# Patient Record
Sex: MALE | Race: WHITE | NOT HISPANIC OR LATINO | Employment: OTHER | URBAN - METROPOLITAN AREA
[De-identification: names, ages, dates, MRNs, and addresses within clinical notes are randomized per-mention and may not be internally consistent; named-entity substitution may affect disease eponyms.]

---

## 2017-01-24 ENCOUNTER — ALLSCRIPTS OFFICE VISIT (OUTPATIENT)
Dept: OTHER | Facility: OTHER | Age: 57
End: 2017-01-24

## 2017-01-24 DIAGNOSIS — Z13.6 ENCOUNTER FOR SCREENING FOR CARDIOVASCULAR DISORDERS: ICD-10-CM

## 2017-01-24 DIAGNOSIS — G40.909 EPILEPSY WITHOUT STATUS EPILEPTICUS, NOT INTRACTABLE (HCC): ICD-10-CM

## 2017-01-24 DIAGNOSIS — M15.9 POLYOSTEOARTHRITIS: ICD-10-CM

## 2017-01-24 DIAGNOSIS — Z12.5 ENCOUNTER FOR SCREENING FOR MALIGNANT NEOPLASM OF PROSTATE: ICD-10-CM

## 2017-01-26 ENCOUNTER — APPOINTMENT (OUTPATIENT)
Dept: LAB | Facility: HOSPITAL | Age: 57
End: 2017-01-26
Attending: PSYCHIATRY & NEUROLOGY
Payer: COMMERCIAL

## 2017-01-26 ENCOUNTER — TRANSCRIBE ORDERS (OUTPATIENT)
Dept: LAB | Facility: HOSPITAL | Age: 57
End: 2017-01-26

## 2017-01-26 DIAGNOSIS — G40.909 EPILEPSY WITHOUT STATUS EPILEPTICUS, NOT INTRACTABLE (HCC): ICD-10-CM

## 2017-01-26 LAB
ALBUMIN SERPL BCP-MCNC: 4 G/DL (ref 3.5–5)
ALP SERPL-CCNC: 67 U/L (ref 46–116)
ALT SERPL W P-5'-P-CCNC: 32 U/L (ref 12–78)
ANION GAP SERPL CALCULATED.3IONS-SCNC: 9 MMOL/L (ref 4–13)
AST SERPL W P-5'-P-CCNC: 18 U/L (ref 5–45)
BASOPHILS # BLD AUTO: 0.04 THOUSANDS/ΜL (ref 0–0.1)
BASOPHILS NFR BLD AUTO: 1 % (ref 0–1)
BILIRUB SERPL-MCNC: 0.27 MG/DL (ref 0.2–1)
BUN SERPL-MCNC: 8 MG/DL (ref 5–25)
CALCIUM SERPL-MCNC: 8.4 MG/DL (ref 8.3–10.1)
CARBAMAZEPINE SERPL-MCNC: 12.6 UG/ML (ref 4–12)
CHLORIDE SERPL-SCNC: 97 MMOL/L (ref 100–108)
CO2 SERPL-SCNC: 28 MMOL/L (ref 21–32)
CREAT SERPL-MCNC: 0.69 MG/DL (ref 0.6–1.3)
EOSINOPHIL # BLD AUTO: 0.12 THOUSAND/ΜL (ref 0–0.61)
EOSINOPHIL NFR BLD AUTO: 2 % (ref 0–6)
ERYTHROCYTE [DISTWIDTH] IN BLOOD BY AUTOMATED COUNT: 11.4 % (ref 11.6–15.1)
GFR SERPL CREATININE-BSD FRML MDRD: >60 ML/MIN/1.73SQ M
GLUCOSE SERPL-MCNC: 70 MG/DL (ref 65–140)
HCT VFR BLD AUTO: 40.8 % (ref 36.5–49.3)
HGB BLD-MCNC: 14.3 G/DL (ref 12–17)
LYMPHOCYTES # BLD AUTO: 2.53 THOUSANDS/ΜL (ref 0.6–4.47)
LYMPHOCYTES NFR BLD AUTO: 39 % (ref 14–44)
MCH RBC QN AUTO: 33.5 PG (ref 26.8–34.3)
MCHC RBC AUTO-ENTMCNC: 35 G/DL (ref 31.4–37.4)
MCV RBC AUTO: 96 FL (ref 82–98)
MONOCYTES # BLD AUTO: 0.54 THOUSAND/ΜL (ref 0.17–1.22)
MONOCYTES NFR BLD AUTO: 8 % (ref 4–12)
NEUTROPHILS # BLD AUTO: 3.23 THOUSANDS/ΜL (ref 1.85–7.62)
NEUTS SEG NFR BLD AUTO: 50 % (ref 43–75)
NRBC BLD AUTO-RTO: 0 /100 WBCS
PLATELET # BLD AUTO: 236 THOUSANDS/UL (ref 149–390)
PMV BLD AUTO: 9.1 FL (ref 8.9–12.7)
POTASSIUM SERPL-SCNC: 4 MMOL/L (ref 3.5–5.3)
PROT SERPL-MCNC: 7.1 G/DL (ref 6.4–8.2)
RBC # BLD AUTO: 4.27 MILLION/UL (ref 3.88–5.62)
SODIUM SERPL-SCNC: 134 MMOL/L (ref 136–145)
WBC # BLD AUTO: 6.47 THOUSAND/UL (ref 4.31–10.16)

## 2017-01-26 PROCEDURE — 80156 ASSAY CARBAMAZEPINE TOTAL: CPT

## 2017-01-26 PROCEDURE — 36415 COLL VENOUS BLD VENIPUNCTURE: CPT

## 2017-01-26 PROCEDURE — 80053 COMPREHEN METABOLIC PANEL: CPT

## 2017-01-26 PROCEDURE — 85025 COMPLETE CBC W/AUTO DIFF WBC: CPT

## 2017-02-20 ENCOUNTER — TRANSCRIBE ORDERS (OUTPATIENT)
Dept: LAB | Facility: HOSPITAL | Age: 57
End: 2017-02-20

## 2017-02-20 ENCOUNTER — APPOINTMENT (OUTPATIENT)
Dept: LAB | Facility: HOSPITAL | Age: 57
End: 2017-02-20
Payer: COMMERCIAL

## 2017-02-20 ENCOUNTER — GENERIC CONVERSION - ENCOUNTER (OUTPATIENT)
Dept: OTHER | Facility: OTHER | Age: 57
End: 2017-02-20

## 2017-02-20 DIAGNOSIS — Z13.6 ENCOUNTER FOR SCREENING FOR CARDIOVASCULAR DISORDERS: ICD-10-CM

## 2017-02-20 DIAGNOSIS — G40.909 EPILEPSY WITHOUT STATUS EPILEPTICUS, NOT INTRACTABLE (HCC): ICD-10-CM

## 2017-02-20 DIAGNOSIS — M15.9 POLYOSTEOARTHRITIS: ICD-10-CM

## 2017-02-20 DIAGNOSIS — Z12.5 ENCOUNTER FOR SCREENING FOR MALIGNANT NEOPLASM OF PROSTATE: ICD-10-CM

## 2017-02-20 LAB
ALBUMIN SERPL BCP-MCNC: 3.9 G/DL (ref 3.5–5)
ALP SERPL-CCNC: 68 U/L (ref 46–116)
ALT SERPL W P-5'-P-CCNC: 32 U/L (ref 12–78)
ANION GAP SERPL CALCULATED.3IONS-SCNC: 8 MMOL/L (ref 4–13)
AST SERPL W P-5'-P-CCNC: 17 U/L (ref 5–45)
BASOPHILS # BLD AUTO: 0.03 THOUSANDS/ΜL (ref 0–0.1)
BASOPHILS NFR BLD AUTO: 1 % (ref 0–1)
BILIRUB SERPL-MCNC: 0.52 MG/DL (ref 0.2–1)
BUN SERPL-MCNC: 9 MG/DL (ref 5–25)
CALCIUM SERPL-MCNC: 8.4 MG/DL (ref 8.3–10.1)
CHLORIDE SERPL-SCNC: 101 MMOL/L (ref 100–108)
CHOLEST SERPL-MCNC: 210 MG/DL (ref 50–200)
CO2 SERPL-SCNC: 28 MMOL/L (ref 21–32)
CREAT SERPL-MCNC: 0.74 MG/DL (ref 0.6–1.3)
EOSINOPHIL # BLD AUTO: 0.09 THOUSAND/ΜL (ref 0–0.61)
EOSINOPHIL NFR BLD AUTO: 2 % (ref 0–6)
ERYTHROCYTE [DISTWIDTH] IN BLOOD BY AUTOMATED COUNT: 11.5 % (ref 11.6–15.1)
GFR SERPL CREATININE-BSD FRML MDRD: >60 ML/MIN/1.73SQ M
GLUCOSE SERPL-MCNC: 88 MG/DL (ref 65–140)
HCT VFR BLD AUTO: 42.9 % (ref 36.5–49.3)
HDLC SERPL-MCNC: 93 MG/DL (ref 40–60)
HGB BLD-MCNC: 15 G/DL (ref 12–17)
LDLC SERPL CALC-MCNC: 87 MG/DL (ref 0–100)
LYMPHOCYTES # BLD AUTO: 1.93 THOUSANDS/ΜL (ref 0.6–4.47)
LYMPHOCYTES NFR BLD AUTO: 32 % (ref 14–44)
MCH RBC QN AUTO: 34 PG (ref 26.8–34.3)
MCHC RBC AUTO-ENTMCNC: 35 G/DL (ref 31.4–37.4)
MCV RBC AUTO: 97 FL (ref 82–98)
MONOCYTES # BLD AUTO: 0.45 THOUSAND/ΜL (ref 0.17–1.22)
MONOCYTES NFR BLD AUTO: 8 % (ref 4–12)
NEUTROPHILS # BLD AUTO: 3.49 THOUSANDS/ΜL (ref 1.85–7.62)
NEUTS SEG NFR BLD AUTO: 57 % (ref 43–75)
NRBC BLD AUTO-RTO: 0 /100 WBCS
PLATELET # BLD AUTO: 236 THOUSANDS/UL (ref 149–390)
PMV BLD AUTO: 9.1 FL (ref 8.9–12.7)
POTASSIUM SERPL-SCNC: 4.1 MMOL/L (ref 3.5–5.3)
PROT SERPL-MCNC: 7.2 G/DL (ref 6.4–8.2)
PSA SERPL-MCNC: 0.6 NG/ML (ref 0–4)
RBC # BLD AUTO: 4.41 MILLION/UL (ref 3.88–5.62)
SODIUM SERPL-SCNC: 137 MMOL/L (ref 136–145)
TRIGL SERPL-MCNC: 149 MG/DL
TSH SERPL DL<=0.05 MIU/L-ACNC: 1.01 UIU/ML (ref 0.36–3.74)
WBC # BLD AUTO: 6 THOUSAND/UL (ref 4.31–10.16)

## 2017-02-20 PROCEDURE — 36415 COLL VENOUS BLD VENIPUNCTURE: CPT

## 2017-02-20 PROCEDURE — 80061 LIPID PANEL: CPT

## 2017-02-20 PROCEDURE — 84443 ASSAY THYROID STIM HORMONE: CPT

## 2017-02-20 PROCEDURE — G0103 PSA SCREENING: HCPCS

## 2017-02-20 PROCEDURE — 80053 COMPREHEN METABOLIC PANEL: CPT

## 2017-02-20 PROCEDURE — 85025 COMPLETE CBC W/AUTO DIFF WBC: CPT

## 2017-07-25 ENCOUNTER — ALLSCRIPTS OFFICE VISIT (OUTPATIENT)
Dept: OTHER | Facility: OTHER | Age: 57
End: 2017-07-25

## 2017-10-05 ENCOUNTER — ALLSCRIPTS OFFICE VISIT (OUTPATIENT)
Dept: OTHER | Facility: OTHER | Age: 57
End: 2017-10-05
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
## 2017-10-25 ENCOUNTER — ALLSCRIPTS OFFICE VISIT (OUTPATIENT)
Dept: OTHER | Facility: OTHER | Age: 57
End: 2017-10-25

## 2017-10-25 DIAGNOSIS — G40.909 EPILEPSY WITHOUT STATUS EPILEPTICUS, NOT INTRACTABLE (HCC): ICD-10-CM
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
## 2017-12-18 ENCOUNTER — ALLSCRIPTS OFFICE VISIT (OUTPATIENT)
Dept: OTHER | Facility: OTHER | Age: 57
End: 2017-12-18
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
## 2017-12-30 ENCOUNTER — APPOINTMENT (EMERGENCY)
Dept: RADIOLOGY | Facility: HOSPITAL | Age: 57
End: 2017-12-30
Payer: COMMERCIAL

## 2017-12-30 ENCOUNTER — HOSPITAL ENCOUNTER (EMERGENCY)
Facility: HOSPITAL | Age: 57
Discharge: HOME | End: 2017-12-30
Admitting: EMERGENCY MEDICINE
Payer: COMMERCIAL

## 2017-12-30 VITALS
SYSTOLIC BLOOD PRESSURE: 142 MMHG | HEART RATE: 92 BPM | RESPIRATION RATE: 18 BRPM | TEMPERATURE: 98.2 F | WEIGHT: 165 LBS | DIASTOLIC BLOOD PRESSURE: 85 MMHG | OXYGEN SATURATION: 99 %

## 2017-12-30 DIAGNOSIS — S52.021A CLOSED FRACTURE OF RIGHT OLECRANON PROCESS, INITIAL ENCOUNTER: Primary | ICD-10-CM

## 2017-12-30 PROCEDURE — 73080 X-RAY EXAM OF ELBOW: CPT

## 2017-12-30 PROCEDURE — 99283 EMERGENCY DEPT VISIT LOW MDM: CPT

## 2017-12-30 RX ORDER — LORAZEPAM 0.5 MG/1
0.5 TABLET ORAL EVERY 8 HOURS PRN
COMMUNITY
End: 2018-05-07 | Stop reason: SDUPTHER

## 2017-12-30 RX ORDER — MELATONIN
1000 DAILY
COMMUNITY
End: 2017-12-31 | Stop reason: ALTCHOICE

## 2017-12-30 RX ORDER — CARBAMAZEPINE 200 MG/1
TABLET ORAL
COMMUNITY
Start: 2015-06-11 | End: 2018-03-28 | Stop reason: SDUPTHER

## 2017-12-30 RX ORDER — DIPHENOXYLATE HYDROCHLORIDE AND ATROPINE SULFATE 2.5; .025 MG/1; MG/1
1 TABLET ORAL DAILY
COMMUNITY
End: 2017-12-31 | Stop reason: ALTCHOICE

## 2017-12-30 RX ORDER — SILDENAFIL 50 MG/1
50 TABLET, FILM COATED ORAL DAILY PRN
COMMUNITY
End: 2017-12-31 | Stop reason: ALTCHOICE

## 2017-12-30 RX ORDER — ASPIRIN 81 MG/1
81 TABLET ORAL DAILY
COMMUNITY
End: 2018-06-25 | Stop reason: ALTCHOICE

## 2017-12-30 NOTE — DISCHARGE INSTRUCTIONS
Elbow Fracture   WHAT YOU NEED TO KNOW:   An elbow fracture is a break in one or more of the 3 bones that form your elbow joint  An elbow fracture is often caused by an injury  An example is a fall onto an outstretched hand with a bent elbow  Osteoporosis (brittle bones) can increase your risk for an elbow fracture  DISCHARGE INSTRUCTIONS:   Return to the emergency department if:   · Your skin becomes swollen, cold, or pale  · Your elbow, hand, or fingers are numb  Contact your healthcare provider if:   · You have a fever  · The pain gets worse, even after you rest and take your medicine  · You have new or more trouble moving your arm  · You have new sores around the area of your brace, splint, or cast     · Your brace, splint, or cast becomes damaged  · You have questions or concerns about your condition or care  Medicines: You may need any of the following:  · Prescription pain medicine  may be given  Ask your healthcare provider how to take this medicine safely  Some prescription pain medicines contain acetaminophen  Do not take other medicines that contain acetaminophen without talking to your healthcare provider  Too much acetaminophen may cause liver damage  Prescription pain medicine may cause constipation  Ask your healthcare provider how to prevent or treat constipation  · NSAIDs , such as ibuprofen, help decrease swelling, pain, and fever  This medicine is available with or without a doctor's order  NSAIDs can cause stomach bleeding or kidney problems in certain people  If you take blood thinner medicine, always ask your healthcare provider if NSAIDs are safe for you  Always read the medicine label and follow directions  · Take your medicine as directed  Contact your healthcare provider if you think your medicine is not helping or if you have side effects  Tell him or her if you are allergic to any medicine  Keep a list of the medicines, vitamins, and herbs you take   Include the amounts, and when and why you take them  Bring the list or the pill bottles to follow-up visits  Carry your medicine list with you in case of an emergency  Self-care:   · Elevate your elbow  above the level of your heart as often as you can  This will help decrease swelling and pain  Prop your elbow on pillows or blankets to keep it elevated comfortably  While your elbow is elevated, wiggle your fingers and open and close them to prevent hand stiffness  · Apply ice  on your elbow on your elbow for 15 to 20 minutes every hour or as directed  Use an ice pack, or put crushed ice in a plastic bag  Cover it with a towel  Ice helps prevent tissue damage and decreases swelling and pain  · Go to physical therapy as directed  A physical therapist can teach you exercises to help improve movement and strength and to decrease pain  Care for your brace, cast, or splint:  Follow instructions about when you may take a bath or shower  It is important not to get your brace, cast, or splint wet  Cover your device with a plastic bag before you bathe  Tape the bag to your skin above the device to help keep out water  Hold your elbow away from the water in case the bag breaks  · Check the skin around your cast, brace, or splint daily for any redness or open skin  · Do not use a sharp or pointed object to scratch your skin under the cast, brace, or splint  · Do not remove your brace or splint unless directed  Follow up with your healthcare provider as directed: You may need to see a specialist  Prakashstew Janae may need more x-rays and treatment  Write down your questions so you remember to ask them during your visits  © 2017 [employer] [employer] Information is for End User's use only and may not be sold, redistributed or otherwise used for commercial purposes  All illustrations and images included in CareNotes® are the copyrighted property of A D A M [employer]  or Abdirizak Navas    The above information is an  only  It is not intended as medical advice for individual conditions or treatments  Talk to your doctor, nurse or pharmacist before following any medical regimen to see if it is safe and effective for you

## 2017-12-30 NOTE — ED NOTES
Pt comes out to nurses station and states "who do I talk to about medication  I need 200mg of Tegratol   I need 2 pills "      Berta Dailey, RN  12/30/17 6504

## 2017-12-30 NOTE — ED PROVIDER NOTES
History  Chief Complaint   Patient presents with    Elbow Injury     Patient reports falling in driveway while shoveling, injuring R elbow  This is a 77-year-old male, history of seizures, presents for evaluation of right elbow injury that happened earlier today  Patient reports that he was walking down his driveway, slipped and injured his elbow  Patient states her crackling sounds when he got up  States he has minimal pain however noticed a lot of swelling to the area  Patient has not taken anything for pain nor does he want anything for pain  Patient states that he is able to fully range his hand however has minimal pain with extension  Denies any fever, chills, numbness, tingling, radiation of pain  States that he was able to drive here  Patient is right-hand dominant  Prior to Admission Medications   Prescriptions Last Dose Informant Patient Reported? Taking? LORazepam (ATIVAN) 0 5 mg tablet   Yes Yes   Sig: Take 0 5 mg by mouth every 8 (eight) hours as needed for anxiety   aspirin (ECOTRIN LOW STRENGTH) 81 mg EC tablet   Yes Yes   Sig: Take 81 mg by mouth daily   carBAMazepine (TEGRETOL) 200 mg tablet   Yes Yes   Sig: Take by mouth   cholecalciferol (VITAMIN D3) 1,000 units tablet   Yes Yes   Sig: Take 1,000 Units by mouth daily   co-enzyme Q-10 30 MG capsule   Yes Yes   Sig: Take 100 mg by mouth 3 (three) times a day   multivitamin (THERAGRAN) TABS   Yes Yes   Sig: Take 1 tablet by mouth daily   sildenafil (VIAGRA) 50 MG tablet   Yes Yes   Sig: Take 50 mg by mouth daily as needed for erectile dysfunction      Facility-Administered Medications: None       Past Medical History:   Diagnosis Date    Seizures ([facility] [address] )        History reviewed  No pertinent surgical history  History reviewed  No pertinent family history  I have reviewed and agree with the history as documented      Social History   Substance Use Topics    Smoking status: Never Smoker    Smokeless tobacco: Never Used  Alcohol use Yes      Comment: occasional        Review of Systems   Constitutional: Negative for chills and fever  Gastrointestinal: Negative for nausea and vomiting  Musculoskeletal: Positive for arthralgias and joint swelling  Skin: Negative  Neurological: Negative for weakness and numbness  Physical Exam  ED Triage Vitals [12/30/17 1208]   Temperature Pulse Respirations Blood Pressure SpO2   98 2 °F (36 8 °C) 92 18 142/85 99 %      Temp Source Heart Rate Source Patient Position - Orthostatic VS BP Location FiO2 (%)   Oral Monitor Sitting Right arm --      Pain Score       3           Orthostatic Vital Signs  Vitals:    12/30/17 1208   BP: 142/85   Pulse: 92   Patient Position - Orthostatic VS: Sitting       Physical Exam   Constitutional: He is oriented to person, place, and time  He appears well-developed and well-nourished  He is cooperative  No distress  HENT:   Head: Normocephalic and atraumatic  Neck: Normal range of motion  Neck supple  Cardiovascular: Normal rate and normal heart sounds  No murmur heard  Pulmonary/Chest: Effort normal and breath sounds normal    Musculoskeletal: Normal range of motion  He exhibits tenderness and deformity  He exhibits no edema  Right elbow: He exhibits swelling and effusion  He exhibits normal range of motion, no deformity and no laceration  Tenderness found  Lateral epicondyle and olecranon process tenderness noted  No radial head and no medial epicondyle tenderness noted  Swelling noted over the olecranon, no erythema or ecchymosis  Full active range of motion, 5/5 strength  Radial pulse 2 +, capillary refill less than 2 seconds, sensation intact  Soft musculature  No signs of compartment syndrome  Neurological: He is alert and oriented to person, place, and time  Skin: Skin is warm  Capillary refill takes less than 2 seconds  No rash noted  He is not diaphoretic  No erythema  Psychiatric: He has a normal mood and affect  ED Medications  Medications - No data to display    Diagnostic Studies  Results Reviewed     None                 XR elbow 3+ views RIGHT    (Results Pending)              Procedures  Static Splint Application  Date/Time: 12/30/2017 2:00 PM  Performed by: Phil Adames  Authorized by: Phil Adames     Patient location:  ED  Procedure performed by emergency physician: No    Other Assisting Provider: Yes (comment) (ED nurse)    Consent:     Consent obtained:  Verbal    Consent given by:  Patient    Risks discussed:  Discoloration, numbness, pain and swelling  Universal protocol:     Patient identity confirmed:  Verbally with patient  Indication:     Indications: fracture    Pre-procedure details:     Sensation:  Normal  Procedure details:     Laterality:  Right    Location:  Elbow    Elbow:  R elbow    Splint type:  Long arm (Posterior long-arm splint)  Post-procedure details:     Pain:  Unchanged    Sensation:  Normal    Neurovascular Exam: skin pink      Patient tolerance of procedure: Tolerated well, no immediate complications           Phone Contacts  ED Phone Contact    ED Course  ED Course as of Dec 30 1423   Sat Dec 30, 2017   1357 Spoke with Ju Jewell, orthopedic resident, states to apply posterior splint keep hand at a 45-90 degree angle and apply sling  Follow up with Orthopedics in 1 week with Dr Yue Akins                                [facility]  Number of Diagnoses or Management Options  Closed fracture of right olecranon process, initial encounter:   Diagnosis management comments: This is a 66-year-old male presents for evaluation of right elbow injury after a fall earlier today  Patient is well-appearing, vital signs not concerning  X-ray results in fractures  Talked to Orthopedics on the phone who advised to place a posterior splint and have him follow up in 1 week  Return precautions given  Patient understands and agrees to plan      CritCare Time    Disposition  Final diagnoses:   Closed fracture of right olecranon process, initial encounter     Time reflects when diagnosis was documented in both MDM as applicable and the Disposition within this note     Time User Action Codes Description Comment    12/30/2017  1:58 PM Caden Johnson Add [K81 021A] Closed fracture of right olecranon process, initial encounter       ED Disposition     ED Disposition Condition Comment    Discharge  [address] discharge to home/self care  Condition at discharge: Good        Follow-up Information     Follow up With Specialties Details Why Contact Info    Ruperto Tovar MD Orthopedic Surgery Go in 1 week Make an appointment to see Dr Geovanni Haley in 1 week  [address]  [phone]          Patient's Medications   Discharge Prescriptions    No medications on file     No discharge procedures on file      ED Provider  Electronically Signed by           Chely Nair PA-C  12/30/17 4652

## 2017-12-31 ENCOUNTER — HOSPITAL ENCOUNTER (EMERGENCY)
Facility: HOSPITAL | Age: 57
Discharge: HOME | End: 2017-12-31
Attending: EMERGENCY MEDICINE | Admitting: EMERGENCY MEDICINE
Payer: COMMERCIAL

## 2017-12-31 VITALS
BODY MASS INDEX: 23.62 KG/M2 | OXYGEN SATURATION: 99 % | HEART RATE: 100 BPM | HEIGHT: 70 IN | RESPIRATION RATE: 18 BRPM | WEIGHT: 165 LBS | TEMPERATURE: 98 F | SYSTOLIC BLOOD PRESSURE: 151 MMHG | DIASTOLIC BLOOD PRESSURE: 72 MMHG

## 2017-12-31 DIAGNOSIS — S42.401A CLOSED FRACTURE OF RIGHT ELBOW: Primary | ICD-10-CM

## 2017-12-31 PROCEDURE — 99283 EMERGENCY DEPT VISIT LOW MDM: CPT

## 2017-12-31 NOTE — ED ATTENDING ATTESTATION
Carina Verdugo MD, saw and evaluated the patient  I have discussed the patient with the resident/non-physician practitioner and agree with the resident's/non-physician practitioner's findings, Plan of Care, and MDM as documented in the resident's/non-physician practitioner's note, except where noted  All available labs and Radiology studies were reviewed  At this point I agree with the current assessment done in the Emergency Department  I have conducted an independent evaluation of this patient a history and physical is as follows: The patient suffered an olecranon fracture yesterday he was seen in the ER    The patient was placed in a posterior  long-arm splint his complaint today is swelling of his hand he has no pain or numbness on exam the splint appears to be somewhat too tight at the wrist area there is some mild edema of the dorsal aspect of the hand pulses are normal sensation is normal motor strength intact the splint was taken down and a new splint was placed good capillary refill neurovascular status status post new splint application intact     Critical Care Time  CritCare Time    Procedures

## 2018-01-02 ENCOUNTER — GENERIC CONVERSION - ENCOUNTER (OUTPATIENT)
Dept: FAMILY MEDICINE CLINIC | Facility: CLINIC | Age: 58
End: 2018-01-02

## 2018-01-03 ENCOUNTER — APPOINTMENT (OUTPATIENT)
Dept: LAB | Facility: HOSPITAL | Age: 58
End: 2018-01-03
Attending: PSYCHIATRY & NEUROLOGY
Payer: COMMERCIAL

## 2018-01-03 ENCOUNTER — TRANSCRIBE ORDERS (OUTPATIENT)
Dept: LAB | Facility: HOSPITAL | Age: 58
End: 2018-01-03

## 2018-01-03 DIAGNOSIS — G40.909 EPILEPSY WITHOUT STATUS EPILEPTICUS, NOT INTRACTABLE (HCC): ICD-10-CM

## 2018-01-03 LAB
ALBUMIN SERPL BCP-MCNC: 3.9 G/DL (ref 3.5–5)
ALP SERPL-CCNC: 72 U/L (ref 46–116)
ALT SERPL W P-5'-P-CCNC: 30 U/L (ref 12–78)
ANION GAP SERPL CALCULATED.3IONS-SCNC: 5 MMOL/L (ref 4–13)
AST SERPL W P-5'-P-CCNC: 19 U/L (ref 5–45)
BASOPHILS # BLD AUTO: 0.04 THOUSANDS/ΜL (ref 0–0.1)
BASOPHILS NFR BLD AUTO: 1 % (ref 0–1)
BILIRUB SERPL-MCNC: 0.43 MG/DL (ref 0.2–1)
BUN SERPL-MCNC: 11 MG/DL (ref 5–25)
CALCIUM SERPL-MCNC: 9.1 MG/DL (ref 8.3–10.1)
CARBAMAZEPINE SERPL-MCNC: 13.9 UG/ML (ref 4–12)
CHLORIDE SERPL-SCNC: 104 MMOL/L (ref 100–108)
CO2 SERPL-SCNC: 29 MMOL/L (ref 21–32)
CREAT SERPL-MCNC: 0.69 MG/DL (ref 0.6–1.3)
EOSINOPHIL # BLD AUTO: 0.14 THOUSAND/ΜL (ref 0–0.61)
EOSINOPHIL NFR BLD AUTO: 2 % (ref 0–6)
ERYTHROCYTE [DISTWIDTH] IN BLOOD BY AUTOMATED COUNT: 11.5 % (ref 11.6–15.1)
GFR SERPL CREATININE-BSD FRML MDRD: 105 ML/MIN/1.73SQ M
GLUCOSE SERPL-MCNC: 62 MG/DL (ref 65–140)
HCT VFR BLD AUTO: 42.7 % (ref 36.5–49.3)
HGB BLD-MCNC: 14.7 G/DL (ref 12–17)
LYMPHOCYTES # BLD AUTO: 1.9 THOUSANDS/ΜL (ref 0.6–4.47)
LYMPHOCYTES NFR BLD AUTO: 23 % (ref 14–44)
MCH RBC QN AUTO: 33.9 PG (ref 26.8–34.3)
MCHC RBC AUTO-ENTMCNC: 34.4 G/DL (ref 31.4–37.4)
MCV RBC AUTO: 99 FL (ref 82–98)
MONOCYTES # BLD AUTO: 0.73 THOUSAND/ΜL (ref 0.17–1.22)
MONOCYTES NFR BLD AUTO: 9 % (ref 4–12)
NEUTROPHILS # BLD AUTO: 5.28 THOUSANDS/ΜL (ref 1.85–7.62)
NEUTS SEG NFR BLD AUTO: 65 % (ref 43–75)
NRBC BLD AUTO-RTO: 0 /100 WBCS
PLATELET # BLD AUTO: 276 THOUSANDS/UL (ref 149–390)
PMV BLD AUTO: 9.4 FL (ref 8.9–12.7)
POTASSIUM SERPL-SCNC: 3.9 MMOL/L (ref 3.5–5.3)
PROT SERPL-MCNC: 7.5 G/DL (ref 6.4–8.2)
RBC # BLD AUTO: 4.33 MILLION/UL (ref 3.88–5.62)
SODIUM SERPL-SCNC: 138 MMOL/L (ref 136–145)
WBC # BLD AUTO: 8.11 THOUSAND/UL (ref 4.31–10.16)

## 2018-01-03 PROCEDURE — 80156 ASSAY CARBAMAZEPINE TOTAL: CPT

## 2018-01-03 PROCEDURE — 36415 COLL VENOUS BLD VENIPUNCTURE: CPT

## 2018-01-03 PROCEDURE — 85025 COMPLETE CBC W/AUTO DIFF WBC: CPT

## 2018-01-03 PROCEDURE — 80053 COMPREHEN METABOLIC PANEL: CPT

## 2018-01-04 DIAGNOSIS — R25.1 TREMOR: ICD-10-CM

## 2018-01-08 ENCOUNTER — GENERIC CONVERSION - ENCOUNTER (OUTPATIENT)
Dept: FAMILY MEDICINE CLINIC | Facility: CLINIC | Age: 58
End: 2018-01-08

## 2018-01-13 VITALS
WEIGHT: 164 LBS | TEMPERATURE: 97.3 F | BODY MASS INDEX: 22.96 KG/M2 | HEIGHT: 71 IN | HEART RATE: 80 BPM | SYSTOLIC BLOOD PRESSURE: 120 MMHG | DIASTOLIC BLOOD PRESSURE: 90 MMHG | RESPIRATION RATE: 16 BRPM

## 2018-01-13 VITALS
HEIGHT: 71 IN | HEART RATE: 88 BPM | BODY MASS INDEX: 22.54 KG/M2 | WEIGHT: 161 LBS | DIASTOLIC BLOOD PRESSURE: 74 MMHG | RESPIRATION RATE: 16 BRPM | SYSTOLIC BLOOD PRESSURE: 126 MMHG

## 2018-01-14 VITALS
BODY MASS INDEX: 23.71 KG/M2 | HEART RATE: 76 BPM | HEIGHT: 71 IN | SYSTOLIC BLOOD PRESSURE: 120 MMHG | DIASTOLIC BLOOD PRESSURE: 82 MMHG | WEIGHT: 169.38 LBS | RESPIRATION RATE: 16 BRPM | TEMPERATURE: 98.5 F

## 2018-01-14 VITALS
HEART RATE: 92 BPM | WEIGHT: 161 LBS | DIASTOLIC BLOOD PRESSURE: 90 MMHG | SYSTOLIC BLOOD PRESSURE: 134 MMHG | TEMPERATURE: 97.9 F | HEIGHT: 71 IN | RESPIRATION RATE: 16 BRPM | BODY MASS INDEX: 22.54 KG/M2

## 2018-01-15 DIAGNOSIS — M15.9 POLYOSTEOARTHRITIS: ICD-10-CM

## 2018-01-15 DIAGNOSIS — Z13.6 ENCOUNTER FOR SCREENING FOR CARDIOVASCULAR DISORDERS: ICD-10-CM

## 2018-01-15 DIAGNOSIS — Z12.5 ENCOUNTER FOR SCREENING FOR MALIGNANT NEOPLASM OF PROSTATE: ICD-10-CM

## 2018-01-15 DIAGNOSIS — G40.909 EPILEPSY WITHOUT STATUS EPILEPTICUS, NOT INTRACTABLE (HCC): ICD-10-CM

## 2018-01-18 NOTE — RESULT NOTES
Message   Call patient  Blood work - WNL  Verified Results  (1) CBC/PLT/DIFF [id number] 09:18AM Cammy Pugh   TW Order Number: [id number]     Test Name Result Flag Reference   WBC COUNT 6 00 Thousand/uL  4 31-10 16   RBC COUNT 4 41 Million/uL  3 88-5 62   HEMOGLOBIN 15 0 g/dL  12 0-17 0   HEMATOCRIT 42 9 %  36 5-49 3   MCV 97 fL  82-98   MCH 34 0 pg  26 8-34 3   MCHC 35 0 g/dL  31 4-37 4   RDW 11 5 % L 11 6-15 1   MPV 9 1 fL  8 9-12 7   PLATELET COUNT 081 Thousands/uL  149-390   nRBC AUTOMATED 0 /100 WBCs     NEUTROPHILS RELATIVE PERCENT 57 %  43-75   LYMPHOCYTES RELATIVE PERCENT 32 %  14-44   MONOCYTES RELATIVE PERCENT 8 %  4-12   EOSINOPHILS RELATIVE PERCENT 2 %  0-6   BASOPHILS RELATIVE PERCENT 1 %  0-1   NEUTROPHILS ABSOLUTE COUNT 3 49 Thousands/? ??L  1 85-7 62   LYMPHOCYTES ABSOLUTE COUNT 1 93 Thousands/? ??L  0 60-4 47   MONOCYTES ABSOLUTE COUNT 0 45 Thousand/? ??L  0 17-1 22   EOSINOPHILS ABSOLUTE COUNT 0 09 Thousand/? ??L  0 00-0 61   BASOPHILS ABSOLUTE COUNT 0 03 Thousands/? ??L  0 00-0 10   - Patient Instructions: This bloodwork is non-fasting  Please drink two glasses of water morning of bloodwork  - Patient Instructions: This bloodwork is non-fasting  Please drink two glasses of water morning of bloodwork  (1) COMPREHENSIVE METABOLIC PANEL [id number] 52:93MR Cammy Pugh   TW Order Number: [id number]     Test Name Result Flag Reference   GLUCOSE,RANDM 88 mg/dL     If the patient is fasting, the ADA then defines impaired fasting glucose as > 100 mg/dL and diabetes as > or equal to 123 mg/dL     SODIUM 137 mmol/L  136-145   POTASSIUM 4 1 mmol/L  3 5-5 3   CHLORIDE 101 mmol/L  100-108   CARBON DIOXIDE 28 mmol/L  21-32   ANION GAP (CALC) 8 mmol/L  4-13   BLOOD UREA NITROGEN 9 mg/dL  5-25   CREATININE 0 74 mg/dL  0 60-1 30   Standardized to IDMS reference method   CALCIUM 8 4 mg/dL  8 3-10 1   BILI, TOTAL 0 52 mg/dL  0 20-1 00   ALK PHOSPHATAS 68 U/L   ALT (SGPT) 32 U/L  12-78   AST(SGOT) 17 U/L  5-45   ALBUMIN 3 9 g/dL  3 5-5 0   TOTAL PROTEIN 7 2 g/dL  6 4-8 2   eGFR Non-African American      >60 0 ml/min/1 73sq m   - Patient Instructions: This is a fasting blood test  Water,black tea or black  coffee only after 9:00pm the night before test Drink 2 glasses of water the morning of test - Patient Instructions: This bloodwork is non-fasting  Please drink two glasses of   water morning of bloodwork  National Kidney Disease Education Program recommendations are as follows:  GFR calculation is accurate only with a steady state creatinine  Chronic Kidney disease less than 60 ml/min/1 73 sq  meters  Kidney failure less than 15 ml/min/1 73 sq  meters  (1) TSH 20Feb2017 09:18AM Arash Goff   TW Order Number: [id number]     Test Name Result Flag Reference   TSH 1 010 uIU/mL  0 358-3 740   - Patient Instructions: This bloodwork is non-fasting  Please drink two glasses of water morning of bloodwork  - Patient Instructions: This is a fasting blood test  Water,black tea or black  coffee only after 9:00pm the night before test Drink 2 glasses of water the morning of test - Patient Instructions: This bloodwork is non-fasting  Please drink two glasses of   water morning of bloodwork  Patients undergoing fluorescein dye angiography may retain small amounts of fluorescein in the body for 48-72 hours post procedure  Samples containing fluorescein can produce falsely depressed TSH values  If the patient had this procedure,a specimen should be resubmitted post fluorescein clearance  (1) LIPID PANEL, FASTING 20Feb2017 09:18AM Arash EASLEY Order Number: [id number]     Test Name Result Flag Reference   CHOLESTEROL 210 mg/dL H    HDL,DIRECT 93 mg/dL H 40-60   Specimen collection should occur prior to Metamizole administration due to the potential for falsely depressed results     LDL CHOLESTEROL CALCULATED 87 mg/dL  0-100   - Patient Instructions: This is a fasting blood test  Water,black tea or black  coffee only after 9:00pm the night before test   Drink 2 glasses of water the morning of test     - Patient Instructions: This is a fasting blood test  Water,black tea or black  coffee only after 9:00pm the night before test Drink 2 glasses of water the morning of test - Patient Instructions: This bloodwork is non-fasting  Please drink two glasses of   water morning of bloodwork  Triglyceride:         Normal              <150 mg/dl       Borderline High    150-199 mg/dl       High               200-499 mg/dl       Very High          >499 mg/dl  Cholesterol:         Desirable        <200 mg/dl      Borderline High  200-239 mg/dl      High             >239 mg/dl  HDL Cholesterol:        High    >59 mg/dL      Low     <41 mg/dL  LDL CALCULATED:    This screening LDL is a calculated result  It does not have the accuracy of the Direct Measured LDL in the monitoring of patients with hyperlipidemia and/or statin therapy  Direct Measure LDL (ELC444) must be ordered separately in these patients  TRIGLYCERIDES 149 mg/dL  <=150   Specimen collection should occur prior to N-Acetylcysteine or Metamizole administration due to the potential for falsely depressed results  (1) PSA (SCREEN) (Dx V76 44 Screen for Prostate Cancer) [id number] 09:18AM Jesusitabeth Rohan    Order Number: [id number]     Test Name Result Flag Reference   PROSTATE SPECIFIC ANTIGEN 0 6 ng/mL  0 0-4 0   American Urological Association Guidelines define biochemical recurrence of prostate cancer as a detectable or rising PSA value post-radical prostatectomy that is greater than or equal to 0 2 ng/mL with a second confirmatory level of greater than or equal to 0 2 ng/mL  - Patient Instructions: This test is non-fasting  Please drink two glasses of water morning of bloodwork  - Patient Instructions: This test is non-fasting   Please drink two glasses of water morning of bloodwork

## 2018-01-22 ENCOUNTER — GENERIC CONVERSION - ENCOUNTER (OUTPATIENT)
Dept: OTHER | Facility: OTHER | Age: 58
End: 2018-01-22

## 2018-01-23 VITALS
HEART RATE: 80 BPM | DIASTOLIC BLOOD PRESSURE: 88 MMHG | SYSTOLIC BLOOD PRESSURE: 130 MMHG | BODY MASS INDEX: 23.1 KG/M2 | RESPIRATION RATE: 16 BRPM | WEIGHT: 165 LBS | TEMPERATURE: 97 F | HEIGHT: 71 IN

## 2018-01-23 NOTE — MISCELLANEOUS
Message  Return to work or school:   Hugo Garay is under my professional care  He was seen in my office on 12/18/2017   He is able to return to work on  12/21/2017       ERNA Solsi /sybil        Signatures   Electronically signed by : ERNA Solis ; Dec 20 2017 12:04PM EST                       (Author)

## 2018-02-28 ENCOUNTER — CONSULT (OUTPATIENT)
Dept: UROLOGY | Facility: AMBULATORY SURGERY CENTER | Age: 58
End: 2018-02-28
Payer: COMMERCIAL

## 2018-02-28 VITALS
DIASTOLIC BLOOD PRESSURE: 80 MMHG | HEIGHT: 71 IN | SYSTOLIC BLOOD PRESSURE: 140 MMHG | WEIGHT: 168.2 LBS | BODY MASS INDEX: 23.55 KG/M2 | HEART RATE: 80 BPM

## 2018-02-28 DIAGNOSIS — N52.9 ORGANIC IMPOTENCE: Primary | ICD-10-CM

## 2018-02-28 PROCEDURE — 99204 OFFICE O/P NEW MOD 45 MIN: CPT | Performed by: UROLOGY

## 2018-02-28 NOTE — PROGRESS NOTES
Assessment/Plan:    Organic impotence  Tri Mix was ordered to a compound pharmacy for the patient  He will get the medication and come back for Tri Mix teaching  We briefly discussed other treatment options including penile prosthesis  Diagnoses and all orders for this visit:    Organic impotence          Total visit time was 60 minutes of which over 50% was spent on counseling  Subjective:     Patient ID: Fidel Coffman is a 62 y o  male    22-year-old male presents for evaluation of erectile dysfunction  The patient has epilepsy and is on large doses of Tegretol  He states ever being on that medication he has had problems with erections  He states his erections are best in the morning when the medicine is almost out of his system  He has problems both obtaining and maintaining erections  He has tried Cialis and Viagra  He has also tried Tri Mix gel which is squirted into the urethra  None of these options have worked well for him  He gets occasional morning erections  He denies any abnormal curvature or pain with erections  He has no other complaints  The following portions of the patient's history were reviewed and updated as appropriate: allergies, current medications, past family history, past medical history, past social history, past surgical history and problem list     Review of Systems   Constitutional: Negative  HENT: Negative  Eyes: Negative  Respiratory: Negative  Cardiovascular: Negative  Gastrointestinal: Negative  Endocrine: Negative  Genitourinary:        As noted per HPI   Musculoskeletal: Negative  Skin: Negative  Allergic/Immunologic: Negative  Neurological: Negative  Hematological: Negative  Psychiatric/Behavioral: Negative  AUA SYMPTOM SCORE    Flowsheet Row Most Recent Value   AUA SYMPTOM SCORE   How often have you had a sensation of not emptying your bladder completely after you finished urinating?   0   How often have you had to urinate again less than two hours after you finished urinating? 0   How often have you found you stopped and started again several times when you urinate?  0   How often have you found it difficult to postpone urination? 0   How often have you had a weak urinary stream?  0   How often have you had to push or strain to begin urination? 0   How many times did you most typically get up to urinate from the time you went to bed at night until the time you got up in the morning?  0   Quality of Life: If you were to spend the rest of your life with your urinary condition just the way it is now, how would you feel about that?  1   AUA SYMPTOM SCORE  0          Urinary Incontinence Screening    Flowsheet Row Most Recent Value   Urinary Incontinence   Urinary Incontinence? No   Incomplete emptying? No   Urinary frequency? No   Urinary urgency? No   Urinary hesitancy? No   Dysuria (painful difficult urination)? No   Nocturia (waking up to use the bathroom)? No   Straining (having to push to go)? No   Weak stream?  No   Intermittent stream?  No   Post void dribbling? Yes          Objective:    Physical Exam   Constitutional: He is oriented to person, place, and time  He appears well-developed and well-nourished  Neck: Normal range of motion  Cardiovascular: Intact distal pulses  Pulmonary/Chest: Effort normal    Abdominal: Soft  Bowel sounds are normal  He exhibits no distension and no mass  There is no tenderness  There is no rebound and no guarding  Musculoskeletal: Normal range of motion  Neurological: He is alert and oriented to person, place, and time  Skin: Skin is warm and dry  Psychiatric: He has a normal mood and affect  Vitals reviewed          Results  Lab Results   Component Value Date    PSA 0 6 02/20/2017    PSA 0 7 06/03/2016    PSA 0 8 12/04/2014     Lab Results   Component Value Date    GLUCOSE 62 (L) 01/03/2018    CALCIUM 9 1 01/03/2018     01/03/2018    K 3 9 01/03/2018    CO2 29 01/03/2018     01/03/2018    BUN 11 01/03/2018    CREATININE 0 69 01/03/2018     Lab Results   Component Value Date    WBC 8 11 01/03/2018    HGB 14 7 01/03/2018    HCT 42 7 01/03/2018    MCV 99 (H) 01/03/2018     01/03/2018       No results found for this or any previous visit (from the past 1 hour(s)) ]

## 2018-02-28 NOTE — LETTER
February 28, 2018     Ivan Drummond, [address]    Patient: Alyssa Carpenter   YOB: 1960   Date of Visit: 2/28/2018       Dear Dr Ruth Castellano:    Thank you for referring Alyssa Carpenter to me for evaluation  Below are my notes for this consultation  If you have questions, please do not hesitate to call me  I look forward to following your patient along with you  Sincerely,        Jason Desai MD        CC: No Recipients  Jason Desai MD  2/28/2018  3:15 PM  Sign at close encounter  Assessment/Plan:    Organic impotence  Tri Mix was ordered to a compound pharmacy for the patient  He will get the medication and come back for Tri Mix teaching  We briefly discussed other treatment options including penile prosthesis  Diagnoses and all orders for this visit:    Organic impotence          Total visit time was 60 minutes of which over 50% was spent on counseling  Subjective:     Patient ID: Alyssa Carpenter is a 62 y o  male    54-year-old male presents for evaluation of erectile dysfunction  The patient has epilepsy and is on large doses of Tegretol  He states ever being on that medication he has had problems with erections  He states his erections are best in the morning when the medicine is almost out of his system  He has problems both obtaining and maintaining erections  He has tried Cialis and Viagra  He has also tried Tri Mix gel which is squirted into the urethra  None of these options have worked well for him  He gets occasional morning erections  He denies any abnormal curvature or pain with erections  He has no other complaints  The following portions of the patient's history were reviewed and updated as appropriate: allergies, current medications, past family history, past medical history, past social history, past surgical history and problem list     Review of Systems   Constitutional: Negative      HENT: Negative  Eyes: Negative  Respiratory: Negative  Cardiovascular: Negative  Gastrointestinal: Negative  Endocrine: Negative  Genitourinary:        As noted per HPI   Musculoskeletal: Negative  Skin: Negative  Allergic/Immunologic: Negative  Neurological: Negative  Hematological: Negative  Psychiatric/Behavioral: Negative  AUA SYMPTOM SCORE    Flowsheet Row Most Recent Value   AUA SYMPTOM SCORE   How often have you had a sensation of not emptying your bladder completely after you finished urinating? 0   How often have you had to urinate again less than two hours after you finished urinating? 0   How often have you found you stopped and started again several times when you urinate?  0   How often have you found it difficult to postpone urination? 0   How often have you had a weak urinary stream?  0   How often have you had to push or strain to begin urination? 0   How many times did you most typically get up to urinate from the time you went to bed at night until the time you got up in the morning?  0   Quality of Life: If you were to spend the rest of your life with your urinary condition just the way it is now, how would you feel about that?  1   AUA SYMPTOM SCORE  0          Urinary Incontinence Screening    Flowsheet Row Most Recent Value   Urinary Incontinence   Urinary Incontinence? No   Incomplete emptying? No   Urinary frequency? No   Urinary urgency? No   Urinary hesitancy? No   Dysuria (painful difficult urination)? No   Nocturia (waking up to use the bathroom)? No   Straining (having to push to go)? No   Weak stream?  No   Intermittent stream?  No   Post void dribbling? Yes          Objective:    Physical Exam   Constitutional: He is oriented to person, place, and time  He appears well-developed and well-nourished  Neck: Normal range of motion  Cardiovascular: Intact distal pulses  Pulmonary/Chest: Effort normal    Abdominal: Soft   Bowel sounds are normal  He exhibits no distension and no mass  There is no tenderness  There is no rebound and no guarding  Musculoskeletal: Normal range of motion  Neurological: He is alert and oriented to person, place, and time  Skin: Skin is warm and dry  Psychiatric: He has a normal mood and affect  Vitals reviewed          Results  Lab Results   Component Value Date    PSA 0 6 02/20/2017    PSA 0 7 06/03/2016    PSA 0 8 12/04/2014     Lab Results   Component Value Date    GLUCOSE 62 (L) 01/03/2018    CALCIUM 9 1 01/03/2018     01/03/2018    K 3 9 01/03/2018    CO2 29 01/03/2018     01/03/2018    BUN 11 01/03/2018    CREATININE 0 69 01/03/2018     Lab Results   Component Value Date    WBC 8 11 01/03/2018    HGB 14 7 01/03/2018    HCT 42 7 01/03/2018    MCV 99 (H) 01/03/2018     01/03/2018       No results found for this or any previous visit (from the past 1 hour(s)) ]

## 2018-02-28 NOTE — ASSESSMENT & PLAN NOTE
Tri Mix was ordered to a compound pharmacy for the patient  He will get the medication and come back for Tri Mix teaching  We briefly discussed other treatment options including penile prosthesis

## 2018-03-16 ENCOUNTER — TELEPHONE (OUTPATIENT)
Dept: NEUROLOGY | Facility: CLINIC | Age: 58
End: 2018-03-16

## 2018-03-16 DIAGNOSIS — G40.209 PARTIAL SYMPTOMATIC EPILEPSY WITH COMPLEX PARTIAL SEIZURES, NOT INTRACTABLE, WITHOUT STATUS EPILEPTICUS (HCC): ICD-10-CM

## 2018-03-16 DIAGNOSIS — G40.119 POORLY CONTROLLED PARTIAL SEIZURES (HCC): Primary | ICD-10-CM

## 2018-03-16 NOTE — TELEPHONE ENCOUNTER
Patient stopped by the office to report he is on Tegretol 200mg 1 tab 8 times daily, which has been working well, but it has been costing him $500  He reports he had discussed potentially going back on the generic  He would like to go back to the generic but also wants to take lorazepam with this do avoid any issue with the transition  He would like your opinion regarding this

## 2018-03-16 NOTE — TELEPHONE ENCOUNTER
Ativan is not used this way , only for auras    If he wants to go to generic , can increase the dose of Tegretol to 9 daily but no way to predict his tolerance and potential seizure control or side effects     If he agrees , can erx the script

## 2018-03-28 RX ORDER — CARBAMAZEPINE 200 MG/1
TABLET ORAL
Qty: 270 TABLET | Refills: 5 | Status: SHIPPED | OUTPATIENT
Start: 2018-03-28 | End: 2018-09-21 | Stop reason: SDUPTHER

## 2018-03-28 NOTE — TELEPHONE ENCOUNTER
Called and spoke to pt and advised him of the below  He states he would like generic and to take 9 daily  He will let us know if he cannot tolerate or has any side effects  He states he paid for a 1 month supply of Tegretol (brand) already because he was running out  He would like new rx sent to [facility] for generic and he will  next month

## 2018-04-04 ENCOUNTER — TELEPHONE (OUTPATIENT)
Dept: FAMILY MEDICINE CLINIC | Facility: CLINIC | Age: 58
End: 2018-04-04

## 2018-04-04 NOTE — TELEPHONE ENCOUNTER
Patient was seen by urologist for ED  Recommend to call [facility] to discuss semen analysis

## 2018-04-20 ENCOUNTER — APPOINTMENT (EMERGENCY)
Dept: RADIOLOGY | Facility: HOSPITAL | Age: 58
End: 2018-04-20
Payer: COMMERCIAL

## 2018-04-20 ENCOUNTER — HOSPITAL ENCOUNTER (EMERGENCY)
Facility: HOSPITAL | Age: 58
Discharge: HOME | End: 2018-04-20
Attending: EMERGENCY MEDICINE | Admitting: EMERGENCY MEDICINE
Payer: COMMERCIAL

## 2018-04-20 VITALS
DIASTOLIC BLOOD PRESSURE: 73 MMHG | TEMPERATURE: 98.4 F | HEART RATE: 85 BPM | RESPIRATION RATE: 18 BRPM | SYSTOLIC BLOOD PRESSURE: 120 MMHG | OXYGEN SATURATION: 97 %

## 2018-04-20 DIAGNOSIS — G40.919 BREAKTHROUGH SEIZURE (HCC): Primary | ICD-10-CM

## 2018-04-20 LAB
ALBUMIN SERPL BCP-MCNC: 3.6 G/DL (ref 3.5–5)
ALP SERPL-CCNC: 74 U/L (ref 46–116)
ALT SERPL W P-5'-P-CCNC: 30 U/L (ref 12–78)
AMPHETAMINES SERPL QL SCN: NEGATIVE
ANION GAP SERPL CALCULATED.3IONS-SCNC: 12 MMOL/L (ref 4–13)
AST SERPL W P-5'-P-CCNC: 43 U/L (ref 5–45)
BACTERIA UR QL AUTO: ABNORMAL /HPF
BACTERIA UR QL AUTO: NORMAL /HPF
BARBITURATES UR QL: NEGATIVE
BASOPHILS # BLD AUTO: 0.02 THOUSANDS/ΜL (ref 0–0.1)
BASOPHILS NFR BLD AUTO: 0 % (ref 0–1)
BENZODIAZ UR QL: NEGATIVE
BILIRUB SERPL-MCNC: 0.34 MG/DL (ref 0.2–1)
BILIRUB UR QL STRIP: NEGATIVE
BILIRUB UR QL STRIP: NEGATIVE
BUN SERPL-MCNC: 13 MG/DL (ref 5–25)
CALCIUM SERPL-MCNC: 8.1 MG/DL (ref 8.3–10.1)
CARBAMAZEPINE SERPL-MCNC: 7.4 UG/ML (ref 4–12)
CHLORIDE SERPL-SCNC: 107 MMOL/L (ref 100–108)
CLARITY UR: CLEAR
CLARITY UR: CLEAR
CO2 SERPL-SCNC: 18 MMOL/L (ref 21–32)
COCAINE UR QL: NEGATIVE
COLOR UR: YELLOW
COLOR UR: YELLOW
CREAT SERPL-MCNC: 0.95 MG/DL (ref 0.6–1.3)
EOSINOPHIL # BLD AUTO: 0.02 THOUSAND/ΜL (ref 0–0.61)
EOSINOPHIL NFR BLD AUTO: 0 % (ref 0–6)
ERYTHROCYTE [DISTWIDTH] IN BLOOD BY AUTOMATED COUNT: 11.8 % (ref 11.6–15.1)
GFR SERPL CREATININE-BSD FRML MDRD: 88 ML/MIN/1.73SQ M
GLUCOSE SERPL-MCNC: 143 MG/DL (ref 65–140)
GLUCOSE UR STRIP-MCNC: NEGATIVE MG/DL
GLUCOSE UR STRIP-MCNC: NEGATIVE MG/DL
HCT VFR BLD AUTO: 42.9 % (ref 36.5–49.3)
HGB BLD-MCNC: 14.6 G/DL (ref 12–17)
HGB UR QL STRIP.AUTO: NEGATIVE
HGB UR QL STRIP.AUTO: NEGATIVE
HYALINE CASTS #/AREA URNS LPF: ABNORMAL /LPF
HYALINE CASTS #/AREA URNS LPF: NORMAL /LPF
KETONES UR STRIP-MCNC: NEGATIVE MG/DL
KETONES UR STRIP-MCNC: NEGATIVE MG/DL
LEUKOCYTE ESTERASE UR QL STRIP: NEGATIVE
LEUKOCYTE ESTERASE UR QL STRIP: NEGATIVE
LYMPHOCYTES # BLD AUTO: 1.12 THOUSANDS/ΜL (ref 0.6–4.47)
LYMPHOCYTES NFR BLD AUTO: 10 % (ref 14–44)
MCH RBC QN AUTO: 33 PG (ref 26.8–34.3)
MCHC RBC AUTO-ENTMCNC: 34 G/DL (ref 31.4–37.4)
MCV RBC AUTO: 97 FL (ref 82–98)
METHADONE UR QL: NEGATIVE
MONOCYTES # BLD AUTO: 0.46 THOUSAND/ΜL (ref 0.17–1.22)
MONOCYTES NFR BLD AUTO: 4 % (ref 4–12)
NEUTROPHILS # BLD AUTO: 9.83 THOUSANDS/ΜL (ref 1.85–7.62)
NEUTS SEG NFR BLD AUTO: 86 % (ref 43–75)
NITRITE UR QL STRIP: NEGATIVE
NITRITE UR QL STRIP: NEGATIVE
NON-SQ EPI CELLS URNS QL MICRO: ABNORMAL /HPF
NON-SQ EPI CELLS URNS QL MICRO: NORMAL /HPF
NRBC BLD AUTO-RTO: 0 /100 WBCS
OPIATES UR QL SCN: NEGATIVE
PCP UR QL: NEGATIVE
PH UR STRIP.AUTO: 5 [PH] (ref 4.5–8)
PH UR STRIP.AUTO: 5 [PH] (ref 4.5–8)
PLATELET # BLD AUTO: 219 THOUSANDS/UL (ref 149–390)
PMV BLD AUTO: 8.9 FL (ref 8.9–12.7)
POTASSIUM SERPL-SCNC: 4.3 MMOL/L (ref 3.5–5.3)
PROT SERPL-MCNC: 6.9 G/DL (ref 6.4–8.2)
PROT UR STRIP-MCNC: ABNORMAL MG/DL
PROT UR STRIP-MCNC: ABNORMAL MG/DL
RBC # BLD AUTO: 4.42 MILLION/UL (ref 3.88–5.62)
RBC #/AREA URNS AUTO: ABNORMAL /HPF
RBC #/AREA URNS AUTO: NORMAL /HPF
SODIUM SERPL-SCNC: 137 MMOL/L (ref 136–145)
SP GR UR STRIP.AUTO: 1.01 (ref 1–1.03)
SP GR UR STRIP.AUTO: 1.02 (ref 1–1.03)
THC UR QL: NEGATIVE
UROBILINOGEN UR QL STRIP.AUTO: 0.2 E.U./DL
UROBILINOGEN UR QL STRIP.AUTO: 0.2 E.U./DL
WBC # BLD AUTO: 11.47 THOUSAND/UL (ref 4.31–10.16)
WBC #/AREA URNS AUTO: ABNORMAL /HPF
WBC #/AREA URNS AUTO: NORMAL /HPF

## 2018-04-20 PROCEDURE — 80307 DRUG TEST PRSMV CHEM ANLYZR: CPT | Performed by: EMERGENCY MEDICINE

## 2018-04-20 PROCEDURE — 99284 EMERGENCY DEPT VISIT MOD MDM: CPT

## 2018-04-20 PROCEDURE — 80156 ASSAY CARBAMAZEPINE TOTAL: CPT | Performed by: EMERGENCY MEDICINE

## 2018-04-20 PROCEDURE — 71046 X-RAY EXAM CHEST 2 VIEWS: CPT

## 2018-04-20 PROCEDURE — 36415 COLL VENOUS BLD VENIPUNCTURE: CPT | Performed by: EMERGENCY MEDICINE

## 2018-04-20 PROCEDURE — 81001 URINALYSIS AUTO W/SCOPE: CPT

## 2018-04-20 PROCEDURE — 81001 URINALYSIS AUTO W/SCOPE: CPT | Performed by: EMERGENCY MEDICINE

## 2018-04-20 PROCEDURE — 85025 COMPLETE CBC W/AUTO DIFF WBC: CPT | Performed by: EMERGENCY MEDICINE

## 2018-04-20 PROCEDURE — 80053 COMPREHEN METABOLIC PANEL: CPT | Performed by: EMERGENCY MEDICINE

## 2018-04-20 PROCEDURE — 96361 HYDRATE IV INFUSION ADD-ON: CPT

## 2018-04-20 PROCEDURE — 96374 THER/PROPH/DIAG INJ IV PUSH: CPT

## 2018-04-20 RX ORDER — CARBAMAZEPINE 200 MG/1
200 TABLET ORAL 3 TIMES DAILY
Status: DISCONTINUED | OUTPATIENT
Start: 2018-04-20 | End: 2018-04-21 | Stop reason: HOSPADM

## 2018-04-20 RX ORDER — CARBAMAZEPINE 200 MG/1
200 TABLET ORAL ONCE
Status: COMPLETED | OUTPATIENT
Start: 2018-04-20 | End: 2018-04-20

## 2018-04-20 RX ORDER — LORAZEPAM 2 MG/ML
0.5 INJECTION INTRAMUSCULAR ONCE
Status: COMPLETED | OUTPATIENT
Start: 2018-04-20 | End: 2018-04-20

## 2018-04-20 RX ORDER — ACETAMINOPHEN 325 MG/1
650 TABLET ORAL ONCE
Status: COMPLETED | OUTPATIENT
Start: 2018-04-20 | End: 2018-04-20

## 2018-04-20 RX ADMIN — SODIUM CHLORIDE 1000 ML: 0.9 INJECTION, SOLUTION INTRAVENOUS at 19:34

## 2018-04-20 RX ADMIN — ACETAMINOPHEN 650 MG: 325 TABLET, FILM COATED ORAL at 21:37

## 2018-04-20 RX ADMIN — CARBAMAZEPINE 200 MG: 200 TABLET ORAL at 21:08

## 2018-04-20 RX ADMIN — LORAZEPAM 0.5 MG: 2 INJECTION INTRAMUSCULAR; INTRAVENOUS at 19:39

## 2018-04-20 NOTE — ED ATTENDING ATTESTATION
Rashaun Proctor MD, saw and evaluated the patient  I have discussed the patient with the resident/non-physician practitioner and agree with the resident's/non-physician practitioner's findings, Plan of Care, and MDM as documented in the resident's/non-physician practitioner's note, except where noted  All available labs and Radiology studies were reviewed  At this point I agree with the current assessment done in the Emergency Department  I have conducted an independent evaluation of this patient a history and physical is as follows:  OA: 63 y/o m with h/o seizure disorder on tegretol who presents s/p a witnessed tonic-clonic seizure today  ~ 15 minutes post-ictal period  No abortive medications given  Pt was sleeping in bed after work, was then talking on the phone with his girlfriend when seizure occurred  Pt admits to increased stressors and ocassional noncompliance with medications  Last two seizures two years ago  He had typical aura preceding seizure  No tongue biting or incontinence  Denies recent illness/cough/fevers/fall/trauma  PE, well developed m in NAD, mildly tachycardic on my exam with , PERRL, no nystagmus, neck supple/FROM, RR-mild tachycardia, regular, no murmurs, lungs CTAB, -w/r, abd soft, NT/ND, +BS, -r/g, - LE edema/calf ttp, + 2 distal pulses and capillary refill < 2 sec, TOPETE, symmetric face, clear speech, AAOx3  A/p seizure, check tegretol level, u/a, pt requesting meds, discuss with neuro  Pt's girlfriend pulled me aside to state the pt has not been sleeping well and stressed in regard to her moving       Critical Care Time  CritCare Time    Procedures

## 2018-04-20 NOTE — ED RE-EVALUATION NOTE
Pt requesting his dinner-time tegretol that he normally takes at 6pm  States that he did not have a chance to take it since he had his seizure and was brought here to the ED  States he takes 200mg of tegretol 4x/day  This dose was confirmed in previous records       Amelia Stephenson MD  04/23/18 [id number]

## 2018-04-21 NOTE — DISCHARGE INSTRUCTIONS
Epilepsy   WHAT YOU NEED TO KNOW:   Epilepsy is a brain disorder that causes seizures  It is also called a seizure disorder  A seizure means an abnormal area in your brain sometimes sends bursts of electrical activity  A seizure may start in one part of your brain, or both sides may be affected  Depending on the type of seizure, you may have movements you cannot control, lose consciousness, or stare straight ahead  You may be confused or tired after the seizure  A seizure may last a few seconds or longer than 5 minutes  A birth defect, tumor, stroke, dementia, injury, or infection may cause epilepsy  The cause of your epilepsy may not be known  If your seizures are not controlled, epilepsy may become life-threatening  DISCHARGE INSTRUCTIONS:   Call 911 for any of the following:   · Your seizure lasts longer than 5 minutes  · You have trouble breathing after a seizure  · You have diabetes or are pregnant and have a seizure  · You have a seizure in water, such as a swimming pool or bathtub  Seek care immediately if:   · You have a second seizure within 24 hours of the first      · You are injured during a seizure  Contact your healthcare provider if:   · You feel you are not able to cope with your condition  · Your seizures start to happen more often  · You are confused longer than usual after a seizure  · You are planning to get pregnant or are currently pregnant  · You have questions or concerns about your condition or care  Medicines:   · Antiseizure medicine  may control or prevent another seizure  Do not stop taking this medicine  Another person may need to give you rescue medicine to stop a seizure at home  Ask your healthcare provider for more information about rescue medicine  · Take your medicine as directed  Contact your healthcare provider if you think your medicine is not helping or if you have side effects  Tell him of her if you are allergic to any medicine   Keep a list of the medicines, vitamins, and herbs you take  Include the amounts, and when and why you take them  Bring the list or the pill bottles to follow-up visits  Carry your medicine list with you in case of an emergency  Follow up with your neurologist as directed: You may need tests to check the level of antiseizure medicine in your blood  Your neurologist may need to change or adjust your medicine  Write down your questions so you remember to ask them during your visits  What you can do to prevent a seizure: You may not be able to prevent every seizure  The following can help you manage triggers that may make a seizure start:  · Take your medicine every day at the same time  This will also help prevent medicine side effects  Set an alarm to help remind you to take your medicine every day  · Manage stress  Stress can be a trigger for epilepsy  Exercise can help you reduce stress  Talk to your healthcare provider about exercise that is safe for you  Illness can be a form of stress  Eat a variety of healthy foods and drink plenty of liquids during an illness  Talk to your healthcare provider about other ways to manage stress  · Set a regular sleep schedule  A lack of sleep can trigger a seizure  Try to go to sleep and wake up at the same time every day  Keep your bedroom quiet and dark  Talk to your healthcare provider if you are having trouble sleeping  · Limit or do not drink alcohol as directed  Alcohol can trigger a seizure, especially if you drink a large amount at one time  A drink of alcohol is 12 ounces of beer, 1½ ounces of liquor, or 5 ounces of wine  Talk to your healthcare provider about a safe amount of alcohol for you  Your provider may recommend that you do not drink any alcohol  Tell him or her if you need help to quit drinking  What you can do to manage epilepsy:   · Keep a seizure diary  This can help you find your triggers and avoid them   Write down the dates of your seizures, where you were, and what you were doing  Include how you felt before and after  Possible triggers include illness, lack of sleep, hormonal changes, alcohol, drugs, lights, or stress  · Record any auras you have before a seizure  An aura is a sign that you are about to have a seizure  Auras happen before certain types of seizures that are in only 1 part of the brain  The aura may happen seconds before a seizure, or up to an hour before  You may feel, see, hear, or smell something  Examples include part of your body becoming hot  You may see a flash of light or hear something  You may have anxiety or déjà vu  If you have an aura, include it in your seizure diary  · Create a care plan  Tell family, friends, and coworkers about your epilepsy  Give them instructions that tell them how they can keep you safe if you have a seizure  · Find support  You may be referred to a psychologist or   Ask your healthcare provider about support groups for people with epilepsy  · Ask what safety precautions you should take  Talk with your healthcare provider about driving  You may not be able to drive until you are seizure-free for a period of time  You will need to check the law where you live  Also talk to your healthcare provider about swimming and bathing  You may drown or develop life-threatening heart or lung damage if you have a seizure in water  · Carry medical alert identification  Wear medical alert jewelry or carry a card that says you have epilepsy  Ask your healthcare provider where to get these items  How others can keep you safe during a seizure:  Give the following instructions to family, friends, and coworkers:  · Do not panic  · Do not hold me down or put anything in my mouth  · Gently guide me to the floor or a soft surface  · Place me on my side to help prevent me from swallowing saliva or vomit  · Protect me from injury   Remove sharp or hard objects from the area surrounding me, or cushion my head  · Loosen the clothing around my head and neck  · Time how long my seizure lasts  Call 911 if my seizure lasts longer than 5 minutes or if I have a second seizure  · Stay with me until my seizure ends  Let me rest until I am fully awake  · Perform CPR if I stop breathing or you cannot feel my pulse  · Do not give me anything to eat or drink until I am fully awake  © 2017 [facility] Information is for End User's use only and may not be sold, redistributed or otherwise used for commercial purposes  All illustrations and images included in CareNotes® are the copyrighted property of [employer] A [employer]  or [employer]  The above information is an  only  It is not intended as medical advice for individual conditions or treatments  Talk to your doctor, nurse or pharmacist before following any medical regimen to see if it is safe and effective for you

## 2018-04-21 NOTE — ED PROVIDER NOTES
History  Chief Complaint   Patient presents with    Seizure - Prior Hx Of     pt with hx seizures, only other known seizure 2 ys ago  pt missed his daily dose ativan and had witnessed tonic clonic seizure by mother  Pt post-ictal 15 min per EMS  pt has no complaints at this time and is appropriate  59-year-old male with a history of a seizure disorder, for which he takes Ativan and Tegretol, presents status post witnessed tonic-clonic seizure  The patient has been seizure-free for last 2 years on his medication  He states that he thinks he forgot his lunchtime dose of Tegretol, then after returning from work, went to sleep for a nap which she normally takes afterward  He was found by his mother in the middle of the tonic-clonic seizure while he was in bed, and then was noted to have about a 15 minutes episode of confusion, which was also witnessed by EMS  Patient is currently back to baseline, has no acute complaints  On further questioning, it appears that the patient frequently for gets his lunchtime dose of Tegretol  He has also been having increased stress over the last several weeks, associated with helping his girlfriend move  He denies any fevers, chills, recent upper respiratory infection, cough, sputum production, chest pain, nausea, vomiting, diarrhea, shortness of breath, abdominal pain, urinary symptoms  He has no recent travel, recent sick contacts, recent hospitalizations, recent surgery  He denies any recent alcohol use, and states the occasionally does drink approximately 1-2 alcoholic drinks with his girlfriend on special occasions  He denies any drug use  He denies any increased thirst, or increased water intake recently  Prior to Admission Medications   Prescriptions Last Dose Informant Patient Reported? Taking?    Calcium Carb-Cholecalciferol (CALCIUM 500 +D PO) 4/20/2018 at Unknown time Self Yes Yes   Sig: Take by mouth 2 tablets daily   LORazepam (ATIVAN) 0 5 mg tablet Past Week at Unknown time Self Yes Yes   Sig: Take 0 5 mg by mouth every 8 (eight) hours as needed for anxiety   Vitamin Mixture (VITAMIN E COMPLETE PO) 2018 at Unknown time Self Yes Yes   Sig: Take by mouth   aspirin (ECOTRIN LOW STRENGTH) 81 mg EC tablet Past Month at Unknown time Self Yes Yes   Sig: Take 81 mg by mouth daily   carBAMazepine (TEGRETOL) 200 mg tablet 2018 at Unknown time  No Yes   Si pills po daily   Patient taking differently: 400 mg 4 (four) times a day     co-enzyme Q-10 30 MG capsule 2018 at Unknown time Self Yes Yes   Sig: Take 100 mg by mouth 3 (three) times a day      Facility-Administered Medications: None       Past Medical History:   Diagnosis Date    Seizures ([facility] [address] )        No past surgical history on file  Family History   Problem Relation Age of Onset    Family history unknown: Yes     I have reviewed and agree with the history as documented  Social History   Substance Use Topics    Smoking status: Never Smoker    Smokeless tobacco: Never Used    Alcohol use Yes      Comment: occasional        Review of Systems   Constitutional: Negative for activity change, chills, fever and unexpected weight change  HENT: Negative for congestion and sinus pain  Eyes: Negative for photophobia and visual disturbance  Respiratory: Negative for cough and shortness of breath  Cardiovascular: Negative for chest pain and palpitations  Gastrointestinal: Negative for abdominal pain, diarrhea, nausea and vomiting  Endocrine: Negative for polyphagia and polyuria  Genitourinary: Negative for dysuria  Musculoskeletal: Negative for neck pain and neck stiffness  Skin: Negative for pallor and rash  Neurological: Positive for seizures  Negative for dizziness, syncope, speech difficulty, weakness, light-headedness and headaches  Psychiatric/Behavioral: The patient is nervous/anxious          Physical Exam  ED Triage Vitals   Temperature Pulse Respirations Blood Pressure SpO2   04/20/18 1904 04/20/18 1900 04/20/18 1900 04/20/18 1900 04/20/18 1900   98 4 °F (36 9 °C) (!) 120 18 111/66 97 %      Temp Source Heart Rate Source Patient Position - Orthostatic VS BP Location FiO2 (%)   04/20/18 1904 04/20/18 1900 04/20/18 1900 04/20/18 1900 --   Oral Monitor Lying Right arm       Pain Score       04/20/18 1900       No Pain           Orthostatic Vital Signs  Vitals:    04/20/18 2043 04/20/18 2139 04/20/18 2226 04/20/18 2348   BP: 111/80 114/76 120/76 120/73   Pulse: 94 93 94 85   Patient Position - Orthostatic VS: Lying Lying Lying Lying       Physical Exam   Constitutional: He is oriented to person, place, and time  Awake alert, well-appearing, no acute distress   HENT:   Head: Normocephalic  Mouth/Throat: Oropharynx is clear and moist  No oropharyngeal exudate  No tongue lacerations, oral lacerations  Eyes: Pupils are equal, round, and reactive to light  No scleral icterus  Neck: Normal range of motion  No JVD present  Cardiovascular: Regular rhythm and normal heart sounds  Tachycardic   Pulmonary/Chest: Effort normal  No respiratory distress  He has no wheezes  He has no rales  Abdominal: Soft  He exhibits no distension  There is no tenderness  Musculoskeletal: Normal range of motion  He exhibits no edema  Neurological: He is alert and oriented to person, place, and time  He exhibits normal muscle tone  Strength is 5/5 in all extremities bilaterally  Sensation grossly intact in all extremities  No dysmetria with finger-to-nose or heel-to-shin testing  Gait is normal    Skin: Skin is warm and dry  No rash noted         ED Medications  Medications   carBAMazepine (TEGretol) tablet 200 mg (200 mg Oral Not Given 4/20/18 2139)   sodium chloride 0 9 % bolus 1,000 mL (0 mL Intravenous Stopped 4/20/18 2052)   LORazepam (ATIVAN) 2 mg/mL injection 0 5 mg (0 5 mg Intravenous Given 4/20/18 1939)   carBAMazepine (TEGretol) tablet 200 mg (200 mg Oral Given 4/20/18 2108)   acetaminophen (TYLENOL) tablet 650 mg (650 mg Oral Given 4/20/18 2137)       Diagnostic Studies  Results Reviewed     Procedure Component Value Units Date/Time    Rapid drug screen, urine [[id number]]  (Normal) Collected:  04/20/18 2221    Lab Status:  Final result Specimen:  Urine from Urine, Clean Catch Updated:  04/20/18 2355     Amph/Meth UR Negative     Barbiturate Ur Negative     Benzodiazepine Urine Negative     Cocaine Urine Negative     Methadone Urine Negative     Opiate Urine Negative     PCP Ur Negative     THC Urine Negative    Narrative:         FOR MEDICAL PURPOSES ONLY  IF CONFIRMATION NEEDED PLEASE CONTACT THE LAB WITHIN 5 DAYS      Drug Screen Cutoff Levels:  AMPHETAMINE/METHAMPHETAMINES  1000 ng/mL  BARBITURATES     200 ng/mL  BENZODIAZEPINES     200 ng/mL  COCAINE      300 ng/mL  METHADONE      300 ng/mL  OPIATES      300 ng/mL  PHENCYCLIDINE     25 ng/mL  THC       50 ng/mL    Urine Microscopic [[id number]]  (Abnormal) Collected:  04/20/18 2222    Lab Status:  Final result Specimen:  Urine from Urine, Clean Catch Updated:  04/20/18 2242     RBC, UA None Seen /hpf      WBC, UA 2-4 (A) /hpf      Epithelial Cells None Seen /hpf      Bacteria, UA None Seen /hpf      Hyaline Casts, UA None Seen /lpf     Urine Microscopic [[id number]]  (Normal) Collected:  04/20/18 2221    Lab Status:  Final result Specimen:  Urine from Urine, Clean Catch Updated:  04/20/18 2238     RBC, UA None Seen /hpf      WBC, UA None Seen /hpf      Epithelial Cells None Seen /hpf      Bacteria, UA None Seen /hpf      Hyaline Casts, UA None Seen /lpf     UA w Reflex to Microscopic w Reflex to Culture [[id number]]  (Abnormal) Collected:  04/20/18 2221    Lab Status:  Final result Specimen:  Urine from Urine, Clean Catch Updated:  04/20/18 2236     Color, UA Yellow     Clarity, UA Clear     Specific Gravity, UA 1 014     pH, UA 5 0     Leukocytes, UA Negative     Nitrite, UA Negative     Protein, UA 30 (1+) (A) mg/dl      Glucose, UA Negative mg/dl      Ketones, UA Negative mg/dl      Urobilinogen, UA 0 2 E U /dl      Bilirubin, UA Negative     Blood, UA Negative    ED Urine Macroscopic [[id number]]  (Abnormal) Collected:  04/20/18 2222    Lab Status:  Final result Specimen:  Urine Updated:  04/20/18 2219     Color, UA Yellow     Clarity, UA Clear     pH, UA 5 0     Leukocytes, UA Negative     Nitrite, UA Negative     Protein, UA 30 (1+) (A) mg/dl      Glucose, UA Negative mg/dl      Ketones, UA Negative mg/dl      Urobilinogen, UA 0 2 E U /dl      Bilirubin, UA Negative     Blood, UA Negative     Specific Gravity, UA 1 025    Narrative:       CLINITEK RESULT    Comprehensive metabolic panel [[id number]]  (Abnormal) Collected:  04/20/18 1931    Lab Status:  Final result Specimen:  Blood from Arm, Left Updated:  04/20/18 2037     Sodium 137 mmol/L      Potassium 4 3 mmol/L      Chloride 107 mmol/L      CO2 18 (L) mmol/L      Anion Gap 12 mmol/L      BUN 13 mg/dL      Creatinine 0 95 mg/dL      Glucose 143 (H) mg/dL      Calcium 8 1 (L) mg/dL      AST 43 U/L      ALT 30 U/L      Alkaline Phosphatase 74 U/L      Total Protein 6 9 g/dL      Albumin 3 6 g/dL      Total Bilirubin 0 34 mg/dL      eGFR 88 ml/min/1 73sq m     Narrative:         National Kidney Disease Education Program recommendations are as follows:  GFR calculation is accurate only with a steady state creatinine  Chronic Kidney disease less than 60 ml/min/1 73 sq  meters  Kidney failure less than 15 ml/min/1 73 sq  meters      Carbamazepine level, total [[id number]]  (Normal) Collected:  04/20/18 1931    Lab Status:  Final result Specimen:  Blood from Arm, Left Updated:  04/20/18 2037     Carbamazepine Lvl 7 4 ug/mL     CBC and differential [[id number]]  (Abnormal) Collected:  04/20/18 1931    Lab Status:  Final result Specimen:  Blood from Arm, Left Updated:  04/20/18 1942     WBC 11 47 (H) Thousand/uL      RBC 4 42 Million/uL      Hemoglobin 14 6 g/dL      Hematocrit 42 9 %      MCV 97 fL MCH 33 0 pg      MCHC 34 0 g/dL      RDW 11 8 %      MPV 8 9 fL      Platelets 378 Thousands/uL      nRBC 0 /100 WBCs      Neutrophils Relative 86 (H) %      Lymphocytes Relative 10 (L) %      Monocytes Relative 4 %      Eosinophils Relative 0 %      Basophils Relative 0 %      Neutrophils Absolute 9 83 (H) Thousands/µL      Lymphocytes Absolute 1 12 Thousands/µL      Monocytes Absolute 0 46 Thousand/µL      Eosinophils Absolute 0 02 Thousand/µL      Basophils Absolute 0 02 Thousands/µL                  XR chest 2 views   Final Result by Aurea Montano MD (04/20 2029)      No acute cardiopulmonary disease  Workstation performed: [id number]               Procedures  Procedures      Phone Consults  ED Phone Contact    ED Course  ED Course                                MDM  Number of Diagnoses or Management Options  Breakthrough seizure [facility]):   Diagnosis management comments:     ASSESSMENT AND PLAN    Tanner Suarez is a 62 y o  male with a history of a known seizure disorder, for which she takes daily Ativan, and q i d  Tegretol, presents status post a breakthrough seizure in the setting of recent stressors and anxiety related to hoping his girlfriend move, as well as possible missed doses of his Tegretol  he is currently tachycardic, but otherwise hemodynamically stable, and appears nontoxic  His physical exam is unremarkable  -Tegretol level was drawn, and found to be therapeutic, however lower than his previous readings   -lab work unremarkable, except for mild leukocytosis, which could be explained by his recent seizure   -chest x-ray unremarkable   -urinalysis significant for mild proteinuria, mild leukouria  Otherwise unremarkable  Rapid drug screen was negative, however of note the patient Ativan every day, and that was also negative for benzodiazepines, so possibly this isn't an accurate result    However, I do not believe a positive drug screen would changed acute management at this time   -patient was given a dose of Ativan for anxiety  He was also given his dinner time dose of Tegretol, which he states he missed due to coming to the hospital   -breakthrough seizure likely secondary to recent stressors, decreased sleep which may have lowered his seizure threshold  He denies alcohol or drug use, so I do not believe this is playing a factor  Also, his Tegretol level was normal  I discussed this case with the epileptologist on call, who recommended no acute interventions at this time, but to schedule an appointment within the next 2 weeks with his neurologist for possible medication adjustments to be determined at that time   -due to his recent seizure, and incomplete workup at this time, I did revoked his 's license due to his breakthrough seizure  I discussed this with the patient, he endorses that he cannot drive  I submitted the appropriate paperwork to the [address] [place] [address] [employer]   -the patient will be discharged home  Strict return precautions were provided  He will follow up with his neurologist, and I instructed him to call their office on Monday to schedule an appointment  The patient, as well as his girlfriend was present in the room, endorse understanding and agreement with this plan  CritCare Time    Disposition  Final diagnoses:   Breakthrough seizure ([address] )     Time reflects when diagnosis was documented in both MDM as applicable and the Disposition within this note     Time User Action Codes Description Comment    4/20/2018 11:47 PM Yonny Weber Add [G40 919] Breakthrough seizure [facility])       ED Disposition     ED Disposition Condition Comment    Discharge  [address] discharge to home/self care      Condition at discharge: Good        Follow-up Information     Follow up With Specialties Details Why [address] BOYLE MD [address]      Mayito Martinez DO Neurology Schedule an appointment as soon as possible for a visit  [address] [facility]  [phone]          Discharge Medication List as of 4/20/2018 11:48 PM      CONTINUE these medications which have NOT CHANGED    Details   aspirin (ECOTRIN LOW STRENGTH) 81 mg EC tablet Take 81 mg by mouth daily, Historical Med      Calcium Carb-Cholecalciferol (CALCIUM 500 +D PO) Take by mouth 2 tablets daily, Historical Med      carBAMazepine (TEGRETOL) 200 mg tablet 9 pills po daily, Normal      co-enzyme Q-10 30 MG capsule Take 100 mg by mouth 3 (three) times a day, Historical Med      LORazepam (ATIVAN) 0 5 mg tablet Take 0 5 mg by mouth every 8 (eight) hours as needed for anxiety, Historical Med      Vitamin Mixture (VITAMIN E COMPLETE PO) Take by mouth, Historical Med           No discharge procedures on file  ED Provider  Attending physically available and evaluated [facility] managed the patient along with the ED Attending      Electronically Signed by         Freya Desai MD  04/21/18 7192

## 2018-04-23 ENCOUNTER — TELEPHONE (OUTPATIENT)
Dept: NEUROLOGY | Facility: CLINIC | Age: 58
End: 2018-04-23

## 2018-04-23 NOTE — TELEPHONE ENCOUNTER
As per the er records , he forgot to take a dose of tegretol at lunchtime , with the 200mg dose , he needs to be compliant     No need to be seen sooner or change the dose since seizures was due to noncompliance   It occurred when he was sleeping      he will be starting on generic tegretol next week     He can f/u as previously  scheduled

## 2018-04-23 NOTE — TELEPHONE ENCOUNTER
Pt left message w/ answering service over the weekend to report sz while in bed  I called and spoke to pt who states he had sz after work on Friday while in bed  Pt was sleeping  No recollection of event  Pt taken to B by EMS  Please see Epic notes  UA/culture abnormal  Tegretol total level 13 9     Pt denies illness/stressors/sleep deprivation/ETOH/ missed doses or med changes  Pt "feeling great" and back to work today  Next appt 10/29  Pt questioning need to be seen sooner  Please advise  Tegretol 200mg tabs - 400mg QID  Pt to start 9 tabs daily (600mg am and 400mg TID) on May 1st  States he is finishing up this mo rx       Cell [phone]

## 2018-05-03 ENCOUNTER — TELEPHONE (OUTPATIENT)
Dept: NEUROLOGY | Facility: CLINIC | Age: 58
End: 2018-05-03

## 2018-05-03 ENCOUNTER — OFFICE VISIT (OUTPATIENT)
Dept: NEUROLOGY | Facility: CLINIC | Age: 58
End: 2018-05-03
Payer: COMMERCIAL

## 2018-05-03 VITALS — WEIGHT: 163 LBS | BODY MASS INDEX: 22.73 KG/M2

## 2018-05-03 DIAGNOSIS — G40.909 NONINTRACTABLE EPILEPSY WITHOUT STATUS EPILEPTICUS, UNSPECIFIED EPILEPSY TYPE (HCC): Primary | ICD-10-CM

## 2018-05-03 PROBLEM — R25.1 TREMOR: Status: ACTIVE | Noted: 2017-10-25

## 2018-05-03 PROCEDURE — 99215 OFFICE O/P EST HI 40 MIN: CPT | Performed by: PHYSICIAN ASSISTANT

## 2018-05-03 RX ORDER — SILDENAFIL CITRATE 50 MG
TABLET ORAL
COMMUNITY
End: 2019-12-02 | Stop reason: ALTCHOICE

## 2018-05-03 NOTE — TELEPHONE ENCOUNTER
Transportation options will likely be limited as it appears patient lives in [place] and works in [facility]  Perhaps the only possibility will be carpooling/UBER, as public transportation typically does not cross county lines  MSW attempted to reach patient to discuss transportation options and confirm location of employment  Patient was not available at home, and call to patient's cell phone was unanswered  MSW left message requesting callback  Awaiting same

## 2018-05-03 NOTE — ASSESSMENT & PLAN NOTE
Pt had a breakthrough seizure 4/20/18 which is likely due to forgetting to take his medication at lunchtime  He reports that he does forget at work at times  He does understand the importance of taking his medications as prescribed  He did change to generic medication 2 days ago, after having the seizure, due to the high cost of branded Tegretol  He will have a carbamazepine level checked in 2 weeks  He did receive a letter from [facility] which will be completed and sent back  He does understand that he is not to drive at this time  [facility]  will be contacted to assist with any transportation information she may have  He will follow-up as scheduled with Dr Yunier Bains

## 2018-05-03 NOTE — PROGRESS NOTES
Patient ID: Livia Lin is a 62 y o  male  Assessment/Plan:    Epilepsy ([facility] )  Pt had a breakthrough seizure 4/20/18 which is likely due to forgetting to take his medication at lunchtime  He reports that he does forget at work at times  He does understand the importance of taking his medications as prescribed  He did change to generic medication 2 days ago, after having the seizure, due to the high cost of branded Tegretol  He will have a carbamazepine level checked in 2 weeks  He did receive a letter from [facility] which will be completed and sent back  He does understand that he is not to drive at this time  Merissa Cueva will be contacted to assist with any transportation information she may have  He will follow-up as scheduled with Dr Corinne Michel  Diagnoses and all orders for this visit:    Nonintractable epilepsy without status epilepticus, unspecified epilepsy type ([facility] )    Other orders  -     sildenafil (VIAGRA) 50 MG tablet; take 1 tablet by mouth if needed 1 HOUR BEFORE NEEDED  -     Calcium-Magnesium-Vitamin D (CALCIUM MAGNESIUM PO); Take by mouth           Subjective:    HPI    Livia Lin is a 57y/o right hand male with a long history of seizures  They began when he was 9 y/o it started with twitching of the chin, and was followed by a tonic clinic event  He was placed on phenobarbital and Dilantin but it did not control the seizures  In 1981 he was using a blow torch when had a typical seizure with a loss consciousness  At that time Phenobarbital and Dilantin was discontinued and Tegretol was started  His seizures start with abnormal sense in the chest; he usually screams followed by hiccups, a LOC, generalized tonic/ clonic activity and is followed by a severe, intense headache  The headaches last for about 1/2 hr  during the tonic/ clonic activity  In January of 2015     He was diagnosed with bronchitis and placed on antibiotic (Pack) he had a seizures after the three day    (Normal description followed by headache)  He switched to Tegretol brand name 1400mg daily and started taking mag supplements  On November 18, 2015  He forgot to take the last dose of tegretol  The following day  He began a having hiccups or auras  It was followed by 2 to 3hrs of confusion, and alteration on consiousness  He was treated with extra magnesium, extra hot herbal tea  He then developed chest pain and was brought to [facility]  He was admitted, for monitoring  Tegretol level was 12 2, sodium was 142  And an EEG showed generalized epileptiform features, and CT scan was normal  The dose of brand name tegretol was increased to 1600mg daily  On April 20, 2018 he was seen in the ED  He was taking a nap when his mother found him having a seizure  He may have missed one dose of Tegretol that day  His Tegretol level was 7 4  His levels usually run 12-13  He was taking brand name at the time  He is now taking generic Tegretol 200mg tabs 9 tabs total per day (3, 2, 2, 2)  The ED physician did report him to the state and he did receive paperwork about no longer driving  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  He is drinking Gatorade and working full time   He is taking care of his mother  No double vision or difficulty walking but does admit to chest pain when eating quickly     The following portions of the patient's history were reviewed and updated as appropriate: allergies, current medications, past family history, past medical history, past social history, past surgical history and problem list          Objective:    Weight 73 9 kg (163 lb)  Physical Exam   Constitutional: He appears well-developed and well-nourished  Eyes: EOM are normal  Pupils are equal, round, and reactive to light  Neurological: He has normal strength and normal reflexes  Gait normal    Psychiatric: His speech is normal    Vitals reviewed        Neurological Exam    Mental Status  The patient is alert and oriented to person, place, time, and situation  His recent and remote memory are normal  He has no visuospatial neglect  His speech is normal  His language is fluent with no aphasia  He has normal attention span and concentration  He follows multi-step commands  He has a normal fund of knowledge  Cranial Nerves    CN II: The patient's visual acuity and visual fields are normal   CN III, IV, VI: The patient's pupils are equally round and reactive to light and ocular movements are normal   CN V: The patient has normal facial sensation  CN VII:  The patient has symmetric facial movement  CN VIII:  The patient's hearing is normal   CN IX, X: The patient has symmetric palate movement and normal gag reflex  CN XI: The patient's shoulder shrug strength is normal   CN XII: The patient's tongue is midline without atrophy or fasciculations  Motor   His strength is 5/5 throughout all four extremities  Sensory  The patient's sensation is normal in all four extremities  Reflexes  Deep tendon reflexes are 2+ and symmetric in all four extremities with downgoing toes bilaterally  Gait and Coordination  The patient has normal gait and station  ROS:    Review of Systems   Constitutional: Negative  HENT: Negative  Eyes: Negative  Respiratory: Negative  Cardiovascular: Negative  Gastrointestinal: Negative  Endocrine: Negative  Genitourinary: Negative  Musculoskeletal: Negative  Skin: Negative  Allergic/Immunologic: Negative  Neurological: Positive for seizures  Hematological: Negative  Psychiatric/Behavioral: Negative

## 2018-05-05 ENCOUNTER — LAB (OUTPATIENT)
Dept: LAB | Facility: HOSPITAL | Age: 58
End: 2018-05-05
Payer: COMMERCIAL

## 2018-05-05 DIAGNOSIS — G40.909 NONINTRACTABLE EPILEPSY WITHOUT STATUS EPILEPTICUS, UNSPECIFIED EPILEPSY TYPE (HCC): ICD-10-CM

## 2018-05-05 LAB — CARBAMAZEPINE SERPL-MCNC: 11.5 UG/ML (ref 4–12)

## 2018-05-05 PROCEDURE — 36415 COLL VENOUS BLD VENIPUNCTURE: CPT

## 2018-05-05 PROCEDURE — 80156 ASSAY CARBAMAZEPINE TOTAL: CPT

## 2018-05-07 DIAGNOSIS — G40.209 PARTIAL SYMPTOMATIC EPILEPSY WITH COMPLEX PARTIAL SEIZURES, NOT INTRACTABLE, WITHOUT STATUS EPILEPTICUS (HCC): Primary | ICD-10-CM

## 2018-05-07 DIAGNOSIS — G40.909 SEIZURE DISORDER (HCC): Primary | ICD-10-CM

## 2018-05-07 DIAGNOSIS — G40.909 NONINTRACTABLE EPILEPSY WITHOUT STATUS EPILEPTICUS, UNSPECIFIED EPILEPSY TYPE (HCC): Primary | ICD-10-CM

## 2018-05-07 RX ORDER — LORAZEPAM 0.5 MG/1
TABLET ORAL
Qty: 15 TABLET | Refills: 0 | Status: SHIPPED | OUTPATIENT
Start: 2018-05-07 | End: 2019-03-11 | Stop reason: SDUPTHER

## 2018-05-07 NOTE — TELEPHONE ENCOUNTER
Writer received voicemail from patient's brother Seema Jarvis Monday 5/7 at 8:06am     Padilla Watkins returned call to Seema Lukass- [phone]  Informed him of the information above  Discussed that patient [place] outside [place]  Discussed how this limits transportation options  Discussed car pooling, uber, taxi's  He reported his brother wants a steady ride and doesn't like the idea of having to call for 1 each time he needs it  Informed him this would likely be the case as most transportation options require this  Informed him that other - William was originally working on this task and has placed a call to the epilepsy foundation but writer was unclear of the outcome of that call at this time  Writer informed Mihaelamary lou Lukass that William FOY  Was out of the office today but that 1 of the social workers would return his call tomorrow potentially with an update from the epilepsy foundation  Seema Jarvis- [phone]  He would like the return call because his brother is asking him to help him deal with this  His brother reports he needs a ride already as the 8th but at this time there is no direct option/answer besides uber/taxi until further notice

## 2018-05-07 NOTE — PROGRESS NOTES
Please call pt and let him know that his level is better - more in line with previous labs  He will need a repeat carbamazepine level in 2 weeks as he is now on generic medication  I will enter the order if you can send it to him  Thanks

## 2018-05-07 NOTE — TELEPHONE ENCOUNTER
----- Message from Ramiro Doty PA-C sent at 5/7/2018 11:44 AM EDT -----  Please call pt and let him know that his level is better - more in line with previous labs  He will need a repeat carbamazepine level in 2 weeks as he is now on generic medication  I will enter the order if you can send it to him  Thanks

## 2018-05-08 NOTE — TELEPHONE ENCOUNTER
MSW contacted [place] and [employer] but both agencies would not be able to provide transportation since patient's needs cross [employer]  MSW did phone the [address] and Adult Services that suggested [facility] [facility] [employer] at [phone]  MSW placed call to [facility], but there was no answer  MSW left message requesting callback  Awaiting same  MSW also phoned [address] [facility] [address] who advised that patient would need to hire a private service  MSW phoned patient's brother, Sharron Champion, to make him aware that this writer is still awaiting a callback from the [employer]  Sharron Champion stated that patient has been set up with a transport company in [place] ([address]) for 5/8/18 through 5/15/18  Sharron Champion stated that patient is paying $50 a day for round trip service to and from work ( at 6AM at home, and then 3PM pick-up at work)  Sharron Champion stated that patient will have the same /car each day and that they are able to book in advance (not schedule rides each day)  Sharron Champion stated that they did look into [employer], but that would have been slightly more at $26 40 per one day ($52 80 a day)  MSW did advise that [employer] does allow users to save work and home locations to help facilitate ride requests  MSW also did advise that there are  services, such as [employer], that would be able to help schedule trips but charge a fee of $0 19 per minute (during the call and duration of the ride) for the service  MSW did advise that per [employer] website, they do offer scheduled services (automatic rides for fixed appointments)  Sharron Champion stated that he and the patient would be open to any other transportation options that may be cheaper, as patient's current mode of transportation will cost him $1000 per month  MSW encouraged patient/family to think about family/friends/neighbors/coworkers who may be able to help   MSW inquired if it would be possible to for patient to UBER to a coworkers house and then carpool from there, as this may offer some savings  MSW advised that patient/family would need to explore these informal supports  MSW advised that this writer will update Starlette Look if any additional services are suggested

## 2018-05-08 NOTE — TELEPHONE ENCOUNTER
MSW had reached out to the [employer] on 5/3/18, but still had yet to receive callback as of this AM  MSW placed a call to Molli Barthel of the [address] [place] [address] again this date  MSW left a message requesting a callback ASAP  Awaiting same

## 2018-05-08 NOTE — TELEPHONE ENCOUNTER
MSW received callback from Susana Reid from the [employer]  Susana Reid advised that there was no simple solution, that patient may qualify for transportation via [facility] [employer] (MSW had already placed call to this agency (awaiting callback), but is under the impression that they offer only infrequent transportation  Susana Reid also suggested that patient might be able to strike a deal with an Uber , put an ad on [employer], or look to members of a [employer] that he may belong to  MSW also placed call to [address]  to see if their private duty aide services would be able to offer transportation at a cheaper rate  Message left for Livan Souza at [phone]  Awaiting callback

## 2018-05-09 NOTE — TELEPHONE ENCOUNTER
MSW spoke with [facility] and the [facility] at [address]  She was able to quote a price of $21 per hour for aide services to provide transportation, but then that patient would also be required to pay $0 40 per mile for gas reimbursement  This would total $54 80 per day which is more than the taxi service that patient already has lined up  MSW also received a callback from [employer] at [facility] who advised that they would not be able to help as their volunteers do not travel that far, and also that [employer]'s focus for transportation is more on getting patient to medical appointments and grocery shopping than getting them to and from work  [employer] also stated that there is a waiting list for any services for patients in [place]

## 2018-05-09 NOTE — TELEPHONE ENCOUNTER
MSW phoned patient's brother to make him aware of suggestions from the [facility] [facility]  MSW also advised that [facility] would not be able to assist  MSW also quoted the price from [address]  Patient's brother stated that it sounds like he already has the most cost efficient transportation lined up  MSW advised that if any additional resources are located that this writer will reach out, but that this is unlikely as an exhaustive search has already been completed  Patient's brother thanked this writer for the assistance  MSW will be available to patient/brother as needed

## 2018-05-17 ENCOUNTER — TELEPHONE (OUTPATIENT)
Dept: NEUROLOGY | Facility: CLINIC | Age: 58
End: 2018-05-17

## 2018-05-17 NOTE — TELEPHONE ENCOUNTER
Torrey Abreu, Patient stopped in to let you know that he his catching a car service back and forth to work and he is getting 9 to 10 hours of sleep, you were right about not getting enough of sleep,He is feeling better    Thanks  [employer]

## 2018-06-04 NOTE — TELEPHONE ENCOUNTER
Pt came into office to drop off Seizure reporting form, please call [phone]  Pt is aware it can take up to 2 weeks to complete

## 2018-06-18 NOTE — TELEPHONE ENCOUNTER
I'm not familiar with that form but I can take a look at it when I am in the office on Wednesday  Thanks

## 2018-06-18 NOTE — TELEPHONE ENCOUNTER
Called and advised pt of all of the below  He verbalized clear understanding of all instructions  He states that [place] received the seizure reporting form last month  He does not know why they send a substance abuse form  He will call [place] after work and call us back  Substance abuse form placed in Lois's folder

## 2018-06-18 NOTE — TELEPHONE ENCOUNTER
Patient came in regarding the substance abuse form  I did let him know that ivette will review it on Wednesday 6/20/2018  Please call patient when completed at [phone]       Thanks  [facility] [employer]

## 2018-06-18 NOTE — TELEPHONE ENCOUNTER
Received seizure reporting form and substance abuse form  I see that seizure reporting from was sent on 5/3  I lmom for pt to see if he knows why he received another seizure reporting form  Lois-are you willing to complete substance abuse form?

## 2018-06-25 ENCOUNTER — OFFICE VISIT (OUTPATIENT)
Dept: FAMILY MEDICINE CLINIC | Facility: CLINIC | Age: 58
End: 2018-06-25
Payer: COMMERCIAL

## 2018-06-25 VITALS
TEMPERATURE: 97.7 F | DIASTOLIC BLOOD PRESSURE: 80 MMHG | HEIGHT: 71 IN | BODY MASS INDEX: 22.68 KG/M2 | SYSTOLIC BLOOD PRESSURE: 130 MMHG | HEART RATE: 84 BPM | WEIGHT: 162 LBS | RESPIRATION RATE: 16 BRPM

## 2018-06-25 DIAGNOSIS — G40.909 NONINTRACTABLE EPILEPSY WITHOUT STATUS EPILEPTICUS, UNSPECIFIED EPILEPSY TYPE (HCC): Primary | ICD-10-CM

## 2018-06-25 DIAGNOSIS — R03.0 ELEVATED BP WITHOUT DIAGNOSIS OF HYPERTENSION: ICD-10-CM

## 2018-06-25 PROBLEM — K59.00 CONSTIPATION: Status: ACTIVE | Noted: 2017-07-25

## 2018-06-25 PROBLEM — M15.9 GOA (GENERALIZED OSTEOARTHRITIS): Status: ACTIVE | Noted: 2017-01-24

## 2018-06-25 PROBLEM — K63.5 COLON POLYPS: Status: ACTIVE | Noted: 2017-07-25

## 2018-06-25 PROCEDURE — 99213 OFFICE O/P EST LOW 20 MIN: CPT | Performed by: FAMILY MEDICINE

## 2018-06-25 PROCEDURE — 3008F BODY MASS INDEX DOCD: CPT | Performed by: FAMILY MEDICINE

## 2018-06-25 NOTE — PROGRESS NOTES
Assessment/Plan:    Elevated BP without diagnosis of hypertension  Patient presents to the office c/o elevated blood pressure readings at home  His BP in the office is normal at 130/80  Recommended to start on a low-sodium diet  Encouraged regular exercise  Patient was advised to bring blood pressure monitor to check for accuracy  Epilepsy [facility])  Continue current medical regimen as per neurology Dr Rickey Gil  Keep follow-up appointment as scheduled on 9/24/18  Diagnoses and all orders for this visit:    Nonintractable epilepsy without status epilepticus, unspecified epilepsy type ([facility] [address] )    Elevated BP without diagnosis of hypertension          Subjective:      Patient ID: David Doyle is a 62 y o  male  HPI     Patient presents to the office to discussed elevated blood pressure readings  Patient states that he started checking BP at home with wrist monitor  BP ranges 145 -150/80-86  He is not sure if his BP monitor is accurate  Denies chest pain, shortness of breath, dizziness, headache  Patient admits eating salty foods  Family history is positive for HTN  Patient has epilepsy  He had breakthrough seizure on 4/20/18 due to forgetting to take his medication at lunch  He was seen in [facility]  He followed by neurology on 5/3/18  Dose of Carbamazepine 200 mg was  increased to 9 tablets daily  No more episodes of seizure  Denies tobacco use  The following portions of the patient's history were reviewed and updated as appropriate: allergies, current medications, past family history, past medical history, past social history, past surgical history and problem list     Review of Systems   Constitutional: Negative for activity change, appetite change, chills, fatigue and fever  HENT: Negative  Eyes: Negative for pain, discharge, redness, itching and visual disturbance     Respiratory: Negative for cough, chest tightness, shortness of breath and wheezing  Cardiovascular: Negative for chest pain, palpitations and leg swelling  Gastrointestinal: Positive for diarrhea (occasional)  Negative for abdominal pain, blood in stool, constipation, nausea and vomiting  Genitourinary: Negative for difficulty urinating, dysuria and hematuria  Musculoskeletal: Negative for arthralgias, back pain, gait problem, joint swelling and myalgias  Skin: Negative for rash and wound  Neurological: Negative for dizziness, syncope and headaches  Hematological: Negative for adenopathy  Does not bruise/bleed easily  Psychiatric/Behavioral: Negative for sleep disturbance  Anxiety          Objective:      /80   Pulse 84   Temp 97 7 °F (36 5 °C) (Tympanic)   Resp 16   Ht 5' 11" (1 803 m)   Wt 73 5 kg (162 lb)   BMI 22 59 kg/m²          Physical Exam   Constitutional: He is oriented to person, place, and time  He appears well-developed and well-nourished  HENT:   Head: Normocephalic and atraumatic  Right Ear: External ear normal    Left Ear: External ear normal    Mouth/Throat: Oropharynx is clear and moist    Eyes: Conjunctivae are normal  Pupils are equal, round, and reactive to light  Neck: Normal range of motion  Neck supple  No JVD present  Cardiovascular: Normal rate, regular rhythm and normal heart sounds  No murmur heard  No carotid bruits BL  No BL LE edema   Pulmonary/Chest: Effort normal and breath sounds normal  He has no wheezes  He has no rales  Abdominal: Soft  Bowel sounds are normal  There is no tenderness  Musculoskeletal: Normal range of motion  He exhibits no edema, tenderness or deformity  Neurological: He is alert and oriented to person, place, and time  Coordination normal    Skin: Skin is warm and dry  No rash noted  Psychiatric: He has a normal mood and affect  His behavior is normal    Nursing note and vitals reviewed

## 2018-06-25 NOTE — ASSESSMENT & PLAN NOTE
Patient presents to the office c/o elevated blood pressure readings at home  His BP in the office is normal at 130/80  Recommended to start on a low-sodium diet  Encouraged regular exercise  Patient was advised to bring blood pressure monitor to check for accuracy

## 2018-09-21 DIAGNOSIS — G40.209 PARTIAL SYMPTOMATIC EPILEPSY WITH COMPLEX PARTIAL SEIZURES, NOT INTRACTABLE, WITHOUT STATUS EPILEPTICUS (HCC): ICD-10-CM

## 2018-09-24 RX ORDER — CARBAMAZEPINE 200 MG/1
TABLET ORAL
Qty: 270 TABLET | Refills: 5 | Status: SHIPPED | OUTPATIENT
Start: 2018-09-24 | End: 2019-04-10 | Stop reason: SDUPTHER

## 2018-10-01 ENCOUNTER — OFFICE VISIT (OUTPATIENT)
Dept: FAMILY MEDICINE CLINIC | Facility: CLINIC | Age: 58
End: 2018-10-01
Payer: COMMERCIAL

## 2018-10-01 ENCOUNTER — TELEPHONE (OUTPATIENT)
Dept: FAMILY MEDICINE CLINIC | Facility: CLINIC | Age: 58
End: 2018-10-01

## 2018-10-01 VITALS
HEART RATE: 80 BPM | TEMPERATURE: 98.1 F | RESPIRATION RATE: 16 BRPM | BODY MASS INDEX: 22.71 KG/M2 | DIASTOLIC BLOOD PRESSURE: 100 MMHG | WEIGHT: 162.8 LBS | SYSTOLIC BLOOD PRESSURE: 142 MMHG

## 2018-10-01 DIAGNOSIS — I10 ESSENTIAL HYPERTENSION: Primary | ICD-10-CM

## 2018-10-01 DIAGNOSIS — K63.5 POLYP OF COLON, UNSPECIFIED PART OF COLON, UNSPECIFIED TYPE: ICD-10-CM

## 2018-10-01 DIAGNOSIS — Z12.5 SCREENING FOR PROSTATE CANCER: ICD-10-CM

## 2018-10-01 DIAGNOSIS — H61.23 BILATERAL IMPACTED CERUMEN: ICD-10-CM

## 2018-10-01 DIAGNOSIS — G40.909 NONINTRACTABLE EPILEPSY WITHOUT STATUS EPILEPTICUS, UNSPECIFIED EPILEPSY TYPE (HCC): ICD-10-CM

## 2018-10-01 PROBLEM — R03.0 ELEVATED BP WITHOUT DIAGNOSIS OF HYPERTENSION: Status: RESOLVED | Noted: 2018-06-25 | Resolved: 2018-10-01

## 2018-10-01 PROCEDURE — 69210 REMOVE IMPACTED EAR WAX UNI: CPT | Performed by: FAMILY MEDICINE

## 2018-10-01 PROCEDURE — 99214 OFFICE O/P EST MOD 30 MIN: CPT | Performed by: FAMILY MEDICINE

## 2018-10-01 NOTE — ASSESSMENT & PLAN NOTE
Patient had colonoscopy done 4 years ago by colorectal surgeon   Dr Nafisa Pathak  Patient will be due for colonoscopy next year

## 2018-10-01 NOTE — PROGRESS NOTES
Chief Complaint   Patient presents with    Follow-up     8 month follow up  Health Maintenance   Topic Date Due    DTaP,Tdap,and Td Vaccines (1 - Tdap) 02/08/1981    INFLUENZA VACCINE  09/01/2018    CRC Screening: Colonoscopy  03/29/2019    Depression Screening PHQ  06/25/2019     Assessment/Plan:    Essential hypertension  BP is elevated today  Patient states that he has blood pressure medication at home prescribed to him in the past, but he does not remember the name and the dose  Recommended to follow a low-sodium diet, regular exercise  Start checking blood pressure at home and call with BP log in 2 weeks  Check labs  Epilepsy ([facility] )  Continue cCbamazepine 200 mg 9 tablets daily  Schedule follow-up office visit with neurology Dr Ashia Hernandez  Colon polyps  Patient had colonoscopy done 4 years ago by colorectal surgeon   Dr David Mercedes  Patient will be due for colonoscopy next year  Bilateral impacted cerumen  Cerumen impaction removed from both ears by irrigation with warm water  TM intact BL  Patient tolerated procedure well  HM: patient declined Flu vaccination  I have spent 25 minutes with Patient  today in which greater than 50% of this time was spent in counseling/coordination of care regarding Risks and benefits of tx options, Intructions for management, Importance of tx compliance and Risk factor reductions  Schedule follow-up visit for HTN in 4 weeks, will review test results  Diagnoses and all orders for this visit:    Nonintractable epilepsy without status epilepticus, unspecified epilepsy type ([facility] )  -     CBC and differential; Future    Essential hypertension  -     Comprehensive metabolic panel; Future  -     Lipid panel; Future  -     TSH, 3rd generation with Free T4 reflex;  Future  -     CBC and differential; Future    Polyp of colon, unspecified part of colon, unspecified type    Bilateral impacted cerumen    Screening for prostate cancer  -     PSA, Total Screen; Future          Subjective:      Patient ID: Shubham Williamson is a 62 y o  male  HPI     The patient is 70-year-old male with epilepsy, elevated pressure, OA, ED  He presents for 8 months follow-up office visit  Epilepsy - patient been followed by neurology   Dr Peter Tan, last seen in May 2018  Currently taking Carbamazepine 200 mg 9 tab  daily  Patient reports no episodes of seizures since 8/18  Last blood test done in 5/18  Patient denies chest pain, shortness of breath, dizziness  He has not been monitoring blood pressure at home, admits eating salty foods, drinks 2 cups of coffee daily  Denies tobacco use  Patient c/o clogged right ear, started using  Debrox ear drops for the last 2-3 days  Denies nasal congestion, sore throat  Denies dizziness  Refusing Flu shot  Patient had colonoscopy done 4 years ago by colorectal surgeon Dr Neeta Shelby,  2 polyps were removed  The following portions of the patient's history were reviewed and updated as appropriate: allergies, past family history, past medical history, past social history, past surgical history and problem list     Review of Systems   Constitutional: Negative for activity change, appetite change, chills, fatigue and fever  HENT: Positive for hearing loss (decresed hearing in R ear)  Negative for congestion, ear pain, nosebleeds, postnasal drip, sinus pressure, sore throat, tinnitus and trouble swallowing  Clogged R ear   Eyes: Negative for pain, discharge, redness, itching and visual disturbance  Respiratory: Negative for cough, chest tightness, shortness of breath and wheezing  Cardiovascular: Negative for chest pain, palpitations and leg swelling  Gastrointestinal: Positive for abdominal distention and constipation (occasionally)  Negative for abdominal pain, blood in stool, diarrhea, nausea and vomiting     Genitourinary: Negative for difficulty urinating, dysuria, flank pain, frequency and hematuria  Musculoskeletal: Positive for arthralgias (in R hand joint)  Negative for back pain, gait problem, joint swelling, myalgias and neck pain  Skin: Negative for rash and wound  Neurological: Negative for dizziness, seizures, syncope and headaches  Hematological: Negative  Psychiatric/Behavioral: Negative for behavioral problems, dysphoric mood, sleep disturbance and suicidal ideas  Objective:      /100 (BP Location: Left arm, Patient Position: Sitting, Cuff Size: Standard)   Pulse 80   Temp 98 1 °F (36 7 °C) (Tympanic)   Resp 16   Wt 73 8 kg (162 lb 12 8 oz)   BMI 22 71 kg/m²          Physical Exam   Constitutional: He appears well-developed and well-nourished  HENT:   Head: Normocephalic and atraumatic  BL external ear canals impacted with wax  TM can not be visualized well   Eyes: Pupils are equal, round, and reactive to light  Conjunctivae are normal    Cardiovascular: Normal rate, regular rhythm and normal heart sounds  No murmur heard  Pulmonary/Chest: Effort normal and breath sounds normal  He has no wheezes  He has no rales  Abdominal: Bowel sounds are normal  There is no tenderness  Musculoskeletal: Normal range of motion  He exhibits no edema, tenderness or deformity  Skin: Skin is warm and dry  No rash noted  Nursing note and vitals reviewed

## 2018-10-01 NOTE — ASSESSMENT & PLAN NOTE
BP is elevated today  Patient states that he has blood pressure medication at home prescribed to him in the past, but he does not remember the name and the dose  Recommended to follow a low-sodium diet, regular exercise  Start checking blood pressure at home and call with BP log in 2 weeks  Check labs

## 2018-10-01 NOTE — ASSESSMENT & PLAN NOTE
Cerumen impaction removed from both ears by irrigation with warm water  TM intact BL  Patient tolerated procedure well

## 2018-10-01 NOTE — ASSESSMENT & PLAN NOTE
Continue cCbamazepine 200 mg 9 tablets daily  Schedule follow-up office visit with neurology Dr Linda Howell

## 2018-10-08 ENCOUNTER — OFFICE VISIT (OUTPATIENT)
Dept: NEUROLOGY | Facility: CLINIC | Age: 58
End: 2018-10-08
Payer: COMMERCIAL

## 2018-10-08 VITALS
SYSTOLIC BLOOD PRESSURE: 138 MMHG | DIASTOLIC BLOOD PRESSURE: 98 MMHG | HEIGHT: 71 IN | BODY MASS INDEX: 23.17 KG/M2 | HEART RATE: 81 BPM | WEIGHT: 165.5 LBS

## 2018-10-08 DIAGNOSIS — G40.009 PARTIAL IDIOPATHIC EPILEPSY WITH SEIZURES OF LOCALIZED ONSET, NOT INTRACTABLE, WITHOUT STATUS EPILEPTICUS (HCC): Primary | ICD-10-CM

## 2018-10-08 PROCEDURE — 99213 OFFICE O/P EST LOW 20 MIN: CPT | Performed by: PSYCHIATRY & NEUROLOGY

## 2018-10-08 NOTE — PROGRESS NOTES
Patient ID: Geeta Anderson is a 62 y o  male  Assessment/Plan:    Epilepsy ([facility] )  Pt had a breakthrough seizure 4/20/18 which is likely due to forgetting to take his medication at lunchtime and insomnia He does understand the importance of taking his medications as prescribed  He did change to generic medication  due to the high cost of branded Tegretol  He did receive a letter from [address] he is no longer driving    He will be requesting a a form   But he has not received form [employer]   I asked him to call and sent our office the forms   He  needs to be seizure free for 6 months but ultimately it is [place] decision    He asked  For a letter for 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  I can not make that decision with out  additional information but it is ultimately  his company decision  to allow him to drive their  cars  He needs a tegretol level        Diagnoses and all orders for this visit:    Partial idiopathic epilepsy with seizures of localized onset, not intractable, without status epilepticus ([facility] )  -     Carbamazepine level, total; Future         Subjective:    Jovita So is a 59y/o right hand male with a long history of seizures  He had  a seizure  On 4/20/18 And is now seizure free   They began when he was 7 y/o it started with twitching of the chin, and was followed by a tonic clinic event  He was placed on phenobarbital and Dilantin but it did not control the seizures  In 1981 he was using a blow torch when had a typical seizure with a loss consciousness  At that time Phenobarbital and Dilantin was discontinued and Tegretol was started  His seizures start with abnormal sense in the chest; he usually screams followed by hiccups, a LOC, generalized tonic/ clonic activity and is followed by a severe, intense headache  The headaches last for about 1/2 hr  during the tonic/ clonic activity       In January of 2015     He was diagnosed with bronchitis and placed on antibiotic (Pack) he had a seizures after the three day    (Normal description followed by headache)  He switched to Tegretol brand name 1400mg daily and started taking mag supplements       On November 18, 2015  He forgot to take the last dose of tegretol  The following day  He began a having "hiccups" or auras  It was followed by 2 to 3hrs of confusion, and alteration on consiousness  He was treated with extra magnesium, extra hot herbal tea  He then developed chest pain and was brought to [address] [place] [address]  He was admitted, for monitoring  Tegretol level was 12 2, sodium was 142  And an EEG showed generalized epileptiform features, and CT scan was normal  The dose of brand name tegretol was increased to 1600mg daily      On April 20, 2018 he was seen in the ED  He was taking a nap when his mother found him having a seizure  He may have missed one dose of Tegretol that day  His Tegretol level was 7 4  His levels usually run 12-13  He was taking brand name at the time  He is now taking generic Tegretol 200mg tabs 9 tabs total per day (3, 2, 2, 2)  The ED physician did report him to the state and he did receive paperwork about no longer driving  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      He is drinking Gatorade and working full time   He is sleeping better   He is sleeping  At 8;30 pm   He is taking 3-2-2-2                     The following portions of the patient's history were reviewed and updated as appropriate:   He  has a past medical history of Arthritis; Hypertension; and Seizures ([address] )  He  has no past surgical history on file  His family history includes Alzheimer's disease in his mother; Dementia in his mother; Stroke in his father  He  reports that he has never smoked  He has never used smokeless tobacco  He reports that he drinks alcohol  He reports that he does not use drugs    Current Outpatient Prescriptions   Medication Sig Dispense Refill    Calcium Carb-Cholecalciferol (CALCIUM 500 +D PO) Take by mouth 2 tablets daily      Calcium-Magnesium-Vitamin D (CALCIUM MAGNESIUM PO) Take by mouth      carBAMazepine (TEGretol) 200 mg tablet take 9 tablets by mouth daily 270 tablet 5    co-enzyme Q-10 30 MG capsule Take 100 mg by mouth daily        LORazepam (ATIVAN) 0 5 mg tablet Take 1 tablet as needed for seizures  15 tablet 0    Vitamin Mixture (VITAMIN E COMPLETE PO) Take by mouth      sildenafil (VIAGRA) 50 MG tablet take 1 tablet by mouth if needed 1 HOUR BEFORE NEEDED       No current facility-administered medications for this visit  He is allergic to meperidine and pollen extract            Objective:    Blood pressure 138/98, pulse 81, height 5' 11" (1 803 m), weight 75 1 kg (165 lb 8 oz)  Physical Exam   Eyes: Lids are normal    Neurological: He has normal strength  Coordination normal    Psychiatric: His speech is normal        Neurological Exam  Mental Status  Awake, alert and oriented to person, place and time  Speech is normal  Language is fluent with no aphasia  Attention and concentration are normal     Cranial Nerves  CN II: Visual fields full to confrontation  CN III, IV, VI: Normal lids and orbits bilaterally  CN V: Facial sensation is normal   CN VII: Full and symmetric facial movement  CN VIII: Hearing is normal   CN IX, X: Palate elevates symmetrically    Motor  Normal muscle bulk throughout  Strength is 5/5 throughout all four extremities  Sensory  Light touch is normal in upper and lower extremities  Coordination  Finger-to-nose, rapid alternating movements and heel-to-shin normal bilaterally without dysmetria  Gait  Normal casual, toe, heel and tandem gait  Able to rise from chair without using arms  ROS:    Review of Systems   Constitutional: Negative  Negative for appetite change and fever  HENT: Negative  Negative for hearing loss, tinnitus, trouble swallowing and voice change      Eyes: Positive for itching  Negative for photophobia and pain  Respiratory: Negative  Negative for shortness of breath  Cardiovascular: Negative  Negative for palpitations  Gastrointestinal: Positive for diarrhea  Negative for nausea and vomiting  Endocrine: Negative  Negative for cold intolerance and heat intolerance  Genitourinary: Negative  Negative for dysuria, frequency and urgency  Musculoskeletal: Positive for back pain and joint swelling (Hands)  Negative for myalgias and neck pain  Skin: Negative  Negative for rash  Neurological: Negative  Negative for dizziness, tremors, seizures, syncope, facial asymmetry, speech difficulty, weakness, light-headedness, numbness and headaches  Hematological: Negative  Does not bruise/bleed easily  Psychiatric/Behavioral: Negative  Negative for confusion, hallucinations and sleep disturbance

## 2018-10-08 NOTE — ASSESSMENT & PLAN NOTE
Pt had a breakthrough seizure 4/20/18 which is likely due to forgetting to take his medication at lunchtime and insomnia He does understand the importance of taking his medications as prescribed  He did change to generic medication  due to the high cost of branded Tegretol  He did receive a letter from [facility] he is no longer driving    He will be requesting a a form   But he has not received form [place] [employer]   I asked him to call and sent our office the forms   He  needs to be seizure free for 6 months but ultimately it is [place] decision    He asked  For a letter for 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

## 2018-10-12 ENCOUNTER — TELEPHONE (OUTPATIENT)
Dept: NEUROLOGY | Facility: CLINIC | Age: 58
End: 2018-10-12

## 2018-10-12 NOTE — TELEPHONE ENCOUNTER
Patient came in requesting a letter from Dr Michael Pruett that patients states employer will keep it on file  Letter employer is requesting to say that Patient is okay to drive small  trucks  We Also let patient know that he needs to bring in a letter from employer stating this  Patient is in doubt about requesting this from employer  Any questions you may have please contact patient   I also gave patient a note what to ask his employer with our number if he has any questions

## 2018-10-13 ENCOUNTER — APPOINTMENT (OUTPATIENT)
Dept: LAB | Facility: HOSPITAL | Age: 58
End: 2018-10-13
Payer: COMMERCIAL

## 2018-10-13 DIAGNOSIS — M15.9 POLYOSTEOARTHRITIS: ICD-10-CM

## 2018-10-13 DIAGNOSIS — G40.909 EPILEPSY WITHOUT STATUS EPILEPTICUS, NOT INTRACTABLE (HCC): ICD-10-CM

## 2018-10-13 DIAGNOSIS — G40.009 PARTIAL IDIOPATHIC EPILEPSY WITH SEIZURES OF LOCALIZED ONSET, NOT INTRACTABLE, WITHOUT STATUS EPILEPTICUS (HCC): ICD-10-CM

## 2018-10-13 DIAGNOSIS — G40.909 NONINTRACTABLE EPILEPSY WITHOUT STATUS EPILEPTICUS, UNSPECIFIED EPILEPSY TYPE (HCC): ICD-10-CM

## 2018-10-13 DIAGNOSIS — Z12.5 SCREENING FOR PROSTATE CANCER: ICD-10-CM

## 2018-10-13 DIAGNOSIS — I10 ESSENTIAL HYPERTENSION: ICD-10-CM

## 2018-10-13 DIAGNOSIS — Z12.5 ENCOUNTER FOR SCREENING FOR MALIGNANT NEOPLASM OF PROSTATE: ICD-10-CM

## 2018-10-13 DIAGNOSIS — R25.1 TREMOR: ICD-10-CM

## 2018-10-13 DIAGNOSIS — G40.909 SEIZURE DISORDER (HCC): ICD-10-CM

## 2018-10-13 DIAGNOSIS — Z13.6 ENCOUNTER FOR SCREENING FOR CARDIOVASCULAR DISORDERS: ICD-10-CM

## 2018-10-13 LAB
ALBUMIN SERPL BCP-MCNC: 3.8 G/DL (ref 3.5–5)
ALP SERPL-CCNC: 66 U/L (ref 46–116)
ALT SERPL W P-5'-P-CCNC: 40 U/L (ref 12–78)
ANION GAP SERPL CALCULATED.3IONS-SCNC: 4 MMOL/L (ref 4–13)
AST SERPL W P-5'-P-CCNC: 22 U/L (ref 5–45)
BASOPHILS # BLD AUTO: 0.08 THOUSANDS/ΜL (ref 0–0.1)
BASOPHILS NFR BLD AUTO: 1 % (ref 0–1)
BILIRUB SERPL-MCNC: 0.36 MG/DL (ref 0.2–1)
BUN SERPL-MCNC: 11 MG/DL (ref 5–25)
CALCIUM SERPL-MCNC: 8.2 MG/DL (ref 8.3–10.1)
CARBAMAZEPINE SERPL-MCNC: 10.2 UG/ML (ref 4–12)
CHLORIDE SERPL-SCNC: 104 MMOL/L (ref 100–108)
CHOLEST SERPL-MCNC: 210 MG/DL (ref 50–200)
CO2 SERPL-SCNC: 27 MMOL/L (ref 21–32)
CREAT SERPL-MCNC: 0.67 MG/DL (ref 0.6–1.3)
EOSINOPHIL # BLD AUTO: 0.27 THOUSAND/ΜL (ref 0–0.61)
EOSINOPHIL NFR BLD AUTO: 4 % (ref 0–6)
ERYTHROCYTE [DISTWIDTH] IN BLOOD BY AUTOMATED COUNT: 11.3 % (ref 11.6–15.1)
GFR SERPL CREATININE-BSD FRML MDRD: 106 ML/MIN/1.73SQ M
GLUCOSE P FAST SERPL-MCNC: 85 MG/DL (ref 65–99)
HCT VFR BLD AUTO: 45.6 % (ref 36.5–49.3)
HDLC SERPL-MCNC: 95 MG/DL (ref 40–60)
HGB BLD-MCNC: 15.4 G/DL (ref 12–17)
IMM GRANULOCYTES # BLD AUTO: 0.01 THOUSAND/UL (ref 0–0.2)
IMM GRANULOCYTES NFR BLD AUTO: 0 % (ref 0–2)
LDLC SERPL CALC-MCNC: 96 MG/DL (ref 0–100)
LYMPHOCYTES # BLD AUTO: 2.01 THOUSANDS/ΜL (ref 0.6–4.47)
LYMPHOCYTES NFR BLD AUTO: 32 % (ref 14–44)
MCH RBC QN AUTO: 33.7 PG (ref 26.8–34.3)
MCHC RBC AUTO-ENTMCNC: 33.8 G/DL (ref 31.4–37.4)
MCV RBC AUTO: 100 FL (ref 82–98)
MONOCYTES # BLD AUTO: 0.42 THOUSAND/ΜL (ref 0.17–1.22)
MONOCYTES NFR BLD AUTO: 7 % (ref 4–12)
NEUTROPHILS # BLD AUTO: 3.55 THOUSANDS/ΜL (ref 1.85–7.62)
NEUTS SEG NFR BLD AUTO: 56 % (ref 43–75)
NONHDLC SERPL-MCNC: 115 MG/DL
NRBC BLD AUTO-RTO: 0 /100 WBCS
PLATELET # BLD AUTO: 230 THOUSANDS/UL (ref 149–390)
PMV BLD AUTO: 9 FL (ref 8.9–12.7)
POTASSIUM SERPL-SCNC: 4.2 MMOL/L (ref 3.5–5.3)
PROT SERPL-MCNC: 7.3 G/DL (ref 6.4–8.2)
PSA SERPL-MCNC: 0.8 NG/ML (ref 0–4)
RBC # BLD AUTO: 4.57 MILLION/UL (ref 3.88–5.62)
SODIUM SERPL-SCNC: 135 MMOL/L (ref 136–145)
TRIGL SERPL-MCNC: 93 MG/DL
TSH SERPL DL<=0.05 MIU/L-ACNC: 0.82 UIU/ML (ref 0.36–3.74)
WBC # BLD AUTO: 6.34 THOUSAND/UL (ref 4.31–10.16)

## 2018-10-13 PROCEDURE — 36415 COLL VENOUS BLD VENIPUNCTURE: CPT

## 2018-10-13 PROCEDURE — 80053 COMPREHEN METABOLIC PANEL: CPT

## 2018-10-13 PROCEDURE — 80061 LIPID PANEL: CPT

## 2018-10-13 PROCEDURE — G0103 PSA SCREENING: HCPCS

## 2018-10-13 PROCEDURE — 80156 ASSAY CARBAMAZEPINE TOTAL: CPT

## 2018-10-13 PROCEDURE — 84443 ASSAY THYROID STIM HORMONE: CPT

## 2018-10-13 PROCEDURE — 85025 COMPLETE CBC W/AUTO DIFF WBC: CPT

## 2018-10-13 NOTE — TELEPHONE ENCOUNTER
Please call pt to get more information     We had a long discussion about this at the last  Visit           He does not have his drivers  licence reinstated so nothing  About driving  can be addressed       Since he continues to have seizures I can not put in writing that there is no risk with driving any type of vehicle    There is always a risk of seizures   Even when he is taking his meds and is sleeping well     Please let him know

## 2018-10-15 NOTE — TELEPHONE ENCOUNTER
Pt states last seizure was 4/20, please see last office note    Pt needs [employer] form filled out, will drop off form on Friday @[address]   pt states employer wants letter stating he is safe to drive  I did review below note w/pt & last office note as well  Pt hoping letter will say he is safe to drive    Pt states he is taking meds as prescribed  Cannot afford to lost his job, this is his life    Please advise

## 2018-10-15 NOTE — TELEPHONE ENCOUNTER
We can note document he is safe to drive     We can only document that Jean[employer] has reinstated his license    Please ask him to call us when his license is reinstated

## 2018-10-17 NOTE — TELEPHONE ENCOUNTER
He needs to call on Monday to let us know if he was seizure free over the weekend    After he call we can fill out the forms

## 2018-10-17 NOTE — TELEPHONE ENCOUNTER
Pt aware  He will be dropping off [employer] form on Friday to [place] office,   Will be 6months seizure free on saturday

## 2018-10-19 NOTE — TELEPHONE ENCOUNTER
Pt dropped off [employer] forms they were scanned into media tab as well as placed in clinical bin  Pt was told about two week turn around period and will be calling in on Monday as told to report seizure free

## 2018-10-22 NOTE — TELEPHONE ENCOUNTER
Pt called to report he remained sz free over this weekend  Pt made aware of 2 wk turn around again  He verbalized understanding

## 2018-10-23 NOTE — TELEPHONE ENCOUNTER
I need to look at the form first   You can send it to [facility] tomorrow  Or else I will look at it on Monday  10/29 when I am back in the [address] [place] [address]

## 2018-10-24 NOTE — TELEPHONE ENCOUNTER
Dr Philippe Jaquez called me this am and states that she reviewed form and asked that I complete form  She will review and sign on Monday      From completed and placed in your folder

## 2018-10-29 ENCOUNTER — OFFICE VISIT (OUTPATIENT)
Dept: FAMILY MEDICINE CLINIC | Facility: CLINIC | Age: 58
End: 2018-10-29
Payer: COMMERCIAL

## 2018-10-29 ENCOUNTER — TELEPHONE (OUTPATIENT)
Dept: NEUROLOGY | Facility: CLINIC | Age: 58
End: 2018-10-29

## 2018-10-29 VITALS
HEIGHT: 71 IN | DIASTOLIC BLOOD PRESSURE: 84 MMHG | SYSTOLIC BLOOD PRESSURE: 130 MMHG | HEART RATE: 64 BPM | WEIGHT: 162.2 LBS | TEMPERATURE: 97.4 F | BODY MASS INDEX: 22.71 KG/M2 | RESPIRATION RATE: 16 BRPM

## 2018-10-29 DIAGNOSIS — G40.009 PARTIAL IDIOPATHIC EPILEPSY WITH SEIZURES OF LOCALIZED ONSET, NOT INTRACTABLE, WITHOUT STATUS EPILEPTICUS (HCC): ICD-10-CM

## 2018-10-29 DIAGNOSIS — I10 ESSENTIAL HYPERTENSION: Primary | ICD-10-CM

## 2018-10-29 PROCEDURE — 3075F SYST BP GE 130 - 139MM HG: CPT | Performed by: FAMILY MEDICINE

## 2018-10-29 PROCEDURE — 3079F DIAST BP 80-89 MM HG: CPT | Performed by: FAMILY MEDICINE

## 2018-10-29 PROCEDURE — 99213 OFFICE O/P EST LOW 20 MIN: CPT | Performed by: FAMILY MEDICINE

## 2018-10-29 PROCEDURE — 3008F BODY MASS INDEX DOCD: CPT | Performed by: FAMILY MEDICINE

## 2018-10-29 NOTE — PROGRESS NOTES
Chief Complaint   Patient presents with    Follow-up     4 week follow up for Hypertension  Health Maintenance   Topic Date Due    DTaP,Tdap,and Td Vaccines (1 - Tdap) 02/08/1981    INFLUENZA VACCINE  07/01/2018    CRC Screening: Colonoscopy  03/29/2019    Depression Screening PHQ  06/25/2019     Assessment/Plan:    Essential hypertension  BP improved since last visit  Recommended to follow a low-sodium diet, stay well hydrated  Continue regular exercise  Check blood pressure at home periodically  Call if blood pressure >140/90  Epilepsy [facility])  Management per neurology Dr Demi Hauser  Schedule follow-up visit in 6 months  Diagnoses and all orders for this visit:    Essential hypertension    Partial idiopathic epilepsy with seizures of localized onset, not intractable, without status epilepticus ([facility] )          Subjective:      Patient ID: Usha Munoz is a 62 y o  male  HPI     Patient presents to the office for 4 week follow-up visit for HTN  Patient does not take any blood pressure medications currently  He had blood work done on October 13, 2018  TSH 0 817, Hemoglobin 15 4, creatinine 0 67, LFTs - within normal limits  Total cholesterol 210, triglycerides 93, HDL 95, LDL 96  Patient states that he tries to exercise 3 times per week on stationary bike  Denies chest pain, shortness of breath, dizziness  He checks blood pressure periodically  BP ranges in 130/ 80's  Patient has epilepsy  Currently taking Carbamazepine 200 mg 9 tablets daily  He has been followed by neurologist Thomas Gomez, last seen on 10/8/18  Declined Flu shot  Colonoscopy done 4 years ago by colorectal surgeon   Dr Valda Dancer,  2 polyps were removed      The following portions of the patient's history were reviewed and updated as appropriate: allergies, current medications, past medical history, past social history, past surgical history and problem list     Review of Systems Constitutional: Negative for activity change, appetite change, chills, fatigue and fever  HENT: Positive for congestion (mild)  Negative for ear pain, nosebleeds, sore throat, tinnitus and trouble swallowing  Eyes: Negative for pain, discharge, redness, itching and visual disturbance  Respiratory: Negative for cough, chest tightness, shortness of breath and wheezing  Cardiovascular: Negative for chest pain, palpitations and leg swelling  Gastrointestinal: Negative  Genitourinary: Negative for difficulty urinating, dysuria, flank pain, frequency and hematuria  Musculoskeletal: Negative for arthralgias, back pain, joint swelling and myalgias  Skin: Negative for rash and wound  Neurological: Negative for dizziness, syncope and headaches  Hematological: Negative  Psychiatric/Behavioral: Negative  Objective:      /84 (BP Location: Left arm, Patient Position: Sitting, Cuff Size: Standard)   Pulse 64   Temp (!) 97 4 °F (36 3 °C) (Tympanic)   Resp 16   Ht 5' 11" (1 803 m)   Wt 73 6 kg (162 lb 3 2 oz)   BMI 22 62 kg/m²          Physical Exam   Constitutional: He appears well-developed and well-nourished  HENT:   Head: Normocephalic and atraumatic  Right Ear: External ear normal    Left Ear: External ear normal    Mouth/Throat: Oropharynx is clear and moist    Eyes: Pupils are equal, round, and reactive to light  Conjunctivae are normal    Cardiovascular: Normal rate, regular rhythm and normal heart sounds  No murmur heard  No BL LE edema  No carotid bruits BL   Pulmonary/Chest: Effort normal and breath sounds normal  He has no wheezes  He has no rales  Abdominal: Soft  Bowel sounds are normal  There is no tenderness  Musculoskeletal: Normal range of motion  He exhibits no edema, tenderness or deformity  Skin: Skin is warm and dry  No rash noted  Psychiatric: He has a normal mood and affect  Nursing note and vitals reviewed

## 2018-10-29 NOTE — TELEPHONE ENCOUNTER
Pt called asking status of the form  Advised him of the below  Clinical team: Pt would like a call back today once signed by Dr Lorna Mackenzie              [phone]

## 2018-10-29 NOTE — ASSESSMENT & PLAN NOTE
BP improved since last visit  Recommended to follow a low-sodium diet, stay well hydrated  Continue regular exercise  Check blood pressure at home periodically  Call if blood pressure >140/90

## 2019-03-11 DIAGNOSIS — G40.209 PARTIAL SYMPTOMATIC EPILEPSY WITH COMPLEX PARTIAL SEIZURES, NOT INTRACTABLE, WITHOUT STATUS EPILEPTICUS (HCC): ICD-10-CM

## 2019-03-11 RX ORDER — LORAZEPAM 0.5 MG/1
TABLET ORAL
Qty: 15 TABLET | Refills: 0 | Status: SHIPPED | OUTPATIENT
Start: 2019-03-11 | End: 2022-03-24 | Stop reason: SDUPTHER

## 2019-04-10 ENCOUNTER — OFFICE VISIT (OUTPATIENT)
Dept: NEUROLOGY | Facility: CLINIC | Age: 59
End: 2019-04-10
Payer: COMMERCIAL

## 2019-04-10 VITALS
DIASTOLIC BLOOD PRESSURE: 98 MMHG | WEIGHT: 168.4 LBS | SYSTOLIC BLOOD PRESSURE: 140 MMHG | HEART RATE: 88 BPM | HEIGHT: 71 IN | BODY MASS INDEX: 23.57 KG/M2

## 2019-04-10 DIAGNOSIS — G40.009 PARTIAL IDIOPATHIC EPILEPSY WITH SEIZURES OF LOCALIZED ONSET, NOT INTRACTABLE, WITHOUT STATUS EPILEPTICUS (HCC): Primary | ICD-10-CM

## 2019-04-10 DIAGNOSIS — G40.209 PARTIAL SYMPTOMATIC EPILEPSY WITH COMPLEX PARTIAL SEIZURES, NOT INTRACTABLE, WITHOUT STATUS EPILEPTICUS (HCC): ICD-10-CM

## 2019-04-10 PROCEDURE — 99214 OFFICE O/P EST MOD 30 MIN: CPT | Performed by: PHYSICIAN ASSISTANT

## 2019-04-10 RX ORDER — CARBAMAZEPINE 200 MG/1
TABLET ORAL
Qty: 270 TABLET | Refills: 5 | Status: SHIPPED | OUTPATIENT
Start: 2019-04-10 | End: 2019-10-09 | Stop reason: SDUPTHER

## 2019-05-09 ENCOUNTER — APPOINTMENT (OUTPATIENT)
Dept: LAB | Facility: HOSPITAL | Age: 59
End: 2019-05-09
Payer: COMMERCIAL

## 2019-05-09 DIAGNOSIS — G40.009 PARTIAL IDIOPATHIC EPILEPSY WITH SEIZURES OF LOCALIZED ONSET, NOT INTRACTABLE, WITHOUT STATUS EPILEPTICUS (HCC): ICD-10-CM

## 2019-05-09 LAB
ALBUMIN SERPL BCP-MCNC: 3.7 G/DL (ref 3.5–5)
ALP SERPL-CCNC: 76 U/L (ref 46–116)
ALT SERPL W P-5'-P-CCNC: 39 U/L (ref 12–78)
ANION GAP SERPL CALCULATED.3IONS-SCNC: 6 MMOL/L (ref 4–13)
AST SERPL W P-5'-P-CCNC: 22 U/L (ref 5–45)
BASOPHILS # BLD AUTO: 0.06 THOUSANDS/ΜL (ref 0–0.1)
BASOPHILS NFR BLD AUTO: 1 % (ref 0–1)
BILIRUB SERPL-MCNC: 0.32 MG/DL (ref 0.2–1)
BUN SERPL-MCNC: 12 MG/DL (ref 5–25)
CALCIUM SERPL-MCNC: 8.2 MG/DL (ref 8.3–10.1)
CARBAMAZEPINE SERPL-MCNC: 13.7 UG/ML (ref 4–12)
CHLORIDE SERPL-SCNC: 103 MMOL/L (ref 100–108)
CO2 SERPL-SCNC: 26 MMOL/L (ref 21–32)
CREAT SERPL-MCNC: 0.82 MG/DL (ref 0.6–1.3)
EOSINOPHIL # BLD AUTO: 0.15 THOUSAND/ΜL (ref 0–0.61)
EOSINOPHIL NFR BLD AUTO: 2 % (ref 0–6)
ERYTHROCYTE [DISTWIDTH] IN BLOOD BY AUTOMATED COUNT: 11 % (ref 11.6–15.1)
GFR SERPL CREATININE-BSD FRML MDRD: 97 ML/MIN/1.73SQ M
GLUCOSE SERPL-MCNC: 101 MG/DL (ref 65–140)
HCT VFR BLD AUTO: 41.2 % (ref 36.5–49.3)
HGB BLD-MCNC: 14.1 G/DL (ref 12–17)
IMM GRANULOCYTES # BLD AUTO: 0.02 THOUSAND/UL (ref 0–0.2)
IMM GRANULOCYTES NFR BLD AUTO: 0 % (ref 0–2)
LYMPHOCYTES # BLD AUTO: 2.34 THOUSANDS/ΜL (ref 0.6–4.47)
LYMPHOCYTES NFR BLD AUTO: 35 % (ref 14–44)
MCH RBC QN AUTO: 33.6 PG (ref 26.8–34.3)
MCHC RBC AUTO-ENTMCNC: 34.2 G/DL (ref 31.4–37.4)
MCV RBC AUTO: 98 FL (ref 82–98)
MONOCYTES # BLD AUTO: 0.54 THOUSAND/ΜL (ref 0.17–1.22)
MONOCYTES NFR BLD AUTO: 8 % (ref 4–12)
NEUTROPHILS # BLD AUTO: 3.53 THOUSANDS/ΜL (ref 1.85–7.62)
NEUTS SEG NFR BLD AUTO: 54 % (ref 43–75)
NRBC BLD AUTO-RTO: 0 /100 WBCS
PLATELET # BLD AUTO: 220 THOUSANDS/UL (ref 149–390)
PMV BLD AUTO: 8.8 FL (ref 8.9–12.7)
POTASSIUM SERPL-SCNC: 3.6 MMOL/L (ref 3.5–5.3)
PROT SERPL-MCNC: 7 G/DL (ref 6.4–8.2)
RBC # BLD AUTO: 4.2 MILLION/UL (ref 3.88–5.62)
SODIUM SERPL-SCNC: 135 MMOL/L (ref 136–145)
WBC # BLD AUTO: 6.64 THOUSAND/UL (ref 4.31–10.16)

## 2019-05-09 PROCEDURE — 80053 COMPREHEN METABOLIC PANEL: CPT

## 2019-05-09 PROCEDURE — 80156 ASSAY CARBAMAZEPINE TOTAL: CPT

## 2019-05-09 PROCEDURE — 36415 COLL VENOUS BLD VENIPUNCTURE: CPT

## 2019-05-09 PROCEDURE — 85025 COMPLETE CBC W/AUTO DIFF WBC: CPT

## 2019-05-14 ENCOUNTER — OFFICE VISIT (OUTPATIENT)
Dept: FAMILY MEDICINE CLINIC | Facility: CLINIC | Age: 59
End: 2019-05-14
Payer: COMMERCIAL

## 2019-05-14 VITALS
RESPIRATION RATE: 16 BRPM | OXYGEN SATURATION: 98 % | SYSTOLIC BLOOD PRESSURE: 130 MMHG | WEIGHT: 169 LBS | HEIGHT: 71 IN | HEART RATE: 76 BPM | BODY MASS INDEX: 23.66 KG/M2 | TEMPERATURE: 98.7 F | DIASTOLIC BLOOD PRESSURE: 82 MMHG

## 2019-05-14 DIAGNOSIS — I10 ESSENTIAL HYPERTENSION: Primary | ICD-10-CM

## 2019-05-14 DIAGNOSIS — Z12.5 SCREENING FOR PROSTATE CANCER: ICD-10-CM

## 2019-05-14 DIAGNOSIS — G40.009 PARTIAL IDIOPATHIC EPILEPSY WITH SEIZURES OF LOCALIZED ONSET, NOT INTRACTABLE, WITHOUT STATUS EPILEPTICUS (HCC): ICD-10-CM

## 2019-05-14 DIAGNOSIS — Z11.59 ENCOUNTER FOR HEPATITIS C VIRUS SCREENING TEST FOR HIGH RISK PATIENT: ICD-10-CM

## 2019-05-14 DIAGNOSIS — Z91.89 ENCOUNTER FOR HEPATITIS C VIRUS SCREENING TEST FOR HIGH RISK PATIENT: ICD-10-CM

## 2019-05-14 PROCEDURE — 1036F TOBACCO NON-USER: CPT | Performed by: FAMILY MEDICINE

## 2019-05-14 PROCEDURE — 3075F SYST BP GE 130 - 139MM HG: CPT | Performed by: FAMILY MEDICINE

## 2019-05-14 PROCEDURE — 99213 OFFICE O/P EST LOW 20 MIN: CPT | Performed by: FAMILY MEDICINE

## 2019-05-14 PROCEDURE — 3079F DIAST BP 80-89 MM HG: CPT | Performed by: FAMILY MEDICINE

## 2019-05-14 PROCEDURE — 3008F BODY MASS INDEX DOCD: CPT | Performed by: FAMILY MEDICINE

## 2019-10-09 ENCOUNTER — OFFICE VISIT (OUTPATIENT)
Dept: NEUROLOGY | Facility: CLINIC | Age: 59
End: 2019-10-09
Payer: COMMERCIAL

## 2019-10-09 VITALS
HEART RATE: 80 BPM | HEIGHT: 71 IN | WEIGHT: 161 LBS | SYSTOLIC BLOOD PRESSURE: 120 MMHG | BODY MASS INDEX: 22.54 KG/M2 | DIASTOLIC BLOOD PRESSURE: 78 MMHG

## 2019-10-09 DIAGNOSIS — G40.209 PARTIAL SYMPTOMATIC EPILEPSY WITH COMPLEX PARTIAL SEIZURES, NOT INTRACTABLE, WITHOUT STATUS EPILEPTICUS (HCC): ICD-10-CM

## 2019-10-09 DIAGNOSIS — G40.909 NONINTRACTABLE EPILEPSY WITHOUT STATUS EPILEPTICUS, UNSPECIFIED EPILEPSY TYPE (HCC): Primary | ICD-10-CM

## 2019-10-09 PROCEDURE — 99213 OFFICE O/P EST LOW 20 MIN: CPT | Performed by: PHYSICIAN ASSISTANT

## 2019-10-09 RX ORDER — CARBAMAZEPINE 200 MG/1
TABLET ORAL
Qty: 270 TABLET | Refills: 5 | Status: SHIPPED | OUTPATIENT
Start: 2019-10-09 | End: 2020-03-24 | Stop reason: SDUPTHER

## 2019-10-09 NOTE — ASSESSMENT & PLAN NOTE
Pt will continue carbamazepine 200mg 3 tabs in the AM, 2 tabs at lunch, 2 tabs at dinner and 2 tabs at bedtime  Bloodwork will be ordered  He will continue the present dose of medication  He was educated on the importance of not skipping doses of medication  Fifteen minutes were spent with the patient gathering history, examining patient and formulating a treatment plan  Pt/family understood and were in agreement with the treatment plan  He will follow-up in 6 months

## 2019-10-09 NOTE — PATIENT INSTRUCTIONS
Epilepsy ([facility] )  Pt will continue carbamazepine 200mg 3 tabs in the AM, 2 tabs at lunch, 2 tabs at dinner and 2 tabs at bedtime  Bloodwork will be ordered  He will continue the present dose of medication  He was educated on the importance of not skipping doses of medication  Fifteen minutes were spent with the patient gathering history, examining patient and formulating a treatment plan  Pt/family understood and were in agreement with the treatment plan  He will follow-up in 6 months

## 2019-10-09 NOTE — PROGRESS NOTES
Patient ID: Tony Jacob is a 61 y o  male  Assessment/Plan:    Epilepsy ([facility] )  Pt will continue carbamazepine 200mg 3 tabs in the AM, 2 tabs at lunch, 2 tabs at dinner and 2 tabs at bedtime  Bloodwork will be ordered  He will continue the present dose of medication  He was educated on the importance of not skipping doses of medication  Fifteen minutes were spent with the patient gathering history, examining patient and formulating a treatment plan  Pt/family understood and were in agreement with the treatment plan  He will follow-up in 6 months  Problem List Items Addressed This Visit        Nervous and Auditory    Epilepsy ([facility] ) - Primary     Pt will continue carbamazepine 200mg 3 tabs in the AM, 2 tabs at lunch, 2 tabs at dinner and 2 tabs at bedtime  Bloodwork will be ordered  He will continue the present dose of medication  He was educated on the importance of not skipping doses of medication  Fifteen minutes were spent with the patient gathering history, examining patient and formulating a treatment plan  Pt/family understood and were in agreement with the treatment plan  He will follow-up in 6 months  Relevant Medications    carBAMazepine (TEGretol) 200 mg tablet    Other Relevant Orders    Carbamazepine level, total    CBC and differential    Comprehensive metabolic panel             Subjective:    LAMBERTO Morris is a 62 y/o right hand male with a long history of seizures  He had  a seizure on 4/20/18  He has been seizure free since  To review, seizures began when he was 9 y/o with twitching of the chin followed by a tonic clonic event  He was placed on phenobarbital and Dilantin but it did not control the seizures  In 1981 he was using a blow torch when had a typical seizure with a loss consciousness  At that time Phenobarbital and Dilantin was discontinued and Tegretol was started   His seizures start with abnormal sense in the chest  He usually screams followed by hiccups, LOC, generalized tonic/ clonic activity and is followed by a severe, intense headache  The headaches last for about 1/2 hr      In January of 2015    He was diagnosed with bronchitis and placed on an antibiotic  He had a seizures after the three day  He switched to Tegretol brand name 1400mg daily and started taking mag supplements  On November 18, 2015  He forgot to take the last dose of tegretol  The following day  He began a having "hiccups" or auras  It was followed by 2 to 3hrs of confusion, and alteration on consiousness  He was treated with extra magnesium, extra hot herbal tea  He then developed chest pain and was brought to [address] [place] [address]  He was admitted, for monitoring  Tegretol level was 12 2, sodium was 142  And an EEG showed generalized epileptiform features, and CT scan was normal  The dose of brand name tegretol was increased to 1600mg daily  On April 20, 2018 he was seen in the ED  He was taking a nap when his mother found him having a seizure  He may have missed one dose of Tegretol that day  His Tegretol level was 7 4  His levels usually run 12-13  He was taking brand name at the time  He is now taking generic Tegretol 200mg tabs 9 tabs total per day (3, 2, 2, 2)  He does have lorazepam for breakthrough  The ED physician did report him to the state and he did receive paperwork about no longer driving  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  He has not had a seizure since then  He was seizure free for 6 months and his license was reinstated  He is using magnesium and phospholipid supplements  He states that he does have difficulty remembering things in the morning at work but his memory does improve during the day  He denies skipping any doses of medication since his last appt  He reports that he has had a tremor in the Rt arm that occurs when he is driving  He states that it started after he broke his arm and has a phani and plate in the arm   He spoke to the surgeon who told him to do exercises to strengthen the arm  Bloodwork was done in May  Carbamazepine level was mildly elevated but he had taken the mediation a few hours prior  The following portions of the patient's history were reviewed and updated as appropriate: allergies, current medications, past family history, past medical history, past social history, past surgical history and problem list          Objective:    Blood pressure 120/78, pulse 80, height 5' 11" (1 803 m), weight 73 kg (161 lb)  Physical Exam   Eyes: Pupils are equal, round, and reactive to light  Lids are normal    Neurological: He has normal strength and normal reflexes  Coordination normal    Psychiatric: His speech is normal    Vitals reviewed  Neurological Exam  Mental Status   Oriented to person, place, time and situation  Speech is normal  Language is fluent with no aphasia  Attention and concentration are normal  Fund of knowledge is appropriate for level of education  Apraxia absent  Cranial Nerves  CN II: Visual acuity is normal  Visual fields full to confrontation  CN III, IV, VI: Extraocular movements intact bilaterally  Normal lids and orbits bilaterally  Pupils equal round and reactive to light bilaterally  CN V: Facial sensation is normal   CN VII: Full and symmetric facial movement  CN VIII: Hearing is normal   CN IX, X: Palate elevates symmetrically  Normal gag reflex  CN XI: Shoulder shrug strength is normal   CN XII: Tongue midline without atrophy or fasciculations  Motor   Strength is 5/5 throughout all four extremities  Sensory  Sensation is intact to light touch, pinprick, vibration and proprioception in all four extremities  Reflexes  Deep tendon reflexes are 2+ and symmetric in all four extremities with downgoing toes bilaterally  Coordination  Finger-to-nose, rapid alternating movements and heel-to-shin normal bilaterally without dysmetria      Gait  Normal casual, toe, heel and tandem gait  ROS:    Review of Systems   Constitutional: Negative  Negative for appetite change and fever  HENT: Negative  Negative for hearing loss, tinnitus, trouble swallowing and voice change  Eyes: Negative  Negative for photophobia and pain  Respiratory: Negative  Negative for shortness of breath  Cardiovascular: Negative  Negative for palpitations  Gastrointestinal: Negative  Negative for nausea and vomiting  Endocrine: Negative  Negative for cold intolerance and heat intolerance  Genitourinary: Negative  Negative for dysuria, frequency and urgency  Musculoskeletal: Negative  Negative for myalgias and neck pain  Skin: Negative  Negative for rash  Neurological: Negative  Negative for dizziness, tremors, seizures, syncope, facial asymmetry, speech difficulty, weakness, light-headedness, numbness and headaches  Hematological: Negative  Does not bruise/bleed easily  Psychiatric/Behavioral: Negative  Negative for confusion, hallucinations and sleep disturbance  Review of systems personally reviewed

## 2019-11-16 ENCOUNTER — APPOINTMENT (OUTPATIENT)
Dept: LAB | Facility: HOSPITAL | Age: 59
End: 2019-11-16
Payer: COMMERCIAL

## 2019-11-16 DIAGNOSIS — Z91.89 ENCOUNTER FOR HEPATITIS C VIRUS SCREENING TEST FOR HIGH RISK PATIENT: ICD-10-CM

## 2019-11-16 DIAGNOSIS — Z11.59 ENCOUNTER FOR HEPATITIS C VIRUS SCREENING TEST FOR HIGH RISK PATIENT: ICD-10-CM

## 2019-11-16 DIAGNOSIS — G40.909 NONINTRACTABLE EPILEPSY WITHOUT STATUS EPILEPTICUS, UNSPECIFIED EPILEPSY TYPE (HCC): ICD-10-CM

## 2019-11-16 DIAGNOSIS — I10 ESSENTIAL HYPERTENSION: ICD-10-CM

## 2019-11-16 DIAGNOSIS — G40.009 PARTIAL IDIOPATHIC EPILEPSY WITH SEIZURES OF LOCALIZED ONSET, NOT INTRACTABLE, WITHOUT STATUS EPILEPTICUS (HCC): ICD-10-CM

## 2019-11-16 DIAGNOSIS — Z12.5 SCREENING FOR PROSTATE CANCER: ICD-10-CM

## 2019-11-16 LAB
ALBUMIN SERPL BCP-MCNC: 3.9 G/DL (ref 3.5–5)
ALP SERPL-CCNC: 67 U/L (ref 46–116)
ALT SERPL W P-5'-P-CCNC: 39 U/L (ref 12–78)
ANION GAP SERPL CALCULATED.3IONS-SCNC: 5 MMOL/L (ref 4–13)
AST SERPL W P-5'-P-CCNC: 21 U/L (ref 5–45)
BASOPHILS # BLD AUTO: 0.08 THOUSANDS/ΜL (ref 0–0.1)
BASOPHILS NFR BLD AUTO: 1 % (ref 0–1)
BILIRUB SERPL-MCNC: 0.46 MG/DL (ref 0.2–1)
BUN SERPL-MCNC: 9 MG/DL (ref 5–25)
CALCIUM SERPL-MCNC: 9.1 MG/DL (ref 8.3–10.1)
CARBAMAZEPINE SERPL-MCNC: 13.3 UG/ML (ref 4–12)
CHLORIDE SERPL-SCNC: 105 MMOL/L (ref 100–108)
CO2 SERPL-SCNC: 26 MMOL/L (ref 21–32)
CREAT SERPL-MCNC: 0.67 MG/DL (ref 0.6–1.3)
EOSINOPHIL # BLD AUTO: 0.24 THOUSAND/ΜL (ref 0–0.61)
EOSINOPHIL NFR BLD AUTO: 4 % (ref 0–6)
ERYTHROCYTE [DISTWIDTH] IN BLOOD BY AUTOMATED COUNT: 11.2 % (ref 11.6–15.1)
GFR SERPL CREATININE-BSD FRML MDRD: 105 ML/MIN/1.73SQ M
GLUCOSE SERPL-MCNC: 84 MG/DL (ref 65–140)
HCT VFR BLD AUTO: 43.7 % (ref 36.5–49.3)
HCV AB SER QL: NORMAL
HGB BLD-MCNC: 14.6 G/DL (ref 12–17)
IMM GRANULOCYTES # BLD AUTO: 0.02 THOUSAND/UL (ref 0–0.2)
IMM GRANULOCYTES NFR BLD AUTO: 0 % (ref 0–2)
LYMPHOCYTES # BLD AUTO: 1.77 THOUSANDS/ΜL (ref 0.6–4.47)
LYMPHOCYTES NFR BLD AUTO: 26 % (ref 14–44)
MCH RBC QN AUTO: 33.3 PG (ref 26.8–34.3)
MCHC RBC AUTO-ENTMCNC: 33.4 G/DL (ref 31.4–37.4)
MCV RBC AUTO: 100 FL (ref 82–98)
MONOCYTES # BLD AUTO: 0.54 THOUSAND/ΜL (ref 0.17–1.22)
MONOCYTES NFR BLD AUTO: 8 % (ref 4–12)
NEUTROPHILS # BLD AUTO: 4.27 THOUSANDS/ΜL (ref 1.85–7.62)
NEUTS SEG NFR BLD AUTO: 61 % (ref 43–75)
NRBC BLD AUTO-RTO: 0 /100 WBCS
PLATELET # BLD AUTO: 220 THOUSANDS/UL (ref 149–390)
PMV BLD AUTO: 8.9 FL (ref 8.9–12.7)
POTASSIUM SERPL-SCNC: 4.3 MMOL/L (ref 3.5–5.3)
PROT SERPL-MCNC: 7.4 G/DL (ref 6.4–8.2)
PSA SERPL-MCNC: 1 NG/ML (ref 0–4)
RBC # BLD AUTO: 4.38 MILLION/UL (ref 3.88–5.62)
SODIUM SERPL-SCNC: 136 MMOL/L (ref 136–145)
WBC # BLD AUTO: 6.92 THOUSAND/UL (ref 4.31–10.16)

## 2019-11-16 PROCEDURE — 80053 COMPREHEN METABOLIC PANEL: CPT

## 2019-11-16 PROCEDURE — 86803 HEPATITIS C AB TEST: CPT

## 2019-11-16 PROCEDURE — 85025 COMPLETE CBC W/AUTO DIFF WBC: CPT

## 2019-11-16 PROCEDURE — 80156 ASSAY CARBAMAZEPINE TOTAL: CPT

## 2019-11-16 PROCEDURE — 36415 COLL VENOUS BLD VENIPUNCTURE: CPT

## 2019-11-16 PROCEDURE — G0103 PSA SCREENING: HCPCS

## 2019-11-19 ENCOUNTER — TELEPHONE (OUTPATIENT)
Dept: NEUROLOGY | Facility: CLINIC | Age: 59
End: 2019-11-19

## 2019-11-19 NOTE — TELEPHONE ENCOUNTER
----- Message from Donnie Walsh PA-C sent at 11/18/2019  2:39 PM EST -----  Can you call pt and find out is he has any symptoms consistent with carbamazepine toxicity? Can you also find out when he took his medication in relation to when he had the blood drawn? Thank you

## 2019-11-19 NOTE — TELEPHONE ENCOUNTER
Patient came to office and requested to speak to nursing staff  Patient states that his numbers may have been off because he "drinks a lot of coffee"  Advised patient that this test is related to his medication levels  Patient advised that his information was sent to [facility] and we will reach out to him if medication adjustments are recommended  Patient verbalizes understanding

## 2019-11-19 NOTE — TELEPHONE ENCOUNTER
Call to patient, states that he feels great and wouldn't want to change his dose  Denies any adverse affects  Taking 3 tabs in the AM, 2 tabs with lunch, 2 tabs with dinner, and 2 tabs at bedtime as he has been directed to take

## 2019-11-30 PROBLEM — H61.23 BILATERAL IMPACTED CERUMEN: Status: RESOLVED | Noted: 2018-10-01 | Resolved: 2019-11-30

## 2019-12-02 ENCOUNTER — OFFICE VISIT (OUTPATIENT)
Dept: FAMILY MEDICINE CLINIC | Facility: CLINIC | Age: 59
End: 2019-12-02
Payer: COMMERCIAL

## 2019-12-02 VITALS
HEIGHT: 71 IN | TEMPERATURE: 98.7 F | DIASTOLIC BLOOD PRESSURE: 80 MMHG | HEART RATE: 90 BPM | OXYGEN SATURATION: 97 % | SYSTOLIC BLOOD PRESSURE: 120 MMHG | WEIGHT: 169 LBS | BODY MASS INDEX: 23.66 KG/M2 | RESPIRATION RATE: 16 BRPM

## 2019-12-02 DIAGNOSIS — I10 ESSENTIAL HYPERTENSION: Primary | ICD-10-CM

## 2019-12-02 DIAGNOSIS — K63.5 POLYP OF COLON, UNSPECIFIED PART OF COLON, UNSPECIFIED TYPE: ICD-10-CM

## 2019-12-02 DIAGNOSIS — L98.9 SKIN LESION OF HAND: ICD-10-CM

## 2019-12-02 DIAGNOSIS — G40.009 PARTIAL IDIOPATHIC EPILEPSY WITH SEIZURES OF LOCALIZED ONSET, NOT INTRACTABLE, WITHOUT STATUS EPILEPTICUS (HCC): ICD-10-CM

## 2019-12-02 PROCEDURE — 3079F DIAST BP 80-89 MM HG: CPT | Performed by: FAMILY MEDICINE

## 2019-12-02 PROCEDURE — 3074F SYST BP LT 130 MM HG: CPT | Performed by: FAMILY MEDICINE

## 2019-12-02 PROCEDURE — 1036F TOBACCO NON-USER: CPT | Performed by: FAMILY MEDICINE

## 2019-12-02 PROCEDURE — 99213 OFFICE O/P EST LOW 20 MIN: CPT | Performed by: FAMILY MEDICINE

## 2019-12-02 PROCEDURE — 3008F BODY MASS INDEX DOCD: CPT | Performed by: FAMILY MEDICINE

## 2019-12-02 NOTE — ASSESSMENT & PLAN NOTE
Colonoscopy was performed by colorectal surgeon Dr Parveen Mace 5 years ago, 2 polyps were removed  Schedule colonoscopy next year

## 2019-12-02 NOTE — ASSESSMENT & PLAN NOTE
Blood pressure remains stable  Continue to follow a well-balanced diet, avoid salty foods  Encouraged regular exercise

## 2019-12-02 NOTE — PROGRESS NOTES
Chief Complaint   Patient presents with    Follow-up     6 month follow up     Health Maintenance   Topic Date Due    HIV Screening  02/08/1975    DTaP,Tdap,and Td Vaccines (2 - Tdap) 07/27/2008    Depression Screening PHQ  05/14/2020    BMI: Adult  12/02/2020    CRC Screening: Colonoscopy  08/05/2022    Pneumococcal Vaccine: 65+ Years (1 of 2 - PCV13) 02/08/2025    Hepatitis C Screening  Completed    Pneumococcal Vaccine: Pediatrics (0 to 5 Years) and At-Risk Patients (6 to 59 Years)  Aged Out    HIB Vaccine  Aged Out    Hepatitis B Vaccine  Aged Out    IPV Vaccine  Aged Out    Hepatitis A Vaccine  Aged Out    Meningococcal ACWY Vaccine  Aged Out    HPV Vaccine  Aged Out    Influenza Vaccine  Discontinued     Assessment/Plan:    Essential hypertension  Blood pressure remains stable  Continue to follow a well-balanced diet, avoid salty foods  Encouraged regular exercise  Epilepsy ([facility] [address] )  Stable  No seizure activity for over 1 year  Continue current dose of Carbamazepine  Follow- up with neurologist Dr Soco Graff every 6 months  Colon polyps  Colonoscopy was performed by colorectal surgeon Dr Mohamud Cody 5 years ago, 2 polyps were removed  Schedule colonoscopy next year  Skin lesion of hand  Recommended dermatology evaluation  HM: patient declined Flu vaccination  Schedule follow-up office visit in 6 months  Check labs prior to next visit  Diagnoses and all orders for this visit:    Essential hypertension  -     Lipid panel; Future  -     Comprehensive metabolic panel; Future    Partial idiopathic epilepsy with seizures of localized onset, not intractable, without status epilepticus ([facility] [address] )    Polyp of colon, unspecified part of colon, unspecified type    Skin lesion of hand  -     Ambulatory referral to Dermatology; Future          Subjective:      Patient ID: Jaimie Crenshaw is a 61 y o  male  HPI     Patient presents for 6 month follow-up visit        PMHx: HTN, OA,  Epilepsy, colon polyps  Patient states that he feels well overall  Reviewed current medications, blood work results from November 16, 2019  Hemoglobin 14 6, WBC 6 92 thousands, fasting blood sugar 84, creatinine 0 67, LFTs -within normal range  PSA 1 0  HTN - blood pressure remains stable  Patient does not take any blood pressure medication currently  He remains active  Weight has been stable  Denies chest pain, shortness of breath, dizziness    Epilepsy - patient has been neurologist Dr Monique Kessler, last seen in 10/19  Currently taking Carbamazepine 200 mg 9 tablets daily  Reports last seizure over 1 year ago  Carbamazepine level was mildly elevated at 13 3 with recent blood work but patient declined changing medication because jailene is doing well on current dose  Denies tobacco use  Patient wants to check skin lesions on right hand which he has been having for the last few years  Denies skin itching  Patient had colonoscopy 5 years ago performed by colorectal surgeon Dr Nick Chow, 2 polyps were removed  Patient declined Flu vaccination  The following portions of the patient's history were reviewed and updated as appropriate: allergies, current medications, past medical history, past social history, past surgical history and problem list     Review of Systems   Constitutional: Negative for activity change, appetite change, chills, fatigue and fever  HENT: Negative for congestion, ear pain, hearing loss, mouth sores, nosebleeds, sinus pressure, sore throat, tinnitus and trouble swallowing  Eyes: Negative for pain, discharge, redness, itching and visual disturbance  Respiratory: Negative for cough, chest tightness, shortness of breath and wheezing  Cardiovascular: Negative for chest pain, palpitations and leg swelling  Gastrointestinal: Positive for constipation (occasionally)  Negative for abdominal pain, blood in stool, diarrhea, nausea and vomiting  Genitourinary: Negative for difficulty urinating, dysuria, flank pain, frequency and hematuria  Musculoskeletal: Positive for back pain (occasional back pain )  Negative for gait problem, joint swelling and neck pain  Skin: Negative for rash  Skin lesions on right hand   Neurological: Positive for seizures (no seizures for over 1 year)  Negative for dizziness, syncope and headaches  Hematological: Negative  Psychiatric/Behavioral: Negative for sleep disturbance  The patient is not nervous/anxious  Objective:      /80 (BP Location: Left arm, Patient Position: Sitting, Cuff Size: Adult)   Pulse 90   Temp 98 7 °F (37 1 °C) (Tympanic)   Resp 16   Ht 5' 11" (1 803 m)   Wt 76 7 kg (169 lb)   SpO2 97%   BMI 23 57 kg/m²          Physical Exam   Constitutional: He appears well-developed and well-nourished  HENT:   Head: Normocephalic and atraumatic  Right Ear: External ear normal    Left Ear: External ear normal    Mouth/Throat: Oropharynx is clear and moist    Eyes: Pupils are equal, round, and reactive to light  Conjunctivae are normal    Cardiovascular: Normal rate, regular rhythm and normal heart sounds  No murmur heard  No BL LE edema  No carotid bruits BL   Pulmonary/Chest: Effort normal and breath sounds normal    Abdominal: Soft  Bowel sounds are normal  There is no tenderness  Musculoskeletal:   Right 5 th finger contructure   Skin: Skin is warm and dry  No rash noted  Raised fleshed -colored skin lesions on the dorsum of right hand   Psychiatric: He has a normal mood and affect  Nursing note and vitals reviewed

## 2019-12-02 NOTE — ASSESSMENT & PLAN NOTE
Stable  No seizure activity for over 1 year  Continue current dose of Carbamazepine  Follow- up with neurologist Dr Chandu Bryson every 6 months

## 2020-03-24 DIAGNOSIS — G40.209 PARTIAL SYMPTOMATIC EPILEPSY WITH COMPLEX PARTIAL SEIZURES, NOT INTRACTABLE, WITHOUT STATUS EPILEPTICUS (HCC): ICD-10-CM

## 2020-03-24 RX ORDER — CARBAMAZEPINE 200 MG/1
TABLET ORAL
Qty: 270 TABLET | Refills: 5 | Status: SHIPPED | OUTPATIENT
Start: 2020-03-24 | End: 2020-04-08 | Stop reason: SDUPTHER

## 2020-04-03 ENCOUNTER — TELEPHONE (OUTPATIENT)
Dept: NEUROLOGY | Facility: CLINIC | Age: 60
End: 2020-04-03

## 2020-04-08 ENCOUNTER — OFFICE VISIT (OUTPATIENT)
Dept: NEUROLOGY | Facility: CLINIC | Age: 60
End: 2020-04-08
Payer: COMMERCIAL

## 2020-04-08 VITALS
HEART RATE: 85 BPM | BODY MASS INDEX: 24.2 KG/M2 | HEIGHT: 70 IN | DIASTOLIC BLOOD PRESSURE: 82 MMHG | WEIGHT: 169 LBS | SYSTOLIC BLOOD PRESSURE: 132 MMHG

## 2020-04-08 DIAGNOSIS — M54.2 NECK PAIN: ICD-10-CM

## 2020-04-08 DIAGNOSIS — R25.1 TREMOR: ICD-10-CM

## 2020-04-08 DIAGNOSIS — G40.909 NONINTRACTABLE EPILEPSY WITHOUT STATUS EPILEPTICUS, UNSPECIFIED EPILEPSY TYPE (HCC): Primary | ICD-10-CM

## 2020-04-08 DIAGNOSIS — G40.209 PARTIAL SYMPTOMATIC EPILEPSY WITH COMPLEX PARTIAL SEIZURES, NOT INTRACTABLE, WITHOUT STATUS EPILEPTICUS (HCC): ICD-10-CM

## 2020-04-08 PROCEDURE — 3008F BODY MASS INDEX DOCD: CPT | Performed by: PHYSICIAN ASSISTANT

## 2020-04-08 PROCEDURE — 3079F DIAST BP 80-89 MM HG: CPT | Performed by: PHYSICIAN ASSISTANT

## 2020-04-08 PROCEDURE — 3075F SYST BP GE 130 - 139MM HG: CPT | Performed by: PHYSICIAN ASSISTANT

## 2020-04-08 PROCEDURE — 1036F TOBACCO NON-USER: CPT | Performed by: PHYSICIAN ASSISTANT

## 2020-04-08 PROCEDURE — 99214 OFFICE O/P EST MOD 30 MIN: CPT | Performed by: PHYSICIAN ASSISTANT

## 2020-04-08 RX ORDER — CARBAMAZEPINE 200 MG/1
TABLET ORAL
Qty: 270 TABLET | Refills: 5 | Status: SHIPPED | OUTPATIENT
Start: 2020-04-08 | End: 2020-10-15 | Stop reason: SDUPTHER

## 2020-06-15 ENCOUNTER — OFFICE VISIT (OUTPATIENT)
Dept: FAMILY MEDICINE CLINIC | Facility: CLINIC | Age: 60
End: 2020-06-15
Payer: COMMERCIAL

## 2020-06-15 VITALS
DIASTOLIC BLOOD PRESSURE: 82 MMHG | WEIGHT: 163 LBS | HEIGHT: 70 IN | TEMPERATURE: 98.3 F | OXYGEN SATURATION: 98 % | SYSTOLIC BLOOD PRESSURE: 120 MMHG | HEART RATE: 84 BPM | BODY MASS INDEX: 23.34 KG/M2 | RESPIRATION RATE: 14 BRPM

## 2020-06-15 DIAGNOSIS — K21.9 GASTROESOPHAGEAL REFLUX DISEASE WITHOUT ESOPHAGITIS: ICD-10-CM

## 2020-06-15 DIAGNOSIS — I10 ESSENTIAL HYPERTENSION: Primary | ICD-10-CM

## 2020-06-15 DIAGNOSIS — K63.5 POLYP OF COLON, UNSPECIFIED PART OF COLON, UNSPECIFIED TYPE: ICD-10-CM

## 2020-06-15 DIAGNOSIS — G40.009 PARTIAL IDIOPATHIC EPILEPSY WITH SEIZURES OF LOCALIZED ONSET, NOT INTRACTABLE, WITHOUT STATUS EPILEPTICUS (HCC): ICD-10-CM

## 2020-06-15 PROCEDURE — 99213 OFFICE O/P EST LOW 20 MIN: CPT | Performed by: FAMILY MEDICINE

## 2020-06-15 PROCEDURE — 1036F TOBACCO NON-USER: CPT | Performed by: FAMILY MEDICINE

## 2020-06-15 PROCEDURE — 3079F DIAST BP 80-89 MM HG: CPT | Performed by: FAMILY MEDICINE

## 2020-06-15 PROCEDURE — 3008F BODY MASS INDEX DOCD: CPT | Performed by: FAMILY MEDICINE

## 2020-06-15 PROCEDURE — 3074F SYST BP LT 130 MM HG: CPT | Performed by: FAMILY MEDICINE

## 2020-06-29 ENCOUNTER — TRANSCRIBE ORDERS (OUTPATIENT)
Dept: LAB | Facility: HOSPITAL | Age: 60
End: 2020-06-29

## 2020-06-29 ENCOUNTER — TELEPHONE (OUTPATIENT)
Dept: OTHER | Facility: OTHER | Age: 60
End: 2020-06-29

## 2020-06-29 ENCOUNTER — LAB (OUTPATIENT)
Dept: LAB | Facility: HOSPITAL | Age: 60
End: 2020-06-29
Payer: COMMERCIAL

## 2020-06-29 DIAGNOSIS — G40.209 PARTIAL SYMPTOMATIC EPILEPSY WITH COMPLEX PARTIAL SEIZURES, NOT INTRACTABLE, WITHOUT STATUS EPILEPTICUS (HCC): ICD-10-CM

## 2020-06-29 DIAGNOSIS — G40.909 NONINTRACTABLE EPILEPSY WITHOUT STATUS EPILEPTICUS, UNSPECIFIED EPILEPSY TYPE (HCC): ICD-10-CM

## 2020-06-29 DIAGNOSIS — I10 ESSENTIAL HYPERTENSION: ICD-10-CM

## 2020-06-29 DIAGNOSIS — R73.9 HYPERGLYCEMIA: Primary | ICD-10-CM

## 2020-06-29 LAB
ALBUMIN SERPL BCP-MCNC: 3.8 G/DL (ref 3.5–5)
ALP SERPL-CCNC: 62 U/L (ref 46–116)
ALT SERPL W P-5'-P-CCNC: 28 U/L (ref 12–78)
ANION GAP SERPL CALCULATED.3IONS-SCNC: 7 MMOL/L (ref 4–13)
AST SERPL W P-5'-P-CCNC: 19 U/L (ref 5–45)
BILIRUB SERPL-MCNC: 0.32 MG/DL (ref 0.2–1)
BUN SERPL-MCNC: 12 MG/DL (ref 5–25)
CALCIUM SERPL-MCNC: 8.3 MG/DL (ref 8.3–10.1)
CARBAMAZEPINE SERPL-MCNC: 16.9 UG/ML (ref 4–12)
CHLORIDE SERPL-SCNC: 107 MMOL/L (ref 100–108)
CO2 SERPL-SCNC: 25 MMOL/L (ref 21–32)
CREAT SERPL-MCNC: 0.85 MG/DL (ref 0.6–1.3)
GFR SERPL CREATININE-BSD FRML MDRD: 95 ML/MIN/1.73SQ M
GLUCOSE SERPL-MCNC: 160 MG/DL (ref 65–140)
POTASSIUM SERPL-SCNC: 4.1 MMOL/L (ref 3.5–5.3)
PROT SERPL-MCNC: 6.8 G/DL (ref 6.4–8.2)
SODIUM SERPL-SCNC: 139 MMOL/L (ref 136–145)

## 2020-06-29 PROCEDURE — 80053 COMPREHEN METABOLIC PANEL: CPT

## 2020-06-29 PROCEDURE — 36415 COLL VENOUS BLD VENIPUNCTURE: CPT

## 2020-06-29 PROCEDURE — 80156 ASSAY CARBAMAZEPINE TOTAL: CPT

## 2020-06-30 ENCOUNTER — TELEPHONE (OUTPATIENT)
Dept: NEUROLOGY | Facility: CLINIC | Age: 60
End: 2020-06-30

## 2020-09-28 ENCOUNTER — TELEPHONE (OUTPATIENT)
Dept: NEUROLOGY | Facility: CLINIC | Age: 60
End: 2020-09-28

## 2020-10-15 ENCOUNTER — TELEPHONE (OUTPATIENT)
Dept: NEUROLOGY | Facility: CLINIC | Age: 60
End: 2020-10-15

## 2020-10-15 ENCOUNTER — OFFICE VISIT (OUTPATIENT)
Dept: NEUROLOGY | Facility: CLINIC | Age: 60
End: 2020-10-15
Payer: COMMERCIAL

## 2020-10-15 VITALS
SYSTOLIC BLOOD PRESSURE: 122 MMHG | BODY MASS INDEX: 23.48 KG/M2 | HEIGHT: 70 IN | DIASTOLIC BLOOD PRESSURE: 72 MMHG | TEMPERATURE: 97.2 F | WEIGHT: 164 LBS | HEART RATE: 92 BPM

## 2020-10-15 DIAGNOSIS — R25.1 TREMOR: ICD-10-CM

## 2020-10-15 DIAGNOSIS — M54.2 NECK PAIN: ICD-10-CM

## 2020-10-15 DIAGNOSIS — G40.209 PARTIAL SYMPTOMATIC EPILEPSY WITH COMPLEX PARTIAL SEIZURES, NOT INTRACTABLE, WITHOUT STATUS EPILEPTICUS (HCC): ICD-10-CM

## 2020-10-15 DIAGNOSIS — G40.909 NONINTRACTABLE EPILEPSY WITHOUT STATUS EPILEPTICUS, UNSPECIFIED EPILEPSY TYPE (HCC): Primary | ICD-10-CM

## 2020-10-15 PROCEDURE — 3078F DIAST BP <80 MM HG: CPT | Performed by: PHYSICIAN ASSISTANT

## 2020-10-15 PROCEDURE — 99214 OFFICE O/P EST MOD 30 MIN: CPT | Performed by: PHYSICIAN ASSISTANT

## 2020-10-15 PROCEDURE — 1036F TOBACCO NON-USER: CPT | Performed by: PHYSICIAN ASSISTANT

## 2020-10-15 RX ORDER — CARBAMAZEPINE 200 MG/1
TABLET ORAL
Qty: 270 TABLET | Refills: 5 | Status: SHIPPED | OUTPATIENT
Start: 2020-10-15 | End: 2021-06-22 | Stop reason: SDUPTHER

## 2020-10-16 ENCOUNTER — APPOINTMENT (OUTPATIENT)
Dept: LAB | Facility: HOSPITAL | Age: 60
End: 2020-10-16
Payer: COMMERCIAL

## 2020-10-16 DIAGNOSIS — G40.909 NONINTRACTABLE EPILEPSY WITHOUT STATUS EPILEPTICUS, UNSPECIFIED EPILEPSY TYPE (HCC): ICD-10-CM

## 2020-10-16 DIAGNOSIS — R73.9 HYPERGLYCEMIA: ICD-10-CM

## 2020-10-16 LAB
ALBUMIN SERPL BCP-MCNC: 4.1 G/DL (ref 3.5–5)
ALP SERPL-CCNC: 69 U/L (ref 46–116)
ALT SERPL W P-5'-P-CCNC: 32 U/L (ref 12–78)
ANION GAP SERPL CALCULATED.3IONS-SCNC: 4 MMOL/L (ref 4–13)
AST SERPL W P-5'-P-CCNC: 14 U/L (ref 5–45)
BASOPHILS # BLD AUTO: 0.08 THOUSANDS/ΜL (ref 0–0.1)
BASOPHILS NFR BLD AUTO: 1 % (ref 0–1)
BILIRUB SERPL-MCNC: 0.45 MG/DL (ref 0.2–1)
BUN SERPL-MCNC: 10 MG/DL (ref 5–25)
CALCIUM SERPL-MCNC: 8.6 MG/DL (ref 8.3–10.1)
CARBAMAZEPINE SERPL-MCNC: 12.1 UG/ML (ref 4–12)
CHLORIDE SERPL-SCNC: 103 MMOL/L (ref 100–108)
CHOLEST SERPL-MCNC: 223 MG/DL (ref 50–200)
CO2 SERPL-SCNC: 29 MMOL/L (ref 21–32)
CREAT SERPL-MCNC: 0.8 MG/DL (ref 0.6–1.3)
EOSINOPHIL # BLD AUTO: 0.28 THOUSAND/ΜL (ref 0–0.61)
EOSINOPHIL NFR BLD AUTO: 4 % (ref 0–6)
ERYTHROCYTE [DISTWIDTH] IN BLOOD BY AUTOMATED COUNT: 11.3 % (ref 11.6–15.1)
EST. AVERAGE GLUCOSE BLD GHB EST-MCNC: 97 MG/DL
GFR SERPL CREATININE-BSD FRML MDRD: 97 ML/MIN/1.73SQ M
GLUCOSE P FAST SERPL-MCNC: 99 MG/DL (ref 65–99)
HBA1C MFR BLD: 5 %
HCT VFR BLD AUTO: 45.7 % (ref 36.5–49.3)
HDLC SERPL-MCNC: 100 MG/DL
HGB BLD-MCNC: 15.6 G/DL (ref 12–17)
IMM GRANULOCYTES # BLD AUTO: 0.01 THOUSAND/UL (ref 0–0.2)
IMM GRANULOCYTES NFR BLD AUTO: 0 % (ref 0–2)
LDLC SERPL CALC-MCNC: 86 MG/DL (ref 0–100)
LYMPHOCYTES # BLD AUTO: 2.25 THOUSANDS/ΜL (ref 0.6–4.47)
LYMPHOCYTES NFR BLD AUTO: 31 % (ref 14–44)
MCH RBC QN AUTO: 34.1 PG (ref 26.8–34.3)
MCHC RBC AUTO-ENTMCNC: 34.1 G/DL (ref 31.4–37.4)
MCV RBC AUTO: 100 FL (ref 82–98)
MONOCYTES # BLD AUTO: 0.46 THOUSAND/ΜL (ref 0.17–1.22)
MONOCYTES NFR BLD AUTO: 6 % (ref 4–12)
NEUTROPHILS # BLD AUTO: 4.23 THOUSANDS/ΜL (ref 1.85–7.62)
NEUTS SEG NFR BLD AUTO: 58 % (ref 43–75)
NONHDLC SERPL-MCNC: 123 MG/DL
NRBC BLD AUTO-RTO: 0 /100 WBCS
PLATELET # BLD AUTO: 231 THOUSANDS/UL (ref 149–390)
PMV BLD AUTO: 8.9 FL (ref 8.9–12.7)
POTASSIUM SERPL-SCNC: 3.8 MMOL/L (ref 3.5–5.3)
PROT SERPL-MCNC: 7.4 G/DL (ref 6.4–8.2)
RBC # BLD AUTO: 4.58 MILLION/UL (ref 3.88–5.62)
SODIUM SERPL-SCNC: 136 MMOL/L (ref 136–145)
TRIGL SERPL-MCNC: 183 MG/DL
WBC # BLD AUTO: 7.31 THOUSAND/UL (ref 4.31–10.16)

## 2020-10-16 PROCEDURE — 83036 HEMOGLOBIN GLYCOSYLATED A1C: CPT

## 2020-10-16 PROCEDURE — 36415 COLL VENOUS BLD VENIPUNCTURE: CPT

## 2020-10-16 PROCEDURE — 80156 ASSAY CARBAMAZEPINE TOTAL: CPT

## 2020-10-16 PROCEDURE — 80061 LIPID PANEL: CPT

## 2020-10-16 PROCEDURE — 80053 COMPREHEN METABOLIC PANEL: CPT

## 2020-10-16 PROCEDURE — 85025 COMPLETE CBC W/AUTO DIFF WBC: CPT

## 2020-10-28 ENCOUNTER — TELEPHONE (OUTPATIENT)
Dept: FAMILY MEDICINE CLINIC | Facility: CLINIC | Age: 60
End: 2020-10-28

## 2020-12-13 PROBLEM — F41.9 ANXIETY: Status: ACTIVE | Noted: 2020-12-13

## 2020-12-13 PROBLEM — E78.2 MIXED HYPERLIPIDEMIA: Status: ACTIVE | Noted: 2020-12-13

## 2020-12-15 ENCOUNTER — OFFICE VISIT (OUTPATIENT)
Dept: FAMILY MEDICINE CLINIC | Facility: CLINIC | Age: 60
End: 2020-12-15
Payer: COMMERCIAL

## 2020-12-15 VITALS
BODY MASS INDEX: 24.2 KG/M2 | HEIGHT: 70 IN | TEMPERATURE: 97.5 F | HEART RATE: 78 BPM | RESPIRATION RATE: 14 BRPM | DIASTOLIC BLOOD PRESSURE: 84 MMHG | OXYGEN SATURATION: 98 % | WEIGHT: 169 LBS | SYSTOLIC BLOOD PRESSURE: 130 MMHG

## 2020-12-15 DIAGNOSIS — Z12.5 SCREENING FOR PROSTATE CANCER: ICD-10-CM

## 2020-12-15 DIAGNOSIS — G40.009 PARTIAL IDIOPATHIC EPILEPSY WITH SEIZURES OF LOCALIZED ONSET, NOT INTRACTABLE, WITHOUT STATUS EPILEPTICUS (HCC): ICD-10-CM

## 2020-12-15 DIAGNOSIS — F41.9 ANXIETY: ICD-10-CM

## 2020-12-15 DIAGNOSIS — K21.9 GASTROESOPHAGEAL REFLUX DISEASE WITHOUT ESOPHAGITIS: ICD-10-CM

## 2020-12-15 DIAGNOSIS — E78.2 MIXED HYPERLIPIDEMIA: ICD-10-CM

## 2020-12-15 DIAGNOSIS — I10 ESSENTIAL HYPERTENSION: Primary | ICD-10-CM

## 2020-12-15 PROCEDURE — 3079F DIAST BP 80-89 MM HG: CPT | Performed by: FAMILY MEDICINE

## 2020-12-15 PROCEDURE — 3075F SYST BP GE 130 - 139MM HG: CPT | Performed by: FAMILY MEDICINE

## 2020-12-15 PROCEDURE — 99213 OFFICE O/P EST LOW 20 MIN: CPT | Performed by: FAMILY MEDICINE

## 2020-12-15 PROCEDURE — 1036F TOBACCO NON-USER: CPT | Performed by: FAMILY MEDICINE

## 2020-12-15 PROCEDURE — 3008F BODY MASS INDEX DOCD: CPT | Performed by: FAMILY MEDICINE

## 2021-02-11 ENCOUNTER — TELEPHONE (OUTPATIENT)
Dept: NEUROLOGY | Facility: CLINIC | Age: 61
End: 2021-02-11

## 2021-02-11 ENCOUNTER — HOSPITAL ENCOUNTER (OUTPATIENT)
Dept: RADIOLOGY | Age: 61
Discharge: HOME | End: 2021-02-11
Payer: COMMERCIAL

## 2021-02-11 DIAGNOSIS — G40.909 NONINTRACTABLE EPILEPSY WITHOUT STATUS EPILEPTICUS, UNSPECIFIED EPILEPSY TYPE (HCC): ICD-10-CM

## 2021-02-11 PROCEDURE — 77080 DXA BONE DENSITY AXIAL: CPT

## 2021-02-11 NOTE — TELEPHONE ENCOUNTER
Per result notes-Testing is consistent with osteoporosis  He should follow-up with his PCP for treatment recommendations    Called and advised pt of all of the below and above  He verbalized clear understanding of all instructions   He will f/u w/ his pcp

## 2021-02-11 NOTE — TELEPHONE ENCOUNTER
----- Message from Bernarda Morales PA-C sent at 2/11/2021 11:43 AM EST -----  Pt to follow-up with PCP

## 2021-02-13 PROBLEM — M81.0 OSTEOPOROSIS WITHOUT CURRENT PATHOLOGICAL FRACTURE: Status: ACTIVE | Noted: 2021-02-13

## 2021-02-15 ENCOUNTER — OFFICE VISIT (OUTPATIENT)
Dept: FAMILY MEDICINE CLINIC | Facility: CLINIC | Age: 61
End: 2021-02-15
Payer: COMMERCIAL

## 2021-02-15 VITALS
HEART RATE: 90 BPM | WEIGHT: 159 LBS | HEIGHT: 70 IN | OXYGEN SATURATION: 97 % | TEMPERATURE: 98.4 F | SYSTOLIC BLOOD PRESSURE: 140 MMHG | DIASTOLIC BLOOD PRESSURE: 82 MMHG | RESPIRATION RATE: 14 BRPM | BODY MASS INDEX: 22.76 KG/M2

## 2021-02-15 DIAGNOSIS — M81.0 OSTEOPOROSIS WITHOUT CURRENT PATHOLOGICAL FRACTURE, UNSPECIFIED OSTEOPOROSIS TYPE: Primary | ICD-10-CM

## 2021-02-15 DIAGNOSIS — G40.009 PARTIAL IDIOPATHIC EPILEPSY WITH SEIZURES OF LOCALIZED ONSET, NOT INTRACTABLE, WITHOUT STATUS EPILEPTICUS (HCC): ICD-10-CM

## 2021-02-15 PROCEDURE — 3008F BODY MASS INDEX DOCD: CPT | Performed by: FAMILY MEDICINE

## 2021-02-15 PROCEDURE — 3725F SCREEN DEPRESSION PERFORMED: CPT | Performed by: FAMILY MEDICINE

## 2021-02-15 PROCEDURE — 3079F DIAST BP 80-89 MM HG: CPT | Performed by: FAMILY MEDICINE

## 2021-02-15 PROCEDURE — 3077F SYST BP >= 140 MM HG: CPT | Performed by: FAMILY MEDICINE

## 2021-02-15 PROCEDURE — 99213 OFFICE O/P EST LOW 20 MIN: CPT | Performed by: FAMILY MEDICINE

## 2021-02-15 RX ORDER — ALENDRONATE SODIUM 70 MG/1
70 TABLET ORAL
Qty: 4 TABLET | Refills: 5 | Status: SHIPPED | OUTPATIENT
Start: 2021-02-15 | End: 2021-08-30

## 2021-02-15 NOTE — PROGRESS NOTES
Chief Complaint   Patient presents with    Results     Review DEXA scan results     Health Maintenance   Topic Date Due    HIV Screening  02/08/1975    Annual Physical  02/08/1978    DTaP,Tdap,and Td Vaccines (2 - Tdap) 07/27/2008    Influenza Vaccine (1) 09/01/2020    Depression Screening PHQ  02/15/2022    BMI: Adult  02/15/2022    Colonoscopy Surveillance  08/05/2022    Colorectal Cancer Screening  08/05/2029    Hepatitis C Screening  Completed    Pneumococcal Vaccine: Pediatrics (0 to 5 Years) and At-Risk Patients (6 to 59 Years)  Aged Out    HIB Vaccine  Aged Out    Hepatitis B Vaccine  Aged Out    IPV Vaccine  Aged Out    Hepatitis A Vaccine  Aged Out    Meningococcal ACWY Vaccine  Aged Out    HPV Vaccine  Aged Out              Assessment/Plan:    Osteoporosis without current pathological fracture  DEXA scan done on February 11, 2021 showed osteoporosis  Patient has been taking Carbamazepine for epilepsy since  1970's  Discussed treatment options with patient  Patient declined Prolia injections  Will start on Fosamax 70 mg 1 tablet once a week  Discuss how to take medication in detail  Continue calcium with Vit D supplementation, weight-bearing exercise  Recommended to follow-up with a dentist regularly  Stop taking medicine if develops abdominal pain, heartburn  Will recheck DEXA scan in 2 years  Epilepsy ([facility] )  Stable  Continue current dose of Carbamazepine  Follow- up with Neurology Dr Marybel Sanford every 6 months  Keep follow-up office visit as scheduled in April Diagnoses and all orders for this visit:    Osteoporosis without current pathological fracture, unspecified osteoporosis type  -     alendronate (Fosamax) 70 mg tablet;  Take 1 tablet (70 mg total) by mouth every 7 days    Partial idiopathic epilepsy with seizures of localized onset, not intractable, without status epilepticus ([facility] )          Subjective:      Patient ID: Lilibeth Holley james a 64 y o  male  HPI     Patient presents  to the office to discuss treatment for osteoporosis  Patient has epilepsy, followed by neurologist Dr Brian Villarreal  Takes Carbamazepine 200 mg 9 tablets daily  Last seizure was in April 2018  DEXA scan ordered by neurologist done on February 11, 2021 showed osteoporosis  Reviewed blood test results from December 2020  Creatinine 0 80, calcium 8 6  Patient takes Calcium with Vit D 1 tablet twice daily  C/o occasional pain in left wrist, right knee due to osteoarthritis  Patient states that he has right elbow Fx in the past     Denies tobacco use  Denies dental problems  Patient has history GERD  C/o occasional heartburn  Takes Tums PRN  The following portions of the patient's history were reviewed and updated as appropriate: allergies, current medications, past medical history, past social history, past surgical history and problem list     Review of Systems   Constitutional: Negative for activity change, appetite change, chills, fatigue and fever  HENT: Negative for congestion, nosebleeds and sore throat  Eyes: Negative for pain, discharge, redness, itching and visual disturbance  Respiratory: Negative for cough, chest tightness, shortness of breath and wheezing  Cardiovascular: Negative for chest pain, palpitations and leg swelling  Gastrointestinal: Positive for diarrhea (occasional)  Negative for abdominal pain, blood in stool, constipation, nausea and vomiting  Occasional heartburn   Genitourinary: Negative for difficulty urinating, flank pain, frequency and hematuria  Musculoskeletal: Positive for arthralgias  Negative for back pain, gait problem and neck pain  Skin: Negative for rash  Neurological: Negative for dizziness, syncope and headaches  Hematological: Negative  Psychiatric/Behavioral: Negative for sleep disturbance          Anxiety - symptoms are stable         Objective:      /82 (BP Location: Left arm, Patient Position: Sitting, Cuff Size: Adult)   Pulse 90   Temp 98 4 °F (36 9 °C) (Tympanic)   Resp 14   Ht 5' 10" (1 778 m)   Wt 72 1 kg (159 lb)   SpO2 97%   BMI 22 81 kg/m²          Physical Exam  Vitals signs and nursing note reviewed  Constitutional:       Appearance: Normal appearance  HENT:      Head: Normocephalic and atraumatic  Eyes:      Conjunctiva/sclera: Conjunctivae normal       Pupils: Pupils are equal, round, and reactive to light  Cardiovascular:      Rate and Rhythm: Normal rate and regular rhythm  Heart sounds: No murmur  Pulmonary:      Effort: Pulmonary effort is normal       Breath sounds: Normal breath sounds  Abdominal:      General: There is no distension  Palpations: Abdomen is soft  Tenderness: There is no abdominal tenderness  Musculoskeletal:      Comments: Arthritic changes in hand joints  Right 5 th finger contracture   Neurological:      Mental Status: He is alert     Psychiatric:         Mood and Affect: Mood normal

## 2021-02-15 NOTE — ASSESSMENT & PLAN NOTE
Stable  Continue current dose of Carbamazepine  Follow- up with Neurology Dr Radha Dueñas every 6 months

## 2021-02-15 NOTE — ASSESSMENT & PLAN NOTE
DEXA scan done on February 11, 2021 showed osteoporosis  Patient has been taking Carbamazepine for epilepsy since  1970's  Discussed treatment options with patient  Patient declined Prolia injections  Will start on Fosamax 70 mg 1 tablet once a week  Discuss how to take medication in detail  Continue calcium with Vit D supplementation, weight-bearing exercise  Recommended to follow-up with a dentist regularly  Stop taking medicine if develops abdominal pain, heartburn  Will recheck DEXA scan in 2 years

## 2021-03-10 DIAGNOSIS — Z23 ENCOUNTER FOR IMMUNIZATION: ICD-10-CM

## 2021-03-19 ENCOUNTER — IMMUNIZATIONS (OUTPATIENT)
Dept: FAMILY MEDICINE CLINIC | Facility: HOSPITAL | Age: 61
End: 2021-03-19

## 2021-03-19 DIAGNOSIS — Z23 ENCOUNTER FOR IMMUNIZATION: Primary | ICD-10-CM

## 2021-03-19 PROCEDURE — 91300 SARS-COV-2 / COVID-19 MRNA VACCINE (PFIZER-BIONTECH) 30 MCG: CPT

## 2021-03-19 PROCEDURE — 0001A SARS-COV-2 / COVID-19 MRNA VACCINE (PFIZER-BIONTECH) 30 MCG: CPT

## 2021-04-06 ENCOUNTER — TELEPHONE (OUTPATIENT)
Dept: NEUROLOGY | Facility: CLINIC | Age: 61
End: 2021-04-06

## 2021-04-10 ENCOUNTER — IMMUNIZATIONS (OUTPATIENT)
Dept: FAMILY MEDICINE CLINIC | Facility: HOSPITAL | Age: 61
End: 2021-04-10

## 2021-04-10 DIAGNOSIS — Z23 ENCOUNTER FOR IMMUNIZATION: Primary | ICD-10-CM

## 2021-04-10 PROCEDURE — 0002A SARS-COV-2 / COVID-19 MRNA VACCINE (PFIZER-BIONTECH) 30 MCG: CPT

## 2021-04-10 PROCEDURE — 91300 SARS-COV-2 / COVID-19 MRNA VACCINE (PFIZER-BIONTECH) 30 MCG: CPT

## 2021-06-11 ENCOUNTER — OFFICE VISIT (OUTPATIENT)
Dept: FAMILY MEDICINE CLINIC | Facility: CLINIC | Age: 61
End: 2021-06-11
Payer: COMMERCIAL

## 2021-06-11 VITALS
HEART RATE: 84 BPM | DIASTOLIC BLOOD PRESSURE: 80 MMHG | SYSTOLIC BLOOD PRESSURE: 126 MMHG | WEIGHT: 162 LBS | RESPIRATION RATE: 14 BRPM | OXYGEN SATURATION: 98 % | TEMPERATURE: 97.4 F | HEIGHT: 70 IN | BODY MASS INDEX: 23.19 KG/M2

## 2021-06-11 DIAGNOSIS — K21.9 GASTROESOPHAGEAL REFLUX DISEASE WITHOUT ESOPHAGITIS: ICD-10-CM

## 2021-06-11 DIAGNOSIS — R25.1 TREMOR: ICD-10-CM

## 2021-06-11 DIAGNOSIS — G40.009 PARTIAL IDIOPATHIC EPILEPSY WITH SEIZURES OF LOCALIZED ONSET, NOT INTRACTABLE, WITHOUT STATUS EPILEPTICUS (HCC): ICD-10-CM

## 2021-06-11 DIAGNOSIS — M81.0 OSTEOPOROSIS WITHOUT CURRENT PATHOLOGICAL FRACTURE, UNSPECIFIED OSTEOPOROSIS TYPE: ICD-10-CM

## 2021-06-11 DIAGNOSIS — F41.9 ANXIETY: ICD-10-CM

## 2021-06-11 DIAGNOSIS — I10 ESSENTIAL HYPERTENSION: Primary | ICD-10-CM

## 2021-06-11 PROCEDURE — 3079F DIAST BP 80-89 MM HG: CPT | Performed by: FAMILY MEDICINE

## 2021-06-11 PROCEDURE — 3074F SYST BP LT 130 MM HG: CPT | Performed by: FAMILY MEDICINE

## 2021-06-11 PROCEDURE — 99214 OFFICE O/P EST MOD 30 MIN: CPT | Performed by: FAMILY MEDICINE

## 2021-06-11 PROCEDURE — 1036F TOBACCO NON-USER: CPT | Performed by: FAMILY MEDICINE

## 2021-06-11 PROCEDURE — 3008F BODY MASS INDEX DOCD: CPT | Performed by: FAMILY MEDICINE

## 2021-06-11 NOTE — ASSESSMENT & PLAN NOTE
DEXA scan done in February 2021 showed osteoporosis  Patient has been taking Carbamazepine for epilepsy since 1970's  Continue Fosamax 70 mg once a week  Continue calcium with vitamin-D supplementation, weight-bearing exercise  Recheck DEXA scan in 2 years

## 2021-06-11 NOTE — ASSESSMENT & PLAN NOTE
BP remains stable  Follow a low-sodium diet, regular exercise  Check labs as ordered in 12/2020    Reprinted script for blood test

## 2021-06-11 NOTE — PROGRESS NOTES
Chief Complaint   Patient presents with    Follow-up     4 month follow up     Health Maintenance   Topic Date Due    HIV Screening  Never done    Annual Physical  Never done    DTaP,Tdap,and Td Vaccines (2 - Tdap) 07/27/2008    Influenza Vaccine (Season Ended) 09/01/2021    Depression Screening PHQ  02/15/2022    BMI: Adult  06/11/2022    Colonoscopy Surveillance  08/05/2022    Colorectal Cancer Screening  08/05/2029    Hepatitis C Screening  Completed    COVID-19 Vaccine  Completed    Pneumococcal Vaccine: Pediatrics (0 to 5 Years) and At-Risk Patients (6 to 59 Years)  Aged Out    HIB Vaccine  Aged Out    Hepatitis B Vaccine  Aged Out    IPV Vaccine  Aged Out    Hepatitis A Vaccine  Aged Out    Meningococcal ACWY Vaccine  Aged Out    HPV Vaccine  Aged Out                Assessment/Plan:    Essential hypertension  BP remains stable  Follow a low-sodium diet, regular exercise  Check labs as ordered in 12/2020  Reprinted script for blood test       Osteoporosis without current pathological fracture  DEXA scan done in February 2021 showed osteoporosis  Patient has been taking Carbamazepine for epilepsy since 1970's  Continue Fosamax 70 mg once a week  Continue calcium with vitamin-D supplementation, weight-bearing exercise  Recheck DEXA scan in 2 years  Epilepsy ([address] )  Stable on current dose of Carbamazepine 200 mg9 tablets  daily  Follow up with neurology Dr Unique Hartley  GERD (gastroesophageal reflux disease)  Symptoms are stable  TakeTums PRN  Avoid caffeine, fried, fatty foods  Anxiety  Mood has been stable  Tremor  Patient is concerned about developing Parkinson's disease  Recommended to discuss right hand tremor with neurology Dr Unique Hartley  Schedule follow-up office visit in 6 months         Diagnoses and all orders for this visit:    Essential hypertension    Osteoporosis without current pathological fracture, unspecified osteoporosis type    Partial idiopathic epilepsy with seizures of localized onset, not intractable, without status epilepticus ([facility] [address] )    Gastroesophageal reflux disease without esophagitis    Anxiety    Tremor          Subjective:      Patient ID: Francesca Moon is a 64 y o  male  HPI    Patient presents for 4 month follow-up office visit  PMHx: HTN, Epilepsy, Anxiety, GERD, OA, Hyperlipidemia, Osteoporosis  Reviewed current medications  Patient did not go for blood work prior to today's visit as ordered in 12/2020  HTN - blood pressure remains stable  Patient does not take any blood pressure medication currently  Reports no chest pain, shortness of breath, dizziness  Epilepsy- management per neurology Dr Betty Srivastava, was seen by neurology PA -C  in October 2020  Currently on Carbamazepine 200 mg 9 tablets daily  Reports no seizures  Last seizure was in April 2018  Anxiety - mood has been stable  Patient retired in April this year  He remains active around his home, walks  DEXA done in February 2021 showed osteoporosis  Patient was started on Fosamax 70 mg 1 tablet once a week  Reports no side effects  He takes calcium with Vit D supplements  C/o right hand tremor for the past year, wants to check for   Parkinson's disease  Reports no family history of Parkinson's  Patient had right wrist fracture S/P fall in 2017  Surgery was performed by orthopedic surgeon Dr Esteves Come  Patient wants to know if removal of metal plate will help with R hand tremor  Completed COVID vaccination in April  Patient has history of colon polyps  Colonoscopy done in August 2019 by colorectal surgeon Dr Rebeka Perry who recommended to repeat colonoscopy in 3 years  Denies tobacco use    The following portions of the patient's history were reviewed and updated as appropriate: allergies, current medications, past medical history, past social history, past surgical history and problem list     Review of Systems   Constitutional: Negative for activity change, appetite change, chills, fatigue and fever  HENT: Negative for congestion, ear pain, hearing loss, mouth sores, nosebleeds, sore throat, tinnitus and trouble swallowing  Eyes: Negative  Respiratory: Negative for cough, chest tightness, shortness of breath and wheezing  Cardiovascular: Negative for chest pain, palpitations and leg swelling  Gastrointestinal: Negative for abdominal pain, blood in stool, constipation, diarrhea, nausea and vomiting  Genitourinary: Negative for difficulty urinating, dysuria, flank pain, frequency and hematuria  Nocturia x 3   Musculoskeletal: Positive for arthralgias  Negative for back pain, gait problem, joint swelling and neck pain  Skin: Negative for rash  Neurological: Positive for tremors (right hand)  Negative for dizziness and headaches  Hematological: Negative  Psychiatric/Behavioral: Negative for dysphoric mood and sleep disturbance  Anxiety - mood has been stable         Objective:      /80 (BP Location: Left arm, Patient Position: Sitting, Cuff Size: Standard)   Pulse 84   Temp (!) 97 4 °F (36 3 °C) (Tympanic)   Resp 14   Ht 5' 10" (1 778 m)   Wt 73 5 kg (162 lb)   SpO2 98%   BMI 23 24 kg/m²          Physical Exam  Vitals signs and nursing note reviewed  Constitutional:       Appearance: Normal appearance  HENT:      Head: Normocephalic and atraumatic  Right Ear: Tympanic membrane and external ear normal       Left Ear: Tympanic membrane and external ear normal    Eyes:      Conjunctiva/sclera: Conjunctivae normal    Neck:      Musculoskeletal: Normal range of motion and neck supple  Vascular: No carotid bruit  Cardiovascular:      Rate and Rhythm: Normal rate and regular rhythm  Heart sounds: No murmur  Pulmonary:      Effort: Pulmonary effort is normal       Breath sounds: Normal breath sounds     Abdominal:      General: Bowel sounds are normal  There is no distension  Palpations: Abdomen is soft  Tenderness: There is no abdominal tenderness  Musculoskeletal:      Comments: No joints swelling or tenderness  Right hand : 5 th finger contracture  Skin:     General: Skin is warm and dry  Findings: No rash  Neurological:      General: No focal deficit present  Mental Status: He is alert  Mental status is at baseline  Motor: No weakness        Gait: Gait normal       Comments: Mild right hand tremor at rest   Psychiatric:         Mood and Affect: Mood normal

## 2021-06-11 NOTE — ASSESSMENT & PLAN NOTE
Patient is concerned about developing Parkinson's disease  Recommended to discuss right hand tremor with neurology Dr Demi Hauser

## 2021-06-11 NOTE — ASSESSMENT & PLAN NOTE
Stable on current dose of Carbamazepine 200 mg9 tablets  daily  Follow up with neurology Dr Bell Appl

## 2021-06-14 ENCOUNTER — APPOINTMENT (OUTPATIENT)
Dept: LAB | Facility: HOSPITAL | Age: 61
End: 2021-06-14
Payer: COMMERCIAL

## 2021-06-14 DIAGNOSIS — E78.2 MIXED HYPERLIPIDEMIA: ICD-10-CM

## 2021-06-14 DIAGNOSIS — Z12.5 SCREENING FOR PROSTATE CANCER: ICD-10-CM

## 2021-06-14 DIAGNOSIS — I10 ESSENTIAL HYPERTENSION: ICD-10-CM

## 2021-06-14 LAB
ALBUMIN SERPL BCP-MCNC: 3.6 G/DL (ref 3.5–5)
ALP SERPL-CCNC: 84 U/L (ref 46–116)
ALT SERPL W P-5'-P-CCNC: 29 U/L (ref 12–78)
ANION GAP SERPL CALCULATED.3IONS-SCNC: 3 MMOL/L (ref 4–13)
AST SERPL W P-5'-P-CCNC: 19 U/L (ref 5–45)
BILIRUB SERPL-MCNC: 0.47 MG/DL (ref 0.2–1)
BUN SERPL-MCNC: 11 MG/DL (ref 5–25)
CALCIUM SERPL-MCNC: 9.3 MG/DL (ref 8.3–10.1)
CHLORIDE SERPL-SCNC: 104 MMOL/L (ref 100–108)
CHOLEST SERPL-MCNC: 186 MG/DL (ref 50–200)
CO2 SERPL-SCNC: 29 MMOL/L (ref 21–32)
CREAT SERPL-MCNC: 0.67 MG/DL (ref 0.6–1.3)
GFR SERPL CREATININE-BSD FRML MDRD: 104 ML/MIN/1.73SQ M
GLUCOSE P FAST SERPL-MCNC: 94 MG/DL (ref 65–99)
HDLC SERPL-MCNC: 81 MG/DL
LDLC SERPL CALC-MCNC: 74 MG/DL (ref 0–100)
NONHDLC SERPL-MCNC: 105 MG/DL
POTASSIUM SERPL-SCNC: 3.9 MMOL/L (ref 3.5–5.3)
PROT SERPL-MCNC: 7.4 G/DL (ref 6.4–8.2)
PSA SERPL-MCNC: 0.7 NG/ML (ref 0–4)
SODIUM SERPL-SCNC: 136 MMOL/L (ref 136–145)
TRIGL SERPL-MCNC: 156 MG/DL

## 2021-06-14 PROCEDURE — 36415 COLL VENOUS BLD VENIPUNCTURE: CPT

## 2021-06-14 PROCEDURE — G0103 PSA SCREENING: HCPCS

## 2021-06-14 PROCEDURE — 80061 LIPID PANEL: CPT

## 2021-06-14 PROCEDURE — 80053 COMPREHEN METABOLIC PANEL: CPT

## 2021-06-22 ENCOUNTER — OFFICE VISIT (OUTPATIENT)
Dept: NEUROLOGY | Facility: CLINIC | Age: 61
End: 2021-06-22
Payer: COMMERCIAL

## 2021-06-22 VITALS
DIASTOLIC BLOOD PRESSURE: 85 MMHG | HEART RATE: 82 BPM | BODY MASS INDEX: 23.19 KG/M2 | SYSTOLIC BLOOD PRESSURE: 131 MMHG | WEIGHT: 162 LBS | HEIGHT: 70 IN

## 2021-06-22 DIAGNOSIS — G40.209 PARTIAL SYMPTOMATIC EPILEPSY WITH COMPLEX PARTIAL SEIZURES, NOT INTRACTABLE, WITHOUT STATUS EPILEPTICUS (HCC): ICD-10-CM

## 2021-06-22 DIAGNOSIS — R25.1 TREMOR: Primary | ICD-10-CM

## 2021-06-22 DIAGNOSIS — G40.909 EPILEPSY (HCC): ICD-10-CM

## 2021-06-22 PROCEDURE — 3079F DIAST BP 80-89 MM HG: CPT | Performed by: PHYSICIAN ASSISTANT

## 2021-06-22 PROCEDURE — 99215 OFFICE O/P EST HI 40 MIN: CPT | Performed by: PHYSICIAN ASSISTANT

## 2021-06-22 PROCEDURE — 1036F TOBACCO NON-USER: CPT | Performed by: PHYSICIAN ASSISTANT

## 2021-06-22 PROCEDURE — 3075F SYST BP GE 130 - 139MM HG: CPT | Performed by: PHYSICIAN ASSISTANT

## 2021-06-22 PROCEDURE — 3008F BODY MASS INDEX DOCD: CPT | Performed by: PHYSICIAN ASSISTANT

## 2021-06-22 RX ORDER — LORAZEPAM 0.5 MG/1
TABLET ORAL
Qty: 15 TABLET | Refills: 0 | Status: CANCELLED | OUTPATIENT
Start: 2021-06-22

## 2021-06-22 RX ORDER — CARBAMAZEPINE 200 MG/1
TABLET ORAL
Qty: 270 TABLET | Refills: 5 | Status: SHIPPED | OUTPATIENT
Start: 2021-06-22 | End: 2021-07-20 | Stop reason: SDUPTHER

## 2021-06-22 NOTE — PROGRESS NOTES
Patient ID: Cristy Muniz is a 64 y o  male  Assessment/Plan:    Tremor  · Pt and his brother report worsening tremor over the past few months  · Bloodwork will be ordered  · A brain MRI will be ordered to rule out a structural cause of tremor  · He reports possibly having some metal at the back of his head  Will discuss with MRI to determine if he needs an x-ray prior  · Discussed possible causes of tremor  · Pt is hesitant to start any new medications  Will be revisited after work-up  I have spent 45 minutes with Patient and family today in which greater than 50% of this time was spent in counseling/coordination of care regarding Diagnostic results, Prognosis, Risks and benefits of tx options, Intructions for management, Patient and family education, Importance of tx compliance, Risk factor reductions and Impressions  He will follow-up as scheduled  Epilepsy ([facility] )  · No seizures since his last appt  · He will continue carbamazepine  Problem List Items Addressed This Visit        Nervous and Auditory    Epilepsy ([facility] )     · No seizures since his last appt  · He will continue carbamazepine  Relevant Medications    carBAMazepine (TEGretol) 200 mg tablet       Other    Tremor - Primary     · Pt and his brother report worsening tremor over the past few months  · Bloodwork will be ordered  · A brain MRI will be ordered to rule out a structural cause of tremor  · He reports possibly having some metal at the back of his head  Will discuss with MRI to determine if he needs an x-ray prior  I have spent 45 minutes with Patient and family today in which greater than 50% of this time was spent in counseling/coordination of care regarding Diagnostic results, Prognosis, Risks and benefits of tx options, Intructions for management, Patient and family education, Importance of tx compliance, Risk factor reductions and Impressions  He will follow-up as scheduled           Relevant Orders TSH, 3rd generation    Copper Level    Ceruloplasmin    Vitamin B12    Vitamin D 25 hydroxy    MRI brain without contrast             Subjective:    HPI    Neal Herr is a 65 y/o right hand male with a long history of seizures  He had  a seizure on 4/20/18  He has been seizure free since  To review, seizures began when he was 7 y/o with twitching of the chin followed by a tonic clonic event  He was placed on phenobarbital and Dilantin but it did not control the seizures  In 1981 he was using a blow torch when had a typical seizure with a loss consciousness  At that time Phenobarbital and Dilantin was discontinued and Tegretol was started  His seizures start with abnormal sense in the chest  He usually screams followed by hiccups, LOC, generalized tonic/ clonic activity and is followed by a severe, intense headache  The headaches last for about 1/2 hr      In January of 2015, he was diagnosed with bronchitis and placed on an antibiotic  He had a seizures after the three day  He switched to Tegretol brand name 1400mg daily and started taking mag supplements  On November 18, 2015  He forgot to take the last dose of tegretol  The following day  He began a having "hiccups" or auras  It was followed by 2 to 3hrs of confusion, and alteration on consiousness  He was treated with extra magnesium, extra hot herbal tea  He then developed chest pain and was brought to [address] [place] [address]  He was admitted, for monitoring  Tegretol level was 12 2, sodium was 142  And an EEG showed generalized epileptiform features, and CT scan was normal  The dose of brand name tegretol was increased to 1600mg daily  On April 20, 2018 he was seen in the ED  He was taking a nap when his mother found him having a seizure  He may have missed one dose of Tegretol that day  His Tegretol level was 7 4  His levels usually run 12-13  He was taking brand name at the time   He is now taking generic Tegretol 200mg tabs 9 tabs total per day (3, 2, 2, 2)  He does have lorazepam for breakthrough  The ED physician did report him to the state and he did receive paperwork about no longer driving  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  He has not had a seizure since then  He was seizure free for 6 months and his license was reinstated  He denies any seizures since his last appt  He is taking carbamazepine as prescribed and denies any side effects of the medication  He retired last month which makes it easier to take his medication  He reports having an intermittent tremor in the Rt arm  His brother was on the phone during his appt to discuss the tremor  Tremor has been present for many years but it worsened in 2017 after he broke his arm  The tremor is present both at rest and with activity  It does not inhibit his ability to eat, drink or perform ADLs  He denies any family history of tremor  He denies any aggravating or alleviating factors  He is concerned for the development of PD  He denies any change in handwriting, sense of smell, dysphagia, change in speech or slowness of movement  The following portions of the patient's history were reviewed and updated as appropriate: allergies, current medications, past family history, past medical history, past social history, past surgical history and problem list          Objective:    Blood pressure 131/85, pulse 82, height 5' 10" (1 778 m), weight 73 5 kg (162 lb)  Physical Exam  Vitals reviewed  Eyes:      General: Lids are normal       Extraocular Movements: Extraocular movements intact  Pupils: Pupils are equal, round, and reactive to light  Neurological:      Coordination: Coordination is intact  Deep Tendon Reflexes: Strength normal and reflexes are normal and symmetric  Psychiatric:         Speech: Speech normal          Neurological Exam  Mental Status   Oriented to person, place, time and situation  Recent and remote memory are intact   Speech is normal  Language is fluent with no aphasia  Fund of knowledge is appropriate for level of education  Apraxia absent  Cranial Nerves  CN II: Visual acuity is normal  Visual fields full to confrontation  CN III, IV, VI: Extraocular movements intact bilaterally  Normal lids and orbits bilaterally  Pupils equal round and reactive to light bilaterally  CN V: Facial sensation is normal   CN VII: Full and symmetric facial movement  CN VIII: Hearing is normal   CN IX, X: Palate elevates symmetrically  Normal gag reflex  CN XI: Shoulder shrug strength is normal   CN XII: Tongue midline without atrophy or fasciculations  Motor   Strength is 5/5 throughout all four extremities  High frequency tremor seen in the Rt UE at rest when pt is distracted  Tremor improves with Rt UE outstretched  High frequency tremor seen on finger to nose testing bilaterally Rt > Lt  Sensory  Sensation is intact to light touch, pinprick, vibration and proprioception in all four extremities  Reflexes  Deep tendon reflexes are 2+ and symmetric in all four extremities with downgoing toes bilaterally  Coordination  Finger-to-nose, rapid alternating movements and heel-to-shin normal bilaterally without dysmetria  Gait  Normal casual, toe, heel and tandem gait  ROS:    Review of Systems   Constitutional: Negative  Negative for appetite change and fever  HENT: Negative  Negative for hearing loss, tinnitus, trouble swallowing and voice change  Eyes: Negative  Negative for photophobia and pain  Respiratory: Negative  Negative for shortness of breath  Cardiovascular: Negative  Negative for palpitations  Gastrointestinal: Negative  Negative for nausea and vomiting  Endocrine: Negative  Negative for cold intolerance  Genitourinary: Negative  Negative for dysuria, frequency and urgency  Musculoskeletal: Negative  Negative for myalgias and neck pain  Skin: Negative  Negative for rash     Neurological: Positive for tremors (right arm)  Negative for dizziness, seizures, syncope, facial asymmetry, speech difficulty, weakness, light-headedness, numbness and headaches  Hematological: Negative  Does not bruise/bleed easily  Psychiatric/Behavioral: Negative  Negative for confusion, hallucinations and sleep disturbance  All other systems reviewed and are negative  Review of systems personally reviewed

## 2021-06-22 NOTE — PATIENT INSTRUCTIONS
Tremor  · Pt and his brother report worsening tremor over the past few months  · Bloodwork will be ordered  · A brain MRI will be ordered to rule out a structural cause of tremor  · He reports possibly having some metal at the back of his head  Will discuss with MRI to determine if he needs an x-ray prior  · Discussed possible causes of tremor  · Pt is hesitant to start any new medications  Will be revisited after work-up  I have spent 45 minutes with Patient and family today in which greater than 50% of this time was spent in counseling/coordination of care regarding Diagnostic results, Prognosis, Risks and benefits of tx options, Intructions for management, Patient and family education, Importance of tx compliance, Risk factor reductions and Impressions  He will follow-up as scheduled  Epilepsy ([facility] )  · No seizures since his last appt  · He will continue carbamazepine

## 2021-06-22 NOTE — ASSESSMENT & PLAN NOTE
· Pt and his brother report worsening tremor over the past few months  · Bloodwork will be ordered  · A brain MRI will be ordered to rule out a structural cause of tremor  · He reports possibly having some metal at the back of his head  Will discuss with MRI to determine if he needs an x-ray prior  · Discussed possible causes of tremor  · Pt is hesitant to start any new medications  Will be revisited after work-up  I have spent 45 minutes with Patient and family today in which greater than 50% of this time was spent in counseling/coordination of care regarding Diagnostic results, Prognosis, Risks and benefits of tx options, Intructions for management, Patient and family education, Importance of tx compliance, Risk factor reductions and Impressions  He will follow-up as scheduled

## 2021-07-07 ENCOUNTER — APPOINTMENT (OUTPATIENT)
Dept: LAB | Facility: HOSPITAL | Age: 61
End: 2021-07-07
Payer: COMMERCIAL

## 2021-07-07 DIAGNOSIS — R25.1 TREMOR: ICD-10-CM

## 2021-07-07 LAB
25(OH)D3 SERPL-MCNC: 36.2 NG/ML (ref 30–100)
TSH SERPL DL<=0.05 MIU/L-ACNC: 1.26 UIU/ML (ref 0.36–3.74)
VIT B12 SERPL-MCNC: 570 PG/ML (ref 100–900)

## 2021-07-07 PROCEDURE — 82306 VITAMIN D 25 HYDROXY: CPT

## 2021-07-07 PROCEDURE — 82390 ASSAY OF CERULOPLASMIN: CPT

## 2021-07-07 PROCEDURE — 82607 VITAMIN B-12: CPT

## 2021-07-07 PROCEDURE — 36415 COLL VENOUS BLD VENIPUNCTURE: CPT

## 2021-07-07 PROCEDURE — 84443 ASSAY THYROID STIM HORMONE: CPT

## 2021-07-07 PROCEDURE — 82525 ASSAY OF COPPER: CPT

## 2021-07-08 LAB — CERULOPLASMIN SERPL-MCNC: 32.1 MG/DL (ref 16–31)

## 2021-07-09 LAB — COPPER SERPL-MCNC: 125 UG/DL (ref 69–132)

## 2021-07-12 ENCOUNTER — HOSPITAL ENCOUNTER (OUTPATIENT)
Dept: RADIOLOGY | Facility: HOSPITAL | Age: 61
Discharge: HOME | End: 2021-07-12
Payer: COMMERCIAL

## 2021-07-12 DIAGNOSIS — R25.1 TREMOR: ICD-10-CM

## 2021-07-12 PROCEDURE — G1004 CDSM NDSC: HCPCS

## 2021-07-12 PROCEDURE — 70551 MRI BRAIN STEM W/O DYE: CPT

## 2021-07-14 ENCOUNTER — TELEPHONE (OUTPATIENT)
Dept: NEUROLOGY | Facility: CLINIC | Age: 61
End: 2021-07-14

## 2021-07-14 NOTE — TELEPHONE ENCOUNTER
----- Message from Savage Dover PA-C sent at 7/14/2021  8:52 AM EDT -----  Please call pt and let him know that he had one abnormal value in bloodwork  I would like to have him repeat the ceruloplasmin in 4 weeks  Thanks

## 2021-07-14 NOTE — TELEPHONE ENCOUNTER
Called patient, read Lois's message  Explained to him what this lab looks at  Patient verbalizes understanding of information  Confirmed he uses a [facility] and does not need script printed  Reports they do not have any further questions or concerns at this time

## 2021-07-16 ENCOUNTER — TELEPHONE (OUTPATIENT)
Dept: OTHER | Facility: OTHER | Age: 61
End: 2021-07-16

## 2021-07-16 ENCOUNTER — TELEPHONE (OUTPATIENT)
Dept: NEUROLOGY | Facility: CLINIC | Age: 61
End: 2021-07-16

## 2021-07-16 NOTE — TELEPHONE ENCOUNTER
Called patient LVM to patient that his appointment has been reschedule due to provider out of office  I also stated that I will mail appointment card with date time location and provider

## 2021-07-16 NOTE — TELEPHONE ENCOUNTER
Pt called to see why appointment was rescheduled  Advised him previous provider will not be in the office  He satted he didn't want to see a new provider advised to call when office is open on Monday to see if he can see old provider

## 2021-07-19 ENCOUNTER — TELEPHONE (OUTPATIENT)
Dept: NEUROLOGY | Facility: CLINIC | Age: 61
End: 2021-07-19

## 2021-07-19 NOTE — TELEPHONE ENCOUNTER
Patient called in to ask about his last carbamazepine level  I reviewed labs and office note from 10/15 with him  States he wants to know because he continues to have shaking of his right arm - thinks this is because his carbamazepine level is too high  (shaking reported to Tommy Jones in the past  Notes it is not worsening, it just continues to be there and he is concerned)  Patient requested another carbamazepine level be drawn  Best call back [phone] okay with detailed messages

## 2021-07-19 NOTE — TELEPHONE ENCOUNTER
Called patient, let him know labs were ordered  Patient verbalizes understanding of information  Reports they do not have any further questions or concerns at this time

## 2021-07-19 NOTE — TELEPHONE ENCOUNTER
ADD ON - patient is scheduled with Taj King in [place] on 7/20 @ 1p - insurance is still [employer] ppo

## 2021-07-20 ENCOUNTER — TELEPHONE (OUTPATIENT)
Dept: NEUROLOGY | Facility: CLINIC | Age: 61
End: 2021-07-20

## 2021-07-20 ENCOUNTER — APPOINTMENT (OUTPATIENT)
Dept: LAB | Facility: HOSPITAL | Age: 61
End: 2021-07-20
Payer: COMMERCIAL

## 2021-07-20 ENCOUNTER — OFFICE VISIT (OUTPATIENT)
Dept: NEUROLOGY | Facility: CLINIC | Age: 61
End: 2021-07-20
Payer: COMMERCIAL

## 2021-07-20 ENCOUNTER — OFFICE VISIT (OUTPATIENT)
Dept: SURGERY | Facility: CLINIC | Age: 61
End: 2021-07-20
Payer: COMMERCIAL

## 2021-07-20 VITALS
HEIGHT: 70 IN | HEART RATE: 84 BPM | BODY MASS INDEX: 23.34 KG/M2 | SYSTOLIC BLOOD PRESSURE: 130 MMHG | DIASTOLIC BLOOD PRESSURE: 86 MMHG | WEIGHT: 163 LBS

## 2021-07-20 VITALS — WEIGHT: 162 LBS | HEIGHT: 70 IN | BODY MASS INDEX: 23.19 KG/M2

## 2021-07-20 DIAGNOSIS — G40.209 PARTIAL SYMPTOMATIC EPILEPSY WITH COMPLEX PARTIAL SEIZURES, NOT INTRACTABLE, WITHOUT STATUS EPILEPTICUS (HCC): ICD-10-CM

## 2021-07-20 DIAGNOSIS — G40.909 SEIZURE DISORDER (HCC): Primary | ICD-10-CM

## 2021-07-20 DIAGNOSIS — G40.909 NONINTRACTABLE EPILEPSY WITHOUT STATUS EPILEPTICUS, UNSPECIFIED EPILEPSY TYPE (HCC): ICD-10-CM

## 2021-07-20 DIAGNOSIS — R25.1 TREMOR: ICD-10-CM

## 2021-07-20 DIAGNOSIS — M79.5 FOREIGN BODY (FB) IN SOFT TISSUE: Primary | ICD-10-CM

## 2021-07-20 LAB — CARBAMAZEPINE SERPL-MCNC: 12.9 UG/ML (ref 4–12)

## 2021-07-20 PROCEDURE — 3008F BODY MASS INDEX DOCD: CPT | Performed by: PHYSICIAN ASSISTANT

## 2021-07-20 PROCEDURE — 80156 ASSAY CARBAMAZEPINE TOTAL: CPT

## 2021-07-20 PROCEDURE — 3075F SYST BP GE 130 - 139MM HG: CPT | Performed by: PHYSICIAN ASSISTANT

## 2021-07-20 PROCEDURE — 36415 COLL VENOUS BLD VENIPUNCTURE: CPT

## 2021-07-20 PROCEDURE — 99202 OFFICE O/P NEW SF 15 MIN: CPT | Performed by: SURGERY

## 2021-07-20 PROCEDURE — 3079F DIAST BP 80-89 MM HG: CPT | Performed by: PHYSICIAN ASSISTANT

## 2021-07-20 PROCEDURE — 99215 OFFICE O/P EST HI 40 MIN: CPT | Performed by: PHYSICIAN ASSISTANT

## 2021-07-20 PROCEDURE — 1036F TOBACCO NON-USER: CPT | Performed by: PHYSICIAN ASSISTANT

## 2021-07-20 RX ORDER — CARBAMAZEPINE 200 MG/1
400 TABLET ORAL 4 TIMES DAILY
Qty: 240 TABLET | Refills: 5 | Status: SHIPPED | OUTPATIENT
Start: 2021-07-20 | End: 2022-03-24 | Stop reason: SDUPTHER

## 2021-07-20 NOTE — ASSESSMENT & PLAN NOTE
· Tremor remains unchanged  · Labs unremarkable with the exception of ceruloplasmin  It was mildly elevated and will be repeated in 4 weeks  · Brain MRI was reviewed and was unremarkable with the exception of a foreign body  The foreign body is unlikely to be causing the tremor or seizures

## 2021-07-20 NOTE — PATIENT INSTRUCTIONS
Seizure disorder ([facility])  · Carbamazepine level has been mildly elevated the past few times it has been checked  · Pt recently retired  He admits that when he was working he did not take the lunchtime dose of carbamazepine  Prior therapeutic levels were likely due to him not taking the lunchtime dose of medication  · He questioned eliminating the lunchtime dose of carbamazepine because he had not been taking it prior to halfway  He will reduce carbamazepine to 2 pills 4x daily  He does understand that there is a risk of seizure when medications are changed  · A carbamazepine level will be checked in 2 weeks to see what effect the medication change has on the level  I have spent 40 minutes with Patient  today in which greater than 50% of this time was spent in counseling/coordination of care regarding Diagnostic results, Prognosis, Risks and benefits of tx options, Intructions for management, Patient and family education, Importance of tx compliance, Risk factor reductions and Impressions  He will follow-up in 4 months  Tremor  · Tremor remains unchanged  · Labs unremarkable with the exception of ceruloplasmin  It was mildly elevated and will be repeated in 4 weeks  · Brain MRI was reviewed and was unremarkable with the exception of a foreign body  The foreign body is unlikely to be causing the tremor or seizures

## 2021-07-20 NOTE — ASSESSMENT & PLAN NOTE
· Carbamazepine level has been mildly elevated the past few times it has been checked  · Pt recently retired  He admits that when he was working he did not take the lunchtime dose of carbamazepine  Prior therapeutic levels were likely due to him not taking the lunchtime dose of medication  · He questioned eliminating the lunchtime dose of carbamazepine because he had not been taking it prior to [facility]  He will reduce carbamazepine to 2 pills 4x daily  He does understand that there is a risk of seizure when medications are changed  · A carbamazepine level will be checked in 2 weeks to see what effect the medication change has on the level  I have spent 40 minutes with Patient  today in which greater than 50% of this time was spent in counseling/coordination of care regarding Diagnostic results, Prognosis, Risks and benefits of tx options, Intructions for management, Patient and family education, Importance of tx compliance, Risk factor reductions and Impressions  He will follow-up in 4 months

## 2021-07-20 NOTE — TELEPHONE ENCOUNTER
Pt's son Chintan Moulton called and states that Dr Lita Membreno is not comfortable removing the piece of metal in back of pt's head, not able to get precise location  He would like to know if you can refer this pt to another surgeon?      Confirmed pt's appt today at 1 pm                  [phone] ok to leave detailed message
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
## 2021-08-08 ENCOUNTER — TELEPHONE (OUTPATIENT)
Dept: OTHER | Facility: OTHER | Age: 61
End: 2021-08-08

## 2021-08-08 ENCOUNTER — APPOINTMENT (OUTPATIENT)
Dept: LAB | Facility: HOSPITAL | Age: 61
End: 2021-08-08
Payer: COMMERCIAL

## 2021-08-08 DIAGNOSIS — G40.909 SEIZURE DISORDER (HCC): ICD-10-CM

## 2021-08-08 LAB — CARBAMAZEPINE SERPL-MCNC: 15.3 UG/ML (ref 4–12)

## 2021-08-08 PROCEDURE — 36415 COLL VENOUS BLD VENIPUNCTURE: CPT

## 2021-08-08 PROCEDURE — 80156 ASSAY CARBAMAZEPINE TOTAL: CPT

## 2021-08-08 NOTE — TELEPHONE ENCOUNTER
Dr Susanna Montanez call Darlene @ [facility] [phone] regarding a CRITICAL Carbamazepine Result  For PT: Shalonda Espinoza  1960

## 2021-08-08 NOTE — TELEPHONE ENCOUNTER
Called madonna and tegretol level 15 3  Called pt to report level  Called home and cell numbers  Pt available at home number  Pt reports no tegretol toxicity sxs at this time  Actually dosing of tegretol was decreased at July appt from 9 to 8 tabs daily  Pt did take his tegretol med this am right before his blood draw about 30 min prior  Pt notes he forgot to do labs first   Likely elevated level reflective of peak value rather than trough  Currently stable  No sxs at this time  Main provider team to decide when to recheck labs again    Pt not interested in getting any labs today and already took meds this am  Please direct pt to when timing of next lab

## 2021-08-09 ENCOUNTER — TELEPHONE (OUTPATIENT)
Dept: NEUROLOGY | Facility: CLINIC | Age: 61
End: 2021-08-09

## 2021-08-09 ENCOUNTER — APPOINTMENT (OUTPATIENT)
Dept: LAB | Facility: HOSPITAL | Age: 61
End: 2021-08-09
Payer: COMMERCIAL

## 2021-08-09 DIAGNOSIS — R25.1 TREMOR: ICD-10-CM

## 2021-08-09 DIAGNOSIS — G40.909 SEIZURE DISORDER (HCC): ICD-10-CM

## 2021-08-09 LAB — CARBAMAZEPINE SERPL-MCNC: 12.3 UG/ML (ref 4–12)

## 2021-08-09 PROCEDURE — 80156 ASSAY CARBAMAZEPINE TOTAL: CPT

## 2021-08-09 PROCEDURE — 36415 COLL VENOUS BLD VENIPUNCTURE: CPT

## 2021-08-09 PROCEDURE — 82390 ASSAY OF CERULOPLASMIN: CPT

## 2021-08-09 NOTE — TELEPHONE ENCOUNTER
----- Message from Diana Durant PA-C sent at 8/9/2021  7:31 AM EDT -----  Please call the patient regarding his abnormal result    Have him repeat the lab this week and get blood draw before he takes his medication

## 2021-08-09 NOTE — TELEPHONE ENCOUNTER
Please call pt and let him know the labs need to be done prior to med not  after baumann     will order a repeat level 2 weeks after the lowering of the dose    Please send to pt

## 2021-08-09 NOTE — TELEPHONE ENCOUNTER
Called [phone] and advised pt of the below  He verbalized clear understanding of all instructions

## 2021-08-09 NOTE — TELEPHONE ENCOUNTER
Level better , keep on current dose of 8 a day        Repeat trough level in 3 months , will place order

## 2021-08-10 LAB — CERULOPLASMIN SERPL-MCNC: 27.8 MG/DL (ref 16–31)

## 2021-08-10 NOTE — TELEPHONE ENCOUNTER
Called patient, reviewed lab results and recommendations   Patient verbalized understanding and will repeat labs in 3 months

## 2021-08-14 ENCOUNTER — OFFICE VISIT (OUTPATIENT)
Dept: FAMILY MEDICINE CLINIC | Facility: CLINIC | Age: 61
End: 2021-08-14
Payer: COMMERCIAL

## 2021-08-14 VITALS
WEIGHT: 164 LBS | TEMPERATURE: 97.8 F | RESPIRATION RATE: 14 BRPM | HEART RATE: 88 BPM | HEIGHT: 70 IN | SYSTOLIC BLOOD PRESSURE: 118 MMHG | DIASTOLIC BLOOD PRESSURE: 64 MMHG | OXYGEN SATURATION: 98 % | BODY MASS INDEX: 23.48 KG/M2

## 2021-08-14 DIAGNOSIS — R25.1 TREMOR OF RIGHT HAND: Primary | ICD-10-CM

## 2021-08-14 DIAGNOSIS — Z87.81 HISTORY OF ARM FRACTURE: ICD-10-CM

## 2021-08-14 PROCEDURE — 99213 OFFICE O/P EST LOW 20 MIN: CPT | Performed by: FAMILY MEDICINE

## 2021-08-14 PROCEDURE — 1036F TOBACCO NON-USER: CPT | Performed by: FAMILY MEDICINE

## 2021-08-14 PROCEDURE — 3078F DIAST BP <80 MM HG: CPT | Performed by: FAMILY MEDICINE

## 2021-08-14 PROCEDURE — 3074F SYST BP LT 130 MM HG: CPT | Performed by: FAMILY MEDICINE

## 2021-08-14 PROCEDURE — 3008F BODY MASS INDEX DOCD: CPT | Performed by: FAMILY MEDICINE

## 2021-08-14 NOTE — PROGRESS NOTES
Chief Complaint   Patient presents with    Tremors     Right Arm     Health Maintenance   Topic Date Due    HIV Screening  Never done    Annual Physical  Never done    DTaP,Tdap,and Td Vaccines (2 - Tdap) 07/27/2008    Influenza Vaccine (1) 09/01/2021    Depression Screening PHQ  02/15/2022    Colorectal Cancer Screening  08/05/2022    BMI: Adult  08/14/2022    Hepatitis C Screening  Completed    COVID-19 Vaccine  Completed    Pneumococcal Vaccine: Pediatrics (0 to 5 Years) and At-Risk Patients (6 to 59 Years)  Aged Out    HIB Vaccine  Aged Out    Hepatitis B Vaccine  Aged Out    IPV Vaccine  Aged Out    Hepatitis A Vaccine  Aged Out    Meningococcal ACWY Vaccine  Aged Out    HPV Vaccine  Aged Out              Assessment/Plan:    Tremor of right hand  Patient has mild right hand tremor  Explained to patient that hand tremor is unlikely related to the metal plate in his right forearm  According to neurology visit note from July patient does not have Parkinson's disease  Continue to follow-up with neurology  History of arm fracture  Patient had right forearm fracture S/P fall in 2017  He will schedule appointment with orthopedic surgeon Dr Gibson Gonzalez who performed surgery to discuss possible removal of the metal plate from his right forearm  Diagnoses and all orders for this visit:    Tremor of right hand    History of arm fracture          Subjective:      Patient ID: Magaly Foster is a 64 y o  male  HPI     Patient is 59-year-old male with history of epilepsy  managed by neurology Dr Krys Arauz presents today c/o right hand tremor  Patient states that tremor worsens with using his right hand while eating  He believes that right hand tremor relates to his old right forearm fracture and metal plate he has  He had right forearm Fx S/P fall in 2017  Surgery was performed by orthopedic surgeon Dr Gibson Gonzalez      Patient  thinks about scheduling appointment with Dr Gibson Gonzalez to remove the metal plate which he believes causing right hand tremor  Patient was seen by neurology last month and was told that tremor is likely not related to the metal plate and he does not have Parkinson's disease  Patient denies family H/o tremors, Parkinson's disease  Review of Systems   Constitutional: Negative for activity change, appetite change, chills, fatigue and fever  Respiratory: Negative for cough, chest tightness and shortness of breath  Cardiovascular: Negative for chest pain, palpitations and leg swelling  Musculoskeletal: Negative for arthralgias, back pain, gait problem, joint swelling and neck pain  Skin: Negative for rash  Neurological: Negative for dizziness and headaches  Right hand tremor   Hematological: Negative  Psychiatric/Behavioral:        Anxiety         Objective:      /64 (BP Location: Left arm, Patient Position: Sitting, Cuff Size: Adult)   Pulse 88   Temp 97 8 °F (36 6 °C) (Tympanic)   Resp 14   Ht 5' 10" (1 778 m)   Wt 74 4 kg (164 lb)   SpO2 98%   BMI 23 53 kg/m²          Physical Exam  Vitals and nursing note reviewed  Constitutional:       Appearance: Normal appearance  HENT:      Head: Normocephalic and atraumatic  Eyes:      Conjunctiva/sclera: Conjunctivae normal       Pupils: Pupils are equal, round, and reactive to light  Cardiovascular:      Rate and Rhythm: Normal rate and regular rhythm  Heart sounds: No murmur heard  Pulmonary:      Effort: Pulmonary effort is normal       Breath sounds: Normal breath sounds  Musculoskeletal:         General: No swelling  Comments: Right hand: 5 th finger cotracture   Skin:     General: Skin is warm and dry  Neurological:      Mental Status: He is alert        Comments: Fine right hand tremor   Psychiatric:         Mood and Affect: Mood normal

## 2021-08-14 NOTE — ASSESSMENT & PLAN NOTE
Patient had right forearm fracture S/P fall in 2017  He will schedule appointment with orthopedic surgeon Dr Anette Ruth who performed surgery to discuss possible removal of the metal plate from his right forearm

## 2021-08-14 NOTE — ASSESSMENT & PLAN NOTE
Patient has mild right hand tremor  Explained to patient that hand tremor is unlikely related to the metal plate in his right forearm  According to neurology visit note from July patient does not have Parkinson's disease  Continue to follow-up with neurology

## 2021-08-27 ENCOUNTER — HOSPITAL ENCOUNTER (EMERGENCY)
Facility: HOSPITAL | Age: 61
Discharge: HOME | End: 2021-08-27
Attending: EMERGENCY MEDICINE | Admitting: EMERGENCY MEDICINE
Payer: COMMERCIAL

## 2021-08-27 VITALS
RESPIRATION RATE: 18 BRPM | SYSTOLIC BLOOD PRESSURE: 152 MMHG | DIASTOLIC BLOOD PRESSURE: 123 MMHG | WEIGHT: 163 LBS | TEMPERATURE: 97.8 F | HEART RATE: 100 BPM | OXYGEN SATURATION: 99 % | BODY MASS INDEX: 23.39 KG/M2

## 2021-08-27 DIAGNOSIS — H57.9 SENSATION OF FOREIGN BODY IN EYE: Primary | ICD-10-CM

## 2021-08-27 PROCEDURE — 99283 EMERGENCY DEPT VISIT LOW MDM: CPT

## 2021-08-27 PROCEDURE — 99282 EMERGENCY DEPT VISIT SF MDM: CPT | Performed by: EMERGENCY MEDICINE

## 2021-08-27 RX ORDER — TETRACAINE HYDROCHLORIDE 5 MG/ML
1 SOLUTION OPHTHALMIC ONCE
Status: COMPLETED | OUTPATIENT
Start: 2021-08-27 | End: 2021-08-27

## 2021-08-27 RX ADMIN — TETRACAINE HYDROCHLORIDE 1 DROP: 5 SOLUTION OPHTHALMIC at 22:38

## 2021-08-27 RX ADMIN — FLUORESCEIN SODIUM 1 STRIP: 1 STRIP OPHTHALMIC at 22:38

## 2021-08-28 NOTE — ED ATTENDING ATTESTATION
Final Diagnosis:  1  Sensation of foreign body in eye           IGila MD, saw and evaluated the patient  All available labs and X-rays were ordered by me or the resident and have been reviewed by myself  I discussed the patient with the resident / non-physician and agree with the resident's / non-physician practitioner's findings and plan as documented in the resident's / non-physician practicitioner's note, except where noted  At this point, I agree with the current assessment done in the ED  I was present during key portions of all procedures performed unless otherwise stated  Chief Complaint   Patient presents with    Foreign Body in Eye     per patient, "i have this dot in my eye, tried to wash it out but it wont come out" denies pain     This is a 64 y o  male presenting for evaluation of eye pain  5PM was eating dinner, saw black spot in the LEFT eye  I thought it was a bug because he saw legs? He flushed it out  He then sprayed hot water, which didn't help  While in waiting room, the black spot resolved but had floaters sensation  Hx of floaters in the past and sees ophtho  Last visit was 1 month ago for routine appointment  He was told he might have glaucoma  He is currently asymptomatic  No contacts  PMH:   has a past medical history of Arthritis, Hypertension, and Seizures ([address] )  PSH:   has no past surgical history on file  Social:  Social History     Substance and Sexual Activity   Alcohol Use Yes    Comment: occasional     Social History     Tobacco Use   Smoking Status Never Smoker   Smokeless Tobacco Never Used     Social History     Substance and Sexual Activity   Drug Use No     PE:  Vitals:    08/27/21 2003   BP: (!) 152/123   BP Location: Left arm   Pulse: 100   Resp: 18   Temp: 97 8 °F (36 6 °C)   TempSrc: Tympanic   SpO2: 99%   Weight: 73 9 kg (163 lb)   General: VS reviewed  Appears in NAD  awake, alert  Well-nourished, well-developed   Appears stated age    Speaking normally in full sentences  Head: Normocephalic, atraumatic  Eyes: EOM-I  No diplopia  No hyphema  No subconjunctival hemorrhages  Symmetrical lids  Eye Exam:     POS (Rosanne-orbital structures): nothing noted around eye / near orbit     LLL (lids, lashes, lacrimals): no lesions, no blepharitis (crusty base of eyelashes), dacryocystitis (tearing/swelling of medial edge of lower lid near drain), hordeolum (a pustule at lash line), chalazion (oil gland at tarsal plate)     C/S (conjunctiva, sclera): white and quiet; no conjunctivitis  No subconjunctival hemorrhage     K (Cornea): no fluorescein uptake except tiny dot (1-2mm; 3 O clock, on iris, but not on visual axis, so shouldn't be able to be seen), no herpetic dendritic staining pattern noted     AC (Anterior chamber): deep and quiet     I (Iris): round and reactive     L (Lens): Clear     VA (visual acuity) pending      IOP (Intraocular pressure): per chart      EOM intact, PERRLA     Retina: sharp disc margins     Ocular U/S: no obvious detachment, no washing machine sign, no signs of vitreous hemorrhage     There is no Eye pain        ENT: Atraumatic external nose and ears  MMM  No malocclusion  No stridor  Normal phonation  No drooling  Normal swallowing  Neck: No JVD  CV: No pallor noted  Lungs:   No tachypnea  No respiratory distress  MSK:   FROM spontaneously  Skin: Dry, intact  Neuro: Awake, alert, GCS15, CN II-XII grossly intact  Motor grossly intact  Psychiatric/Behavioral: Appropriate mood and affect   Exam: deferred  A:  - Eye floaters / pain, resolved   P:  - asymptomatic here; essentially normal eye exam  - Dc, f/u ophtho (has eye doctor already with card in his pocket)     - 13 point ROS was performed and all are normal unless stated in the history above  - Nursing note reviewed  Vitals reviewed     - Orders placed by myself and/or advanced practitioner / resident     - Previous chart was reviewed  - No language barrier    - History obtained from patient  - There are no limitations to the history obtained  - Critical care time: Not applicable for this patient  Code Status: No Order  Advance Directive and Living Will:      Power of :    POLST:      Medications   fluorescein sodium sterile ophthalmic strip 1 strip (1 strip Left Eye Given 8/27/21 2238)   tetracaine 0 5 % ophthalmic solution 1 drop (1 drop Left Eye Given 8/27/21 2238)     No orders to display     Orders Placed This Encounter   Procedures    Visual acuity screening     Labs Reviewed - No data to display  Time reflects when diagnosis was documented in both MDM as applicable and the Disposition within this note     Time User Action Codes Description Comment    8/27/2021 10:51 PM Nicole Dumont Add [H57 9] Sensation of foreign body in eye       ED Disposition     ED Disposition Condition Date/Time Comment    Discharge Stable Fri Aug 27, 2021 10:51 PM Reji Espinoza discharge to home/self care  Follow-up Information     Follow up With Specialties Details Why Contact Info Additional Information    Yazan Ji MD Ophthalmology Schedule an appointment as soon as possible for a visit   [place] [address]  [address] [address] Emergency Department Emergency Medicine Go to  If symptoms worsen [address]  [address] [facility] Emergency Department, [address] [place] [address], [id number] [phone]        Patient's Medications   Discharge Prescriptions    No medications on file     No discharge procedures on file  Prior to Admission Medications   Prescriptions Last Dose Informant Patient Reported? Taking?    Calcium Carb-Cholecalciferol (CALCIUM 500 +D PO)  Self Yes No   Sig: Take by mouth 2 tablets daily   Calcium-Magnesium-Vitamin D (CALCIUM MAGNESIUM PO)  Self Yes No   Sig: Take by mouth   LORazepam (ATIVAN) 0 5 mg tablet  Self No No   Sig: Take 1 tablet as needed for seizures  Patient not taking: Reported on 6/22/2021   Vitamin Mixture (VITAMIN E COMPLETE PO)  Self Yes No   Sig: Take 1 tablet by mouth daily    alendronate (Fosamax) 70 mg tablet  Self No No   Sig: Take 1 tablet (70 mg total) by mouth every 7 days   carBAMazepine (TEGretol) 200 mg tablet  Self No No   Sig: Take 2 tablets (400 mg total) by mouth 4 (four) times a day   co-enzyme Q-10 30 MG capsule  Self Yes No   Sig: Take 100 mg by mouth daily        Facility-Administered Medications: None       Portions of the record may have been created with voice recognition software  Occasional wrong word or "sound a like" substitutions may have occurred due to the inherent limitations of voice recognition software  Read the chart carefully and recognize, using context, where substitutions have occurred      Electronically signed by:  Markus Damon

## 2021-08-28 NOTE — ED PROVIDER NOTES
History  Chief Complaint   Patient presents with    Foreign Body in Eye     per patient, "i have this dot in my eye, tried to wash it out but it wont come out" denies pain     15-year-old male presented to the emergency department for evaluation of a suspected foreign body in his left eye  The patient states that around 5:00 p m  he noticed a black dot with his left eye while he was eating dinner at a restaurant  States that he thought he saw legs so suspected it was a mosquito  He states that he flushed his eye out at the restaurant and then went home  He continued to see the black dot so flushed his eye out again with hot water  He continued to see the blacked out so came to the emergency department for further evaluation  Reports that while he was waiting to be seen the black dot resolved  Currently states that he is intermittently seeing floaters  States that he does have a history of seeing floaters  Reports that he has an ophthalmologist and states that he was told that he may be developing glaucoma  The patient did not take anything to treat his symptoms prior to coming to the emergency department  He denies headache, blurry vision, eye pain/pressure, fevers, chills, nausea, vomiting, diarrhea and localized numbness, tingling or weakness  Prior to Admission Medications   Prescriptions Last Dose Informant Patient Reported? Taking? Calcium Carb-Cholecalciferol (CALCIUM 500 +D PO)  Self Yes No   Sig: Take by mouth 2 tablets daily   Calcium-Magnesium-Vitamin D (CALCIUM MAGNESIUM PO)  Self Yes No   Sig: Take by mouth   LORazepam (ATIVAN) 0 5 mg tablet  Self No No   Sig: Take 1 tablet as needed for seizures     Patient not taking: Reported on 6/22/2021   Vitamin Mixture (VITAMIN E COMPLETE PO)  Self Yes No   Sig: Take 1 tablet by mouth daily    alendronate (Fosamax) 70 mg tablet  Self No No   Sig: Take 1 tablet (70 mg total) by mouth every 7 days   carBAMazepine (TEGretol) 200 mg tablet  Self No No   Sig: Take 2 tablets (400 mg total) by mouth 4 (four) times a day   co-enzyme Q-10 30 MG capsule  Self Yes No   Sig: Take 100 mg by mouth daily        Facility-Administered Medications: None       Past Medical History:   Diagnosis Date    Arthritis     Hypertension     Seizures ([address] )        History reviewed  No pertinent surgical history  Family History   Problem Relation Age of Onset    Dementia Mother     Alzheimer's disease Mother     Stroke Father      I have reviewed and agree with the history as documented  E-Cigarette/Vaping    E-Cigarette Use Never User      E-Cigarette/Vaping Substances     Social History     Tobacco Use    Smoking status: Never Smoker    Smokeless tobacco: Never Used   Vaping Use    Vaping Use: Never used   Substance Use Topics    Alcohol use: Yes     Comment: occasional    Drug use: No        Review of Systems   Constitutional: Negative for chills and fever  HENT: Negative for ear pain and sore throat  Eyes: Negative for pain  Respiratory: Negative for cough and shortness of breath  Cardiovascular: Negative for chest pain and palpitations  Gastrointestinal: Negative for abdominal pain and vomiting  Genitourinary: Negative for dysuria and hematuria  Musculoskeletal: Negative for arthralgias and back pain  Skin: Negative for color change and rash  Neurological: Negative for seizures and syncope  All other systems reviewed and are negative  Physical Exam  ED Triage Vitals [08/27/21 2003]   Temperature Pulse Respirations Blood Pressure SpO2   97 8 °F (36 6 °C) 100 18 (!) 152/123 99 %      Temp Source Heart Rate Source Patient Position - Orthostatic VS BP Location FiO2 (%)   Tympanic Monitor Sitting Left arm --      Pain Score       No Pain             Orthostatic Vital Signs  Vitals:    08/27/21 2003   BP: (!) 152/123   Pulse: 100   Patient Position - Orthostatic VS: Sitting       Physical Exam  Vitals and nursing note reviewed  Constitutional:       Appearance: He is well-developed  HENT:      Head: Normocephalic and atraumatic  Eyes:      General: Lids are normal  Lids are everted, no foreign bodies appreciated  Vision grossly intact  Right eye: No foreign body  Left eye: No foreign body  Intraocular pressure: Right eye pressure is 4 mmHg  Measurements were taken using a handheld tonometer  Extraocular Movements: Extraocular movements intact  Conjunctiva/sclera: Conjunctivae normal       Pupils: Pupils are equal, round, and reactive to light  Right eye: No fluorescein uptake  Left eye: No fluorescein uptake  Visual Fields: Right eye visual fields normal and left eye visual fields normal    Cardiovascular:      Rate and Rhythm: Normal rate and regular rhythm  Heart sounds: No murmur heard  Pulmonary:      Effort: Pulmonary effort is normal  No respiratory distress  Breath sounds: Normal breath sounds  Abdominal:      Palpations: Abdomen is soft  Tenderness: There is no abdominal tenderness  Musculoskeletal:      Cervical back: Neck supple  Skin:     General: Skin is warm and dry  Neurological:      Mental Status: He is alert  ED Medications  Medications   fluorescein sodium sterile ophthalmic strip 1 strip (1 strip Left Eye Given 8/27/21 2238)   tetracaine 0 5 % ophthalmic solution 1 drop (1 drop Left Eye Given 8/27/21 2238)       Diagnostic Studies  Results Reviewed     None                 No orders to display         Procedures  Procedures      ED Course                                       MDM  Number of Diagnoses or Management Options  Sensation of foreign body in eye  Diagnosis management comments: 27-year-old male presented to the emergency department for evaluation of a suspected foreign body in his left eye  On arrival the patient was awake, alert, oriented and in no acute distress    Foreign body sensation resolved while the patient was waiting for evaluation  Workup done in the emergency department was unremarkable  No foreign body appreciated  No fluorescein uptake  Intra-ocular pressure within normal limits  All diagnostic studies were discussed with the patient with recommendation to follow up with his ophthalmologist for continued symptoms  Return precautions were discussed  Patient agrees with the plan for discharge and feels comfortable to go home with proper f/u  Advised to return for worsening or additional problems  Diagnostic tests were reviewed and questions answered  Diagnosis, care plan and treatment options were discussed  The patient understands instructions and will follow up as directed  Disposition  Final diagnoses:   Sensation of foreign body in eye     Time reflects when diagnosis was documented in both MDM as applicable and the Disposition within this note     Time User Action Codes Description Comment    8/27/2021 10:51 PM Gold Melendez Add [H57 9] Sensation of foreign body in eye       ED Disposition     ED Disposition Condition Date/Time Comment    Discharge Stable Fri Aug 27, 2021 10:51 PM Skip Espinoza discharge to home/self care              Follow-up Information     Follow up With Specialties Details Why Contact Info Additional Information    Car Spencer MD Ophthalmology Schedule an appointment as soon as possible for a visit   [facility]  [address] [address] Emergency Department Emergency Medicine Go to  If symptoms worsen [address] [facility] [address] [facility] [address]  [address] Emergency Department, 261 [facility], [address] [place] [address] 108          Discharge Medication List as of 8/27/2021 11:02 PM      CONTINUE these medications which have NOT CHANGED    Details   alendronate (Fosamax) 70 mg tablet Take 1 tablet (70 mg total) by mouth every 7 days, Starting Mon 2/15/2021, Normal Calcium Carb-Cholecalciferol (CALCIUM 500 +D PO) Take by mouth 2 tablets daily, Historical Med      Calcium-Magnesium-Vitamin D (CALCIUM MAGNESIUM PO) Take by mouth, Historical Med      carBAMazepine (TEGretol) 200 mg tablet Take 2 tablets (400 mg total) by mouth 4 (four) times a day, Starting Tue 7/20/2021, Normal      co-enzyme Q-10 30 MG capsule Take 100 mg by mouth daily  , Historical Med      LORazepam (ATIVAN) 0 5 mg tablet Take 1 tablet as needed for seizures  , Normal      Vitamin Mixture (VITAMIN E COMPLETE PO) Take 1 tablet by mouth daily , Historical Med           No discharge procedures on file  PDMP Review     None           ED Provider  Attending physically available and evaluated [facility] managed the patient along with the ED Attending      Electronically Signed by         Roberta Flores MD  08/28/21 7886

## 2021-08-29 DIAGNOSIS — M81.0 OSTEOPOROSIS WITHOUT CURRENT PATHOLOGICAL FRACTURE, UNSPECIFIED OSTEOPOROSIS TYPE: ICD-10-CM

## 2021-08-30 RX ORDER — ALENDRONATE SODIUM 70 MG/1
TABLET ORAL
Qty: 4 TABLET | Refills: 5 | Status: SHIPPED | OUTPATIENT
Start: 2021-08-30 | End: 2022-03-21

## 2021-09-15 ENCOUNTER — TELEPHONE (OUTPATIENT)
Dept: NEUROLOGY | Facility: CLINIC | Age: 61
End: 2021-09-15

## 2021-09-15 NOTE — TELEPHONE ENCOUNTER
Patient came into the office to discuss his tremor  Lashawn Avitia that he has had a tremor in his right arm for about a year  Said that he believes that he has parkinson's but was told at his last appointment that he does not  Advised that since the tremor started after his injury the tremor is probably a result from the injury  Patient is requesting a Dopamine Transporter Scan and possibly start propranolol or primidone for his tremor      Please advise

## 2021-09-15 NOTE — TELEPHONE ENCOUNTER
At his last appt he did not have any exam findings that would indicate the need for a scan  We can discuss starting a medication for tremor at his next appt  Thanks

## 2021-10-06 ENCOUNTER — TELEPHONE (OUTPATIENT)
Dept: NEUROLOGY | Facility: CLINIC | Age: 61
End: 2021-10-06

## 2021-10-06 DIAGNOSIS — I63.9 CVA (CEREBRAL VASCULAR ACCIDENT) (HCC): ICD-10-CM

## 2021-10-06 DIAGNOSIS — I63.9 CVA (CEREBROVASCULAR ACCIDENT) (HCC): Primary | ICD-10-CM

## 2021-10-07 ENCOUNTER — TELEPHONE (OUTPATIENT)
Dept: FAMILY MEDICINE CLINIC | Facility: CLINIC | Age: 61
End: 2021-10-07

## 2021-10-07 PROBLEM — I63.9 CVA (CEREBRAL VASCULAR ACCIDENT) (HCC): Status: ACTIVE | Noted: 2021-10-07

## 2021-10-11 ENCOUNTER — OFFICE VISIT (OUTPATIENT)
Dept: FAMILY MEDICINE CLINIC | Facility: CLINIC | Age: 61
End: 2021-10-11
Payer: COMMERCIAL

## 2021-10-11 VITALS
HEART RATE: 78 BPM | RESPIRATION RATE: 14 BRPM | OXYGEN SATURATION: 99 % | WEIGHT: 165 LBS | DIASTOLIC BLOOD PRESSURE: 80 MMHG | SYSTOLIC BLOOD PRESSURE: 134 MMHG | HEIGHT: 70 IN | BODY MASS INDEX: 23.62 KG/M2 | TEMPERATURE: 98.4 F

## 2021-10-11 DIAGNOSIS — R25.1 TREMOR OF RIGHT HAND: ICD-10-CM

## 2021-10-11 DIAGNOSIS — F41.9 ANXIETY: ICD-10-CM

## 2021-10-11 DIAGNOSIS — M25.422 ELBOW SWELLING, LEFT: ICD-10-CM

## 2021-10-11 DIAGNOSIS — G40.909 SEIZURE DISORDER (HCC): ICD-10-CM

## 2021-10-11 DIAGNOSIS — I63.9 CEREBROVASCULAR ACCIDENT (CVA), UNSPECIFIED MECHANISM (HCC): Primary | ICD-10-CM

## 2021-10-11 DIAGNOSIS — I10 ESSENTIAL HYPERTENSION: ICD-10-CM

## 2021-10-11 PROCEDURE — 99496 TRANSJ CARE MGMT HIGH F2F 7D: CPT | Performed by: FAMILY MEDICINE

## 2021-10-11 RX ORDER — PROPRANOLOL HYDROCHLORIDE 80 MG/1
TABLET ORAL
COMMUNITY
Start: 2021-10-07 | End: 2021-12-17 | Stop reason: SDUPTHER

## 2021-10-11 RX ORDER — ASPIRIN AND DIPYRIDAMOLE 25; 200 MG/1; MG/1
1 CAPSULE, EXTENDED RELEASE ORAL 2 TIMES DAILY
COMMUNITY
Start: 2021-10-07 | End: 2022-08-10 | Stop reason: SDUPTHER

## 2021-10-11 RX ORDER — ATORVASTATIN CALCIUM 40 MG/1
40 TABLET, FILM COATED ORAL DAILY
COMMUNITY
Start: 2021-10-06 | End: 2022-02-07

## 2021-10-11 RX ORDER — PROPRANOLOL HYDROCHLORIDE 40 MG/1
TABLET ORAL
COMMUNITY
Start: 2021-10-07 | End: 2022-05-16

## 2021-11-05 ENCOUNTER — HOSPITAL ENCOUNTER (INPATIENT)
Facility: HOSPITAL | Age: 61
LOS: 1 days | Discharge: LEFT BEFORE DISCHARGE | DRG: 069 | End: 2021-11-06
Attending: EMERGENCY MEDICINE | Admitting: INTERNAL MEDICINE
Payer: COMMERCIAL

## 2021-11-05 ENCOUNTER — APPOINTMENT (EMERGENCY)
Dept: RADIOLOGY | Facility: HOSPITAL | Age: 61
DRG: 069 | End: 2021-11-05
Payer: COMMERCIAL

## 2021-11-05 DIAGNOSIS — I10 ESSENTIAL HYPERTENSION: ICD-10-CM

## 2021-11-05 DIAGNOSIS — I63.9 CEREBROVASCULAR ACCIDENT (CVA), UNSPECIFIED MECHANISM (HCC): Primary | ICD-10-CM

## 2021-11-05 DIAGNOSIS — Z86.73 H/O: CVA (CEREBROVASCULAR ACCIDENT): ICD-10-CM

## 2021-11-05 DIAGNOSIS — R29.898 LEG WEAKNESS, BILATERAL: ICD-10-CM

## 2021-11-05 LAB
ERYTHROCYTE [DISTWIDTH] IN BLOOD BY AUTOMATED COUNT: 11.6 % (ref 11.6–15.1)
GLUCOSE SERPL-MCNC: 111 MG/DL (ref 65–140)
HCT VFR BLD AUTO: 39.6 % (ref 36.5–49.3)
HGB BLD-MCNC: 14.2 G/DL (ref 12–17)
MCH RBC QN AUTO: 33.5 PG (ref 26.8–34.3)
MCHC RBC AUTO-ENTMCNC: 35.9 G/DL (ref 31.4–37.4)
MCV RBC AUTO: 93 FL (ref 82–98)
PLATELET # BLD AUTO: 238 THOUSANDS/UL (ref 149–390)
PMV BLD AUTO: 8.8 FL (ref 8.9–12.7)
RBC # BLD AUTO: 4.24 MILLION/UL (ref 3.88–5.62)
WBC # BLD AUTO: 9.56 THOUSAND/UL (ref 4.31–10.16)

## 2021-11-05 PROCEDURE — 82948 REAGENT STRIP/BLOOD GLUCOSE: CPT

## 2021-11-05 PROCEDURE — 84484 ASSAY OF TROPONIN QUANT: CPT | Performed by: EMERGENCY MEDICINE

## 2021-11-05 PROCEDURE — 99285 EMERGENCY DEPT VISIT HI MDM: CPT | Performed by: EMERGENCY MEDICINE

## 2021-11-05 PROCEDURE — 99285 EMERGENCY DEPT VISIT HI MDM: CPT

## 2021-11-05 PROCEDURE — 93005 ELECTROCARDIOGRAM TRACING: CPT

## 2021-11-05 PROCEDURE — 85610 PROTHROMBIN TIME: CPT | Performed by: EMERGENCY MEDICINE

## 2021-11-05 PROCEDURE — 36415 COLL VENOUS BLD VENIPUNCTURE: CPT | Performed by: EMERGENCY MEDICINE

## 2021-11-05 PROCEDURE — 85027 COMPLETE CBC AUTOMATED: CPT | Performed by: EMERGENCY MEDICINE

## 2021-11-05 PROCEDURE — 71045 X-RAY EXAM CHEST 1 VIEW: CPT

## 2021-11-05 PROCEDURE — 85730 THROMBOPLASTIN TIME PARTIAL: CPT | Performed by: EMERGENCY MEDICINE

## 2021-11-05 PROCEDURE — 80048 BASIC METABOLIC PNL TOTAL CA: CPT | Performed by: EMERGENCY MEDICINE

## 2021-11-06 ENCOUNTER — APPOINTMENT (EMERGENCY)
Dept: RADIOLOGY | Facility: HOSPITAL | Age: 61
DRG: 069 | End: 2021-11-06
Payer: COMMERCIAL

## 2021-11-06 VITALS
DIASTOLIC BLOOD PRESSURE: 98 MMHG | SYSTOLIC BLOOD PRESSURE: 150 MMHG | HEART RATE: 78 BPM | OXYGEN SATURATION: 98 % | RESPIRATION RATE: 16 BRPM | TEMPERATURE: 98.3 F | WEIGHT: 165 LBS | BODY MASS INDEX: 23.62 KG/M2 | HEIGHT: 70 IN

## 2021-11-06 LAB
ANION GAP SERPL CALCULATED.3IONS-SCNC: 5 MMOL/L (ref 4–13)
APTT PPP: 27 SECONDS (ref 23–37)
ATRIAL RATE: 74 BPM
BUN SERPL-MCNC: 10 MG/DL (ref 5–25)
CALCIUM SERPL-MCNC: 8.5 MG/DL (ref 8.3–10.1)
CHLORIDE SERPL-SCNC: 93 MMOL/L (ref 100–108)
CO2 SERPL-SCNC: 28 MMOL/L (ref 21–32)
CREAT SERPL-MCNC: 0.78 MG/DL (ref 0.6–1.3)
GFR SERPL CREATININE-BSD FRML MDRD: 97 ML/MIN/1.73SQ M
GLUCOSE SERPL-MCNC: 123 MG/DL (ref 65–140)
INR PPP: 1 (ref 0.84–1.19)
P AXIS: 78 DEGREES
POTASSIUM SERPL-SCNC: 4.1 MMOL/L (ref 3.5–5.3)
PR INTERVAL: 204 MS
PROTHROMBIN TIME: 12.8 SECONDS (ref 11.6–14.5)
QRS AXIS: 64 DEGREES
QRSD INTERVAL: 100 MS
QT INTERVAL: 362 MS
QTC INTERVAL: 401 MS
SODIUM SERPL-SCNC: 126 MMOL/L (ref 136–145)
T WAVE AXIS: 71 DEGREES
TROPONIN I SERPL-MCNC: <0.02 NG/ML
VENTRICULAR RATE: 74 BPM

## 2021-11-06 PROCEDURE — 99239 HOSP IP/OBS DSCHRG MGMT >30: CPT | Performed by: NURSE PRACTITIONER

## 2021-11-06 PROCEDURE — 99223 1ST HOSP IP/OBS HIGH 75: CPT | Performed by: INTERNAL MEDICINE

## 2021-11-06 PROCEDURE — 96374 THER/PROPH/DIAG INJ IV PUSH: CPT

## 2021-11-06 PROCEDURE — 70496 CT ANGIOGRAPHY HEAD: CPT

## 2021-11-06 PROCEDURE — 70498 CT ANGIOGRAPHY NECK: CPT

## 2021-11-06 PROCEDURE — 93010 ELECTROCARDIOGRAM REPORT: CPT | Performed by: INTERNAL MEDICINE

## 2021-11-06 RX ORDER — ONDANSETRON 2 MG/ML
4 INJECTION INTRAMUSCULAR; INTRAVENOUS ONCE
Status: COMPLETED | OUTPATIENT
Start: 2021-11-06 | End: 2021-11-06

## 2021-11-06 RX ORDER — ASPIRIN AND DIPYRIDAMOLE 25; 200 MG/1; MG/1
1 CAPSULE, EXTENDED RELEASE ORAL 2 TIMES DAILY
Status: DISCONTINUED | OUTPATIENT
Start: 2021-11-06 | End: 2021-11-06 | Stop reason: HOSPADM

## 2021-11-06 RX ORDER — HEPARIN SODIUM 5000 [USP'U]/ML
5000 INJECTION, SOLUTION INTRAVENOUS; SUBCUTANEOUS EVERY 8 HOURS SCHEDULED
Status: DISCONTINUED | OUTPATIENT
Start: 2021-11-06 | End: 2021-11-06 | Stop reason: HOSPADM

## 2021-11-06 RX ORDER — CARBAMAZEPINE 200 MG/1
400 TABLET ORAL 4 TIMES DAILY
Status: DISCONTINUED | OUTPATIENT
Start: 2021-11-06 | End: 2021-11-06 | Stop reason: HOSPADM

## 2021-11-06 RX ORDER — ATORVASTATIN CALCIUM 20 MG/1
40 TABLET, FILM COATED ORAL DAILY
Status: DISCONTINUED | OUTPATIENT
Start: 2021-11-06 | End: 2021-11-06 | Stop reason: HOSPADM

## 2021-11-06 RX ORDER — MAGNESIUM HYDROXIDE/ALUMINUM HYDROXICE/SIMETHICONE 120; 1200; 1200 MG/30ML; MG/30ML; MG/30ML
30 SUSPENSION ORAL EVERY 4 HOURS PRN
Status: DISCONTINUED | OUTPATIENT
Start: 2021-11-06 | End: 2021-11-06 | Stop reason: HOSPADM

## 2021-11-06 RX ORDER — ACETAMINOPHEN 325 MG/1
650 TABLET ORAL EVERY 6 HOURS PRN
Status: DISCONTINUED | OUTPATIENT
Start: 2021-11-06 | End: 2021-11-06 | Stop reason: HOSPADM

## 2021-11-08 ENCOUNTER — TRANSITIONAL CARE MANAGEMENT (OUTPATIENT)
Dept: FAMILY MEDICINE CLINIC | Facility: CLINIC | Age: 61
End: 2021-11-08

## 2021-11-29 ENCOUNTER — TELEPHONE (OUTPATIENT)
Dept: FAMILY MEDICINE CLINIC | Facility: CLINIC | Age: 61
End: 2021-11-29

## 2021-12-02 ENCOUNTER — TELEPHONE (OUTPATIENT)
Dept: FAMILY MEDICINE CLINIC | Facility: CLINIC | Age: 61
End: 2021-12-02

## 2021-12-13 ENCOUNTER — RA CDI HCC (OUTPATIENT)
Dept: OTHER | Facility: HOSPITAL | Age: 61
End: 2021-12-13

## 2021-12-16 PROBLEM — Z00.00 WELL ADULT EXAM: Status: ACTIVE | Noted: 2021-12-16

## 2021-12-16 PROBLEM — E87.1 HYPONATREMIA: Status: ACTIVE | Noted: 2021-12-16

## 2021-12-17 ENCOUNTER — OFFICE VISIT (OUTPATIENT)
Dept: FAMILY MEDICINE CLINIC | Facility: CLINIC | Age: 61
End: 2021-12-17
Payer: COMMERCIAL

## 2021-12-17 VITALS
OXYGEN SATURATION: 99 % | BODY MASS INDEX: 24.28 KG/M2 | SYSTOLIC BLOOD PRESSURE: 120 MMHG | DIASTOLIC BLOOD PRESSURE: 80 MMHG | WEIGHT: 160.2 LBS | HEIGHT: 68 IN | RESPIRATION RATE: 20 BRPM | TEMPERATURE: 97 F | HEART RATE: 80 BPM

## 2021-12-17 DIAGNOSIS — I10 ESSENTIAL HYPERTENSION: ICD-10-CM

## 2021-12-17 DIAGNOSIS — Z00.00 WELL ADULT EXAM: Primary | ICD-10-CM

## 2021-12-17 DIAGNOSIS — G40.909 SEIZURE DISORDER (HCC): ICD-10-CM

## 2021-12-17 DIAGNOSIS — E78.2 MIXED HYPERLIPIDEMIA: ICD-10-CM

## 2021-12-17 DIAGNOSIS — R25.1 TREMOR OF RIGHT HAND: ICD-10-CM

## 2021-12-17 DIAGNOSIS — I63.9 CEREBROVASCULAR ACCIDENT (CVA), UNSPECIFIED MECHANISM (HCC): ICD-10-CM

## 2021-12-17 DIAGNOSIS — E87.1 HYPONATREMIA: ICD-10-CM

## 2021-12-17 PROCEDURE — 1036F TOBACCO NON-USER: CPT | Performed by: FAMILY MEDICINE

## 2021-12-17 PROCEDURE — 99396 PREV VISIT EST AGE 40-64: CPT | Performed by: FAMILY MEDICINE

## 2021-12-17 PROCEDURE — 3074F SYST BP LT 130 MM HG: CPT | Performed by: FAMILY MEDICINE

## 2021-12-17 PROCEDURE — 3079F DIAST BP 80-89 MM HG: CPT | Performed by: FAMILY MEDICINE

## 2021-12-21 ENCOUNTER — APPOINTMENT (OUTPATIENT)
Dept: LAB | Facility: HOSPITAL | Age: 61
End: 2021-12-21
Attending: PSYCHIATRY & NEUROLOGY
Payer: COMMERCIAL

## 2021-12-21 DIAGNOSIS — E78.2 MIXED HYPERLIPIDEMIA: ICD-10-CM

## 2021-12-21 DIAGNOSIS — I10 ESSENTIAL HYPERTENSION: ICD-10-CM

## 2021-12-21 DIAGNOSIS — G40.909 SEIZURE DISORDER (HCC): ICD-10-CM

## 2021-12-21 DIAGNOSIS — E87.1 HYPONATREMIA: ICD-10-CM

## 2021-12-21 LAB
ALBUMIN SERPL BCP-MCNC: 4.1 G/DL (ref 3.5–5)
ALP SERPL-CCNC: 69 U/L (ref 46–116)
ALT SERPL W P-5'-P-CCNC: 39 U/L (ref 12–78)
ANION GAP SERPL CALCULATED.3IONS-SCNC: 4 MMOL/L (ref 4–13)
AST SERPL W P-5'-P-CCNC: 21 U/L (ref 5–45)
BILIRUB SERPL-MCNC: 0.66 MG/DL (ref 0.2–1)
BUN SERPL-MCNC: 9 MG/DL (ref 5–25)
CALCIUM SERPL-MCNC: 8.9 MG/DL (ref 8.3–10.1)
CARBAMAZEPINE SERPL-MCNC: 12.4 UG/ML (ref 4–12)
CHLORIDE SERPL-SCNC: 100 MMOL/L (ref 100–108)
CHOLEST SERPL-MCNC: 164 MG/DL
CO2 SERPL-SCNC: 28 MMOL/L (ref 21–32)
CREAT SERPL-MCNC: 0.71 MG/DL (ref 0.6–1.3)
GFR SERPL CREATININE-BSD FRML MDRD: 101 ML/MIN/1.73SQ M
GLUCOSE P FAST SERPL-MCNC: 95 MG/DL (ref 65–99)
HDLC SERPL-MCNC: 94 MG/DL
LDLC SERPL CALC-MCNC: 36 MG/DL (ref 0–100)
NONHDLC SERPL-MCNC: 70 MG/DL
POTASSIUM SERPL-SCNC: 3.9 MMOL/L (ref 3.5–5.3)
PROT SERPL-MCNC: 7.4 G/DL (ref 6.4–8.2)
SODIUM SERPL-SCNC: 132 MMOL/L (ref 136–145)
TRIGL SERPL-MCNC: 172 MG/DL

## 2021-12-21 PROCEDURE — 80053 COMPREHEN METABOLIC PANEL: CPT

## 2021-12-21 PROCEDURE — 80061 LIPID PANEL: CPT

## 2021-12-21 PROCEDURE — 80156 ASSAY CARBAMAZEPINE TOTAL: CPT

## 2021-12-21 PROCEDURE — 36415 COLL VENOUS BLD VENIPUNCTURE: CPT

## 2021-12-27 ENCOUNTER — TELEPHONE (OUTPATIENT)
Dept: FAMILY MEDICINE CLINIC | Facility: CLINIC | Age: 61
End: 2021-12-27

## 2021-12-29 ENCOUNTER — TELEPHONE (OUTPATIENT)
Dept: NEUROLOGY | Facility: CLINIC | Age: 61
End: 2021-12-29

## 2022-01-02 NOTE — PATIENT INSTRUCTIONS
Epilepsy ([facility] [address] )  Pt had a breakthrough seizure 4/20/18 which is likely due to forgetting to take his medication at lunchtime  He reports that he does forget at work at times  He does understand the importance of taking his medications as prescribed  He did change to generic medication 2 days ago, after having the seizure, due to the high cost of branded Tegretol  He will have a carbamazepine level checked in 2 weeks  He did receive a letter from [facility] which will be completed and sent back  He does understand that he is not to drive at this time  Valeriemukesh Booth will be contacted to assist with any transportation information she may have  He will follow-up as scheduled with Dr Rickey Gil 
10-Oct-2022

## 2022-01-31 ENCOUNTER — TELEPHONE (OUTPATIENT)
Dept: FAMILY MEDICINE CLINIC | Facility: CLINIC | Age: 62
End: 2022-01-31

## 2022-01-31 NOTE — TELEPHONE ENCOUNTER
Pt wants to re do his lipid panel test  Pt has been insisting on wanting to get the test done right away    Please advise

## 2022-02-01 NOTE — TELEPHONE ENCOUNTER
Please call patient  He had lipid panel checked in December 2021, there is no need to repeat lipid panel right away  His insurance may not  cover blood work

## 2022-02-07 ENCOUNTER — OFFICE VISIT (OUTPATIENT)
Dept: FAMILY MEDICINE CLINIC | Facility: CLINIC | Age: 62
End: 2022-02-07
Payer: COMMERCIAL

## 2022-02-07 VITALS
HEART RATE: 68 BPM | WEIGHT: 166 LBS | HEIGHT: 68 IN | DIASTOLIC BLOOD PRESSURE: 80 MMHG | SYSTOLIC BLOOD PRESSURE: 124 MMHG | BODY MASS INDEX: 25.16 KG/M2

## 2022-02-07 DIAGNOSIS — I10 ESSENTIAL HYPERTENSION: ICD-10-CM

## 2022-02-07 DIAGNOSIS — I63.9 CEREBROVASCULAR ACCIDENT (CVA), UNSPECIFIED MECHANISM (HCC): ICD-10-CM

## 2022-02-07 DIAGNOSIS — E78.2 MIXED HYPERLIPIDEMIA: Primary | ICD-10-CM

## 2022-02-07 PROBLEM — Z00.00 WELL ADULT EXAM: Status: RESOLVED | Noted: 2021-12-16 | Resolved: 2022-02-07

## 2022-02-07 PROBLEM — R25.1 TREMOR: Status: RESOLVED | Noted: 2017-10-25 | Resolved: 2022-02-07

## 2022-02-07 PROBLEM — E87.1 HYPONATREMIA: Status: RESOLVED | Noted: 2021-12-16 | Resolved: 2022-02-07

## 2022-02-07 PROCEDURE — 1036F TOBACCO NON-USER: CPT | Performed by: FAMILY MEDICINE

## 2022-02-07 PROCEDURE — 3079F DIAST BP 80-89 MM HG: CPT | Performed by: FAMILY MEDICINE

## 2022-02-07 PROCEDURE — 3008F BODY MASS INDEX DOCD: CPT | Performed by: FAMILY MEDICINE

## 2022-02-07 PROCEDURE — 3074F SYST BP LT 130 MM HG: CPT | Performed by: FAMILY MEDICINE

## 2022-02-07 PROCEDURE — 99213 OFFICE O/P EST LOW 20 MIN: CPT | Performed by: FAMILY MEDICINE

## 2022-02-07 RX ORDER — ROSUVASTATIN CALCIUM 10 MG/1
10 TABLET, COATED ORAL DAILY
Qty: 30 TABLET | Refills: 5 | Status: SHIPPED | OUTPATIENT
Start: 2022-02-07 | End: 2022-07-27 | Stop reason: SDUPTHER

## 2022-02-07 NOTE — PROGRESS NOTES
Assessment and Plan:    Problem List Items Addressed This Visit        Cardiovascular and Mediastinum    CVA (cerebral vascular accident) ([facility] )     No permanent deficit            Other    Mixed hyperlipidemia - Primary    Relevant Medications    rosuvastatin (CRESTOR) 10 MG tablet    Other Relevant Orders    Lipid panel    Comprehensive metabolic panel      Other Visit Diagnoses     BMI 25 0-25 9,adult                     Diagnoses and all orders for this visit:    Mixed hyperlipidemia  -     Lipid panel; Future  -     rosuvastatin (CRESTOR) 10 MG tablet; Take 1 tablet (10 mg total) by mouth daily  -     Comprehensive metabolic panel; Future    Cerebrovascular accident (CVA), unspecified mechanism ([facility] )    BMI 25 0-25 9,adult    Other orders  -     VALERIAN ROOT PO; Use  -     NON FORMULARY; Super [employer]              Subjective:      Patient ID: Alyssa Carpenter is a 64 y o  male  CC:    Chief Complaint   Patient presents with    Hyperlipidemia     patient would like to go on something for his elevated triglicerides  ak       HPI:    Has been on lipitor for triglycerides, levels still up, had stroke 10/21 , no cp, no sob, no headaches      The following portions of the patient's history were reviewed and updated as appropriate: allergies, current medications, past family history, past medical history, past social history, past surgical history and problem list         Review of Systems   Constitutional: Negative for activity change, appetite change and fatigue  Respiratory: Negative for shortness of breath  Cardiovascular: Negative for chest pain  Neurological: Negative for dizziness, seizures and headaches  Psychiatric/Behavioral: The patient is not nervous/anxious  Data to review:       Objective:    Vitals:    02/07/22 1622   BP: 124/80   Pulse: 68   Weight: 75 3 kg (166 lb)   Height: 5' 8" (1 727 m)        Physical Exam  Vitals reviewed     Constitutional:       Appearance: Normal appearance  Cardiovascular:      Rate and Rhythm: Normal rate and regular rhythm  Pulses: Normal pulses  Heart sounds: Normal heart sounds  Pulmonary:      Effort: Pulmonary effort is normal       Breath sounds: Normal breath sounds  Musculoskeletal:      Right lower leg: No edema  Left lower leg: No edema  Lymphadenopathy:      Cervical: No cervical adenopathy  Neurological:      Mental Status: He is alert  Psychiatric:         Mood and Affect: Mood normal               BMI Counseling: Body mass index is 25 24 kg/m²  The BMI is above normal  Nutrition recommendations include reducing portion sizes, decreasing overall calorie intake, 3-5 servings of fruits/vegetables daily, reducing fast food intake and consuming healthier snacks  Exercise recommendations include exercising 3-5 times per week

## 2022-02-07 NOTE — PATIENT INSTRUCTIONS
Change to Crestor,   Weight Management   AMBULATORY CARE:   Why it is important to manage your weight:  Being overweight increases your risk of health conditions such as heart disease, high blood pressure, type 2 diabetes, and certain types of cancer  It can also increase your risk for osteoarthritis, sleep apnea, and other respiratory problems  Aim for a slow, steady weight loss  Even a small amount of weight loss can lower your risk of health problems  How to lose weight safely:  A safe and healthy way to lose weight is to eat fewer calories and get regular exercise  · You can lose up about 1 pound a week by decreasing the number of calories you eat by 500 calories each day  You can decrease calories by eating smaller portion sizes or by cutting out high-calorie foods  Read labels to find out how many calories are in the foods you eat  · You can also burn calories with exercise such as walking, swimming, or biking  You will be more likely to keep weight off if you make these changes part of your lifestyle  Exercise at least 30 minutes per day on most days of the week  You can also fit in more physical activity by taking the stairs instead of the elevator or parking farther away from stores  Ask your healthcare provider about the best exercise plan for you  Healthy meal plan for weight management:  A healthy meal plan includes a variety of foods, contains fewer calories, and helps you stay healthy  A healthy meal plan includes the following:     · Eat whole-grain foods more often  A healthy meal plan should contain fiber  Fiber is the part of grains, fruits, and vegetables that is not broken down by your body  Whole-grain foods are healthy and provide extra fiber in your diet  Some examples of whole-grain foods are whole-wheat breads and pastas, oatmeal, brown rice, and bulgur  · Eat a variety of vegetables every day    Include dark, leafy greens such as spinach, kale, bianca greens, and mustard greens  Eat yellow and orange vegetables such as carrots, sweet potatoes, and winter squash  · Eat a variety of fruits every day  Choose fresh or canned fruit (canned in its own juice or light syrup) instead of juice  Fruit juice has very little or no fiber  · Eat low-fat dairy foods  Drink fat-free (skim) milk or 1% milk  Eat fat-free yogurt and low-fat cottage cheese  Try low-fat cheeses such as mozzarella and other reduced-fat cheeses  · Choose meat and other protein foods that are low in fat  Choose beans or other legumes such as split peas or lentils  Choose fish, skinless poultry (chicken or turkey), or lean cuts of red meat (beef or pork)  Before you cook meat or poultry, cut off any visible fat  · Use less fat and oil  Try baking foods instead of frying them  Add less fat, such as margarine, sour cream, regular salad dressing and mayonnaise to foods  Eat fewer high-fat foods  Some examples of high-fat foods include french fries, doughnuts, ice cream, and cakes  · Eat fewer sweets  Limit foods and drinks that are high in sugar  This includes candy, cookies, regular soda, and sweetened drinks  Ways to decrease calories:   · Eat smaller portions  ? Use a small plate with smaller servings  ? Do not eat second helpings  ? When you eat at a restaurant, ask for a box and place half of your meal in the box before you eat  ? Share an entrée with someone else  · Replace high-calorie snacks with healthy, low-calorie snacks  ? Choose fresh fruit, vegetables, fat-free rice cakes, or air-popped popcorn instead of potato chips, nuts, or chocolate  ? Choose water or calorie-free drinks instead of soda or sweetened drinks  · Do not shop for groceries when you are hungry  You may be more likely to make unhealthy food choices  Take a grocery list of healthy foods and shop after you have eaten  · Eat regular meals  Do not skip meals   Skipping meals can lead to overeating later in the day  This can make it harder for you to lose weight  Eat a healthy snack in place of a meal if you do not have time to eat a regular meal  Talk with a dietitian to help you create a meal plan and schedule that is right for you  Other things to consider as you try to lose weight:   · Be aware of situations that may give you the urge to overeat, such as eating while watching television  Find ways to avoid these situations  For example, read a book, go for a walk, or do crafts  · Meet with a weight loss support group or friends who are also trying to lose weight  This may help you stay motivated to continue working on your weight loss goals  © Copyright [employer] 2021 Information is for End User's use only and may not be sold, redistributed or otherwise used for commercial purposes  All illustrations and images included in CareNotes® are the copyrighted property of [employer]  or [employer]   The above information is an  only  It is not intended as medical advice for individual conditions or treatments  Talk to your doctor, nurse or pharmacist before following any medical regimen to see if it is safe and effective for you

## 2022-02-07 NOTE — ASSESSMENT & PLAN NOTE
Eats out at [address] [place] [address] a lot, asks for low salt food, Bps usually up but yesterday was better

## 2022-02-21 ENCOUNTER — APPOINTMENT (OUTPATIENT)
Dept: LAB | Facility: HOSPITAL | Age: 62
End: 2022-02-21
Payer: COMMERCIAL

## 2022-02-21 DIAGNOSIS — E78.2 MIXED HYPERLIPIDEMIA: ICD-10-CM

## 2022-02-21 LAB
ALBUMIN SERPL BCP-MCNC: 4 G/DL (ref 3.5–5)
ALP SERPL-CCNC: 73 U/L (ref 46–116)
ALT SERPL W P-5'-P-CCNC: 40 U/L (ref 12–78)
ANION GAP SERPL CALCULATED.3IONS-SCNC: 4 MMOL/L (ref 4–13)
AST SERPL W P-5'-P-CCNC: 22 U/L (ref 5–45)
BILIRUB SERPL-MCNC: 0.73 MG/DL (ref 0.2–1)
BUN SERPL-MCNC: 9 MG/DL (ref 5–25)
CALCIUM SERPL-MCNC: 9.1 MG/DL (ref 8.3–10.1)
CHLORIDE SERPL-SCNC: 98 MMOL/L (ref 100–108)
CHOLEST SERPL-MCNC: 175 MG/DL
CO2 SERPL-SCNC: 28 MMOL/L (ref 21–32)
CREAT SERPL-MCNC: 0.65 MG/DL (ref 0.6–1.3)
GFR SERPL CREATININE-BSD FRML MDRD: 104 ML/MIN/1.73SQ M
GLUCOSE P FAST SERPL-MCNC: 96 MG/DL (ref 65–99)
HDLC SERPL-MCNC: 92 MG/DL
LDLC SERPL CALC-MCNC: 55 MG/DL (ref 0–100)
NONHDLC SERPL-MCNC: 83 MG/DL
POTASSIUM SERPL-SCNC: 3.9 MMOL/L (ref 3.5–5.3)
PROT SERPL-MCNC: 8 G/DL (ref 6.4–8.2)
SODIUM SERPL-SCNC: 130 MMOL/L (ref 136–145)
TRIGL SERPL-MCNC: 141 MG/DL

## 2022-02-21 PROCEDURE — 36415 COLL VENOUS BLD VENIPUNCTURE: CPT

## 2022-02-21 PROCEDURE — 80053 COMPREHEN METABOLIC PANEL: CPT

## 2022-02-21 PROCEDURE — 80061 LIPID PANEL: CPT

## 2022-03-21 DIAGNOSIS — M81.0 OSTEOPOROSIS WITHOUT CURRENT PATHOLOGICAL FRACTURE, UNSPECIFIED OSTEOPOROSIS TYPE: ICD-10-CM

## 2022-03-21 RX ORDER — ALENDRONATE SODIUM 70 MG/1
TABLET ORAL
Qty: 4 TABLET | Refills: 5 | Status: SHIPPED | OUTPATIENT
Start: 2022-03-21

## 2022-03-24 DIAGNOSIS — G40.209 PARTIAL SYMPTOMATIC EPILEPSY WITH COMPLEX PARTIAL SEIZURES, NOT INTRACTABLE, WITHOUT STATUS EPILEPTICUS (HCC): ICD-10-CM

## 2022-03-24 RX ORDER — CARBAMAZEPINE 200 MG/1
400 TABLET ORAL 4 TIMES DAILY
Qty: 240 TABLET | Refills: 5 | Status: SHIPPED | OUTPATIENT
Start: 2022-03-24 | End: 2022-04-06 | Stop reason: SDUPTHER

## 2022-03-31 ENCOUNTER — TELEPHONE (OUTPATIENT)
Dept: NEUROLOGY | Facility: CLINIC | Age: 62
End: 2022-03-31

## 2022-03-31 NOTE — TELEPHONE ENCOUNTER
Called and spoke to patient  Patient confirmed upcoming apt with Dr Melissa Braden on 4/6/22 @ 2 pm  Patient has no issues or concerns at this time

## 2022-04-06 ENCOUNTER — OFFICE VISIT (OUTPATIENT)
Dept: NEUROLOGY | Facility: CLINIC | Age: 62
End: 2022-04-06
Payer: COMMERCIAL

## 2022-04-06 VITALS
DIASTOLIC BLOOD PRESSURE: 84 MMHG | OXYGEN SATURATION: 98 % | HEIGHT: 70 IN | HEART RATE: 91 BPM | RESPIRATION RATE: 18 BRPM | BODY MASS INDEX: 23.51 KG/M2 | TEMPERATURE: 96.9 F | WEIGHT: 164.2 LBS | SYSTOLIC BLOOD PRESSURE: 132 MMHG

## 2022-04-06 DIAGNOSIS — G40.209 PARTIAL SYMPTOMATIC EPILEPSY WITH COMPLEX PARTIAL SEIZURES, NOT INTRACTABLE, WITHOUT STATUS EPILEPTICUS (HCC): ICD-10-CM

## 2022-04-06 DIAGNOSIS — G40.909 SEIZURE DISORDER (HCC): Primary | ICD-10-CM

## 2022-04-06 PROCEDURE — 3008F BODY MASS INDEX DOCD: CPT | Performed by: PSYCHIATRY & NEUROLOGY

## 2022-04-06 PROCEDURE — 1036F TOBACCO NON-USER: CPT | Performed by: PSYCHIATRY & NEUROLOGY

## 2022-04-06 PROCEDURE — 3075F SYST BP GE 130 - 139MM HG: CPT | Performed by: PSYCHIATRY & NEUROLOGY

## 2022-04-06 PROCEDURE — 3079F DIAST BP 80-89 MM HG: CPT | Performed by: PSYCHIATRY & NEUROLOGY

## 2022-04-06 PROCEDURE — 99215 OFFICE O/P EST HI 40 MIN: CPT | Performed by: PSYCHIATRY & NEUROLOGY

## 2022-04-06 RX ORDER — LORAZEPAM 0.5 MG/1
TABLET ORAL
Qty: 10 TABLET | Refills: 0 | Status: SHIPPED | OUTPATIENT
Start: 2022-04-06

## 2022-04-06 RX ORDER — CARBAMAZEPINE 200 MG/1
TABLET ORAL
Qty: 270 TABLET | Refills: 5 | Status: SHIPPED | OUTPATIENT
Start: 2022-04-06

## 2022-04-06 NOTE — ASSESSMENT & PLAN NOTE
Darlene Mace is a 59-year-old patient the family history of strokes  In October of 2021 he developed sudden weakness in his legs  Subsequently he has evaluate [facility] and he was noted to have a age indeterminate lacunar infarcts on the left  Prior MRI imaging from July of 2021 showed no evidence of an acute lacunar event  Subsequently he was eventually placed on Aggrenox but left AMA within 24 hours  He then returned to the emergency room at [facility] with weakness in his legs three weeks later  A CTA showed no evidence of an acute infarct and his cerebral vasculature was opened  Sodium was 126 and he left the hospital AMA prior to the evaluation by Neurology  He has been evaluated and followed by Dr Ludivina Sandoval who a neurologist at [facility] medial a bur  There is elevation of the triglycerides and he was placed on Lipitor  Recently this was changed to Crestor 10 mg a day  He is tolerating Crestor without any difficulty  He is currently on Aggrenox twice a day Crestor 10 mg a day

## 2022-04-06 NOTE — ASSESSMENT & PLAN NOTE
Toshia Gavin is a 70-year-old patient has partial complex seizures  He is currently on the carbamazepine generic 200 mg tablets two tablets 4 times a day  The dose has been decreased from 9 to 8 due to of the high levels of carbamazepine and tremor  Has not had any seizures but did have aura several weeks  ago and did take a dose of Ativan  He takes Ativan very infrequently  Prescription was called in two weeks ago and he still has five available  Will increase his Tegretol to 200 mg tablets of the generic carbamazepine to nine a day  I have discussed with them the potential of hyponatremia  it can occur with carbamazepine  Abnormalities in LFTs in CBC can occur with carbamazepine  He  does have access to Ativan but I did provide with 10 more tablets for aura  episodes  Also send a new prescription for Tegretol 200 mg tablets generic nine a day

## 2022-04-06 NOTE — PATIENT INSTRUCTIONS
Labs in 3 weeks    Increase dose of Carbamazepine to 9 a day         Weighted utensils     No need for inderal

## 2022-04-06 NOTE — PROGRESS NOTES
Patient ID: Ivon Faulkner is a 58 y o  male  Assessment/Plan:    Seizure disorder [facility])  Janeth Clay is a 70-year-old patient has partial complex seizures  He is currently on the carbamazepine generic 200 mg tablets two tablets 4 times a day  The dose has been decreased from 9 to 8 due to of the high levels of carbamazepine and tremor  Has not had any seizures but did have aura several weeks  ago and did take a dose of Ativan  He takes Ativan very infrequently  Prescription was called in two weeks ago and he still has five available  Will increase his Tegretol to 200 mg tablets of the generic carbamazepine to nine a day  I have discussed with them the potential of hyponatremia  it can occur with carbamazepine  Abnormalities in LFTs in CBC can occur with carbamazepine  He  does have access to Ativan but I did provide with 10 more tablets for aura  episodes  Also send a new prescription for Tegretol 200 mg tablets generic nine a day  Cerebrovascular accident (CVA) due to thrombosis ([facility] [address] )  Janeth Clay is a 70-year-old patient the family history of strokes  In October of 2021 he developed sudden weakness in his legs  Subsequently he has evaluate [facility] and he was noted to have a age indeterminate lacunar infarcts on the left  Prior MRI imaging from July of 2021 showed no evidence of an acute lacunar event  Subsequently he was eventually placed on Aggrenox but left AMA within 24 hours  He then returned to the emergency room at [facility] with weakness in his legs three weeks later  A CTA showed no evidence of an acute infarct and his cerebral vasculature was opened  Sodium was 126 and he left the hospital AMA prior to the evaluation by Neurology  He has been evaluated and followed by Dr Rivka Avalos who a neurologist at [facility]  There is elevation of the triglycerides and he was placed on Lipitor  Recently this was changed to Crestor 10 mg a day    He is tolerating Crestor without any difficulty  He is currently on Aggrenox twice a day Crestor 10 mg a day  Tremor  He  complained of tremor involving the right upper extremity  This occurs with activity  He has been diagnosed in the central tremor  Lower doses of Tegretol proved to be moved no change in her tremor  He was tried on Inderal but higher doses did result in the headaches and he noticed no difference  Subsequently he gradually weaned off the Inderal    Today he questions the use of primodone   I have informed the primidone is use for the treatment tremors however there is a interaction that can occur with primidone i e  a phenobarbital derviative  and Tegretol  This can cause alteration of Tegretol and increase the risk of recurrent seizures  At this point Lang Arteaga and  his brother would like to avoid high risk of seizures since 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    We discussed that there is no evidence of Parkinson's disease and not many treatments for tremor  He does understand this       Diagnoses and all orders for this visit:    Seizure disorder ([facility] )    Partial symptomatic epilepsy with complex partial seizures, not intractable, without status epilepticus ([facility] )  -     LORazepam (ATIVAN) 0 5 mg tablet; TAKE 1 TABLET BY MOUTH ONCE DAILY IF NEEDED for seizure  Limit of 1 in 24 hours  -     carBAMazepine (TEGretol) 200 mg tablet; Take 3 in am , 2 at lunch , 2 at dinner and  2 at bedtime    -     Comprehensive metabolic panel; Future  -     CBC and Platelet; Future  -     Carbamazepine level, total; Future         Subjective:          ·   ·                Ponce Bombgwen is X 10 y/o right hand male with a long history of seizures  He had  a seizure on 4/20/18  He has been seizure free since  [facility] seizures are partial complex and he is on Tegretol 200 mg tablets up to eight a day    The dose was decreased from nine a day to eight and day due to high levels and a question if this was contributing to tremor  The last evaluated by day in in our offices in 2021  A MRI imaging study had been performed earlier that July which showed no evidence of an acute infarct, with chronic microangiopathy  He has had one potential aura and did take Ativan  He takes minimal amount of Ativan and did come to our offices today for prescription for Ativan  His chart was reviewed and the information reveal that he has been hospitalized several times for potential TIA and stroke  In October of 2021 he had an episode of lower extremity weakness with an inability to walk  Also had weakness of his arms   He was seen in the emergency room and left AMA  His symptoms did improve quickly while in the ambulance Imaging  study did demonstrated age indeterminate left internal capsule and left caudate ischemic infarct  10/5/2021 CTH:    1  No acute intracranial hemorrhage  No large acute territorial infarction  2  Age indeterminant ischemic changes within the anterior limb of LEFT internal  capsule and surrounding the anterior body of LEFT caudate nucleus  3  Metal fragment within the posterior soft tissues overlying the RIGHT  occipital bone measuring 6 mm  Should be considered if there is a brain MRI  Ordered  CTA H and N normal          he was  placed on Aggrenox which he is able to tolerate  Lipitor was also started  His triglycerides were noted to be elevated  In the interim his been seen by his family physician and this is been changed to Crestor 10 mg a day  On November 5, 2021 he also had weakness involving only one side  He was seen in the emergency room again and underwent a CTA with Ct scan of the head it showed no evidence of a new infarct  He then left again AMA without being seen by Neurology  He felt that he had not received his Lovenox in time  The sodium was noted to be low but since he left the ER this was not addressed    The symptoms lasted a few minutes and resolved    He was subsequently evaluated at [facility] and Inderal was added to his regimen for his right-sided ext essential tremor  The dose was increased up to 80 mg where he had no benefit  This also did result in headaches  Subsequently he tapered himself off the dose  The tremors do persist   They are persist with activity  He attributes this to a prior injury of his right elbow  He denies any tremors in his legs and denies any difficulty with ambulation  Today he presents for further questions  He is questioning the potential use of primidone for tremor    His brother Belle Sutton was available by phone during today's visit          To review, Alfonso Gong when he was 7 y/o with twitching of the chin followed by a tonic clonic event  He was placed on phenobarbital and Dilantin but it did not control the seizures  In 1981 he was using a blow torch when had a typical seizure with a loss consciousness  At that time Phenobarbital and Dilantin was discontinued and Tegretol was started  His seizures start with abnormal sense in the chest  He usually screams followed by hiccups, LOC, generalized tonic/ clonic activity and is followed by a severe, intense headache  The headaches last for about 1/2 hr      In January of 2015, he was diagnosed with bronchitis and placed on an antibiotic  He had a seizures after the third day on the abx  He switched to Tegretol brand name 1400mg daily and started taking mag supplements       On November 18, 2015  He forgot to take the last dose of tegretol  The following day  He began a having "hiccups" or auras  It was followed by 2-3hrs of confusion, and alteration on consiousness  He was treated with extra magnesium, extra hot herbal tea  He then developed chest pain and was brought to [address] [place] [address]  He was admitted, for monitoring  Tegretol level was 12 2, sodium was 142   And an EEG showed generalized epileptiform features, and CT scan was normal  The dose of brand name tegretol was increased to 1600mg daily      On April 20, 2018 he was seen in the ED  Carlie Miller was taking a nap when his mother found him having a seizure  He may have missed one dose of Tegretol that day  His Tegretol level was 7 4   His levels usually run 12-13   He was taking brand name at the time  He is now taking generic Tegretol 200mg tabs 9 tabs total per day (3, 2, 2, 2)  He does have lorazepam for breakthrough  The ED physician did report him to the state and he did receive paperwork about no longer driving  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  He has not had a seizure since then  He was seizure free for 6 months and his license was reinstated  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          Sodium on February 22, 2022 was 130 in November 2021was 126                    The following portions of the patient's history were reviewed and updated as appropriate:   He  has a past medical history of Arthritis, Hypertension, Seizures ([facility] [address] ), and Stroke ([facility] [address] )  He  has no past surgical history on file  His family history includes Alzheimer's disease in his mother; Dementia in his mother; Hyperlipidemia in his brother; Stroke in his father  He  reports that he has never smoked  He has never used smokeless tobacco  He reports current alcohol use  He reports that he does not use drugs  Current Outpatient Medications   Medication Sig Dispense Refill    alendronate (FOSAMAX) 70 mg tablet TAKE 1 TAB WEEKLY AS DIRECTED (Patient taking differently: every 7 days  ) 4 tablet 5    aspirin-dipyridamole (AGGRENOX)  mg per 12 hr capsule Take 1 capsule by mouth 2 (two) times a day      Calcium-Magnesium-Vitamin D (CALCIUM MAGNESIUM PO) Take by mouth      carBAMazepine (TEGretol) 200 mg tablet Take 3 in am , 2 at lunch , 2 at dinner and  2 at bedtime    270 tablet 5    co-enzyme Q-10 30 MG capsule Take 100 mg by mouth daily        LORazepam (ATIVAN) 0 5 mg tablet TAKE 1 TABLET BY MOUTH ONCE DAILY IF NEEDED for seizure  Limit of 1 in 24 hours 10 tablet 0    NON FORMULARY Super Beets      rosuvastatin (CRESTOR) 10 MG tablet Take 1 tablet (10 mg total) by mouth daily 30 tablet 5    VALERIAN ROOT PO Use      Vitamin Mixture (VITAMIN E COMPLETE PO) Take 1 tablet by mouth daily       Calcium Carb-Cholecalciferol (CALCIUM 500 +D PO) Take by mouth 2 tablets daily (Patient not taking: Reported on 4/6/2022 )      propranolol (INDERAL) 40 mg tablet TAKE 1 TABLET BY MOUTH TWICE DAILY X 2 WEEKS THEN STOP  (Patient not taking: Reported on 4/6/2022)       No current facility-administered medications for this visit  He is allergic to meperidine, amoxicillin, azithromycin, other, and pollen extract            Objective:    Blood pressure 132/84, pulse 91, temperature (!) 96 9 °F (36 1 °C), temperature source Tympanic, resp  rate 18, height 5' 10" (1 778 m), weight 74 5 kg (164 lb 3 2 oz), SpO2 98 %  Physical Exam  Eyes:      General: Lids are normal       Extraocular Movements: Extraocular movements intact  Pupils: Pupils are equal, round, and reactive to light  Neurological:      Deep Tendon Reflexes:      Reflex Scores:       Tricep reflexes are 2+ on the right side and 2+ on the left side  Bicep reflexes are 2+ on the right side and 2+ on the left side  Patellar reflexes are 2+ on the right side and 2+ on the left side  Achilles reflexes are 1+ on the right side and 1+ on the left side  Neurological Exam  Mental Status  Awake, alert and oriented to person, place and time  Oriented to person, place and time  Language is fluent with no aphasia  Cranial Nerves  CN II: Visual acuity is normal  Visual fields full to confrontation  CN III, IV, VI: Extraocular movements intact bilaterally  Normal lids and orbits bilaterally  Pupils equal round and reactive to light bilaterally  CN V: Facial sensation is normal   CN VII: Full and symmetric facial movement    CN VIII: Hearing is normal   CN IX, X: Palate elevates symmetrically  Normal gag reflex  CN XI: Shoulder shrug strength is normal   CN XII: Tongue midline without atrophy or fasciculations  Motor   Strength is 5/5 in all four extremities except as noted  His right upper extremity had curling of the 4th and 5th digit with atrophy of the ADM  He had normal strength proximally but had difficulty with hand  on the right  This is ongoing and old  The left upper extremity strength was normal in the bilateral lower extremity strength was normal he did have bilateral tremors with extension worse on the right than the left  There is no evidence of cogwheel rigidity  Sensory  Light touch is normal in upper and lower extremities  Temperature is normal in upper and lower extremities  Reflexes                                           Right                      Left  Biceps                                 2+                         2+  Triceps                                2+                         2+  Patellar                                2+                         2+  Achilles                                1+                         1+  Plantar                           Downgoing                Downgoing    Right pathological reflexes: Jason's absent  Left pathological reflexes: Jason's absent  Coordination  Right: Finger-to-nose normal   Left: Finger-to-nose normal     Gait  Normal casual, toe, heel and tandem gait  Able to rise from chair without using arms  Review systems obtained from the medical assistant as below was reviewed with the patient at today's  Visit     ROS:    Review of Systems   Constitutional: Negative  Negative for appetite change and fever  HENT: Negative  Negative for hearing loss, tinnitus, trouble swallowing and voice change  Eyes: Negative  Negative for photophobia and pain  Respiratory: Negative  Negative for shortness of breath  Cardiovascular: Negative    Negative for palpitations  Gastrointestinal: Positive for diarrhea (at times due to meds)  Negative for nausea and vomiting  Endocrine: Negative  Negative for cold intolerance  Genitourinary: Negative  Negative for dysuria, frequency and urgency  Musculoskeletal: Positive for back pain (low back)  Negative for myalgias and neck pain  Skin: Negative  Negative for rash  Allergic/Immunologic: Negative  Neurological: Negative  Negative for dizziness, tremors, seizures, syncope, facial asymmetry, speech difficulty, weakness, light-headedness, numbness and headaches  Hematological: Negative  Does not bruise/bleed easily  Psychiatric/Behavioral: Negative  Negative for confusion, hallucinations and sleep disturbance  Today I spent 40 minutes with rozina  I reviewed his recent hospitalizations , reviewed the records from [address], discussed his symptoms with his brother Vladimir Parker who was on the phone and formulated the plan  Upon review of the chart it looks like he has been following at  [facility]  I informed him that there is no need to follow up of two different neurologist in the [facility]  He is quite reluctant to drive to [facility] on a regular basis but did agree  I have informed him that I can see him  June and then after that we can schedule Q six month appointment either here or in the [place]  He was agreeable  Also had a long discussion regarding ER evaluations  I informed him that I know that the ER evaluations may take  hours  However that is the best and way to be treated acutely  He does need to stay and not sign out AMA as this can delay workup  He did understand this

## 2022-04-06 NOTE — ASSESSMENT & PLAN NOTE
He  complained of tremor involving the right upper extremity  This occurs with activity  He has been diagnosed in the central tremor  Lower doses of Tegretol proved to be moved no change in her tremor  He was tried on Inderal but higher doses did result in the headaches and he noticed no difference  Subsequently he gradually weaned off the Inderal    Today he questions the use of primodone   I have informed the primidone is use for the treatment tremors however there is a interaction that can occur with primidone i e  a phenobarbital derviative  and Tegretol  This can cause alteration of Tegretol and increase the risk of recurrent seizures  At this point Jack Latif and  his brother would like to avoid high risk of seizures since 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    We discussed that there is no evidence of Parkinson's disease and not many treatments for tremor    He does understand this

## 2022-04-08 ENCOUNTER — TELEPHONE (OUTPATIENT)
Dept: NEUROLOGY | Facility: CLINIC | Age: 62
End: 2022-04-08

## 2022-04-08 ENCOUNTER — APPOINTMENT (OUTPATIENT)
Dept: LAB | Facility: HOSPITAL | Age: 62
End: 2022-04-08
Attending: PSYCHIATRY & NEUROLOGY
Payer: COMMERCIAL

## 2022-04-08 DIAGNOSIS — G40.209 PARTIAL SYMPTOMATIC EPILEPSY WITH COMPLEX PARTIAL SEIZURES, NOT INTRACTABLE, WITHOUT STATUS EPILEPTICUS (HCC): ICD-10-CM

## 2022-04-08 LAB
ALBUMIN SERPL BCP-MCNC: 4.1 G/DL (ref 3.5–5)
ALP SERPL-CCNC: 80 U/L (ref 46–116)
ALT SERPL W P-5'-P-CCNC: 36 U/L (ref 12–78)
ANION GAP SERPL CALCULATED.3IONS-SCNC: 5 MMOL/L (ref 4–13)
AST SERPL W P-5'-P-CCNC: 21 U/L (ref 5–45)
BILIRUB SERPL-MCNC: 0.48 MG/DL (ref 0.2–1)
BUN SERPL-MCNC: 9 MG/DL (ref 5–25)
CALCIUM SERPL-MCNC: 8.8 MG/DL (ref 8.3–10.1)
CARBAMAZEPINE SERPL-MCNC: 12.8 UG/ML (ref 4–12)
CHLORIDE SERPL-SCNC: 98 MMOL/L (ref 100–108)
CO2 SERPL-SCNC: 28 MMOL/L (ref 21–32)
CREAT SERPL-MCNC: 0.76 MG/DL (ref 0.6–1.3)
ERYTHROCYTE [DISTWIDTH] IN BLOOD BY AUTOMATED COUNT: 11.1 % (ref 11.6–15.1)
GFR SERPL CREATININE-BSD FRML MDRD: 97 ML/MIN/1.73SQ M
GLUCOSE P FAST SERPL-MCNC: 92 MG/DL (ref 65–99)
HCT VFR BLD AUTO: 43.6 % (ref 36.5–49.3)
HGB BLD-MCNC: 15.2 G/DL (ref 12–17)
MCH RBC QN AUTO: 34.1 PG (ref 26.8–34.3)
MCHC RBC AUTO-ENTMCNC: 34.9 G/DL (ref 31.4–37.4)
MCV RBC AUTO: 98 FL (ref 82–98)
PLATELET # BLD AUTO: 213 THOUSANDS/UL (ref 149–390)
PMV BLD AUTO: 8.4 FL (ref 8.9–12.7)
POTASSIUM SERPL-SCNC: 4.2 MMOL/L (ref 3.5–5.3)
PROT SERPL-MCNC: 7.1 G/DL (ref 6.4–8.2)
RBC # BLD AUTO: 4.46 MILLION/UL (ref 3.88–5.62)
SODIUM SERPL-SCNC: 131 MMOL/L (ref 136–145)
WBC # BLD AUTO: 6.49 THOUSAND/UL (ref 4.31–10.16)

## 2022-04-08 PROCEDURE — 85027 COMPLETE CBC AUTOMATED: CPT

## 2022-04-08 PROCEDURE — 36415 COLL VENOUS BLD VENIPUNCTURE: CPT

## 2022-04-08 PROCEDURE — 80053 COMPREHEN METABOLIC PANEL: CPT

## 2022-04-08 PROCEDURE — 80156 ASSAY CARBAMAZEPINE TOTAL: CPT

## 2022-04-08 NOTE — TELEPHONE ENCOUNTER
Called patient and reviewed lab results/recommendations     Patient states he has not had any seizures and is feeling great     No questions/concerns at this time

## 2022-04-08 NOTE — TELEPHONE ENCOUNTER
----- Message from Dione Cranker, DO sent at 4/8/2022  9:35 AM EDT -----  Tegretol  level is 12 8 and high     I am assuming this is after the dose was increased to 9 a day   Sodium is 131 ( better  but still low)  This is from tegretol     Please find out how he is doing ?  If no seizures , keep on same dose     Ask him to decrease fluid intake ( can increase the sodium ) do not add salt to diet or food

## 2022-05-16 ENCOUNTER — OFFICE VISIT (OUTPATIENT)
Dept: FAMILY MEDICINE CLINIC | Facility: CLINIC | Age: 62
End: 2022-05-16
Payer: COMMERCIAL

## 2022-05-16 VITALS
HEIGHT: 70 IN | SYSTOLIC BLOOD PRESSURE: 124 MMHG | HEART RATE: 76 BPM | DIASTOLIC BLOOD PRESSURE: 82 MMHG | BODY MASS INDEX: 23.82 KG/M2 | WEIGHT: 166.4 LBS | RESPIRATION RATE: 16 BRPM

## 2022-05-16 DIAGNOSIS — I10 ESSENTIAL HYPERTENSION: Primary | ICD-10-CM

## 2022-05-16 DIAGNOSIS — E78.2 MIXED HYPERLIPIDEMIA: ICD-10-CM

## 2022-05-16 PROCEDURE — 3008F BODY MASS INDEX DOCD: CPT | Performed by: FAMILY MEDICINE

## 2022-05-16 PROCEDURE — 3079F DIAST BP 80-89 MM HG: CPT | Performed by: FAMILY MEDICINE

## 2022-05-16 PROCEDURE — 3074F SYST BP LT 130 MM HG: CPT | Performed by: FAMILY MEDICINE

## 2022-05-16 PROCEDURE — 99213 OFFICE O/P EST LOW 20 MIN: CPT | Performed by: FAMILY MEDICINE

## 2022-05-16 PROCEDURE — 1036F TOBACCO NON-USER: CPT | Performed by: FAMILY MEDICINE

## 2022-05-16 NOTE — PROGRESS NOTES
Assessment and Plan:    Problem List Items Addressed This Visit        Cardiovascular and Mediastinum    Essential hypertension - Primary     BP stable           Relevant Orders    Comprehensive metabolic panel       Other    Mixed hyperlipidemia    Relevant Orders    Lipid panel    Comprehensive metabolic panel                 Diagnoses and all orders for this visit:    Essential hypertension  -     Comprehensive metabolic panel; Future    Mixed hyperlipidemia  -     Lipid panel; Future  -     Comprehensive metabolic panel; Future            Subjective:      Patient ID: Katie Blanc is a 58 y o  male  CC:    Chief Complaint   Patient presents with    Follow-up     Pt here today with mother for a follow up  Rcruz       HPI:    Here for f/u htn and lipids, no cp, no sob,no headaches  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      The following portions of the patient's history were reviewed and updated as appropriate: allergies, current medications, past family history, past medical history, past social history, past surgical history and problem list       Review of Systems   Constitutional: Negative for activity change, appetite change and fatigue  Respiratory: Negative for shortness of breath  Cardiovascular: Negative for chest pain  Neurological: Negative for dizziness and headaches  Psychiatric/Behavioral: Negative for dysphoric mood  The patient is not nervous/anxious  Data to review:       Objective:    Vitals:    05/16/22 1206   BP: 124/82   BP Location: Left arm   Patient Position: Sitting   Cuff Size: Large   Pulse: 76   Resp: 16   Weight: 75 5 kg (166 lb 6 4 oz)   Height: 5' 10" (1 778 m)        Physical Exam  Vitals reviewed  Constitutional:       Appearance: Normal appearance  Neck:      Vascular: No carotid bruit  Cardiovascular:      Rate and Rhythm: Normal rate and regular rhythm  Pulses: Normal pulses  Heart sounds: Normal heart sounds     Pulmonary: Effort: Pulmonary effort is normal       Breath sounds: Normal breath sounds  Musculoskeletal:      Right lower leg: No edema  Left lower leg: No edema  Lymphadenopathy:      Cervical: No cervical adenopathy  Neurological:      Mental Status: He is alert     Psychiatric:         Mood and Affect: Mood normal

## 2022-06-10 ENCOUNTER — TELEPHONE (OUTPATIENT)
Dept: NEUROLOGY | Facility: CLINIC | Age: 62
End: 2022-06-10

## 2022-06-10 NOTE — TELEPHONE ENCOUNTER
I called and left a voicemail message about patients upcoming appointment with John Cruz on 6/22/22 @10:30 am  I requested a call back to our office to confirm the appointment or if the patient has any issues or concerns or cannot keep this appointment

## 2022-06-21 ENCOUNTER — APPOINTMENT (OUTPATIENT)
Dept: LAB | Facility: HOSPITAL | Age: 62
End: 2022-06-21
Payer: COMMERCIAL

## 2022-06-21 ENCOUNTER — TELEPHONE (OUTPATIENT)
Dept: NEUROLOGY | Facility: CLINIC | Age: 62
End: 2022-06-21

## 2022-06-21 DIAGNOSIS — I10 ESSENTIAL HYPERTENSION: ICD-10-CM

## 2022-06-21 DIAGNOSIS — E78.2 MIXED HYPERLIPIDEMIA: ICD-10-CM

## 2022-06-21 LAB
ALBUMIN SERPL BCP-MCNC: 3.9 G/DL (ref 3.5–5)
ALP SERPL-CCNC: 64 U/L (ref 46–116)
ALT SERPL W P-5'-P-CCNC: 30 U/L (ref 12–78)
ANION GAP SERPL CALCULATED.3IONS-SCNC: 4 MMOL/L (ref 4–13)
AST SERPL W P-5'-P-CCNC: 23 U/L (ref 5–45)
BILIRUB SERPL-MCNC: 0.83 MG/DL (ref 0.2–1)
BUN SERPL-MCNC: 9 MG/DL (ref 5–25)
CALCIUM SERPL-MCNC: 9 MG/DL (ref 8.3–10.1)
CHLORIDE SERPL-SCNC: 103 MMOL/L (ref 100–108)
CHOLEST SERPL-MCNC: 166 MG/DL
CO2 SERPL-SCNC: 26 MMOL/L (ref 21–32)
CREAT SERPL-MCNC: 0.81 MG/DL (ref 0.6–1.3)
GFR SERPL CREATININE-BSD FRML MDRD: 95 ML/MIN/1.73SQ M
GLUCOSE P FAST SERPL-MCNC: 90 MG/DL (ref 65–99)
HDLC SERPL-MCNC: 87 MG/DL
LDLC SERPL CALC-MCNC: 58 MG/DL (ref 0–100)
NONHDLC SERPL-MCNC: 79 MG/DL
POTASSIUM SERPL-SCNC: 4 MMOL/L (ref 3.5–5.3)
PROT SERPL-MCNC: 7.5 G/DL (ref 6.4–8.2)
SODIUM SERPL-SCNC: 133 MMOL/L (ref 136–145)
TRIGL SERPL-MCNC: 105 MG/DL

## 2022-06-21 PROCEDURE — 36415 COLL VENOUS BLD VENIPUNCTURE: CPT

## 2022-06-21 PROCEDURE — 80053 COMPREHEN METABOLIC PANEL: CPT

## 2022-06-21 PROCEDURE — 80061 LIPID PANEL: CPT

## 2022-06-22 ENCOUNTER — OFFICE VISIT (OUTPATIENT)
Dept: NEUROLOGY | Facility: CLINIC | Age: 62
End: 2022-06-22
Payer: COMMERCIAL

## 2022-06-22 VITALS
WEIGHT: 158 LBS | OXYGEN SATURATION: 98 % | HEIGHT: 70 IN | DIASTOLIC BLOOD PRESSURE: 66 MMHG | TEMPERATURE: 97.5 F | BODY MASS INDEX: 22.62 KG/M2 | RESPIRATION RATE: 18 BRPM | SYSTOLIC BLOOD PRESSURE: 114 MMHG | HEART RATE: 82 BPM

## 2022-06-22 DIAGNOSIS — G40.909 SEIZURE DISORDER (HCC): Primary | ICD-10-CM

## 2022-06-22 PROBLEM — I69.319 COGNITIVE DEFICIT AS LATE EFFECT OF CEREBROVASCULAR ACCIDENT (CVA): Status: ACTIVE | Noted: 2022-06-22

## 2022-06-22 PROCEDURE — 3078F DIAST BP <80 MM HG: CPT | Performed by: PSYCHIATRY & NEUROLOGY

## 2022-06-22 PROCEDURE — 99213 OFFICE O/P EST LOW 20 MIN: CPT | Performed by: PSYCHIATRY & NEUROLOGY

## 2022-06-22 PROCEDURE — 1036F TOBACCO NON-USER: CPT | Performed by: PSYCHIATRY & NEUROLOGY

## 2022-06-22 PROCEDURE — 3008F BODY MASS INDEX DOCD: CPT | Performed by: PSYCHIATRY & NEUROLOGY

## 2022-06-22 PROCEDURE — 3074F SYST BP LT 130 MM HG: CPT | Performed by: PSYCHIATRY & NEUROLOGY

## 2022-06-22 NOTE — PATIENT INSTRUCTIONS
Contact pcp regarding lipid profile     Unsalted fries ( once every week)     Keep dose of carbamazepine and agrennox

## 2022-06-22 NOTE — ASSESSMENT & PLAN NOTE
He does have tremors of the right hand but we discussed medications  To interrupt post to be ineffective  Mysoline could potentially interact the Tegretol therefore will not be initiated  This is tolerable I have also asked him to utilize weights

## 2022-06-22 NOTE — ASSESSMENT & PLAN NOTE
The seizures are well controlled  He will continue on the Tegretol 200 milligram tablets mind a day  He is Tegretol level was 12 8  but he is without side effects and therefore the dose will be maintained  His sodium level was 133   He does need to avoid excessive sodium
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
## 2022-06-22 NOTE — ASSESSMENT & PLAN NOTE
He did have a prior ischemic event and is currently on Aggrenox  He did have a recent lipid profile and the triglycerides have improved since being on the Crestor  He will continue to follow up with family physician

## 2022-07-27 DIAGNOSIS — E78.2 MIXED HYPERLIPIDEMIA: ICD-10-CM

## 2022-07-27 RX ORDER — ROSUVASTATIN CALCIUM 10 MG/1
10 TABLET, COATED ORAL DAILY
Qty: 30 TABLET | Refills: 5 | Status: SHIPPED | OUTPATIENT
Start: 2022-07-27 | End: 2022-09-29 | Stop reason: SDUPTHER

## 2022-08-10 ENCOUNTER — TELEPHONE (OUTPATIENT)
Dept: NEUROLOGY | Facility: CLINIC | Age: 62
End: 2022-08-10

## 2022-08-10 DIAGNOSIS — I63.9 CEREBROVASCULAR ACCIDENT (CVA) DUE TO THROMBOSIS (HCC): Primary | ICD-10-CM

## 2022-08-10 RX ORDER — ASPIRIN AND DIPYRIDAMOLE 25; 200 MG/1; MG/1
1 CAPSULE, EXTENDED RELEASE ORAL 2 TIMES DAILY
Qty: 60 CAPSULE | Refills: 5 | Status: SHIPPED | OUTPATIENT
Start: 2022-08-10 | End: 2022-12-18

## 2022-08-10 NOTE — TELEPHONE ENCOUNTER
Dr Tremaine Rob,  Please see patient's My Chart message  Are you agreeable to filling medication for patient? Last OV 6/22/22  Next appt is 1/25/23    Please advise

## 2022-08-10 NOTE — TELEPHONE ENCOUNTER
----- Message from Marli Maldonado sent at 8/10/2022  8:05 AM EDT -----  Regarding: FW: aspirin dipyridam-er 25-200mg    ----- Message -----  From: Tiffanie Espinoza  Sent: 8/9/2022   4:17 PM EDT  To: [facility]  Subject: aspirin dipyridam-er 25-200mg                    Hi  Dr Peter Tan, I need a prescription called in on this medication  My last prescription was from Dr Dawit Rivera with the stroke  at [facility]  But since I am currently seeing you for my stroke now, I thought I would ask you to call it in at [facility] on [address]  [place]   Than you

## 2022-09-29 ENCOUNTER — OFFICE VISIT (OUTPATIENT)
Dept: FAMILY MEDICINE CLINIC | Facility: CLINIC | Age: 62
End: 2022-09-29
Payer: COMMERCIAL

## 2022-09-29 VITALS
BODY MASS INDEX: 22.96 KG/M2 | SYSTOLIC BLOOD PRESSURE: 120 MMHG | OXYGEN SATURATION: 97 % | DIASTOLIC BLOOD PRESSURE: 82 MMHG | HEIGHT: 70 IN | WEIGHT: 160.4 LBS | HEART RATE: 88 BPM

## 2022-09-29 DIAGNOSIS — M81.0 OSTEOPOROSIS WITHOUT CURRENT PATHOLOGICAL FRACTURE, UNSPECIFIED OSTEOPOROSIS TYPE: ICD-10-CM

## 2022-09-29 DIAGNOSIS — E78.2 MIXED HYPERLIPIDEMIA: ICD-10-CM

## 2022-09-29 DIAGNOSIS — I10 ESSENTIAL HYPERTENSION: Primary | ICD-10-CM

## 2022-09-29 DIAGNOSIS — G40.909 SEIZURE DISORDER (HCC): ICD-10-CM

## 2022-09-29 DIAGNOSIS — Z12.11 SCREENING FOR COLON CANCER: ICD-10-CM

## 2022-09-29 DIAGNOSIS — I63.00 CEREBROVASCULAR ACCIDENT (CVA) DUE TO THROMBOSIS OF PRECEREBRAL ARTERY (HCC): ICD-10-CM

## 2022-09-29 PROBLEM — M25.422 ELBOW SWELLING, LEFT: Status: RESOLVED | Noted: 2021-10-11 | Resolved: 2022-09-29

## 2022-09-29 PROBLEM — Z87.81 HISTORY OF ARM FRACTURE: Status: RESOLVED | Noted: 2021-08-14 | Resolved: 2022-09-29

## 2022-09-29 PROCEDURE — 3725F SCREEN DEPRESSION PERFORMED: CPT | Performed by: FAMILY MEDICINE

## 2022-09-29 PROCEDURE — 99213 OFFICE O/P EST LOW 20 MIN: CPT | Performed by: FAMILY MEDICINE

## 2022-09-29 PROCEDURE — 3074F SYST BP LT 130 MM HG: CPT | Performed by: FAMILY MEDICINE

## 2022-09-29 PROCEDURE — 3079F DIAST BP 80-89 MM HG: CPT | Performed by: FAMILY MEDICINE

## 2022-09-29 RX ORDER — ROSUVASTATIN CALCIUM 10 MG/1
10 TABLET, COATED ORAL DAILY
Qty: 30 TABLET | Refills: 5 | Status: SHIPPED | OUTPATIENT
Start: 2022-09-29

## 2022-09-29 RX ORDER — ALENDRONATE SODIUM 70 MG/1
70 TABLET ORAL
Qty: 4 TABLET | Refills: 5 | Status: SHIPPED | OUTPATIENT
Start: 2022-09-29

## 2022-09-29 NOTE — PROGRESS NOTES
Assessment and Plan:    Problem List Items Addressed This Visit        Cardiovascular and Mediastinum    Cerebrovascular accident (CVA) due to thrombosis ([address] )     Has  To be off Aggrenox  For   Scope,  Event was 11/21,prefer to be off 2  Days instead of  3         Essential hypertension - Primary     BP  stable            Nervous and Auditory    Seizure disorder (HCC)     Stable on meds            Musculoskeletal and Integument    Osteoporosis without current pathological fracture    Relevant Medications    alendronate (FOSAMAX) 70 mg tablet       Other    Mixed hyperlipidemia     Lab stable         Relevant Medications    rosuvastatin (CRESTOR) 10 MG tablet    Other Relevant Orders    Lipid panel    Comprehensive metabolic panel    TSH, 3rd generation      Other Visit Diagnoses     Screening for colon cancer        Relevant Orders    Ambulatory referral for colonoscopy                 Diagnoses and all orders for this visit:    Essential hypertension    Cerebrovascular accident (CVA) due to thrombosis of precerebral artery ([address] )    Osteoporosis without current pathological fracture, unspecified osteoporosis type  -     alendronate (FOSAMAX) 70 mg tablet; Take 1 tablet (70 mg total) by mouth every 7 days    Mixed hyperlipidemia  -     rosuvastatin (CRESTOR) 10 MG tablet; Take 1 tablet (10 mg total) by mouth daily  -     Lipid panel; Future  -     Comprehensive metabolic panel; Future  -     TSH, 3rd generation; Future    Seizure disorder [facility])    Screening for colon cancer  -     Ambulatory referral for colonoscopy; Future            Subjective:      Patient ID: Pa Ortiz is a 58 y o  male      CC:    Chief Complaint   Patient presents with    Follow-up     Pt is present today for 4 months chronic condition follow up       HPI:    here for  F/u htn, lipids, no cp, no sob, no headaches, wants to do colonoscopy  But  Doesn't want  To  Be  Off Aggrenox for 3  Days, will negotite  With GI for  2 days  Off Aggrenox      The following portions of the patient's history were reviewed and updated as appropriate: allergies, current medications, past family history, past medical history, past social history, past surgical history and problem list       Review of Systems   Constitutional: Negative for activity change and fatigue  Respiratory: Negative for shortness of breath  Cardiovascular: Negative for chest pain  Neurological: Negative for dizziness and light-headedness  Hematological: Does not bruise/bleed easily  Psychiatric/Behavioral: The patient is not nervous/anxious  Data to review:       Objective:    Vitals:    09/29/22 1108   BP: 120/82   BP Location: Left arm   Patient Position: Sitting   Cuff Size: Standard   Pulse: 88   SpO2: 97%   Weight: 72 8 kg (160 lb 6 4 oz)   Height: 5' 10" (1 778 m)        Physical Exam  Vitals reviewed  Constitutional:       Appearance: Normal appearance  Neck:      Vascular: No carotid bruit  Cardiovascular:      Rate and Rhythm: Normal rate and regular rhythm  Pulses: Normal pulses  Heart sounds: Normal heart sounds  Pulmonary:      Effort: Pulmonary effort is normal       Breath sounds: Normal breath sounds  Musculoskeletal:      Right lower leg: No edema  Left lower leg: No edema  Lymphadenopathy:      Cervical: No cervical adenopathy  Neurological:      Mental Status: He is alert     Psychiatric:         Mood and Affect: Mood normal

## 2022-10-28 DIAGNOSIS — G40.209 PARTIAL SYMPTOMATIC EPILEPSY WITH COMPLEX PARTIAL SEIZURES, NOT INTRACTABLE, WITHOUT STATUS EPILEPTICUS (HCC): ICD-10-CM

## 2022-10-28 RX ORDER — CARBAMAZEPINE 200 MG/1
TABLET ORAL
Qty: 270 TABLET | Refills: 5 | Status: SHIPPED | OUTPATIENT
Start: 2022-10-28

## 2022-11-09 ENCOUNTER — APPOINTMENT (OUTPATIENT)
Dept: LAB | Facility: CLINIC | Age: 62
End: 2022-11-09

## 2022-11-09 DIAGNOSIS — E78.2 MIXED HYPERLIPIDEMIA: ICD-10-CM

## 2022-11-09 LAB
ALBUMIN SERPL BCP-MCNC: 3.8 G/DL (ref 3.5–5)
ALP SERPL-CCNC: 65 U/L (ref 46–116)
ALT SERPL W P-5'-P-CCNC: 31 U/L (ref 12–78)
ANION GAP SERPL CALCULATED.3IONS-SCNC: 2 MMOL/L (ref 4–13)
AST SERPL W P-5'-P-CCNC: 20 U/L (ref 5–45)
BILIRUB SERPL-MCNC: 0.6 MG/DL (ref 0.2–1)
BUN SERPL-MCNC: 8 MG/DL (ref 5–25)
CALCIUM SERPL-MCNC: 9.1 MG/DL (ref 8.3–10.1)
CHLORIDE SERPL-SCNC: 106 MMOL/L (ref 96–108)
CHOLEST SERPL-MCNC: 164 MG/DL
CO2 SERPL-SCNC: 29 MMOL/L (ref 21–32)
CREAT SERPL-MCNC: 0.76 MG/DL (ref 0.6–1.3)
GFR SERPL CREATININE-BSD FRML MDRD: 97 ML/MIN/1.73SQ M
GLUCOSE P FAST SERPL-MCNC: 99 MG/DL (ref 65–99)
HDLC SERPL-MCNC: 90 MG/DL
LDLC SERPL CALC-MCNC: 55 MG/DL (ref 0–100)
NONHDLC SERPL-MCNC: 74 MG/DL
POTASSIUM SERPL-SCNC: 4 MMOL/L (ref 3.5–5.3)
PROT SERPL-MCNC: 7.7 G/DL (ref 6.4–8.4)
SODIUM SERPL-SCNC: 137 MMOL/L (ref 135–147)
TRIGL SERPL-MCNC: 96 MG/DL
TSH SERPL DL<=0.05 MIU/L-ACNC: 1.26 UIU/ML (ref 0.45–4.5)

## 2022-11-28 ENCOUNTER — OFFICE VISIT (OUTPATIENT)
Dept: PODIATRY | Facility: CLINIC | Age: 62
End: 2022-11-28

## 2022-11-28 VITALS
SYSTOLIC BLOOD PRESSURE: 135 MMHG | WEIGHT: 157.8 LBS | HEART RATE: 84 BPM | HEIGHT: 70 IN | BODY MASS INDEX: 22.59 KG/M2 | DIASTOLIC BLOOD PRESSURE: 88 MMHG

## 2022-11-28 DIAGNOSIS — L85.2 PUNCTATE KERATOSIS: ICD-10-CM

## 2022-11-28 DIAGNOSIS — L98.9 DISORDER OF SKIN AND SUBCUTANEOUS TISSUE: Primary | ICD-10-CM

## 2022-11-28 NOTE — PROGRESS NOTES
PATIENT:  Ramesh Espinoza  1960       ASSESSMENT:     1  Disorder of skin and subcutaneous tissue        2  Punctate keratosis                  PLAN:  1  Patient was counseled and educated on the condition and the diagnosis  2  The diagnosis, treatment options and prognosis were discussed with the patient  3  Removed keratotic lesion  Instructed skin care and protection  Possible chemical treatment depending on the progress  4  Discussed proper footwear, pads, and arch support  5  Patient will return in 3 months for re-evaluation  Imaging: I have personally reviewed pertinent films in PACS  Labs, pathology, and Other Studies: I have personally reviewed pertinent reports  Subjective:       HPI  The patient presents with chief complaint of pain in left foot  He noticed skin lesion / discoloration on left plantar foot  Sharp pain presents in the last 3 weeks  Pain presents with WB  The patient does not recall any injury  Denied any swelling  No associated numbness or paresthesia  The following portions of the patient's history were reviewed and updated as appropriate: allergies, current medications, past family history, past medical history, past social history, past surgical history and problem list   All pertinent labs and images were reviewed        Past Medical History  Past Medical History:   Diagnosis Date   • Arthritis    • Hypertension    • Seizures ([facility] [address] )    • Stroke [facility])        Past Surgical History  Past Surgical History:   Procedure Laterality Date   • ELBOW SURGERY  2017        Allergies:  Meperidine, Amoxicillin, Azithromycin, Other, and Pollen extract    Medications:  Current Outpatient Medications   Medication Sig Dispense Refill   • alendronate (FOSAMAX) 70 mg tablet Take 1 tablet (70 mg total) by mouth every 7 days 4 tablet 5   • aspirin-dipyridamole (AGGRENOX)  mg per 12 hr capsule Take 1 capsule by mouth 2 (two) times a day 60 capsule 5   • Calcium-Magnesium-Vitamin D (CALCIUM MAGNESIUM PO) Take by mouth     • carBAMazepine (TEGretol) 200 mg tablet TAKE 3 TABLETS IN THE AM, 2 TABLETS AT LUNCH, 2 TABLETS AT DINNER, AND 2 TABLETS AT BEDTIME 270 tablet 5   • co-enzyme Q-10 30 MG capsule Take 100 mg by mouth daily       • GaviLyte-G 236 g solution Take as directed     • LORazepam (ATIVAN) 0 5 mg tablet TAKE 1 TABLET BY MOUTH ONCE DAILY IF NEEDED for seizure  Limit of 1 in 24 hours 10 tablet 0   • NON FORMULARY Super Beets     • rosuvastatin (CRESTOR) 10 MG tablet Take 1 tablet (10 mg total) by mouth daily 30 tablet 5   • VALERIAN ROOT PO Use     • Vitamin Mixture (VITAMIN E COMPLETE PO) Take 1 tablet by mouth daily        No current facility-administered medications for this visit  Social History:  Social History     Socioeconomic History   • Marital status: Single     Spouse name: None   • Number of children: None   • Years of education: None   • Highest education level: None   Occupational History   • None   Tobacco Use   • Smoking status: Never   • Smokeless tobacco: Never   Vaping Use   • Vaping Use: Never used   Substance and Sexual Activity   • Alcohol use: Yes     Comment: occasional   • Drug use: No   • Sexual activity: Not Currently   Other Topics Concern   • None   Social History Narrative    Lives with mother    Retired     Social Determinants of Health     Financial Resource Strain: Not on file   Food Insecurity: Not on file   Transportation Needs: Not on file   Physical Activity: Not on file   Stress: Not on file   Social Connections: Not on file   Intimate Partner Violence: Not on file   Housing Stability: Not on file          Review of Systems   Constitutional: Negative for appetite change, chills and fever  Respiratory: Negative for cough and shortness of breath  Cardiovascular: Negative for chest pain  Gastrointestinal: Negative for nausea and vomiting  Musculoskeletal: Negative for gait problem     Skin: Negative for wound    Neurological: Negative for weakness and numbness  Hematological: Negative  Psychiatric/Behavioral: Negative for confusion  Objective:      /88   Pulse 84   Ht 5' 10" (1 778 m) Comment: verbal  Wt 71 6 kg (157 lb 12 8 oz)   BMI 22 64 kg/m²          Physical Exam  Vitals reviewed  Constitutional:       General: He is not in acute distress  Appearance: He is not toxic-appearing or diaphoretic  Cardiovascular:      Rate and Rhythm: Normal rate and regular rhythm  Pulses: Normal pulses  Dorsalis pedis pulses are 2+ on the right side and 2+ on the left side  Posterior tibial pulses are 2+ on the right side and 2+ on the left side  Pulmonary:      Effort: Pulmonary effort is normal  No respiratory distress  Musculoskeletal:         General: Deformity present  No swelling, tenderness or signs of injury  Right lower leg: No edema  Left lower leg: No edema  Right foot: No foot drop  Left foot: No foot drop  Skin:     General: Skin is warm and dry  Capillary Refill: Capillary refill takes less than 2 seconds  Coloration: Skin is not cyanotic or mottled  Findings: No abscess, ecchymosis, erythema or wound  Nails: There is no clubbing  Comments: Punctate keratosis under left 5th met with surrounding callus  Neurological:      General: No focal deficit present  Mental Status: He is alert and oriented to person, place, and time  Cranial Nerves: No cranial nerve deficit  Sensory: No sensory deficit  Motor: No weakness  Coordination: Coordination normal    Psychiatric:         Mood and Affect: Mood normal          Behavior: Behavior normal          Thought Content:  Thought content normal          Judgment: Judgment normal

## 2022-11-30 NOTE — TELEPHONE ENCOUNTER
Patient ([phone]) called to request that provider prescribe carBAMazepine (TEGretol) 200 mg tablet  Medication previously handled by patient's nurologist  Patient states he's had issues getting medication refilled through their office and is concerned about running out of medication  Asks is Dr Gui Pak would be able to send a refill  Received referral from the clinic that the patient needed assistance with transportation. He had an appointment today that he said he was unaware of and the location. He did not have time to set up a ride. Provided him the number to [employer]. Called Johnathan with [employer] to secure ride and ask if cab could be dispatched [facility].

## 2023-01-06 ENCOUNTER — APPOINTMENT (EMERGENCY)
Dept: RADIOLOGY | Facility: HOSPITAL | Age: 63
End: 2023-01-06

## 2023-01-06 ENCOUNTER — HOSPITAL ENCOUNTER (EMERGENCY)
Facility: HOSPITAL | Age: 63
Discharge: HOME | End: 2023-01-06
Attending: EMERGENCY MEDICINE

## 2023-01-06 ENCOUNTER — TELEPHONE (OUTPATIENT)
Dept: NEUROLOGY | Facility: CLINIC | Age: 63
End: 2023-01-06

## 2023-01-06 VITALS
WEIGHT: 163 LBS | TEMPERATURE: 98 F | DIASTOLIC BLOOD PRESSURE: 106 MMHG | BODY MASS INDEX: 23.39 KG/M2 | RESPIRATION RATE: 18 BRPM | SYSTOLIC BLOOD PRESSURE: 150 MMHG | OXYGEN SATURATION: 98 % | HEART RATE: 84 BPM

## 2023-01-06 DIAGNOSIS — R29.898 WEAKNESS OF BOTH LEGS: Primary | ICD-10-CM

## 2023-01-06 LAB
ANION GAP SERPL CALCULATED.3IONS-SCNC: 7 MMOL/L (ref 4–13)
BASOPHILS # BLD AUTO: 0.04 THOUSANDS/ÂΜL (ref 0–0.1)
BASOPHILS NFR BLD AUTO: 1 % (ref 0–1)
BUN SERPL-MCNC: 13 MG/DL (ref 5–25)
CALCIUM SERPL-MCNC: 9 MG/DL (ref 8.3–10.1)
CARBAMAZEPINE SERPL-MCNC: 19.5 UG/ML (ref 4–12)
CHLORIDE SERPL-SCNC: 101 MMOL/L (ref 96–108)
CO2 SERPL-SCNC: 25 MMOL/L (ref 21–32)
CREAT SERPL-MCNC: 0.7 MG/DL (ref 0.6–1.3)
EOSINOPHIL # BLD AUTO: 0.12 THOUSAND/ÂΜL (ref 0–0.61)
EOSINOPHIL NFR BLD AUTO: 2 % (ref 0–6)
ERYTHROCYTE [DISTWIDTH] IN BLOOD BY AUTOMATED COUNT: 11.2 % (ref 11.6–15.1)
GFR SERPL CREATININE-BSD FRML MDRD: 101 ML/MIN/1.73SQ M
GLUCOSE SERPL-MCNC: 105 MG/DL (ref 65–140)
GLUCOSE SERPL-MCNC: 99 MG/DL (ref 65–140)
HCT VFR BLD AUTO: 43.3 % (ref 36.5–49.3)
HGB BLD-MCNC: 15.2 G/DL (ref 12–17)
IMM GRANULOCYTES # BLD AUTO: 0.02 THOUSAND/UL (ref 0–0.2)
IMM GRANULOCYTES NFR BLD AUTO: 0 % (ref 0–2)
LYMPHOCYTES # BLD AUTO: 1.22 THOUSANDS/ÂΜL (ref 0.6–4.47)
LYMPHOCYTES NFR BLD AUTO: 17 % (ref 14–44)
MCH RBC QN AUTO: 34.2 PG (ref 26.8–34.3)
MCHC RBC AUTO-ENTMCNC: 35.1 G/DL (ref 31.4–37.4)
MCV RBC AUTO: 98 FL (ref 82–98)
MONOCYTES # BLD AUTO: 0.45 THOUSAND/ÂΜL (ref 0.17–1.22)
MONOCYTES NFR BLD AUTO: 6 % (ref 4–12)
NEUTROPHILS # BLD AUTO: 5.26 THOUSANDS/ÂΜL (ref 1.85–7.62)
NEUTS SEG NFR BLD AUTO: 74 % (ref 43–75)
NRBC BLD AUTO-RTO: 0 /100 WBCS
PLATELET # BLD AUTO: 245 THOUSANDS/UL (ref 149–390)
PMV BLD AUTO: 9.1 FL (ref 8.9–12.7)
POTASSIUM SERPL-SCNC: 3.9 MMOL/L (ref 3.5–5.3)
RBC # BLD AUTO: 4.44 MILLION/UL (ref 3.88–5.62)
SODIUM SERPL-SCNC: 133 MMOL/L (ref 135–147)
WBC # BLD AUTO: 7.11 THOUSAND/UL (ref 4.31–10.16)

## 2023-01-06 NOTE — ED NOTES
Patient stated that he wants an MRI or else he is going to leave  Spoke with ED resident who is waiting for CT scans to admit patient and possibly patient might have an MRI depending on admitting team  Explained this scenario to patient and he states that he will be calling his brother to discuss as testing is taking too long  Encouraged patient to stay in order to complete testing and admission as suggested by physician       Olivia Sepulveda, JOHN  01/06/23 2389

## 2023-01-06 NOTE — DISCHARGE INSTRUCTIONS
[address] Dimit[address] Mcneill or his authorized caregiver has made the decision for the patient to leave the emergency department against the advice of the emergency department staff  He or his authorized caregiver has been informed and understands the inherent risks, including death, stroke  He or his authorized caregiver has decided to accept the responsibility for this decision  BlessingNataliya Mcneill and all necessary parties have been advised that he may return for further evaluation or treatment  His condition at time of discharge was Stable    Agus Espinoza had current vital signs as follows:  BP (!) 150/106   Pulse 90   Temp 98 °F (36 7 °C) (Oral)   Resp 18   Wt 73 9 kg (163 lb)

## 2023-01-06 NOTE — ED PROVIDER NOTES
History  Chief Complaint   Patient presents with   • Weakness - Generalized     Pt c/o weakness in legs and dizziness that started around 1330 today  States he fell at home, denies HS and LOC  58 Y O M with hx of cva 2 years ago - with no residual deficits on Aggrenox , seizure hx on Carbamezapine , here for transient episode of bilateral lower extremity weakness and states that his legs  "gave out"  No LOC of head strike  Mother was concerned and called ambulance  States that his symptoms resolved now  He think it is " all because of the soups I have been eating", it is causing my "salt to be high"  Denies any other symptoms like fever, numbness, tingling, focal deficits, dysarthria, cp, dyspnea, urinary symptoms, abdominal pain  Prior to Admission Medications   Prescriptions Last Dose Informant Patient Reported? Taking? Calcium-Magnesium-Vitamin D (CALCIUM MAGNESIUM PO)   Yes No   Sig: Take by mouth   GaviLyte-G 236 g solution   Yes No   Sig: Take as directed   LORazepam (ATIVAN) 0 5 mg tablet   No No   Sig: TAKE 1 TABLET BY MOUTH ONCE DAILY IF NEEDED for seizure    Limit of 1 in 24 hours   NON FORMULARY   Yes No   Sig: Super Beets   VALERIAN ROOT PO   Yes No   Sig: Use   Vitamin Mixture (VITAMIN E COMPLETE PO)   Yes No   Sig: Take 1 tablet by mouth daily    alendronate (FOSAMAX) 70 mg tablet   No No   Sig: Take 1 tablet (70 mg total) by mouth every 7 days   aspirin-dipyridamole (AGGRENOX)  mg per 12 hr capsule   No No   Sig: TAKE 1 CAPSULE BY MOUTH TWICE A DAY   carBAMazepine (TEGretol) 200 mg tablet   No No   Sig: TAKE 3 TABLETS IN THE AM, 2 TABLETS AT LUNCH, 2 TABLETS AT DINNER, AND 2 TABLETS AT BEDTIME   co-enzyme Q-10 30 MG capsule   Yes No   Sig: Take 100 mg by mouth daily     rosuvastatin (CRESTOR) 10 MG tablet   No No   Sig: Take 1 tablet (10 mg total) by mouth daily      Facility-Administered Medications: None       Past Medical History:   Diagnosis Date   • Arthritis    • Hypertension    • Seizures ([facility] [address] )    • Stroke [facility])        Past Surgical History:   Procedure Laterality Date   • ELBOW SURGERY  2017       Family History   Problem Relation Age of Onset   • Dementia Mother    • Alzheimer's disease Mother    • Stroke Father    • Hyperlipidemia Brother      I have reviewed and agree with the history as documented  E-Cigarette/Vaping   • E-Cigarette Use Never User      E-Cigarette/Vaping Substances     Social History     Tobacco Use   • Smoking status: Never   • Smokeless tobacco: Never   Vaping Use   • Vaping Use: Never used   Substance Use Topics   • Alcohol use: Yes     Comment: occasional   • Drug use: No        Review of Systems   Constitutional: Negative for chills and fever  HENT: Negative for ear pain and sore throat  Eyes: Negative for pain and visual disturbance  Respiratory: Negative for cough and shortness of breath  Cardiovascular: Negative for chest pain and palpitations  Gastrointestinal: Negative for abdominal pain and vomiting  Genitourinary: Negative for dysuria and hematuria  Musculoskeletal: Negative for arthralgias and back pain  Skin: Negative for color change and rash  Neurological: Negative for seizures and syncope  All other systems reviewed and are negative  Physical Exam  ED Triage Vitals   Temperature Pulse Respirations Blood Pressure SpO2   01/06/23 1513 01/06/23 1508 01/06/23 1508 01/06/23 1512 01/06/23 1508   98 °F (36 7 °C) 87 18 (!) 150/106 98 %      Temp Source Heart Rate Source Patient Position - Orthostatic VS BP Location FiO2 (%)   01/06/23 1513 01/06/23 1508 -- -- --   Oral Monitor         Pain Score       --                    Orthostatic Vital Signs  Vitals:    01/06/23 1508 01/06/23 1512 01/06/23 1515 01/06/23 1745   BP:  (!) 150/106 (!) 150/106    Pulse: 87  90 84       Physical Exam  Vitals and nursing note reviewed  Constitutional:       General: He is not in acute distress       Appearance: He is well-developed  He is not ill-appearing, toxic-appearing or diaphoretic  HENT:      Head: Normocephalic and atraumatic  Eyes:      Conjunctiva/sclera: Conjunctivae normal    Cardiovascular:      Rate and Rhythm: Normal rate and regular rhythm  Heart sounds: Normal heart sounds  No murmur heard  Pulmonary:      Effort: Pulmonary effort is normal  No respiratory distress  Breath sounds: Normal breath sounds  No wheezing, rhonchi or rales  Abdominal:      General: There is no distension  Palpations: Abdomen is soft  Tenderness: There is no abdominal tenderness  There is no guarding or rebound  Musculoskeletal:         General: No swelling or tenderness  Cervical back: Neck supple  No rigidity or tenderness  Skin:     General: Skin is warm and dry  Capillary Refill: Capillary refill takes less than 2 seconds  Coloration: Skin is not jaundiced or pale  Neurological:      Mental Status: He is alert  Cranial Nerves: No cranial nerve deficit  Sensory: No sensory deficit  Motor: No weakness        Coordination: Coordination normal    Psychiatric:         Mood and Affect: Mood normal          Behavior: Behavior normal          ED Medications  Medications - No data to display    Diagnostic Studies  Results Reviewed     Procedure Component Value Units Date/Time    Carbamazepine level, total [[id number]]  (Abnormal) Collected: 01/06/23 1555    Lab Status: Final result Specimen: Blood from Arm, Left Updated: 01/06/23 1813     Carbamazepine Lvl 19 5 ug/mL     Basic metabolic panel [[id number]]  (Abnormal) Collected: 01/06/23 1555    Lab Status: Final result Specimen: Blood from Arm, Left Updated: 01/06/23 1714     Sodium 133 mmol/L      Potassium 3 9 mmol/L      Chloride 101 mmol/L      CO2 25 mmol/L      ANION GAP 7 mmol/L      BUN 13 mg/dL      Creatinine 0 70 mg/dL      Glucose 99 mg/dL      Calcium 9 0 mg/dL      eGFR 101 ml/min/1 73sq m     Narrative:      Consolidated [employer] guidelines for Chronic Kidney Disease (CKD):   •  Stage 1 with normal or high GFR (GFR > 90 mL/min/1 73 square meters)  •  Stage 2 Mild CKD (GFR = 60-89 mL/min/1 73 square meters)  •  Stage 3A Moderate CKD (GFR = 45-59 mL/min/1 73 square meters)  •  Stage 3B Moderate CKD (GFR = 30-44 mL/min/1 73 square meters)  •  Stage 4 Severe CKD (GFR = 15-29 mL/min/1 73 square meters)  •  Stage 5 End Stage CKD (GFR <15 mL/min/1 73 square meters)  Note: GFR calculation is accurate only with a steady state creatinine    CBC and differential [[id number]]  (Abnormal) Collected: 01/06/23 1555    Lab Status: Final result Specimen: Blood from Arm, Left Updated: 01/06/23 1609     WBC 7 11 Thousand/uL      RBC 4 44 Million/uL      Hemoglobin 15 2 g/dL      Hematocrit 43 3 %      MCV 98 fL      MCH 34 2 pg      MCHC 35 1 g/dL      RDW 11 2 %      MPV 9 1 fL      Platelets 490 Thousands/uL      nRBC 0 /100 WBCs      Neutrophils Relative 74 %      Immat GRANS % 0 %      Lymphocytes Relative 17 %      Monocytes Relative 6 %      Eosinophils Relative 2 %      Basophils Relative 1 %      Neutrophils Absolute 5 26 Thousands/µL      Immature Grans Absolute 0 02 Thousand/uL      Lymphocytes Absolute 1 22 Thousands/µL      Monocytes Absolute 0 45 Thousand/µL      Eosinophils Absolute 0 12 Thousand/µL      Basophils Absolute 0 04 Thousands/µL     Fingerstick Glucose (POCT) [[id number]]  (Normal) Collected: 01/06/23 1505    Lab Status: Final result Updated: 01/06/23 1510     POC Glucose 105 mg/dl                  No orders to display         Procedures  Procedures      ED Course  ED Course as of 01/08/23 1651   Fri Jan 06, 2023   1732 CO2: 25                             SBIRT 22yo+    Flowsheet Row Most Recent Value   SBIRT (23 yo +)    In order to provide better care to our patients, we are screening all of our patients for alcohol and drug use  Would it be okay to ask you these screening questions?  No Filed at: 01/06/2023 [address] [place] [address] Making  58 Y O M with hx of cva 2 years ago - with no residual deficits on Aggrenox , seizure hx on Carbamezapine , here for transient episode of bilateral lower extremity weakness and states that his legs  "gave out"  Investigated with basic labs, ekg  Pt  Declined further evaluation with CT CTA and left AMA  All risk factors discussed and pt  Signed AMA forms  Weakness of both legs: acute illness or injury  Amount and/or Complexity of Data Reviewed  Labs: ordered  Decision-making details documented in ED Course  Disposition  Final diagnoses:   Weakness of both legs     Time reflects when diagnosis was documented in both MDM as applicable and the Disposition within this note     Time User Action Codes Description Comment    1/6/2023  5:51 PM Domenico Walker Add [R23 [id number]] Weakness of both legs       ED Disposition     ED Disposition   AMA    Condition   --    Date/Time   Fri Jan 6, 2023  5:49 PM    Comment   Date: 1/6/2023  Patient: Abby Rizzo  Admitted: 1/6/2023  3:00 PM  Attending Provider: Jeancarlos Fritz MD    Abby Rizzo or his authorized caregiver has made the decision for the patient to leave the emergency department agains t the advice of the emergency department staff  He or his authorized caregiver has been informed and understands the inherent risks, including death, stroke  He or his authorized caregiver has decided to accept the responsibility for this decision  Abby Rizzo and all necessary parties have been advised that he may return for further evaluation or treatment  His condition at time of discharge was Stable    Abby Rizzo had current vital signs as follows:  BP (!) 150/106   Pul se 90   Temp 98 °F (36 7 °C) (Oral)   Resp 18   Wt 73 9 kg (163 lb)            Follow-up Information    None         Discharge Medication List as of 1/6/2023  5:51 PM      CONTINUE these medications which have NOT CHANGED    Details   alendronate (FOSAMAX) 70 mg tablet Take 1 tablet (70 mg total) by mouth every 7 days, Starting Thu 9/29/2022, Normal      aspirin-dipyridamole (AGGRENOX)  mg per 12 hr capsule TAKE 1 CAPSULE BY MOUTH TWICE A DAY, Normal      Calcium-Magnesium-Vitamin D (CALCIUM MAGNESIUM PO) Take by mouth, Historical Med      carBAMazepine (TEGretol) 200 mg tablet TAKE 3 TABLETS IN THE AM, 2 TABLETS AT LUNCH, 2 TABLETS AT DINNER, AND 2 TABLETS AT BEDTIME, Normal      co-enzyme Q-10 30 MG capsule Take 100 mg by mouth daily  , Historical Med      GaviLyte-G 236 g solution Take as directed, Starting Thu 6/30/2022, Historical Med      LORazepam (ATIVAN) 0 5 mg tablet TAKE 1 TABLET BY MOUTH ONCE DAILY IF NEEDED for seizure  Limit of 1 in 24 hours, Normal      NON FORMULARY Super Beets, Historical Med      rosuvastatin (CRESTOR) 10 MG tablet Take 1 tablet (10 mg total) by mouth daily, Starting Thu 9/29/2022, Normal      VALERIAN ROOT PO Use, Historical Med      Vitamin Mixture (VITAMIN E COMPLETE PO) Take 1 tablet by mouth daily , Historical Med           No discharge procedures on file  PDMP Review       Value Time User    PDMP Reviewed  Yes 4/6/2022  2:16 PM Jacqueline Frank DO           ED Provider  Attending physically available and evaluated [facility]  I managed the patient along with the ED Attending      Electronically Signed by         Nasim King DO  01/08/23 9224

## 2023-01-06 NOTE — TELEPHONE ENCOUNTER
Called and spoke to patient   Patient confirmed upcoming apt with Dr Demi Hauser on 01/25/23 @ 10:30 am

## 2023-01-06 NOTE — ED ATTENDING ATTESTATION
Melanie Soni MD, saw and evaluated the patient  I have discussed the patient with the resident and agree with the resident's findings, Plan of Care, and MDM as documented in the resident's note, except where noted  All available labs and Radiology studies were reviewed  I was present for key portions of any procedure(s) performed by the resident and I was immediately available to provide assistance  At this point I agree with the current assessment done in the Emergency Department  I have conducted an independent evaluation of this patient a history and physical is as follows:    59 yo male with a history of a seizure disorder, HTN, prior CVA, anxiety, hyperlipidemia, GERD, and cognitive impairment brought to the ED by EMS for evaluation of a transient episode of lower extremity weakness and lightheadedness  The patient says he was in his normal state of health at home when he suddenly felt lightheaded "then my legs gave out" around 1330 today  (+) Minor fall without head strike or LOC  No reported seizure activity  Mother became concerned and called 9-1-1  All symptoms resolved en route to the ED and he now feels "fine"  No chest pain, shortness of breath, or cough  He denies any focal numbness or weakness at present  The patient thinks that the episode is due to eating "too much soup"  He says that the soup he regularly eats contains "too much salt" and admits to eating "lots of salty french fries" as well  During his last admission in November 2021 he was found to be hyponatremic  He presented with a similar story during that visit  No other specific complaints      ROS: per resident physician note    Gen: NAD, AA&Ox3  HEENT: PERRL, EOMI  Neck: supple  CV: RRR  Lungs: CTA B/L  Abdomen: soft, NT/ND  Ext: no swelling or deformity  Neuro: 5/5 strength all extremities, sensation grossly intact, (+) steady gait, no facial droop, no speech difficulty  Skin: no rash    ED Course  The patient has a bizarre affect and is fixated on ingesting "too much soup and fries" lately  He is otherwise very well appearing with stable vital signs and a nonfocal neurologic exam  No current symptoms  Unclear etiology of earlier event  Will check EKG, basic labs, tegretol level, and CTA head/neck  Disposition per results and reassessment  Will continue to monitor in the ED  17:45 The patient is demanding immediate discharge because "I feel fine and everything is taking too long"  Imaging not completed  Risks of leaving AMA were fully discussed including missed CVA, loss of current lifestyle, serious disability, and death  The patient seems capable of making his own medical decisions  AMA paperwork completed  He was instructed to return to the ED ASAP if he changes his mind and would like to complete the workup  Strict return precautions provided        Critical Care Time  Procedures

## 2023-01-06 NOTE — TELEPHONE ENCOUNTER
Pt keesha called to let Dr Roberta Sotomayor know that he is in the [facility] because he had another stroke and wanted to let her know

## 2023-01-08 ENCOUNTER — TELEPHONE (OUTPATIENT)
Dept: NEUROLOGY | Facility: CLINIC | Age: 63
End: 2023-01-08

## 2023-01-08 NOTE — TELEPHONE ENCOUNTER
Clinical team     Inessa Buckner was in the ER on 1/06/23 but left before any of the testing or assessment was performed      he sent 2 my chart messages over the weekend asking for mri, further recommendations of treatment of lacunar strokes and changes in med for a tegretol dose     Please remind the patient   1  My chart messages are for nonurgent issues   They can  take  2 to 3 working   days to be addressed and are not addressed over the weekend   Any urgent issues require a phone call   2  The decision regarding ordering imaging studies and further recommendations can only be ordered at a inperson office visit   They can be addressed at the upcoming visit   If he can not wait till 01/25  He can see Darling Oshea at [facility] [place]    3  He had a questions regarding the high Tegretol    Adjustment depend on when the labs were performed when regard to his dose , if he has any side effects and the dose he is taking   Please find out the dose he was taking and when he took it in regard to the timing of the labs     It  looks like his pcp ordered the MRI of the brain       he explained the symptoms as weakness in his legs and dysarthria  It may have been tegretol

## 2023-01-09 DIAGNOSIS — G45.9 TIA (TRANSIENT ISCHEMIC ATTACK): Primary | ICD-10-CM

## 2023-01-09 DIAGNOSIS — G40.909 SEIZURE DISORDER (HCC): Primary | ICD-10-CM

## 2023-01-09 NOTE — TELEPHONE ENCOUNTER
Called and spoke with patient  Advised of Dr David Arce message  Patient states he is currently taking " 9 pills a day" of his Carbamazepine 200 mg tablets  Current orders are for:  TAKE 3 TABLETS IN THE AM, 2 TABLETS AT LUNCH, 2 TABLETS AT DINNER, AND 2 TABLETS AT BEDTIME         Patient does not remember where he was in dosing schedule when lab was drawn in the ED for Carbamazepine level  Patient feels like he is taking" too much medication "    Patient went on to say that what brought him to the ED on Friday was "it felt like what happened in November of 2021" when he had his Stroke  Patient states on Friday 1/6/23 he was at the diner eating with his mother and felt pressure between his eyes  He left diner and drove home  When he was getting out of his truck it felt like his legs were giving out  He went to walk to the back of his truck and fell ( did not injure himself) States he had No balance whatsoever  Denies any seizures  Advised while on the phone that his PCP Dr Chan Vaca has placed MRI order for patient  Patient was given # to C  S  to schedule his MRI Brain  Patient does not wish to see an AP  Wants to keep current appointment on 1/25/23 with Dr Oscar Ware

## 2023-01-10 NOTE — TELEPHONE ENCOUNTER
Spoke with patient and advised on Dr Ange Villanueva note  Went over with patient, understood  My Chart message was also sent with instructions

## 2023-01-11 ENCOUNTER — TELEPHONE (OUTPATIENT)
Dept: NEUROLOGY | Facility: CLINIC | Age: 63
End: 2023-01-11

## 2023-01-11 NOTE — TELEPHONE ENCOUNTER
Randy Banks     Pt has called and asked to re-scheduled the appointment with you on 01/25/23 at 10:30am   Can you please advise when I can re-schedule the patient?     I thank you in advance,     Aleks Morse

## 2023-01-30 ENCOUNTER — OFFICE VISIT (OUTPATIENT)
Dept: FAMILY MEDICINE CLINIC | Facility: CLINIC | Age: 63
End: 2023-01-30

## 2023-01-30 VITALS
BODY MASS INDEX: 22.96 KG/M2 | HEIGHT: 70 IN | RESPIRATION RATE: 16 BRPM | WEIGHT: 160.4 LBS | TEMPERATURE: 97.3 F | DIASTOLIC BLOOD PRESSURE: 84 MMHG | SYSTOLIC BLOOD PRESSURE: 122 MMHG | HEART RATE: 84 BPM

## 2023-01-30 DIAGNOSIS — I10 ESSENTIAL HYPERTENSION: ICD-10-CM

## 2023-01-30 DIAGNOSIS — G45.9 TIA (TRANSIENT ISCHEMIC ATTACK): ICD-10-CM

## 2023-01-30 DIAGNOSIS — I69.319 COGNITIVE DEFICIT AS LATE EFFECT OF CEREBROVASCULAR ACCIDENT (CVA): ICD-10-CM

## 2023-01-30 DIAGNOSIS — Z12.11 SCREENING FOR COLON CANCER: Primary | ICD-10-CM

## 2023-01-30 DIAGNOSIS — E87.1 HYPONATREMIA: ICD-10-CM

## 2023-01-30 DIAGNOSIS — E78.2 MIXED HYPERLIPIDEMIA: ICD-10-CM

## 2023-01-30 DIAGNOSIS — G40.209 PARTIAL SYMPTOMATIC EPILEPSY WITH COMPLEX PARTIAL SEIZURES, NOT INTRACTABLE, WITHOUT STATUS EPILEPTICUS (HCC): ICD-10-CM

## 2023-01-30 DIAGNOSIS — I63.00 CEREBROVASCULAR ACCIDENT (CVA) DUE TO THROMBOSIS OF PRECEREBRAL ARTERY (HCC): ICD-10-CM

## 2023-01-30 DIAGNOSIS — G40.909 SEIZURE DISORDER (HCC): ICD-10-CM

## 2023-01-30 RX ORDER — CARBAMAZEPINE 200 MG/1
TABLET ORAL
Qty: 240 TABLET | Refills: 5
Start: 2023-01-30

## 2023-01-30 NOTE — PROGRESS NOTES
Name: Sara Grant      : 1960      MRN: [id number]  Encounter Provider: Domingo Williamson MD  Encounter Date: 2023   Encounter department: [facility] PRIMARY [facility]ARE    Assessment & Plan     1  Screening for colon cancer  -     Ambulatory referral for colonoscopy; Future    2  Essential hypertension  Assessment & Plan:  BP stable    Orders:  -     VAS screening; Future; Expected date: 2023    3  TIA (transient ischemic attack)  -     VAS screening; Future; Expected date: 2023    4  Mixed hyperlipidemia  -     VAS screening; Future; Expected date: 2023    5  Seizure disorder [facility])  Assessment & Plan:  Sees neuro      6  Cerebrovascular accident (CVA) due to thrombosis of precerebral artery [facility])  Assessment & Plan:  Had TIA      7  Cognitive deficit as late effect of cerebrovascular accident (CVA)  Assessment & Plan:  Sees neuro      8  Hyponatremia  -     Basic metabolic panel; Future; Expected date: 2023    9  Partial symptomatic epilepsy with complex partial seizures, not intractable, without status epilepticus (HCC)  -     carBAMazepine (TEGretol) 200 mg tablet; TAKE 2 TABLETS IN THE AM, 2 TABLETS AT LUNCH, 2 TABLETS AT DINNER, AND 2 TABLETS AT BEDTIME         Subjective      Here for f/u htn, abnl CT scan, going for MRI tomorrow, had elevated Tegretol level, neuro cut back by one pill and pt  Feeling better, no cp, no sob    Review of Systems   Constitutional: Negative for activity change, appetite change and fatigue  Eyes: Negative for pain  Respiratory: Negative for shortness of breath  Cardiovascular: Negative for chest pain  Neurological: Negative for dizziness and headaches  Psychiatric/Behavioral: Negative for confusion  The patient is not nervous/anxious          Current Outpatient Medications on File Prior to Visit   Medication Sig   • alendronate (FOSAMAX) 70 mg tablet Take 1 tablet (70 mg total) by mouth every 7 days   • aspirin-dipyridamole (AGGRENOX)  mg per 12 hr capsule TAKE 1 CAPSULE BY MOUTH TWICE A DAY   • Calcium-Magnesium-Vitamin D (CALCIUM MAGNESIUM PO) Take by mouth   • co-enzyme Q-10 30 MG capsule Take 100 mg by mouth daily     • GaviLyte-G 236 g solution Take as directed   • LORazepam (ATIVAN) 0 5 mg tablet TAKE 1 TABLET BY MOUTH ONCE DAILY IF NEEDED for seizure  Limit of 1 in 24 hours   • NON FORMULARY Super Beets   • rosuvastatin (CRESTOR) 10 MG tablet Take 1 tablet (10 mg total) by mouth daily   • VALERIAN ROOT PO Use   • Vitamin Mixture (VITAMIN E COMPLETE PO) Take 1 tablet by mouth daily    • [DISCONTINUED] carBAMazepine (TEGretol) 200 mg tablet TAKE 3 TABLETS IN THE AM, 2 TABLETS AT LUNCH, 2 TABLETS AT DINNER, AND 2 TABLETS AT BEDTIME       Objective     /84 (BP Location: Left arm, Patient Position: Sitting, Cuff Size: Adult)   Pulse 84   Temp (!) 97 3 °F (36 3 °C) (Temporal)   Resp 16   Ht 5' 10" (1 778 m)   Wt 72 8 kg (160 lb 6 4 oz)   BMI 23 02 kg/m²     Physical Exam  Vitals reviewed  Constitutional:       Appearance: Normal appearance  Neck:      Vascular: No carotid bruit  Cardiovascular:      Rate and Rhythm: Normal rate and regular rhythm  Pulses: Normal pulses  Heart sounds: Normal heart sounds  Pulmonary:      Effort: Pulmonary effort is normal       Breath sounds: Normal breath sounds  Musculoskeletal:      Right lower leg: No edema  Left lower leg: No edema  Lymphadenopathy:      Cervical: No cervical adenopathy  Neurological:      Mental Status: He is alert     Psychiatric:         Mood and Affect: Mood normal        Dariusz Negrete MD

## 2023-01-31 ENCOUNTER — HOSPITAL ENCOUNTER (OUTPATIENT)
Dept: RADIOLOGY | Facility: HOSPITAL | Age: 63
Discharge: HOME | End: 2023-01-31

## 2023-01-31 DIAGNOSIS — G45.9 TIA (TRANSIENT ISCHEMIC ATTACK): ICD-10-CM

## 2023-02-03 ENCOUNTER — TELEPHONE (OUTPATIENT)
Dept: NEUROLOGY | Facility: CLINIC | Age: 63
End: 2023-02-03

## 2023-02-03 NOTE — TELEPHONE ENCOUNTER
Patient states he received his MRI results and has questions about it  Patient was asking to be seen sooner than his 3/22/23 appointment  Would you be okay seeing him sooner to review his results with him?     Thank you

## 2023-02-06 NOTE — TELEPHONE ENCOUNTER
Hello,       Spoke to patient, inform of all the options he asked to stay on 03/22/23 with you  and be placed on the waiting list     Thank you,     Dina Damico

## 2023-02-07 NOTE — TELEPHONE ENCOUNTER
[10:32 AM] Channing Chong Gm, I have the patient on the line you said 02/15/23 at 1:30 pm but i only see a 12:30 pm     [10:32 AM] Riley Mccormick  Can you please advise ? Thank you      [10:33 AM] Dale Amanda  In with an emg   So will   Look at it afterwards      [10:33 AM] Riley Mccormick  ok no problem sorry to bother and thank you      [11:08 AM] Dale Amanda  The 1pm is a 30 min and there is no 1:30 pm appointment   Is the 1 pm scheduled for 1 hour ? Lorelei Garza  You can put Marisoldeb Debra in at 12:30 but keep the 1:30 closed if that is easier      [11:17 AM] Riley Mccormick  Thank you he has accepted the 12:30 pm

## 2023-02-09 ENCOUNTER — HOSPITAL ENCOUNTER (OUTPATIENT)
Dept: NON INVASIVE DIAGNOSTICS | Facility: CLINIC | Age: 63
Discharge: HOME | End: 2023-02-09

## 2023-02-09 DIAGNOSIS — G45.9 TIA (TRANSIENT ISCHEMIC ATTACK): ICD-10-CM

## 2023-02-09 DIAGNOSIS — I10 ESSENTIAL HYPERTENSION: ICD-10-CM

## 2023-02-09 DIAGNOSIS — E78.2 MIXED HYPERLIPIDEMIA: ICD-10-CM

## 2023-02-15 ENCOUNTER — OFFICE VISIT (OUTPATIENT)
Dept: NEUROLOGY | Facility: CLINIC | Age: 63
End: 2023-02-15

## 2023-02-15 VITALS
DIASTOLIC BLOOD PRESSURE: 78 MMHG | HEIGHT: 70 IN | RESPIRATION RATE: 18 BRPM | TEMPERATURE: 96.6 F | WEIGHT: 160 LBS | SYSTOLIC BLOOD PRESSURE: 135 MMHG | HEART RATE: 84 BPM | BODY MASS INDEX: 22.9 KG/M2

## 2023-02-15 DIAGNOSIS — G40.909 SEIZURE DISORDER (HCC): Primary | ICD-10-CM

## 2023-02-15 NOTE — PATIENT INSTRUCTIONS
Soup once  week    Fresh fries , on salt , once week     Ice cream healthy choice  1/2 cup ,  5 to 6 times  a week       Labs in next few weeks     Same dose of aggrenox/ crestor

## 2023-02-15 NOTE — PROGRESS NOTES
Patient ID: Kimberly Lobo is a 61 y o  male  Assessment/Plan:    Seizure disorder [facility])  Has a known history of partial seizure with secondary generalization  He has had no seizures since 2018 but did have an episode of gait dysfunction in January  His MRI imaging of the brain showed no evidence of an acute ischemic event but there was microvascular ischemic changes  Today his MRI of the brain was reviewed personally by the PACS system  I recommended that he continue on Aggrenox and Crestor for stroke prevention  He was agreeable  His Tegretol level was noted to be extremely high and his dose of Tegretol was decreased to 8 a day  I have asked him to repeat his Tegretol level and a CMP  He does have a history of hyponatremia thought to be due to Tegretol  On review of his chart he does have an appointment with a physician neurologist at [facility] next week  He will be speaking to his brother regarding his appointment  I have asked him to avoid salt if possible but he can have Western Gabriella fries and he can drink soup once a week  He is concerned about eating ice cream on a regular basis  This should be small amounts but low-fat version  In the past he did have high fat versions of ice cream prior to his event in 2021   Diagnoses and all orders for this visit:    Seizure disorder ([facility] [address] )  -     Carbamazepine level, total; Future  -     Comprehensive metabolic panel; Future         Subjective:    Kimberly Lobo is P 21 y/o right hand male with a long history of seizures  He had  a seizure on 4/20/18  He has been seizure free since  [facility] seizures are partial complex with secondary generalization  He was taking Tegretol 9 a day  He was doing well until January 6 when he had an episode of imbalance in his legs  This lasted for short period of time  He then was seen in the emergency room and did not stay    He then asked for MRI imaging of the brain and this was performed several weeks later  There was no associated slurred speech double vision loss of vision  He thought that the symptoms were due to an recurrent stroke  During his hospitalization in the emergency room he was noted to have a Tegretol level of 19  Subsequently his dose of Tegretol was decreased to 8 a day and he has had no recurrent symptoms  He does have access to Ativan as needed        His chart was reviewed and the information reveal that he has been hospitalized several times for potential TIA and stroke  In October of 2021 he had an episode of lower extremity weakness with an inability to walk  Also had weakness of his arms with slurred speech    He was seen in the emergency room and left AMA  His symptoms did improve quickly while in the ambulance Imaging  study did demonstrated age indeterminate left internal capsule and left caudate ischemic infarct  He is currently on Aggrenox    Today he was contemplating potentially discontinuing Aggrenox and Crestor  He does have a history of elevation of his triglycerides which is improved with the use of  Crestor      10/5/2021 CTH:     1  No acute intracranial hemorrhage  No large acute territorial infarction  2  Age indeterminant ischemic changes within the anterior limb of LEFT internal  capsule and surrounding the anterior body of LEFT caudate nucleus  3  Metal fragment within the posterior soft tissues overlying the RIGHT  occipital bone measuring 6 mm  Should be considered if there is a brain MRI  Ordered      He did have questions regarding his diet                    The following portions of the patient's history were reviewed and updated as appropriate:   He  has a past medical history of Arthritis, Hypertension, Seizures ([address] ), and Stroke ([facility] [address] )  He  has a past surgical history that includes Elbow surgery (2017)    His family history includes Alzheimer's disease in his mother; Dementia in his mother; Hyperlipidemia in his brother; Stroke in his father  He  reports that he has never smoked  He has never used smokeless tobacco  He reports current alcohol use  He reports that he does not use drugs  Current Outpatient Medications   Medication Sig Dispense Refill   • alendronate (FOSAMAX) 70 mg tablet Take 1 tablet (70 mg total) by mouth every 7 days 4 tablet 5   • aspirin-dipyridamole (AGGRENOX)  mg per 12 hr capsule TAKE 1 CAPSULE BY MOUTH TWICE A  capsule 3   • Calcium-Magnesium-Vitamin D (CALCIUM MAGNESIUM PO) Take by mouth     • carBAMazepine (TEGretol) 200 mg tablet TAKE 2 TABLETS IN THE AM, 2 TABLETS AT LUNCH, 2 TABLETS AT DINNER, AND 2 TABLETS AT BEDTIME 240 tablet 5   • co-enzyme Q-10 30 MG capsule Take 100 mg by mouth daily       • LORazepam (ATIVAN) 0 5 mg tablet TAKE 1 TABLET BY MOUTH ONCE DAILY IF NEEDED for seizure  Limit of 1 in 24 hours 10 tablet 0   • NON FORMULARY Super Beets     • rosuvastatin (CRESTOR) 10 MG tablet Take 1 tablet (10 mg total) by mouth daily 30 tablet 5   • VALERIAN ROOT PO Use     • Vitamin Mixture (VITAMIN E COMPLETE PO) Take 1 tablet by mouth daily      • GaviLyte-G 236 g solution Take as directed (Patient not taking: Reported on 2/15/2023)       No current facility-administered medications for this visit  He is allergic to meperidine, amoxicillin, azithromycin, other, and pollen extract            Objective:    Blood pressure 135/78, pulse 84, temperature (!) 96 6 °F (35 9 °C), temperature source Tympanic, resp  rate 18, height 5' 10" (1 778 m), weight 72 6 kg (160 lb)  Physical Exam  Eyes:      General: Lids are normal       Extraocular Movements: Extraocular movements intact  Pupils: Pupils are equal, round, and reactive to light  Neurological:      Deep Tendon Reflexes:      Reflex Scores:       Tricep reflexes are 1+ on the right side and 1+ on the left side  Bicep reflexes are 2+ on the right side and 2+ on the left side         Patellar reflexes are 2+ on the right side and 2+ on the left side  Achilles reflexes are 1+ on the right side and 1+ on the left side  Neurological Exam  Mental Status   Oriented to person, place and time  Language is fluent with no aphasia  Cranial Nerves  CN II: Visual acuity is normal  Visual fields full to confrontation  CN III, IV, VI: Extraocular movements intact bilaterally  Normal lids and orbits bilaterally  Pupils equal round and reactive to light bilaterally  CN V: Facial sensation is normal   CN VII: Full and symmetric facial movement  CN VIII: Hearing is normal   CN IX, X: Palate elevates symmetrically  Normal gag reflex  CN XI: Shoulder shrug strength is normal   CN XII: Tongue midline without atrophy or fasciculations  End gaze nystagmus  Motor    Strength is 5/5 in all four extremities except as noted  His right upper extremity had curling of the 4th and 5th digit with atrophy of the ADM  He had normal strength proximally but had difficulty with hand  on the right  This is ongoing and old  The left upper extremity strength was normal in the bilateral lower extremity strength was normal he did have bilateral tremors with extension worse on the right than the left  There is no evidence of cogwheel rigidity    Sensory  Light touch is normal in upper and lower extremities  Temperature is normal in upper and lower extremities          Reflexes                                            Right                      Left  Biceps                                 2+                         2+  Triceps                                1+                         1+  Patellar                                2+                         2+  Achilles                                1+                         1+  Plantar                           Downgoing                Downgoing    Right pathological reflexes: Jason's absent  Left pathological reflexes: Jason's absent      Coordination  Right: Finger-to-nose normal Left: Finger-to-nose normal     Gait  Normal casual, toe, heel and tandem gait  Able to rise from chair without using arms  Review of systems obtained from the medical assistant as below was reviewed with the patient at today's visit    ROS:    Review of Systems   Constitutional: Negative for chills and fever  HENT: Negative for ear pain and sore throat  Eyes: Negative for pain and visual disturbance  Respiratory: Negative for cough and shortness of breath  Cardiovascular: Negative for chest pain and palpitations  Gastrointestinal: Negative for abdominal pain and vomiting  Genitourinary: Negative for dysuria and hematuria  Musculoskeletal: Positive for back pain (lower back)  Negative for arthralgias  Skin: Negative for color change and rash  Neurological: Negative for seizures and syncope  Psychiatric/Behavioral: Positive for sleep disturbance (trouble sleeping at times)  All other systems reviewed and are negative

## 2023-02-15 NOTE — ASSESSMENT & PLAN NOTE
Has a known history of partial seizure with secondary generalization  He has had no seizures since 2018 but did have an episode of gait dysfunction in January  His MRI imaging of the brain showed no evidence of an acute ischemic event but there was microvascular ischemic changes  Today his MRI of the brain was reviewed personally by the PACS system  I recommended that he continue on Aggrenox and Crestor for stroke prevention  He was agreeable  His Tegretol level was noted to be extremely high and his dose of Tegretol was decreased to 8 a day  I have asked him to repeat his Tegretol level and a CMP  He does have a history of hyponatremia thought to be due to Tegretol

## 2023-02-26 ENCOUNTER — APPOINTMENT (OUTPATIENT)
Dept: LAB | Facility: CLINIC | Age: 63
End: 2023-02-26

## 2023-02-26 DIAGNOSIS — G40.909 SEIZURE DISORDER (HCC): ICD-10-CM

## 2023-02-26 LAB
ALBUMIN SERPL BCP-MCNC: 3.9 G/DL (ref 3.5–5)
ALP SERPL-CCNC: 68 U/L (ref 46–116)
ALT SERPL W P-5'-P-CCNC: 33 U/L (ref 12–78)
ANION GAP SERPL CALCULATED.3IONS-SCNC: 2 MMOL/L (ref 4–13)
AST SERPL W P-5'-P-CCNC: 17 U/L (ref 5–45)
BILIRUB SERPL-MCNC: 0.4 MG/DL (ref 0.2–1)
BUN SERPL-MCNC: 13 MG/DL (ref 5–25)
CALCIUM SERPL-MCNC: 9.3 MG/DL (ref 8.3–10.1)
CARBAMAZEPINE SERPL-MCNC: 10 UG/ML (ref 4–12)
CHLORIDE SERPL-SCNC: 106 MMOL/L (ref 96–108)
CO2 SERPL-SCNC: 28 MMOL/L (ref 21–32)
CREAT SERPL-MCNC: 0.66 MG/DL (ref 0.6–1.3)
GFR SERPL CREATININE-BSD FRML MDRD: 102 ML/MIN/1.73SQ M
GLUCOSE P FAST SERPL-MCNC: 88 MG/DL (ref 65–99)
POTASSIUM SERPL-SCNC: 3.8 MMOL/L (ref 3.5–5.3)
PROT SERPL-MCNC: 7.2 G/DL (ref 6.4–8.4)
SODIUM SERPL-SCNC: 136 MMOL/L (ref 135–147)

## 2023-03-09 ENCOUNTER — TELEPHONE (OUTPATIENT)
Dept: NEUROLOGY | Facility: CLINIC | Age: 63
End: 2023-03-09

## 2023-03-09 NOTE — TELEPHONE ENCOUNTER
Called patient and made him aware of lab results and recommendations. Patient verbalized understanding    Patient states he will no longer be seeing Dr. Balaji Sandoval.  He states Dr. Balaji Sandoval tried placing patient back on propranolol 40 mg but patient states he started getting pressure in his eye again so he discontinued that medication as he does not feel that medication is right for him

## 2023-03-09 NOTE — TELEPHONE ENCOUNTER
----- Message from Briseida Mar DO sent at 2/27/2023 11:20 AM EST -----  Please call the patient regarding his result    Please let Amina More know that the Tegretol level is 10, much better , he should remain on same dose of 8 per day     I did note he was seen by Dr Renee Gonzales on 02/21/23 . He addressed the same dx that I did on 02/15 . Please let him that he does not need to see 2 neurologist for the same issues . It can create conflicting information . I would recommend he only sees one of us .  It  up to him  but he should make a decision asap

## 2023-03-20 ENCOUNTER — APPOINTMENT (OUTPATIENT)
Dept: LAB | Facility: CLINIC | Age: 63
End: 2023-03-20

## 2023-03-20 DIAGNOSIS — E87.1 HYPONATREMIA: ICD-10-CM

## 2023-03-20 LAB
ANION GAP SERPL CALCULATED.3IONS-SCNC: 0 MMOL/L (ref 4–13)
BUN SERPL-MCNC: 10 MG/DL (ref 5–25)
CALCIUM SERPL-MCNC: 9 MG/DL (ref 8.3–10.1)
CHLORIDE SERPL-SCNC: 103 MMOL/L (ref 96–108)
CO2 SERPL-SCNC: 28 MMOL/L (ref 21–32)
CREAT SERPL-MCNC: 0.6 MG/DL (ref 0.6–1.3)
GFR SERPL CREATININE-BSD FRML MDRD: 107 ML/MIN/1.73SQ M
GLUCOSE P FAST SERPL-MCNC: 95 MG/DL (ref 65–99)
POTASSIUM SERPL-SCNC: 4.4 MMOL/L (ref 3.5–5.3)
SODIUM SERPL-SCNC: 131 MMOL/L (ref 135–147)

## 2023-04-01 DIAGNOSIS — E78.2 MIXED HYPERLIPIDEMIA: ICD-10-CM

## 2023-04-01 NOTE — TELEPHONE ENCOUNTER
Medication Refill Request     rosuvastatin (CRESTOR) 10 MG tablet  Take 1 tablet (10 mg total) by mouth daily, Starting Thu 9/29/2022, Normal   Dispense: 30 tablet   Refills: 5 ordered   Pharmacy:  [facility] (Ph: [phone])       Verified pharmacy   [x]  Verified ordering Provider   [x]  Does patient have enough for the next 3 days?  Yes [x] No []

## 2023-04-03 RX ORDER — ROSUVASTATIN CALCIUM 10 MG/1
10 TABLET, COATED ORAL DAILY
Qty: 30 TABLET | Refills: 0 | Status: SHIPPED | OUTPATIENT
Start: 2023-04-03

## 2023-04-12 ENCOUNTER — TELEPHONE (OUTPATIENT)
Dept: FAMILY MEDICINE CLINIC | Facility: CLINIC | Age: 63
End: 2023-04-12

## 2023-04-12 NOTE — TELEPHONE ENCOUNTER
Geovanni Ng from [facility] called, stating pt has a colonoscopy scheduled for Friday  Stated pt has a 3 day supply of aggrenox, but that pt only wanted to take it for 2 days  Gave both fax number and phone number      Fax - [phone]  Phone - [phone] ext [phone]

## 2023-04-26 ENCOUNTER — TELEPHONE (OUTPATIENT)
Dept: FAMILY MEDICINE CLINIC | Facility: CLINIC | Age: 63
End: 2023-04-26

## 2023-04-26 DIAGNOSIS — G40.209 PARTIAL SYMPTOMATIC EPILEPSY WITH COMPLEX PARTIAL SEIZURES, NOT INTRACTABLE, WITHOUT STATUS EPILEPTICUS (HCC): ICD-10-CM

## 2023-04-26 DIAGNOSIS — M81.0 OSTEOPOROSIS WITHOUT CURRENT PATHOLOGICAL FRACTURE, UNSPECIFIED OSTEOPOROSIS TYPE: ICD-10-CM

## 2023-04-26 RX ORDER — CARBAMAZEPINE 200 MG/1
TABLET ORAL
Qty: 240 TABLET | Refills: 5 | Status: SHIPPED | OUTPATIENT
Start: 2023-04-26

## 2023-04-26 RX ORDER — ALENDRONATE SODIUM 70 MG/1
70 TABLET ORAL
Qty: 4 TABLET | Refills: 5 | Status: SHIPPED | OUTPATIENT
Start: 2023-04-26

## 2023-04-26 NOTE — TELEPHONE ENCOUNTER
Pt left message requesting a refill of his Carbamazepine   CB# [phone]    Per chart review script for Carbamazepine written on 1/30 By PCP but was set to no print    Called pt ,he stated that he call his pharmacy and was told that he needs more refills  Pt confirmed that he is taking   Carbamazepine 200 mg tab  Takes 3 tabs in the morning,1 tab at noon,2 tabs at supper and 2 tabs at bedtime, pt reported that he is tolerating it well denies any issues      Noted Carbamazepine level,total on 2/26 was 10 0  Na done on 3/    Please sign pended script if agreeable

## 2023-04-26 NOTE — TELEPHONE ENCOUNTER
Medication: alendronate 70 mg   Day supply: 4  Pharmacy: [facility]     Last office visit: 4/21/2023    Upcoming office visit: 7/3/2023

## 2023-05-11 NOTE — ADDENDUM NOTE
Addended by: Ayde Certain on: 7/27/2022 03:14 PM     Modules accepted: Orders Referrals faxed to number listed below.

## 2023-05-26 DIAGNOSIS — E78.2 MIXED HYPERLIPIDEMIA: ICD-10-CM

## 2023-05-26 RX ORDER — ROSUVASTATIN CALCIUM 10 MG/1
10 TABLET, COATED ORAL DAILY
Qty: 30 TABLET | Refills: 0 | Status: SHIPPED | OUTPATIENT
Start: 2023-05-26

## 2023-06-24 DIAGNOSIS — E78.2 MIXED HYPERLIPIDEMIA: ICD-10-CM

## 2023-06-26 RX ORDER — ROSUVASTATIN CALCIUM 10 MG/1
10 TABLET, COATED ORAL DAILY
Qty: 30 TABLET | Refills: 5 | Status: SHIPPED | OUTPATIENT
Start: 2023-06-26 | End: 2023-07-03 | Stop reason: SDUPTHER

## 2023-06-28 ENCOUNTER — RA CDI HCC (OUTPATIENT)
Dept: OTHER | Facility: HOSPITAL | Age: 63
End: 2023-06-28

## 2023-06-28 NOTE — PROGRESS NOTES
[facility]  coding opportunities       Chart reviewed, no opportunity found: CHART REVIEWED, NO OPPORTUNITY FOUND        Patients Insurance        Commercial Insurance: [employer]

## 2023-07-03 ENCOUNTER — TELEPHONE (OUTPATIENT)
Dept: FAMILY MEDICINE CLINIC | Facility: CLINIC | Age: 63
End: 2023-07-03

## 2023-07-03 DIAGNOSIS — E78.2 MIXED HYPERLIPIDEMIA: ICD-10-CM

## 2023-07-03 RX ORDER — ROSUVASTATIN CALCIUM 10 MG/1
10 TABLET, COATED ORAL DAILY
Qty: 30 TABLET | Refills: 5 | Status: SHIPPED | OUTPATIENT
Start: 2023-07-03 | End: 2023-07-03 | Stop reason: SDUPTHER

## 2023-07-03 RX ORDER — ROSUVASTATIN CALCIUM 10 MG/1
10 TABLET, COATED ORAL DAILY
Qty: 30 TABLET | Refills: 0 | Status: SHIPPED | OUTPATIENT
Start: 2023-07-03

## 2023-07-03 NOTE — TELEPHONE ENCOUNTER
Pt was supposed to see Dr. Lou Michelle today 7/3 but had to reschedule. [facility]'s next available apt isn't until 7/25 due to same day appointment slots. Pt is going to call in the meantime and see if he can get a same day slot.  However, he is almost out of medication:    Medication: Rosuvastatin   Day supply: 30  Pharmacy: [facility] office visit: 1/30/23  Upcoming office visit: 7/25/2023

## 2023-07-26 ENCOUNTER — OFFICE VISIT (OUTPATIENT)
Dept: FAMILY MEDICINE CLINIC | Facility: CLINIC | Age: 63
End: 2023-07-26
Payer: COMMERCIAL

## 2023-07-26 VITALS
HEIGHT: 70 IN | WEIGHT: 163 LBS | DIASTOLIC BLOOD PRESSURE: 76 MMHG | HEART RATE: 85 BPM | SYSTOLIC BLOOD PRESSURE: 110 MMHG | OXYGEN SATURATION: 96 % | BODY MASS INDEX: 23.34 KG/M2

## 2023-07-26 DIAGNOSIS — M81.0 OSTEOPOROSIS WITHOUT CURRENT PATHOLOGICAL FRACTURE, UNSPECIFIED OSTEOPOROSIS TYPE: ICD-10-CM

## 2023-07-26 DIAGNOSIS — Z12.5 SCREENING FOR PROSTATE CANCER: ICD-10-CM

## 2023-07-26 DIAGNOSIS — I10 ESSENTIAL HYPERTENSION: Primary | ICD-10-CM

## 2023-07-26 DIAGNOSIS — E78.2 MIXED HYPERLIPIDEMIA: ICD-10-CM

## 2023-07-26 PROCEDURE — 99214 OFFICE O/P EST MOD 30 MIN: CPT | Performed by: FAMILY MEDICINE

## 2023-07-26 RX ORDER — ALENDRONATE SODIUM 70 MG/1
70 TABLET ORAL
Qty: 4 TABLET | Refills: 5 | Status: SHIPPED | OUTPATIENT
Start: 2023-07-26

## 2023-07-26 RX ORDER — ROSUVASTATIN CALCIUM 10 MG/1
10 TABLET, COATED ORAL DAILY
Qty: 90 TABLET | Refills: 1 | Status: SHIPPED | OUTPATIENT
Start: 2023-07-26

## 2023-07-26 RX ORDER — ATORVASTATIN CALCIUM 40 MG/1
1 TABLET, FILM COATED ORAL EVERY EVENING
COMMUNITY
End: 2023-07-26

## 2023-07-31 ENCOUNTER — TELEPHONE (OUTPATIENT)
Dept: NEUROLOGY | Facility: CLINIC | Age: 63
End: 2023-07-31

## 2023-07-31 NOTE — TELEPHONE ENCOUNTER
Called and spoke to patient.  Patient confirmed upcoming apt with Dr Flaquita Merrill on 8/23/23 @ 11:30 AM. 93.78

## 2023-08-25 ENCOUNTER — TELEPHONE (OUTPATIENT)
Dept: NEUROLOGY | Facility: CLINIC | Age: 63
End: 2023-08-25

## 2023-08-25 NOTE — TELEPHONE ENCOUNTER
Patient is requesting a reschedule due to missing appointment and needs earliest appointment possible in day.   Please assist.

## 2023-09-06 DIAGNOSIS — G40.909 SEIZURE DISORDER (HCC): Primary | ICD-10-CM

## 2023-09-06 NOTE — PROGRESS NOTES
Team     please schedule Agus's   CAT scan of the head and EEG as soon as possible he does have a follow-up appointment with me on 920    Please contact his brother Mis Lucio

## 2023-09-08 ENCOUNTER — TELEPHONE (OUTPATIENT)
Dept: NEUROLOGY | Facility: CLINIC | Age: 63
End: 2023-09-08

## 2023-09-08 NOTE — TELEPHONE ENCOUNTER
Patient called back asking for an EEG, a CT scan, and another blood test because he took his two extra carbamazepines.   Please assist.

## 2023-09-08 NOTE — TELEPHONE ENCOUNTER
Pt called in. He took 2 extra carBAMazepine (TEGretol) 200 mg tablet [[id number]]   He is asking for a CB asap please. I did message Maureen Hi on Teams asking her to please call pt.

## 2023-09-08 NOTE — TELEPHONE ENCOUNTER
Called and spoke with patient. He states that the day of his labs (9/5) he had taken 2 extra carbamazepine pills. He said around 6:30 PM he started to feel this "fluttering" which is typically his warning sign of a seizure. He sat for about 45 minutes and it resolved. Patient states he did not actually have a seizure but he is concerned about the event. Patient is aware that his carbamazepine level was high on 9/15 since he took 2 extra doses. Please review ED notes if you'd like     Patient wants to know if he should have his levels rechecked now?      Please advise     cb#: [phone], okay to leave detailed message

## 2023-09-11 ENCOUNTER — APPOINTMENT (OUTPATIENT)
Dept: LAB | Facility: CLINIC | Age: 63
End: 2023-09-11
Payer: COMMERCIAL

## 2023-09-11 ENCOUNTER — TELEPHONE (OUTPATIENT)
Dept: NEUROLOGY | Facility: CLINIC | Age: 63
End: 2023-09-11

## 2023-09-11 ENCOUNTER — OFFICE VISIT (OUTPATIENT)
Dept: FAMILY MEDICINE CLINIC | Facility: CLINIC | Age: 63
End: 2023-09-11
Payer: COMMERCIAL

## 2023-09-11 VITALS
BODY MASS INDEX: 23.05 KG/M2 | HEART RATE: 92 BPM | SYSTOLIC BLOOD PRESSURE: 138 MMHG | HEIGHT: 70 IN | WEIGHT: 161 LBS | OXYGEN SATURATION: 97 % | DIASTOLIC BLOOD PRESSURE: 88 MMHG

## 2023-09-11 DIAGNOSIS — R25.1 TREMOR OF RIGHT HAND: ICD-10-CM

## 2023-09-11 DIAGNOSIS — I10 ESSENTIAL HYPERTENSION: ICD-10-CM

## 2023-09-11 DIAGNOSIS — I10 ESSENTIAL HYPERTENSION: Primary | ICD-10-CM

## 2023-09-11 DIAGNOSIS — G40.909 SEIZURE DISORDER (HCC): ICD-10-CM

## 2023-09-11 LAB
ALBUMIN SERPL BCP-MCNC: 4.6 G/DL (ref 3.5–5)
ALP SERPL-CCNC: 57 U/L (ref 34–104)
ALT SERPL W P-5'-P-CCNC: 24 U/L (ref 7–52)
ANION GAP SERPL CALCULATED.3IONS-SCNC: 8 MMOL/L
AST SERPL W P-5'-P-CCNC: 21 U/L (ref 13–39)
BILIRUB SERPL-MCNC: 0.42 MG/DL (ref 0.2–1)
BUN SERPL-MCNC: 10 MG/DL (ref 5–25)
CALCIUM SERPL-MCNC: 9 MG/DL (ref 8.4–10.2)
CHLORIDE SERPL-SCNC: 97 MMOL/L (ref 96–108)
CO2 SERPL-SCNC: 26 MMOL/L (ref 21–32)
CREAT SERPL-MCNC: 0.7 MG/DL (ref 0.6–1.3)
GFR SERPL CREATININE-BSD FRML MDRD: 100 ML/MIN/1.73SQ M
GLUCOSE SERPL-MCNC: 92 MG/DL (ref 65–140)
POTASSIUM SERPL-SCNC: 4.4 MMOL/L (ref 3.5–5.3)
PROT SERPL-MCNC: 7.1 G/DL (ref 6.4–8.4)
SODIUM SERPL-SCNC: 131 MMOL/L (ref 135–147)

## 2023-09-11 PROCEDURE — 36415 COLL VENOUS BLD VENIPUNCTURE: CPT

## 2023-09-11 PROCEDURE — 80053 COMPREHEN METABOLIC PANEL: CPT

## 2023-09-11 PROCEDURE — 99214 OFFICE O/P EST MOD 30 MIN: CPT | Performed by: FAMILY MEDICINE

## 2023-09-11 NOTE — TELEPHONE ENCOUNTER
Patient's PCP Dr. Luisa Farmer called to confirm patient was going to be scheduled for EEG and CT of head. Doctor verbalized understanding of messages from Dr. Marilyn Castañeda 9/6/2023. Please continue to assist per Dr. Marilyn Castañeda and call patient once scheduled.

## 2023-09-11 NOTE — ASSESSMENT & PLAN NOTE
Sees neuro disease is present with mucosal thickening in the bilateral ethmoid and maxillary sinuses. A mucous retention cyst or polyp is again noted in the left maxillary sinus. **This report has been created using voice recognition software. It may contain minor errors which are inherent in voice recognition technology. **    Final report electronically signed by Dr. Harmony Cano on 3/9/2018 4:36 PM       CT head:3/9/2018 (reviewed)     Impression   No mass effect or acute hemorrhage. We reviewed the patient records and available information in the EHR       Impression:    Principal Problem:    Vasovagal syncope  Active Problems:    S/P laparoscopic sleeve gastrectomy    Acquired hypothyroidism    Syncope and collapse    PCO (polycystic ovaries)  Resolved Problems: Morbid obesity with BMI of 60.0-69.9, adult [facility])    This is a 51-year-old female who presents with symptoms of vasovagal syncope. She apparently experienced an episode of passing out that is probably vasovagal and unlikely to be seizure. She has had syncope/nears syncope episodes in the past with no clear explanation, with her undergoing an event monitor and holter monitor. The patient is scheduled for a reveal monitor with cardiology. She will need to undergo workup from neurology standpoint MRI brain was performed prior to my evaluation of the patient that showed no acute intracranial process. Findings are suggestive of mild chronic nonspecific small vessel disease changes that may be attributed to patient's long-standing history of migraine. I recommended that the patient undergo a homocystine level. In addition, EEG, performed prior to my evaluation that was unremarkable.   The patient would benefit from a tilt table test.  The patient and her family asked questions reflecting understanding, and agreed with the following plan:      Recommendations:                                              1. MRI brain, reviewed and agree

## 2023-09-11 NOTE — PROGRESS NOTES
Name: Ludwig Ortiz      : 1960      MRN: [id number]  Encounter Provider: Natali Macias MD  Encounter Date: 2023   Encounter department: [facility] PRIMARY CARE    Assessment & Plan     1. Essential hypertension  Assessment & Plan:  BP stable    Orders:  -     Comprehensive metabolic panel; Future    2. Seizure disorder [facility])  Assessment & Plan:  Await tegretol level      3. Tremor of right hand  Assessment & Plan:  Sees neuro           Subjective      Had  Episode  At diner that he  [place] dizzy, possibly a seizure, due to see neuro, their  Office  Was supposed to schedule eeg and ct  Scan but  Hasn't  Jones  Anything, was  staying  UP TO MIDNIGHT BUT  NOW  GOING TO BED EARLIER, level of Tegretol was 15, but  took2  Extra  Pills, may need  An additional seizure    Review of Systems   Constitutional: Negative for activity change, appetite change and fatigue. Respiratory: Negative for shortness of breath. Cardiovascular: Negative for chest pain. Neurological: Positive for tremors and seizures. Negative for dizziness, light-headedness and headaches. Hematological: Negative for adenopathy. Psychiatric/Behavioral: The patient is not nervous/anxious. Current Outpatient Medications on File Prior to Visit   Medication Sig   • alendronate (FOSAMAX) 70 mg tablet Take 1 tablet (70 mg total) by mouth every 7 days   • aspirin-dipyridamole (AGGRENOX)  mg per 12 hr capsule TAKE 1 CAPSULE BY MOUTH TWICE A DAY   • Calcium-Magnesium-Vitamin D (CALCIUM MAGNESIUM PO) Take by mouth   • carBAMazepine (TEGretol) 200 mg tablet TAKE 3 TABLETS IN THE AM, 1 TABLETS AT LUNCH, 2 TABLETS AT DINNER, AND 2 TABLETS AT BEDTIME   • co-enzyme Q-10 30 MG capsule Take 100 mg by mouth daily     • LORazepam (ATIVAN) 0.5 mg tablet TAKE 1 TABLET BY MOUTH ONCE DAILY IF NEEDED for seizure.   Limit of 1 in 24 hours   • NON FORMULARY Super Beets   • rosuvastatin (CRESTOR) 10 MG tablet Take 1 tablet (10 mg total) by mouth daily   • VALERIAN ROOT PO Use   • Vitamin Mixture (VITAMIN E COMPLETE PO) Take 1 tablet by mouth daily    • GaviLyte-G 236 g solution Take as directed (Patient not taking: Reported on 9/11/2023)       Objective     /88 (BP Location: Right arm, Patient Position: Sitting, Cuff Size: Standard)   Pulse 92   Ht 5' 10" (1.778 m)   Wt 73 kg (161 lb)   SpO2 97%   BMI 23.10 kg/m²     Physical Exam  Vitals reviewed. Constitutional:       Appearance: Normal appearance. Cardiovascular:      Rate and Rhythm: Normal rate and regular rhythm. Pulses: Normal pulses. Heart sounds: Normal heart sounds. Pulmonary:      Effort: Pulmonary effort is normal.      Breath sounds: Normal breath sounds. Musculoskeletal:      Right lower leg: No edema. Left lower leg: No edema. Lymphadenopathy:      Cervical: No cervical adenopathy. Neurological:      Mental Status: He is alert.    Psychiatric:         Mood and Affect: Mood normal.       Estefany Johnson MD

## 2023-09-12 ENCOUNTER — HOSPITAL ENCOUNTER (OUTPATIENT)
Dept: NEUROLOGY | Facility: CLINIC | Age: 63
Discharge: HOME | End: 2023-09-12
Payer: COMMERCIAL

## 2023-09-12 DIAGNOSIS — G40.909 SEIZURE DISORDER (HCC): ICD-10-CM

## 2023-09-12 DIAGNOSIS — E87.1 HYPONATREMIA: Primary | ICD-10-CM

## 2023-09-12 PROCEDURE — 95816 EEG AWAKE AND DROWSY: CPT

## 2023-09-12 NOTE — TELEPHONE ENCOUNTER
Patient is scheduled for EEG 9/21 and CT Scan 9/19. Could not get anything sooner. Patient also sent in My Chart message regarding this. Forwarded My Chart message to Dr. Rubi Isidro.

## 2023-09-12 NOTE — TELEPHONE ENCOUNTER
"Lucas Davis routed conversation to Neurology [id number] [place] [address] Team 2 20 hours ago (11:14 AM)     Lucas Davis 20 hours ago (11:14 AM)     Patient's PCP Dr. Josefa Huerta called to confirm patient was going to be scheduled for EEG and CT of head. Doctor verbalized understanding of messages from Dr. Marylu Wagner 9/6/2023. Please continue to assist per Dr. Marylu Wagner and call patient once scheduled."        _____________________________________    Alfonso Mina DO  Neurology [facility] Clinical Team 2; Neurology [facility]; Destiny Navarro 3 days ago     Will order a tegretol level , prior to the am dose of tegretol/ I did order an eeg and ctscan last week . Danny Kulkarni  Please send to scheduling team

## 2023-09-13 ENCOUNTER — TELEPHONE (OUTPATIENT)
Dept: FAMILY MEDICINE CLINIC | Facility: CLINIC | Age: 63
End: 2023-09-13

## 2023-09-13 PROCEDURE — 95812 EEG 41-60 MINUTES: CPT | Performed by: PSYCHIATRY & NEUROLOGY

## 2023-09-13 NOTE — TELEPHONE ENCOUNTER
Patient called  He wanted to know if he should use Salt with Sodium in it. He was using sodium free.  And if there is anything else he should be doing to increase his sodium Please call him today

## 2023-09-14 ENCOUNTER — TELEPHONE (OUTPATIENT)
Dept: NEUROLOGY | Facility: CLINIC | Age: 63
End: 2023-09-14

## 2023-09-14 NOTE — TELEPHONE ENCOUNTER
Pt called upset that his appt was cancelled. He had an CT scheduled for tomorrow 09/15/23. Pt stated was he told that his insurance will not cover the test and he needed to cancel or pay out of pocket. Pt stated that if he had to pay out of pocket then he would because he needed answers. Pt wants to keep his appt. Please call pt [phone].

## 2023-09-18 ENCOUNTER — TELEPHONE (OUTPATIENT)
Dept: NEUROLOGY | Facility: CLINIC | Age: 63
End: 2023-09-18

## 2023-09-18 ENCOUNTER — HOSPITAL ENCOUNTER (OUTPATIENT)
Dept: RADIOLOGY | Facility: HOSPITAL | Age: 63
Discharge: HOME | End: 2023-09-18
Attending: PSYCHIATRY & NEUROLOGY
Payer: COMMERCIAL

## 2023-09-18 DIAGNOSIS — G40.909 SEIZURE DISORDER (HCC): ICD-10-CM

## 2023-09-18 PROCEDURE — G1004 CDSM NDSC: HCPCS

## 2023-09-18 PROCEDURE — 70450 CT HEAD/BRAIN W/O DYE: CPT

## 2023-09-18 NOTE — TELEPHONE ENCOUNTER
Patient presented with Seizure Reporting and Neurological forms that needs to be filled out. He's stating he hasn't had a seizure and doesn't know why he has received this. Please reach out to patient for further clarification. Forms are scanned into this encounter. Patient has an appt with Dr Hossein Odell on 9/20/23, informed him to check with provider at time of visit.

## 2023-09-20 ENCOUNTER — TELEPHONE (OUTPATIENT)
Dept: NEUROLOGY | Facility: CLINIC | Age: 63
End: 2023-09-20

## 2023-09-20 ENCOUNTER — OFFICE VISIT (OUTPATIENT)
Dept: NEUROLOGY | Facility: CLINIC | Age: 63
End: 2023-09-20
Payer: COMMERCIAL

## 2023-09-20 VITALS
HEART RATE: 83 BPM | TEMPERATURE: 97.3 F | BODY MASS INDEX: 23.34 KG/M2 | WEIGHT: 163 LBS | OXYGEN SATURATION: 99 % | SYSTOLIC BLOOD PRESSURE: 146 MMHG | DIASTOLIC BLOOD PRESSURE: 92 MMHG | RESPIRATION RATE: 18 BRPM | HEIGHT: 70 IN

## 2023-09-20 DIAGNOSIS — G40.909 SEIZURE DISORDER (HCC): Primary | ICD-10-CM

## 2023-09-20 DIAGNOSIS — I63.9 CEREBROVASCULAR ACCIDENT (CVA) DUE TO THROMBOSIS (HCC): ICD-10-CM

## 2023-09-20 DIAGNOSIS — G40.209 PARTIAL SYMPTOMATIC EPILEPSY WITH COMPLEX PARTIAL SEIZURES, NOT INTRACTABLE, WITHOUT STATUS EPILEPTICUS (HCC): ICD-10-CM

## 2023-09-20 PROCEDURE — 99214 OFFICE O/P EST MOD 30 MIN: CPT | Performed by: PSYCHIATRY & NEUROLOGY

## 2023-09-20 RX ORDER — LEVETIRACETAM 500 MG/1
500 TABLET ORAL EVERY 12 HOURS SCHEDULED
Qty: 60 TABLET | Refills: 5 | Status: SHIPPED | OUTPATIENT
Start: 2023-09-20

## 2023-09-20 RX ORDER — LORAZEPAM 0.5 MG/1
TABLET ORAL
Qty: 10 TABLET | Refills: 0 | Status: SHIPPED | OUTPATIENT
Start: 2023-09-20

## 2023-09-20 RX ORDER — CARBAMAZEPINE 200 MG/1
TABLET ORAL
Qty: 240 TABLET | Refills: 5 | Status: SHIPPED | OUTPATIENT
Start: 2023-09-20

## 2023-09-20 NOTE — TELEPHONE ENCOUNTER
Filled out forms after his visit .  Please add office information including fax number and send to [facility]     On your desk     Thank you

## 2023-09-20 NOTE — ASSESSMENT & PLAN NOTE
Prior MRI imaging over a year ago was noted to have a lacunar infarct. He is to maintain on the current dose of Crestor and Aggrenox.

## 2023-09-20 NOTE — PROGRESS NOTES
Patient ID: Mercedes Patel is a 61 y.o. male. Assessment/Plan:    Seizure disorder ([address])  Porsha Mcduffie  has a history of partial complex seizures and has been utilizing carbamazepine 9 a day. Recently had an episode associated with an aura  Of hiccups  in which he took 2 carbamazepine and one dose of Ativan . 45 minutes later while at the diner and upon standing episode of confusion and weakness and lightheadedness. I have informed him that this may have been secondary to excessive carbamazepine use. It is similar episode in January where his Tegretol level was 19.   he did have an EEG that was normal and a CAT scan of the head that was normal.  I have elected to initiate Keppra 500 mg twice a day. 1 week after this he should reduce his carbamazepine to 7 a day. He  Should have a Tegretol level performed in 2 weeks as a trough. Cerebrovascular accident (CVA) due to thrombosis [facility])  Prior MRI imaging over a year ago was noted to have a lacunar infarct. He is to maintain on the current dose of Crestor and Aggrenox. Did discuss this event from 5 7. I have asked him to not take extra doses of carbamazepine as this can lead to side effects. I have also discussed with him to avoid when on the Internet to look up associated symptoms with sodium. This may not clinically well event. He did provide forms from [employer]. We will fill them out. He is were concerned about his ability to drive. I have informed him that [employer] him ultimately decides on his ability to drive. Diagnoses and all orders for this visit:    Seizure disorder ([address])    Partial symptomatic epilepsy with complex partial seizures, not intractable, without status epilepticus ([address])  -     levETIRAcetam (Keppra) 500 mg tablet;  Take 1 tablet (500 mg total) by mouth every 12 (twelve) hours  -     carBAMazepine (TEGretol) 200 mg tablet; TAKE 3 TABLETS IN THE AM, 1 TABLETS AT LUNCH, 2 TABLETS AT DINNER, AND 2 TABLETS AT BEDTIME  - LORazepam (ATIVAN) 0.5 mg tablet; TAKE 1 TABLET BY MOUTH ONCE DAILY IF NEEDED for seizure. Limit of 1 in 24 hours    Cerebrovascular accident (CVA) due to thrombosis [facility])         Subjective:    Gerardo Tolentino is B 31 y/o right hand male with a long history of seizures. He had  a seizure on 4/20/18. He has been seizure free since. [facility] seizures are partial complex with secondary generalization. He was taking Tegretol 9 a day. He was doing well until January 6 when he had an episode of imbalance in his legs. This lasted for short period of time. He then was seen in the emergency room and did not stay. He then asked for MRI imaging of the brain and this was performed several weeks later. There was no associated slurred speech double vision loss of vision. He thought that the symptoms were due to an recurrent stroke.     During his hospitalization in the emergency room he was noted to have a Tegretol level of 19. Subsequently his dose of Tegretol was decreased to 8 a day and he has had no recurrent symptoms. He was doing well until 2 weeks ago weeks ago. He had had no seizures but did note an aura. This was characterized by hiccups and an alteration of his baseline state. He did take 2 extra carbamazepine 200 and a dose of Ativan and sent for period of time. He started to feel better after 45 minutes and then went to the [address] While at the [address] and after he attempted to stand up he had an episode of lightheadedness , dizziness vertigo and lower extremity weakness. This was then associated with some confusion. 911 was called and he was seen in the emergency room at [facility]. Tegretol level at that point was 15.2. He was then discharged on the same dose of medication. He admits to me that prior to this event he had not been sleeping well at night. He been sleeping shorter amounts. He was taking care of his mother who passed away in March.   During that time he had been not getting enough sleep throughout the night.     Today he was contemplating potentially discontinuing Aggrenox and Crestor. He does have a history of elevation of his triglycerides which is improved with the use of  Crestor.       After this event he did undergo an EEG that was normal and a CAT scan of the head that was normal.     sodium level was 131 and 133 while hospitalized in the ER 9/5/2023 on 9/11/2023 sodium level was 131. Other complaints include tremors. This has been longstanding on the right but he has been noticing symptoms on the left.                          The following portions of the patient's history were reviewed and updated as appropriate:   He  has a past medical history of Arthritis, Hypertension, Seizures ([address]), and Stroke ([address]). He  has a past surgical history that includes Elbow surgery (2017). His family history includes Alzheimer's disease in his mother; Dementia in his mother; Hyperlipidemia in his brother; Stroke in his father. He  reports that he has never smoked. He has never used smokeless tobacco. He reports current alcohol use. He reports that he does not use drugs. Current Outpatient Medications   Medication Sig Dispense Refill   • alendronate (FOSAMAX) 70 mg tablet Take 1 tablet (70 mg total) by mouth every 7 days 4 tablet 5   • aspirin-dipyridamole (AGGRENOX)  mg per 12 hr capsule TAKE 1 CAPSULE BY MOUTH TWICE A  capsule 3   • Calcium-Magnesium-Vitamin D (CALCIUM MAGNESIUM PO) Take by mouth     • carBAMazepine (TEGretol) 200 mg tablet TAKE 3 TABLETS IN THE AM, 1 TABLETS AT LUNCH, 2 TABLETS AT DINNER, AND 2 TABLETS AT BEDTIME 240 tablet 5   • co-enzyme Q-10 30 MG capsule Take 100 mg by mouth daily       • levETIRAcetam (Keppra) 500 mg tablet Take 1 tablet (500 mg total) by mouth every 12 (twelve) hours 60 tablet 5   • LORazepam (ATIVAN) 0.5 mg tablet TAKE 1 TABLET BY MOUTH ONCE DAILY IF NEEDED for seizure.   Limit of 1 in 24 hours 10 tablet 0   • NON FORMULARY Super Beets     • rosuvastatin (CRESTOR) 10 MG tablet Take 1 tablet (10 mg total) by mouth daily 90 tablet 1   • VALERIAN ROOT PO Use     • Vitamin Mixture (VITAMIN E COMPLETE PO) Take 1 tablet by mouth daily        No current facility-administered medications for this visit. He is allergic to meperidine, amoxicillin, azithromycin, other, and pollen extract. .         Objective:    Blood pressure 146/92, pulse 83, temperature (!) 97.3 °F (36.3 °C), temperature source Temporal, resp. rate 18, height 5' 10" (1.778 m), weight 73.9 kg (163 lb), SpO2 99 %. Physical Exam  Neurological:      Deep Tendon Reflexes:      Reflex Scores:       Tricep reflexes are 1+ on the right side and 1+ on the left side. Bicep reflexes are 2+ on the right side and 2+ on the left side. Patellar reflexes are 2+ on the right side and 2+ on the left side. Achilles reflexes are 1+ on the right side and 1+ on the left side. Neurological Exam    Motor    Strength is 5/5 in all four extremities except as noted. His right upper extremity had curling of the 4th and 5th digit with atrophy of the ADM. He had normal strength proximally but had difficulty with hand  on the right. This is ongoing and old. The left upper extremity strength was normal in the bilateral lower extremity strength was normal he did have bilateral tremors with extension worse on the right than the left. There is no evidence of cogwheel rigidity. ... Sensory  Light touch is normal in upper and lower extremities. Temperature is normal in upper and lower extremities.     Reflexes                                            Right                      Left  Biceps                                 2+                         2+  Triceps                                1+                         1+  Patellar                                2+                         2+  Achilles                                1+ 1+  Right Plantar: downgoing  Left Plantar: downgoing    Right pathological reflexes: Jason's absent. Left pathological reflexes: Jason's absent. Coordination    Tremors with extension bilaterally but worse on the right than the left. He does utilize weights on the right hand. Gait   Able to rise from chair without using arms. Able to ambulate independently. .    Review of systems obtained from the medical assistant as below was reviewed with the patient at today's visit  ROS:    Review of Systems   Constitutional: Negative for chills and fever. HENT: Negative for ear pain and sore throat. Eyes: Negative for pain and visual disturbance. Respiratory: Negative for cough and shortness of breath. Cardiovascular: Negative for chest pain and palpitations. Gastrointestinal: Positive for constipation and diarrhea. Negative for abdominal pain and vomiting. Genitourinary: Positive for frequency and urgency. Negative for dysuria and hematuria. Musculoskeletal: Positive for back pain (lower at times). Negative for arthralgias. Skin: Negative for color change and rash. Neurological: Positive for dizziness (the other day), tremors (right hand and arm), seizures and numbness (legs the other day). Negative for syncope. All other systems reviewed and are negative.

## 2023-09-20 NOTE — PATIENT INSTRUCTIONS
Start keppra 500mg twice a day     In one week Decrease the tegretol to  7 a day     Tegretol level in  2 weeks , in morning  before am dose of meds

## 2023-09-20 NOTE — ASSESSMENT & PLAN NOTE
Sarah Pitt  has a history of partial complex seizures and has been utilizing carbamazepine 9 a day. Recently had an episode associated with an aura  Of hiccups  in which he took 2 carbamazepine and one dose of Ativan . 45 minutes later while at the diner and upon standing episode of confusion and weakness and lightheadedness. I have informed him that this may have been secondary to excessive carbamazepine use. It is similar episode in January where his Tegretol level was 19.   he did have an EEG that was normal and a CAT scan of the head that was normal.  I have elected to initiate Keppra 500 mg twice a day. 1 week after this he should reduce his carbamazepine to 7 a day. He  Should have a Tegretol level performed in 2 weeks as a trough.

## 2023-09-23 ENCOUNTER — TELEPHONE (OUTPATIENT)
Dept: OTHER | Facility: OTHER | Age: 63
End: 2023-09-23

## 2023-10-04 ENCOUNTER — APPOINTMENT (OUTPATIENT)
Dept: LAB | Facility: CLINIC | Age: 63
End: 2023-10-04
Payer: COMMERCIAL

## 2023-10-04 DIAGNOSIS — E87.1 HYPONATREMIA: ICD-10-CM

## 2023-10-04 DIAGNOSIS — E78.2 MIXED HYPERLIPIDEMIA: ICD-10-CM

## 2023-10-04 DIAGNOSIS — Z12.5 SCREENING FOR PROSTATE CANCER: ICD-10-CM

## 2023-10-04 LAB
ALBUMIN SERPL BCP-MCNC: 4.4 G/DL (ref 3.5–5)
ALP SERPL-CCNC: 49 U/L (ref 34–104)
ALT SERPL W P-5'-P-CCNC: 30 U/L (ref 7–52)
ANION GAP SERPL CALCULATED.3IONS-SCNC: 9 MMOL/L
AST SERPL W P-5'-P-CCNC: 24 U/L (ref 13–39)
BILIRUB SERPL-MCNC: 0.5 MG/DL (ref 0.2–1)
BUN SERPL-MCNC: 12 MG/DL (ref 5–25)
CALCIUM SERPL-MCNC: 9.2 MG/DL (ref 8.4–10.2)
CHLORIDE SERPL-SCNC: 99 MMOL/L (ref 96–108)
CHOLEST SERPL-MCNC: 163 MG/DL
CO2 SERPL-SCNC: 27 MMOL/L (ref 21–32)
CREAT SERPL-MCNC: 0.68 MG/DL (ref 0.6–1.3)
GFR SERPL CREATININE-BSD FRML MDRD: 101 ML/MIN/1.73SQ M
GLUCOSE P FAST SERPL-MCNC: 81 MG/DL (ref 65–99)
HDLC SERPL-MCNC: 85 MG/DL
LDLC SERPL CALC-MCNC: 61 MG/DL (ref 0–100)
NONHDLC SERPL-MCNC: 78 MG/DL
POTASSIUM SERPL-SCNC: 4.3 MMOL/L (ref 3.5–5.3)
PROT SERPL-MCNC: 6.9 G/DL (ref 6.4–8.4)
PSA SERPL-MCNC: 0.55 NG/ML (ref 0–4)
SODIUM SERPL-SCNC: 135 MMOL/L (ref 135–147)
TRIGL SERPL-MCNC: 84 MG/DL

## 2023-10-04 PROCEDURE — 80053 COMPREHEN METABOLIC PANEL: CPT

## 2023-10-04 PROCEDURE — 36415 COLL VENOUS BLD VENIPUNCTURE: CPT

## 2023-10-04 PROCEDURE — G0103 PSA SCREENING: HCPCS

## 2023-10-04 PROCEDURE — 80061 LIPID PANEL: CPT

## 2023-10-15 ENCOUNTER — HOSPITAL ENCOUNTER (INPATIENT)
Facility: HOSPITAL | Age: 63
LOS: 4 days | DRG: 481 | End: 2023-10-19
Attending: EMERGENCY MEDICINE | Admitting: SURGERY
Payer: COMMERCIAL

## 2023-10-15 ENCOUNTER — APPOINTMENT (EMERGENCY)
Dept: RADIOLOGY | Facility: HOSPITAL | Age: 63
DRG: 481 | End: 2023-10-15
Payer: COMMERCIAL
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
## 2023-10-15 DIAGNOSIS — S72.002A CLOSED FRACTURE OF LEFT HIP, INITIAL ENCOUNTER (HCC): Primary | ICD-10-CM

## 2023-10-15 LAB
ABO GROUP BLD: NORMAL
ABO GROUP BLD: NORMAL
ANION GAP SERPL CALCULATED.3IONS-SCNC: 8 MMOL/L
APTT PPP: 26 SECONDS (ref 23–37)
ATRIAL RATE: 94 BPM
BASOPHILS # BLD AUTO: 0.04 THOUSANDS/ÂΜL (ref 0–0.1)
BASOPHILS NFR BLD AUTO: 1 % (ref 0–1)
BLD GP AB SCN SERPL QL: NEGATIVE
BUN SERPL-MCNC: 13 MG/DL (ref 5–25)
CALCIUM SERPL-MCNC: 9.1 MG/DL (ref 8.4–10.2)
CHLORIDE SERPL-SCNC: 98 MMOL/L (ref 96–108)
CO2 SERPL-SCNC: 25 MMOL/L (ref 21–32)
CREAT SERPL-MCNC: 0.59 MG/DL (ref 0.6–1.3)
EOSINOPHIL # BLD AUTO: 0.1 THOUSAND/ÂΜL (ref 0–0.61)
EOSINOPHIL NFR BLD AUTO: 2 % (ref 0–6)
ERYTHROCYTE [DISTWIDTH] IN BLOOD BY AUTOMATED COUNT: 11.1 % (ref 11.6–15.1)
GFR SERPL CREATININE-BSD FRML MDRD: 107 ML/MIN/1.73SQ M
GLUCOSE SERPL-MCNC: 112 MG/DL (ref 65–140)
HCT VFR BLD AUTO: 43.1 % (ref 36.5–49.3)
HGB BLD-MCNC: 15 G/DL (ref 12–17)
IMM GRANULOCYTES # BLD AUTO: 0.01 THOUSAND/UL (ref 0–0.2)
IMM GRANULOCYTES NFR BLD AUTO: 0 % (ref 0–2)
INR PPP: 0.99 (ref 0.84–1.19)
LYMPHOCYTES # BLD AUTO: 1.59 THOUSANDS/ÂΜL (ref 0.6–4.47)
LYMPHOCYTES NFR BLD AUTO: 30 % (ref 14–44)
MCH RBC QN AUTO: 34.2 PG (ref 26.8–34.3)
MCHC RBC AUTO-ENTMCNC: 34.8 G/DL (ref 31.4–37.4)
MCV RBC AUTO: 98 FL (ref 82–98)
MONOCYTES # BLD AUTO: 0.67 THOUSAND/ÂΜL (ref 0.17–1.22)
MONOCYTES NFR BLD AUTO: 13 % (ref 4–12)
NEUTROPHILS # BLD AUTO: 2.94 THOUSANDS/ÂΜL (ref 1.85–7.62)
NEUTS SEG NFR BLD AUTO: 54 % (ref 43–75)
NRBC BLD AUTO-RTO: 0 /100 WBCS
P AXIS: 70 DEGREES
PLATELET # BLD AUTO: 215 THOUSANDS/UL (ref 149–390)
PLATELET # BLD AUTO: 231 THOUSANDS/UL (ref 149–390)
PMV BLD AUTO: 8.7 FL (ref 8.9–12.7)
PMV BLD AUTO: 8.7 FL (ref 8.9–12.7)
POTASSIUM SERPL-SCNC: 4.2 MMOL/L (ref 3.5–5.3)
PR INTERVAL: 188 MS
PROTHROMBIN TIME: 13 SECONDS (ref 11.6–14.5)
QRS AXIS: 55 DEGREES
QRSD INTERVAL: 98 MS
QT INTERVAL: 322 MS
QTC INTERVAL: 402 MS
RBC # BLD AUTO: 4.39 MILLION/UL (ref 3.88–5.62)
RH BLD: POSITIVE
RH BLD: POSITIVE
SODIUM SERPL-SCNC: 131 MMOL/L (ref 135–147)
SPECIMEN EXPIRATION DATE: NORMAL
T WAVE AXIS: 50 DEGREES
VENTRICULAR RATE: 94 BPM
WBC # BLD AUTO: 5.35 THOUSAND/UL (ref 4.31–10.16)

## 2023-10-15 PROCEDURE — 85610 PROTHROMBIN TIME: CPT

## 2023-10-15 PROCEDURE — 71045 X-RAY EXAM CHEST 1 VIEW: CPT

## 2023-10-15 PROCEDURE — 36415 COLL VENOUS BLD VENIPUNCTURE: CPT

## 2023-10-15 PROCEDURE — 80048 BASIC METABOLIC PNL TOTAL CA: CPT

## 2023-10-15 PROCEDURE — 99284 EMERGENCY DEPT VISIT MOD MDM: CPT

## 2023-10-15 PROCEDURE — 85730 THROMBOPLASTIN TIME PARTIAL: CPT

## 2023-10-15 PROCEDURE — 96374 THER/PROPH/DIAG INJ IV PUSH: CPT

## 2023-10-15 PROCEDURE — 85025 COMPLETE CBC W/AUTO DIFF WBC: CPT

## 2023-10-15 PROCEDURE — 96375 TX/PRO/DX INJ NEW DRUG ADDON: CPT

## 2023-10-15 PROCEDURE — 86901 BLOOD TYPING SEROLOGIC RH(D): CPT

## 2023-10-15 PROCEDURE — 94760 N-INVAS EAR/PLS OXIMETRY 1: CPT

## 2023-10-15 PROCEDURE — 86900 BLOOD TYPING SEROLOGIC ABO: CPT

## 2023-10-15 PROCEDURE — 85049 AUTOMATED PLATELET COUNT: CPT

## 2023-10-15 PROCEDURE — 64450 NJX AA&/STRD OTHER PN/BRANCH: CPT | Performed by: EMERGENCY MEDICINE

## 2023-10-15 PROCEDURE — 73552 X-RAY EXAM OF FEMUR 2/>: CPT

## 2023-10-15 PROCEDURE — 99223 1ST HOSP IP/OBS HIGH 75: CPT | Performed by: SURGERY

## 2023-10-15 PROCEDURE — 72170 X-RAY EXAM OF PELVIS: CPT

## 2023-10-15 PROCEDURE — 86850 RBC ANTIBODY SCREEN: CPT

## 2023-10-15 PROCEDURE — 93010 ELECTROCARDIOGRAM REPORT: CPT | Performed by: INTERNAL MEDICINE

## 2023-10-15 PROCEDURE — 93005 ELECTROCARDIOGRAM TRACING: CPT

## 2023-10-15 PROCEDURE — 99285 EMERGENCY DEPT VISIT HI MDM: CPT | Performed by: EMERGENCY MEDICINE

## 2023-10-15 RX ORDER — ACETAMINOPHEN 325 MG/1
650 TABLET ORAL EVERY 6 HOURS SCHEDULED
Status: DISCONTINUED | OUTPATIENT
Start: 2023-10-15 | End: 2023-10-19 | Stop reason: HOSPADM

## 2023-10-15 RX ORDER — ENOXAPARIN SODIUM 100 MG/ML
30 INJECTION SUBCUTANEOUS EVERY 12 HOURS
Status: DISCONTINUED | OUTPATIENT
Start: 2023-10-16 | End: 2023-10-15

## 2023-10-15 RX ORDER — ACETAMINOPHEN 325 MG/1
975 TABLET ORAL ONCE
Status: COMPLETED | OUTPATIENT
Start: 2023-10-15 | End: 2023-10-15

## 2023-10-15 RX ORDER — CARBAMAZEPINE 200 MG/1
600 TABLET ORAL
Status: DISCONTINUED | OUTPATIENT
Start: 2023-10-16 | End: 2023-10-15

## 2023-10-15 RX ORDER — CARBAMAZEPINE 200 MG/1
600 TABLET ORAL
Status: DISCONTINUED | OUTPATIENT
Start: 2023-10-16 | End: 2023-10-19 | Stop reason: HOSPADM

## 2023-10-15 RX ORDER — LEVETIRACETAM 500 MG/1
500 TABLET ORAL EVERY 12 HOURS SCHEDULED
Status: DISCONTINUED | OUTPATIENT
Start: 2023-10-15 | End: 2023-10-15

## 2023-10-15 RX ORDER — OXYCODONE HYDROCHLORIDE 5 MG/1
5 TABLET ORAL EVERY 4 HOURS PRN
Status: DISCONTINUED | OUTPATIENT
Start: 2023-10-15 | End: 2023-10-19 | Stop reason: HOSPADM

## 2023-10-15 RX ORDER — ONDANSETRON 2 MG/ML
4 INJECTION INTRAMUSCULAR; INTRAVENOUS EVERY 6 HOURS PRN
Status: DISCONTINUED | OUTPATIENT
Start: 2023-10-15 | End: 2023-10-19 | Stop reason: HOSPADM

## 2023-10-15 RX ORDER — ROPIVACAINE HYDROCHLORIDE 5 MG/ML
30 INJECTION, SOLUTION EPIDURAL; INFILTRATION; PERINEURAL ONCE
Status: COMPLETED | OUTPATIENT
Start: 2023-10-15 | End: 2023-10-15

## 2023-10-15 RX ORDER — CARBAMAZEPINE 200 MG/1
400 TABLET ORAL
Status: DISCONTINUED | OUTPATIENT
Start: 2023-10-15 | End: 2023-10-15

## 2023-10-15 RX ORDER — ASPIRIN AND DIPYRIDAMOLE 25; 200 MG/1; MG/1
1 CAPSULE, EXTENDED RELEASE ORAL 2 TIMES DAILY
Status: DISCONTINUED | OUTPATIENT
Start: 2023-10-16 | End: 2023-10-16

## 2023-10-15 RX ORDER — LORAZEPAM 0.5 MG/1
0.5 TABLET ORAL DAILY PRN
Status: DISCONTINUED | OUTPATIENT
Start: 2023-10-15 | End: 2023-10-15

## 2023-10-15 RX ORDER — CALCIUM CARBONATE 500 MG/1
500 TABLET, CHEWABLE ORAL DAILY PRN
Status: DISCONTINUED | OUTPATIENT
Start: 2023-10-15 | End: 2023-10-16

## 2023-10-15 RX ORDER — CARBAMAZEPINE 200 MG/1
200 TABLET ORAL
Status: DISCONTINUED | OUTPATIENT
Start: 2023-10-16 | End: 2023-10-19 | Stop reason: HOSPADM

## 2023-10-15 RX ORDER — POLYETHYLENE GLYCOL 3350 17 G/17G
17 POWDER, FOR SOLUTION ORAL DAILY
Status: DISCONTINUED | OUTPATIENT
Start: 2023-10-16 | End: 2023-10-19 | Stop reason: HOSPADM

## 2023-10-15 RX ORDER — CARBAMAZEPINE 200 MG/1
200 TABLET ORAL
Status: DISCONTINUED | OUTPATIENT
Start: 2023-10-16 | End: 2023-10-15

## 2023-10-15 RX ORDER — ENOXAPARIN SODIUM 100 MG/ML
30 INJECTION SUBCUTANEOUS EVERY 12 HOURS
Status: DISCONTINUED | OUTPATIENT
Start: 2023-10-15 | End: 2023-10-16

## 2023-10-15 RX ORDER — SODIUM CHLORIDE, SODIUM LACTATE, POTASSIUM CHLORIDE, CALCIUM CHLORIDE 600; 310; 30; 20 MG/100ML; MG/100ML; MG/100ML; MG/100ML
4 INJECTION, SOLUTION INTRAVENOUS CONTINUOUS
Status: DISCONTINUED | OUTPATIENT
Start: 2023-10-15 | End: 2023-10-15

## 2023-10-15 RX ORDER — AMOXICILLIN 250 MG
1 CAPSULE ORAL
Status: DISCONTINUED | OUTPATIENT
Start: 2023-10-15 | End: 2023-10-18

## 2023-10-15 RX ORDER — PRAVASTATIN SODIUM 80 MG/1
80 TABLET ORAL
Status: DISCONTINUED | OUTPATIENT
Start: 2023-10-15 | End: 2023-10-15

## 2023-10-15 RX ORDER — LEVETIRACETAM 500 MG/1
500 TABLET ORAL EVERY 12 HOURS SCHEDULED
Status: DISCONTINUED | OUTPATIENT
Start: 2023-10-16 | End: 2023-10-19 | Stop reason: HOSPADM

## 2023-10-15 RX ORDER — HYDROMORPHONE HCL/PF 1 MG/ML
0.5 SYRINGE (ML) INJECTION EVERY 4 HOURS PRN
Status: DISCONTINUED | OUTPATIENT
Start: 2023-10-15 | End: 2023-10-19 | Stop reason: HOSPADM

## 2023-10-15 RX ORDER — OXYCODONE HYDROCHLORIDE 10 MG/1
10 TABLET ORAL EVERY 4 HOURS PRN
Status: DISCONTINUED | OUTPATIENT
Start: 2023-10-15 | End: 2023-10-19 | Stop reason: HOSPADM

## 2023-10-15 RX ORDER — KETOROLAC TROMETHAMINE 30 MG/ML
15 INJECTION, SOLUTION INTRAMUSCULAR; INTRAVENOUS ONCE
Status: COMPLETED | OUTPATIENT
Start: 2023-10-15 | End: 2023-10-15

## 2023-10-15 RX ORDER — ENOXAPARIN SODIUM 100 MG/ML
30 INJECTION SUBCUTANEOUS EVERY 12 HOURS
Status: DISCONTINUED | OUTPATIENT
Start: 2023-10-15 | End: 2023-10-15

## 2023-10-15 RX ORDER — ASPIRIN AND DIPYRIDAMOLE 25; 200 MG/1; MG/1
1 CAPSULE, EXTENDED RELEASE ORAL 2 TIMES DAILY
Status: DISCONTINUED | OUTPATIENT
Start: 2023-10-15 | End: 2023-10-15

## 2023-10-15 RX ORDER — SODIUM CHLORIDE, SODIUM LACTATE, POTASSIUM CHLORIDE, CALCIUM CHLORIDE 600; 310; 30; 20 MG/100ML; MG/100ML; MG/100ML; MG/100ML
75 INJECTION, SOLUTION INTRAVENOUS CONTINUOUS
Status: DISCONTINUED | OUTPATIENT
Start: 2023-10-16 | End: 2023-10-17

## 2023-10-15 RX ORDER — ROSUVASTATIN CALCIUM 10 MG/1
10 TABLET, COATED ORAL
Status: DISCONTINUED | OUTPATIENT
Start: 2023-10-15 | End: 2023-10-19 | Stop reason: HOSPADM

## 2023-10-15 RX ORDER — LORAZEPAM 0.5 MG/1
0.5 TABLET ORAL DAILY PRN
Status: DISCONTINUED | OUTPATIENT
Start: 2023-10-15 | End: 2023-10-19 | Stop reason: HOSPADM

## 2023-10-15 RX ORDER — CARBAMAZEPINE 200 MG/1
400 TABLET ORAL 2 TIMES DAILY
Status: DISCONTINUED | OUTPATIENT
Start: 2023-10-16 | End: 2023-10-19 | Stop reason: HOSPADM

## 2023-10-15 RX ADMIN — ROPIVACAINE HYDROCHLORIDE 30 ML: 5 INJECTION EPIDURAL; INFILTRATION; PERINEURAL at 16:32

## 2023-10-15 RX ADMIN — ACETAMINOPHEN 975 MG: 325 TABLET, FILM COATED ORAL at 14:26

## 2023-10-15 RX ADMIN — TRANEXAMIC ACID 740 MG: 100 INJECTION, SOLUTION INTRAVENOUS at 20:59

## 2023-10-15 RX ADMIN — LEVETIRACETAM 500 MG: 500 TABLET, FILM COATED ORAL at 17:30

## 2023-10-15 RX ADMIN — TRANEXAMIC ACID 740 MG: 100 INJECTION, SOLUTION INTRAVENOUS at 16:32

## 2023-10-15 RX ADMIN — ENOXAPARIN SODIUM 30 MG: 30 INJECTION SUBCUTANEOUS at 20:59

## 2023-10-15 RX ADMIN — CARBAMAZEPINE 400 MG: 200 TABLET ORAL at 17:30

## 2023-10-15 RX ADMIN — SENNOSIDES AND DOCUSATE SODIUM 1 TABLET: 50; 8.6 TABLET ORAL at 21:00

## 2023-10-15 RX ADMIN — CALCIUM CARBONATE (ANTACID) CHEW TAB 500 MG 500 MG: 500 CHEW TAB at 21:37

## 2023-10-15 RX ADMIN — CARBAMAZEPINE 400 MG: 200 TABLET ORAL at 21:00

## 2023-10-15 RX ADMIN — ASPIRIN AND DIPYRIDAMOLE 1 CAPSULE: 25; 200 CAPSULE, EXTENDED RELEASE ORAL at 21:00

## 2023-10-15 RX ADMIN — KETOROLAC TROMETHAMINE 15 MG: 30 INJECTION, SOLUTION INTRAMUSCULAR; INTRAVENOUS at 14:26

## 2023-10-15 RX ADMIN — ACETAMINOPHEN 650 MG: 325 TABLET, FILM COATED ORAL at 20:59

## 2023-10-15 NOTE — ED PROCEDURE NOTE
PROCEDURE  Nerve block    Date/Time: 10/15/2023 4:51 PM    Performed by: Osei Huerta DO  Authorized by: Osei Huerta DO    Patient location:  ED  [place] Protocol:  Procedure performed by: Robinson marques  Consent: Verbal consent obtained. Written consent not obtained. Risks and benefits: risks, benefits and alternatives were discussed  Consent given by: patient    Indications:     Indications:  Pain relief  Location:     Body area:  Lower extremity    Lower extremity nerve blocked: PENG. Nerve type:  Peripheral    Laterality:  Left  Pre-procedure details:     Skin preparation:  2% chlorhexidine  Skin anesthesia (see MAR for exact dosages):     Skin anesthesia method:  None  Procedure details (see MAR for exact dosages): Block needle gauge:  20 G    Guidance: ultrasound      Anesthetic injected:  Ropivacaine 0.5%    Steroid injected:  None    Additive injected:  None    Injection procedure:  Anatomic landmarks identified, anatomic landmarks palpated, introduced needle, incremental injection and negative aspiration for blood  Comments: Total of 40mL ropivacaine 0.25% injected deep to psoas tendon between AIIS and IPE. Analgesia achieved.        Rowdy Beltran DO  10/15/23 5052

## 2023-10-15 NOTE — ED ATTENDING ATTESTATION
10/15/2023  Gabriel ARIAS, DO, saw and evaluated the patient. I have discussed the patient with the resident/non-physician practitioner and agree with the resident's/non-physician practitioner's findings, Plan of Care, and MDM as documented in the resident's/non-physician practitioner's note, except where noted. All available labs and Radiology studies were reviewed. I was present for key portions of any procedure(s) performed by the resident/non-physician practitioner and I was immediately available to provide assistance. At this point I agree with the current assessment done in the Emergency Department. I have conducted an independent evaluation of this patient a history and physical is as follows:    60 yo male presents for evaluation of acute onset L hip pain after non syncopal fall from ground level while standing. He slipped on navarro in his driveway. Unable to range L hip due to pain. Baseline community ambulator. No focal weakness/numbness/tingling. LLE:  Skin intact  Compartments soft in upper and lower leg  +EHL/FHL  2+ DP  Unable to range due to pain in hip    Imp: L hip pain likely fx plan: xrays, pre-op labs, ECG, CXR. MMA. Will likely need admission for ORIF if fx noted on imaging.       ED Course         Critical Care Time  Procedures

## 2023-10-15 NOTE — Clinical Note
Case was discussed with trauma and the patient's admission status was agreed to be Admission Status: inpatient status to the service of Dr. Liao

## 2023-10-15 NOTE — QUICK NOTE
Orthopedics PreOp Note  Nguyễn Espinoza 61 y.o. male MRN: [id number]  Unit/Bed#: [facility] 612-01      Dx: Left hip intertrochanteric fracture  Procedure: Left femur IM nail    Hgb: 15  Plt: 231  Wbc: 5.35    Na: 131  K: 4.2  Cl: 98  Co2: 25  BUN: 13  Cr: 0.59  Gluc: 112    PT:13  PTT:26  INR:0.99    Other Labs: none    CXR:completed  EKG:ordered    Abx: Ancef on hold for OR  Type and Screen/Cross: A positive  NPO:at midnight  Consent: consented  Clearance: Cleared with primary  Anticoagulation:on hold in morning for OR

## 2023-10-15 NOTE — CONSULTS
Orthopedics   Juan Pablo Elliott Edwinlong 61 y.o. male MRN: [id number]  Unit/Bed#: X ray      Chief Complaint:   left hip pain    HPI:   61 y.o.male community ambulator complaining of left hip pain. He presents after a fall on wet grass. He denies head strike or loss of consciousness. He reports that he is on Aggrenox dual antiplatelet therapy for a history of lacunar infarct. He also has a history of epilepsy for which she is taking Keppra and carbamazepine. Surgical history significant for ORIF of the right elbow.     Review Of Systems:   Skin: Normal  Neuro: See HPI  Musculoskeletal: See HPI  14 point review of systems negative except as stated above     Past Medical History:   Past Medical History:   Diagnosis Date    Arthritis     Hypertension     Seizures ([address])     Stroke [facility])        Past Surgical History:   Past Surgical History:   Procedure Laterality Date    ELBOW SURGERY  2017       Family History:  Family history reviewed and non-contributory  Family History   Problem Relation Age of Onset    Dementia Mother     Alzheimer's disease Mother     Stroke Father     Hyperlipidemia Brother        Social History:  Social History     Socioeconomic History    Marital status: Single     Spouse name: None    Number of children: None    Years of education: None    Highest education level: None   Occupational History    None   Tobacco Use    Smoking status: Never    Smokeless tobacco: Never   Vaping Use    Vaping Use: Never used   Substance and Sexual Activity    Alcohol use: Yes     Comment: occasional    Drug use: No    Sexual activity: Not Currently   Other Topics Concern    None   Social History Narrative    Lives with mother    Retired     Social Determinants of Health     Financial Resource Strain: Not on file   Food Insecurity: Not on file   Transportation Needs: Not on file   Physical Activity: Not on file   Stress: Not on file   Social Connections: Not on file   Intimate Partner Violence: Not on file   Housing Stability: Not on file       Allergies: Allergies   Allergen Reactions    Meperidine Seizures    Amoxicillin     Azithromycin Seizures    Other Sneezing     Dust     Pollen Extract Sneezing           Labs:  0   Lab Value Date/Time    HCT 43.1 10/15/2023 1425    HCT 43.3 01/06/2023 1555    HCT 43.6 04/08/2022 0800    HCT 42.8 11/20/2015 0548    HCT 38.4 11/19/2015 2159    HCT 43.8 12/04/2014 1213    HGB 15.0 10/15/2023 1425    HGB 15.2 01/06/2023 1555    HGB 15.2 04/08/2022 0800    HGB 14.9 11/20/2015 0548    HGB 13.4 11/19/2015 2159    HGB 15.1 12/04/2014 1213    INR 0.99 10/15/2023 1425    WBC 5.35 10/15/2023 1425    WBC 7.11 01/06/2023 1555    WBC 6.49 04/08/2022 0800    WBC 7.94 11/20/2015 0548    WBC 6.97 11/19/2015 2159    WBC 6.45 12/04/2014 1213       Meds:    Current Facility-Administered Medications:     acetaminophen (TYLENOL) tablet 650 mg, 650 mg, Oral, Q6H [address], Melvin Cevallos MD    [START ON 10/16/2023] carBAMazepine (TEGretol) tablet 200 mg, 200 mg, Oral, Daily With Lunch, Melvin Cevallos MD    carBAMazepine (TEGretol) tablet 400 mg, 400 mg, Oral, Daily With Carlos Zapata MD, 400 mg at 10/15/23 1730    carBAMazepine (TEGretol) tablet 400 mg, 400 mg, Oral, HS, MD Ole Cobb ON 10/16/2023] carBAMazepine (TEGretol) tablet 600 mg, 600 mg, Oral, Early Morning, MD Ole Cobb ON 10/16/2023] co-enzyme Q-10 capsule 90 mg, 90 mg, Oral, Daily, MD Ole Cobb ON 10/16/2023] enoxaparin (LOVENOX) subcutaneous injection 30 mg, 30 mg, Subcutaneous, Q12H, Melvin Cevallos MD    HYDROmorphone (DILAUDID) injection 0.5 mg, 0.5 mg, Intravenous, Q4H PRN, Melvin Cevallos MD    Nursing Communication Measure and document PVi every 4 hours until patient goes to the OR., , , Until Discontinued **AND** Nursing Communication Notify physician if SpO2 <92% and stop IVF infusion. , , , Until Discontinued **AND** Nursing Communication Check SpHb q4h and notify provider if <8., , , Until Discontinued **AND** Nursing Communication Obtain vital signs, PVi, and SpHb immediately prior to transfer to operating room. , , , Until Discontinued **AND** lactated ringers bolus 1,000 mL, 1,000 mL, Intravenous, Once **AND** lactated ringers infusion, 4 mL/kg/hr (Ideal), Intravenous, Continuous, Bernardo Anders MD    levETIRAcetam (KEPPRA) tablet 500 mg, 500 mg, Oral, Q12H [facility], Bernardo Anders MD, 500 mg at 10/15/23 1730    ondansetron (ZOFRAN) injection 4 mg, 4 mg, Intravenous, Q6H PRN, Bernardo Anders MD    oxyCODONE (ROXICODONE) immediate release tablet 10 mg, 10 mg, Oral, Q4H PRN, Bernardo Anders MD    oxyCODONE (ROXICODONE) IR tablet 5 mg, 5 mg, Oral, Q4H PRN, MD Alex Myers ON 10/16/2023] polyethylene glycol (MIRALAX) packet 17 g, 17 g, Oral, Daily, Bernardo Anders MD    pravastatin (PRAVACHOL) tablet 80 mg, 80 mg, Oral, Daily With Dinner, Bernardo Anders MD    senna-docusate sodium (SENOKOT S) 8.6-50 mg per tablet 1 tablet, 1 tablet, Oral, HS, Bernardo Anders MD    tranexamic Acid 740 mg in sodium chloride 0.9 % 100 mL IVPB, 10 mg/kg, Intravenous, Once, Bernardo Anders MD    Current Outpatient Medications:     alendronate (FOSAMAX) 70 mg tablet, Take 1 tablet (70 mg total) by mouth every 7 days, Disp: 4 tablet, Rfl: 5    aspirin-dipyridamole (AGGRENOX)  mg per 12 hr capsule, TAKE 1 CAPSULE BY MOUTH TWICE A DAY, Disp: 180 capsule, Rfl: 3    Calcium-Magnesium-Vitamin D (CALCIUM MAGNESIUM PO), Take by mouth, Disp: , Rfl:     carBAMazepine (TEGretol) 200 mg tablet, TAKE 3 TABLETS IN THE AM, 1 TABLETS AT LUNCH, 2 TABLETS AT DINNER, AND 2 TABLETS AT BEDTIME, Disp: 240 tablet, Rfl: 5    co-enzyme Q-10 30 MG capsule, Take 100 mg by mouth daily  , Disp: , Rfl:     levETIRAcetam (Keppra) 500 mg tablet, Take 1 tablet (500 mg total) by mouth every 12 (twelve) hours, Disp: 60 tablet, Rfl: 5    LORazepam (ATIVAN) 0.5 mg tablet, TAKE 1 TABLET BY MOUTH ONCE DAILY IF NEEDED for seizure. Limit of 1 in 24 hours, Disp: 10 tablet, Rfl: 0    NON FORMULARY, Super Beets, Disp: , Rfl:     rosuvastatin (CRESTOR) 10 MG tablet, Take 1 tablet (10 mg total) by mouth daily, Disp: 90 tablet, Rfl: 1    VALERIAN ROOT PO, Use, Disp: , Rfl:     Vitamin Mixture (VITAMIN E COMPLETE PO), Take 1 tablet by mouth daily , Disp: , Rfl:     Blood Culture:   No results found for: "BLOODCX"    Wound Culture:   No results found for: "WOUNDCULT"    Ins and Outs:  No intake/output data recorded. Physical Exam:   BP (!) 181/79 (BP Location: Right arm)   Pulse 104   Temp 98.1 °F (36.7 °C) (Oral)   Resp 20   SpO2 96%   Gen: No acute distress, resting comfortably in bed  HEENT: Eyes clear, moist mucus membranes, hearing intact  Respiratory: No audible wheezing or stridor  Cardiovascular: Well Perfused peripherally, 2+ distal pulse  Abdomen: nondistended, no peritoneal signs  Musculoskeletal: left lower extremity  Left lower extremity shortened and externally rotated  Skin dry, and intact, no erythema  Painful range of motion of hip joint but no micromotion tenderness  Sensation intact to sural, saphenous, superficial peroneal, deep peroneal, tibial distributions  5/5 motor strength to ankle dorsi/plantar flexion, EHL/FHL  2+ DP/PT pulse  Musculature is soft and compressible, no pain with passive stretch      Radiology:   I personally reviewed the films. X-rays AP/Lateral views of left hip shows a displaced intertrochanteric fracture of the left femur    Assessment:  63 y.o.male with left hip intertrochanteric fracture. Plan is to take patient to OR for operative fixation of left hip.     Plan:   NWB LLE  PT/OT  Pain control  DVT PPx per primary  Management of other medical conditions per primary  Dispo: Ortho will follow      Alexandra Fu MD

## 2023-10-15 NOTE — H&P
H&P - Trauma   Franklin Espinoza 61 y.o. male MRN: [id number]  Unit/Bed#: ED 12 Encounter: [id number]    Trauma Alert: Evaluation; trauma team notified at [address] via text   Model of Arrival: Ambulance    Trauma Team: Attending Merritt Meeks and Residents Kelsey Thomas  Consultants:     Orthopedics: routine consult; Epic consult order placed; Assessment/Plan   Active Problems / Assessment:   -Status post mechanical fall at home in his driveway while using his leaf blower, slipped on the wet pavement, fell onto left side, no head strike, no loss of consciousness, takes Aggrenox for history of CVA  -Left acute minimally displaced intertrochanteric fracture  -Status post TXA given in ED     Plan:   -Admit to trauma service, [facility]  -Orthopedics consult, appreciate recommendations, TXA given in ED and enrolled in PVI study, tentative plan for OR tomorrow 10/16  -ok for diet, n.p.o. at midnight  -Lovenox, patient also takes Aggrenox for history of CVAs  -will need postop PT/OT eval  -Pain control    History of Present Illness     Chief Complaint: Left lower extremity pain  Mechanism:Fall     HPI:    Ifrah Madrigal is a 61 y.o. male w PMH of HTN, hx of CVA on Aggrenox, HLD, GERD, seizure disorder, presenting as a trauma evaluation after mechanical fall in his driveway earlier today, fell onto his left side, no head strike, no loss of consciousness, takes Aggrenox for history of CVA. Patient found to have acute minimally displaced left-sided intertrochanteric fracture. Patient states he was using his leaf blower outside his house when he slipped on his driveway because it was wet, fell onto his left side with immediate left lower extremity pain, denies head strike, denies loss of consciousness. Patient lives at home alone but his brother is nearby to help as needed, ambulates without a cane or walker at baseline without issues, no recent history of falls, no dizziness or lightheadedness, no headaches.   Reports some left lower extremity pain on evaluation, otherwise stable, GCS 15. Review of Systems   Constitutional:  Negative for activity change, appetite change, chills and fever. Respiratory:  Negative for shortness of breath. Cardiovascular:  Negative for chest pain. Gastrointestinal:  Negative for abdominal distention, abdominal pain, constipation, diarrhea, nausea and vomiting. Genitourinary:  Negative for difficulty urinating. Musculoskeletal:  Negative for back pain and neck pain. Skin:  Negative for color change and wound. Neurological:  Negative for dizziness, syncope, weakness, light-headedness and headaches. Psychiatric/Behavioral:  Negative for agitation and confusion. The patient is not nervous/anxious. All other systems reviewed and are negative. 12-point, complete review of systems was reviewed and negative except as stated above.      Historical Information     Past Medical History:   Diagnosis Date    Arthritis     Hypertension     Seizures ([address])     Stroke [facility])      Past Surgical History:   Procedure Laterality Date    ELBOW SURGERY  2017        Social History     Tobacco Use    Smoking status: Never    Smokeless tobacco: Never   Vaping Use    Vaping Use: Never used   Substance Use Topics    Alcohol use: Yes     Comment: occasional    Drug use: No     Immunization History   Administered Date(s) Administered    COVID-19 PFIZER VACCINE 0.3 ML IM 03/19/2021, 04/10/2021    DTP 07/27/1998    INFLUENZA 12/01/2006, 10/30/2007    Tdap 10/27/2022     Last Tetanus: Unknown  Family History: Non-contributory     Meds/Allergies   all current active meds have been reviewed     Allergies   Allergen Reactions    Meperidine Seizures    Amoxicillin     Azithromycin Seizures    Other Sneezing     Dust     Pollen Extract Sneezing       Objective   Initial Vitals:   Temperature: 98.1 °F (36.7 °C) (10/15/23 1413)  Pulse: 99 (10/15/23 1413)  Respirations: 18 (10/15/23 1413)  Blood Pressure: (!) 200/94 (10/15/23 1813)    Primary Survey:   Airway:        Status: patent;        Pre-hospital Interventions: none        Hospital Interventions: none  Breathing:        Pre-hospital Interventions: none       Effort: normal       Right breath sounds: normal       Left breath sounds: normal  Circulation:        Rhythm: regular       Rate: regular   Right Pulses Left Pulses    R radial: 2+    R pedal: 2+     L radial: 2+    L pedal: 2+       Disability:        GCS: Eye: 4; Verbal: 5 Motor: 6 Total: 15       Right Pupil: round;  reactive         Left Pupil:  round;  reactive      R Motor Strength L Motor Strength    R : 5/5  R dorsiflex: 5/5  R plantarflex: 5/5 L : 5/5  L dorsiflex: 5/5  L plantarflex: 5/5        Sensory:  No sensory deficit  Exposure:       Completed: Yes      Secondary Survey:  Physical Exam  Constitutional:       General: He is not in acute distress. Appearance: Normal appearance. He is not ill-appearing or toxic-appearing. HENT:      Head: Normocephalic and atraumatic. Nose: Nose normal.      Mouth/Throat:      Mouth: Mucous membranes are moist.   Eyes:      Extraocular Movements: Extraocular movements intact. Cardiovascular:      Rate and Rhythm: Normal rate. Pulmonary:      Effort: Pulmonary effort is normal. No respiratory distress. Abdominal:      General: There is no distension. Palpations: Abdomen is soft. Tenderness: There is no abdominal tenderness. Musculoskeletal:         General: Signs of injury present. No swelling, tenderness or deformity. Normal range of motion. Cervical back: Normal range of motion. No tenderness. Legs:    Skin:     General: Skin is warm. Capillary Refill: Capillary refill takes less than 2 seconds. Coloration: Skin is not jaundiced or pale. Findings: No bruising or erythema. Neurological:      Mental Status: He is alert and oriented to person, place, and time.    Psychiatric:         Mood and Affect: Mood normal.         Invasive Devices       Peripheral Intravenous Line  Duration             Peripheral IV 10/15/23 Dorsal (posterior); Left Forearm <1 day                  Lab Results: I have personally reviewed all pertinent laboratory/test results 10/15/23 and in the preceding 24 hours. Recent Labs     10/15/23  1425   WBC 5.35   HGB 15.0   HCT 43.1      SODIUM 131*   K 4.2   CL 98   CO2 25   BUN 13   CREATININE 0.59*   GLUC 112   PTT 26   INR 0.99       Imaging Results: I have personally reviewed pertinent images saved in PACS. CT scan findings (and other pertinent positive findings on images) were discussed with radiology.  My interpretation of the images/reports are as follows:  Chest Xray(s): N/A   FAST exam(s): N/A   CT Scan(s): N/A   Additional Xray(s): positive for acute findings: Left intertrochanteric femur fracture     Other Studies:     Code Status: Level 1 - Full Code  Advance Directive and Living Will:      Power of :    POLST:

## 2023-10-15 NOTE — ED PROVIDER NOTES
History  Chief Complaint   Patient presents with    Fall     Pt fell in his driveway on wet [place] landing on his L hip. Pt denies any head strike. Pt is on ASA. 72-year-old man with relevant PMH seizures, stroke, and osteoporosis presents after mechanical fall. He was walking outside when he slipped at the end of his driveway of a wet piece of [place]. He landed on his left hip. He denies any headstrike. He was unable to get up and was on the ground for 20 minutes until a bystander helped him. He is reporting severe left hip pain but denies any numbness or tingling. Prior to Admission Medications   Prescriptions Last Dose Informant Patient Reported? Taking? Calcium-Magnesium-Vitamin D (CALCIUM MAGNESIUM PO)  Self Yes No   Sig: Take by mouth   LORazepam (ATIVAN) 0.5 mg tablet   No No   Sig: TAKE 1 TABLET BY MOUTH ONCE DAILY IF NEEDED for seizure.   Limit of 1 in 24 hours   NON FORMULARY  Self Yes No   Sig: Super Beets   VALERIAN ROOT PO  Self Yes No   Sig: Use   Vitamin Mixture (VITAMIN E COMPLETE PO)  Self Yes No   Sig: Take 1 tablet by mouth daily    alendronate (FOSAMAX) 70 mg tablet   No No   Sig: Take 1 tablet (70 mg total) by mouth every 7 days   aspirin-dipyridamole (AGGRENOX)  mg per 12 hr capsule  Self No No   Sig: TAKE 1 CAPSULE BY MOUTH TWICE A DAY   carBAMazepine (TEGretol) 200 mg tablet   No No   Sig: TAKE 3 TABLETS IN THE AM, 1 TABLETS AT LUNCH, 2 TABLETS AT DINNER, AND 2 TABLETS AT BEDTIME   co-enzyme Q-10 30 MG capsule  Self Yes No   Sig: Take 100 mg by mouth daily     levETIRAcetam (Keppra) 500 mg tablet   No No   Sig: Take 1 tablet (500 mg total) by mouth every 12 (twelve) hours   rosuvastatin (CRESTOR) 10 MG tablet   No No   Sig: Take 1 tablet (10 mg total) by mouth daily      Facility-Administered Medications: None       Past Medical History:   Diagnosis Date    Arthritis     Hypertension     Seizures (HCC)     Stroke [facility])        Past Surgical History:   Procedure Laterality Date ELBOW SURGERY  2017       Family History   Problem Relation Age of Onset    Dementia Mother     Alzheimer's disease Mother     Stroke Father     Hyperlipidemia Brother      I have reviewed and agree with the history as documented. E-Cigarette/Vaping    E-Cigarette Use Never User      E-Cigarette/Vaping Substances     Social History     Tobacco Use    Smoking status: Never    Smokeless tobacco: Never   Vaping Use    Vaping Use: Never used   Substance Use Topics    Alcohol use: Yes     Comment: occasional    Drug use: No        Review of Systems   Constitutional:  Negative for chills and fever. HENT:  Negative for ear pain and sore throat. Eyes:  Negative for pain and visual disturbance. Respiratory:  Negative for cough and shortness of breath. Cardiovascular:  Negative for chest pain and palpitations. Gastrointestinal:  Negative for abdominal pain, constipation, diarrhea and vomiting. Genitourinary:  Negative for dysuria and hematuria. Musculoskeletal:  Negative for arthralgias and back pain. Skin:  Negative for color change and rash. Neurological:  Negative for seizures, syncope and light-headedness. Psychiatric/Behavioral:  Negative for agitation and confusion. Physical Exam  ED Triage Vitals [10/15/23 1413]   Temperature Pulse Respirations Blood Pressure SpO2   98.1 °F (36.7 °C) 99 18 (!) 200/94 97 %      Temp Source Heart Rate Source Patient Position - Orthostatic VS BP Location FiO2 (%)   Oral Monitor Sitting Right arm --      Pain Score       9             Orthostatic Vital Signs  Vitals:    10/15/23 1413 10/15/23 1500 10/15/23 1630 10/15/23 1700   BP: (!) 200/94 142/88 126/89 134/89   Pulse: 99 94 100 102   Patient Position - Orthostatic VS: Sitting Lying Lying Lying       Physical Exam  Vitals and nursing note reviewed. Constitutional:       General: He is not in acute distress. Appearance: Normal appearance. He is well-developed.    HENT:      Head: Normocephalic and atraumatic. Right Ear: External ear normal.      Left Ear: External ear normal.   Cardiovascular:      Rate and Rhythm: Normal rate and regular rhythm. Pulmonary:      Effort: Pulmonary effort is normal. No respiratory distress. Breath sounds: Normal breath sounds. Abdominal:      Palpations: Abdomen is soft. Tenderness: There is no abdominal tenderness. There is no guarding or rebound. Musculoskeletal:         General: Normal range of motion. Cervical back: Normal range of motion and neck supple. No rigidity or tenderness. Comments: No C, T, or L spinal tenderness. Full ROM of bilateral upper and right lower extremities. Severe pain with manipulation of left hip. 2+ DP and PT of left foot. No pain of left knee, lower leg, ankle, or foot. Skin:     General: Skin is warm and dry. Neurological:      Mental Status: He is alert and oriented to person, place, and time. Mental status is at baseline. Comments: Sensation of the bilateral lower extremities without focal deficit.    Psychiatric:         Mood and Affect: Mood normal.         Behavior: Behavior normal.         ED Medications  Medications   ketorolac (TORADOL) injection 15 mg (15 mg Intravenous Given 10/15/23 1426)   acetaminophen (TYLENOL) tablet 975 mg (975 mg Oral Given 10/15/23 1426)   tranexamic Acid 740 mg in sodium chloride 0.9 % 100 mL IVPB (740 mg Intravenous New Bag 10/15/23 1632)   ropivacaine (NAROPIN) 0.5 % injection 30 mL (30 mL Infiltration Given by Other 10/15/23 1632)       Diagnostic Studies  Results Reviewed       Procedure Component Value Units Date/Time    Basic metabolic panel [[id number]]  (Abnormal) Collected: 10/15/23 1425    Lab Status: Final result Specimen: Blood from Arm, Left Updated: 10/15/23 1501     Sodium 131 mmol/L      Potassium 4.2 mmol/L      Chloride 98 mmol/L      CO2 25 mmol/L      ANION GAP 8 mmol/L      BUN 13 mg/dL      Creatinine 0.59 mg/dL      Glucose 112 mg/dL      Calcium 9.1 mg/dL      eGFR 107 ml/min/1.73sq m     Narrative:      [facility] guidelines for Chronic Kidney Disease (CKD):     Stage 1 with normal or high GFR (GFR > 90 mL/min/1.73 square meters)    Stage 2 Mild CKD (GFR = 60-89 mL/min/1.73 square meters)    Stage 3A Moderate CKD (GFR = 45-59 mL/min/1.73 square meters)    Stage 3B Moderate CKD (GFR = 30-44 mL/min/1.73 square meters)    Stage 4 Severe CKD (GFR = 15-29 mL/min/1.73 square meters)    Stage 5 End Stage CKD (GFR <15 mL/min/1.73 square meters)  Note: GFR calculation is accurate only with a steady state creatinine    Protime-INR [[id number]]  (Normal) Collected: 10/15/23 1425    Lab Status: Final result Specimen: Blood from Arm, Left Updated: 10/15/23 1458     Protime 13.0 seconds      INR 0.99    APTT [[id number]]  (Normal) Collected: 10/15/23 1425    Lab Status: Final result Specimen: Blood from Arm, Left Updated: 10/15/23 1458     PTT 26 seconds     CBC and differential [[id number]]  (Abnormal) Collected: 10/15/23 1425    Lab Status: Final result Specimen: Blood from Arm, Left Updated: 10/15/23 1436     WBC 5.35 Thousand/uL      RBC 4.39 Million/uL      Hemoglobin 15.0 g/dL      Hematocrit 43.1 %      MCV 98 fL      MCH 34.2 pg      MCHC 34.8 g/dL      RDW 11.1 %      MPV 8.7 fL      Platelets 361 Thousands/uL      nRBC 0 /100 WBCs      Neutrophils Relative 54 %      Immat GRANS % 0 %      Lymphocytes Relative 30 %      Monocytes Relative 13 %      Eosinophils Relative 2 %      Basophils Relative 1 %      Neutrophils Absolute 2.94 Thousands/µL      Immature Grans Absolute 0.01 Thousand/uL      Lymphocytes Absolute 1.59 Thousands/µL      Monocytes Absolute 0.67 Thousand/µL      Eosinophils Absolute 0.10 Thousand/µL      Basophils Absolute 0.04 Thousands/µL                    XR femur 2 vw left   ED Interpretation by Kathy Brooke MD (10/15 9021)   Left intertrochanteric hip fx      XR pelvis ap only 1 or 2 vw    (Results Pending)   XR chest 1 view portable    (Results Pending)         Procedures  Procedures      ED Course                             SBIRT 20yo+      Flowsheet Row Most Recent Value   Initial Alcohol Screen: US AUDIT-C     1. How often do you have a drink containing alcohol? 0 Filed at: 10/15/2023 1414   2. How many drinks containing alcohol do you have on a typical day you are drinking? 0 Filed at: 10/15/2023 1414   3a. Male UNDER 65: How often do you have five or more drinks on one occasion? 0 Filed at: 10/15/2023 1414   3b. FEMALE Any Age, or MALE 65+: How often do you have 4 or more drinks on one occassion? 0 Filed at: 10/15/2023 1414   Audit-C Score 0 Filed at: 10/15/2023 1414   JANIS: How many times in the past year have you. .. Used an illegal drug or used a prescription medication for non-medical reasons? Never Filed at: 10/15/2023 1414                  Medical Decision Making  Presents after mechanical fall with left hip pain. He denies any head strike and remembers the fall. He is on aspirin but no anticoagulants. Left hip with severe tenderness with manipulation. Will obtain radiographs of the left hip for presumed hip fracture. No other imaging necessary at this time with no focal pain. Radiograph shows left intertrochanteric hip fracture. We will admit patient to trauma service for orthopedic intervention. Patient in agreement with plan and questions were answered. Portions or all of this note were generated using voice recognition software. Occasional wrong word or "sound a like" substitutions may have occurred due to the inherent limitations of voice recognition software. Please interpret any errors within the intended context of the whole sentence or idea. Amount and/or Complexity of Data Reviewed  Labs: ordered. Radiology: ordered and independent interpretation performed. Risk  OTC drugs. Prescription drug management. Decision regarding hospitalization.           Disposition  Final diagnoses:   Closed fracture of left hip, initial encounter [facility])     Time reflects when diagnosis was documented in both MDM as applicable and the Disposition within this note       Time User Action Codes Description Comment    10/15/2023  3:59 PM Arina Samson Add [S72.002A] Closed fracture of left hip, initial encounter [facility])           ED Disposition       ED Disposition   Admit    Condition   Stable    Date/Time   Sun Oct 15, 2023 1600    Comment   Case was discussed with trauma and the patient's admission status was agreed to be Admission Status: inpatient status to the service of Dr. Sergio Fischer . Follow-up Information    None         Patient's Medications   Discharge Prescriptions    No medications on file     No discharge procedures on file. PDMP Review         Value Time User    PDMP Reviewed  Yes 9/20/2023  9:09 AM [facility], DO             ED Provider  Attending physically available and evaluated Delroy Noe. I managed the patient along with the ED Attending.     Electronically Signed by           Loraine Delaney MD  10/15/23 0021

## 2023-10-15 NOTE — PLAN OF CARE
Problem: MOBILITY - ADULT  Goal: Maintain or return to baseline ADL function  Description: INTERVENTIONS:  -  Assess patient's ability to carry out ADLs; assess patient's baseline for ADL function and identify physical deficits which impact ability to perform ADLs (bathing, care of mouth/teeth, toileting, grooming, dressing, etc.)  - Assess/evaluate cause of self-care deficits   - Assess range of motion  - Assess patient's mobility; develop plan if impaired  - Assess patient's need for assistive devices and provide as appropriate  - Encourage maximum independence but intervene and supervise when necessary  - Involve family in performance of ADLs  - Assess for home care needs following discharge   - Consider OT consult to assist with ADL evaluation and planning for discharge  - Provide patient education as appropriate  Outcome: Progressing  Goal: Maintains/Returns to pre admission functional level  Description: INTERVENTIONS:  - Perform BMAT or MOVE assessment daily.   - Set and communicate daily mobility goal to care team and patient/family/caregiver. - Collaborate with rehabilitation services on mobility goals if consulted  - Perform Range of Motion 3 times a day. - Reposition patient every 2 hours.   - Dangle patient 3 times a day  - Stand patient 3 times a day  - Ambulate patient 3 times a day  - Out of bed to chair 3 times a day   - Out of bed for meals 3 times a day  - Out of bed for toileting  - Record patient progress and toleration of activity level   Outcome: Progressing     Problem: PAIN - ADULT  Goal: Verbalizes/displays adequate comfort level or baseline comfort level  Description: Interventions:  - Encourage patient to monitor pain and request assistance  - Assess pain using appropriate pain scale  - Administer analgesics based on type and severity of pain and evaluate response  - Implement non-pharmacological measures as appropriate and evaluate response  - Consider cultural and social influences on pain and pain management  - Notify physician/advanced practitioner if interventions unsuccessful or patient reports new pain  Outcome: Progressing     Problem: INFECTION - ADULT  Goal: Absence or prevention of progression during hospitalization  Description: INTERVENTIONS:  - Assess and monitor for signs and symptoms of infection  - Monitor lab/diagnostic results  - Monitor all insertion sites, i.e. indwelling lines, tubes, and drains  - Monitor endotracheal if appropriate and nasal secretions for changes in amount and color  - [place] appropriate cooling/warming therapies per order  - Administer medications as ordered  - Instruct and encourage patient and family to use good hand hygiene technique  - Identify and instruct in appropriate isolation precautions for identified infection/condition  Outcome: Progressing  Goal: Absence of fever/infection during neutropenic period  Description: INTERVENTIONS:  - Monitor WBC    Outcome: Progressing

## 2023-10-16 ENCOUNTER — APPOINTMENT (INPATIENT)
Dept: RADIOLOGY | Facility: HOSPITAL | Age: 63
DRG: 481 | End: 2023-10-16
Payer: COMMERCIAL

## 2023-10-16 ENCOUNTER — ANESTHESIA (INPATIENT)
Dept: PERIOP | Facility: HOSPITAL | Age: 63
DRG: 481 | End: 2023-10-16
Payer: COMMERCIAL

## 2023-10-16 ENCOUNTER — ANESTHESIA EVENT (INPATIENT)
Dept: PERIOP | Facility: HOSPITAL | Age: 63
DRG: 481 | End: 2023-10-16
Payer: COMMERCIAL

## 2023-10-16 PROBLEM — S72.142A CLOSED DISPLACED INTERTROCHANTERIC FRACTURE OF LEFT FEMUR (HCC): Status: ACTIVE | Noted: 2023-10-15
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
## 2023-10-16 LAB
ANION GAP SERPL CALCULATED.3IONS-SCNC: 6 MMOL/L
BUN SERPL-MCNC: 11 MG/DL (ref 5–25)
CALCIUM SERPL-MCNC: 8.6 MG/DL (ref 8.4–10.2)
CHLORIDE SERPL-SCNC: 100 MMOL/L (ref 96–108)
CO2 SERPL-SCNC: 27 MMOL/L (ref 21–32)
CREAT SERPL-MCNC: 0.53 MG/DL (ref 0.6–1.3)
ERYTHROCYTE [DISTWIDTH] IN BLOOD BY AUTOMATED COUNT: 11.2 % (ref 11.6–15.1)
GFR SERPL CREATININE-BSD FRML MDRD: 112 ML/MIN/1.73SQ M
GLUCOSE SERPL-MCNC: 84 MG/DL (ref 65–140)
HCT VFR BLD AUTO: 41.2 % (ref 36.5–49.3)
HGB BLD-MCNC: 13.9 G/DL (ref 12–17)
MCH RBC QN AUTO: 34.5 PG (ref 26.8–34.3)
MCHC RBC AUTO-ENTMCNC: 33.7 G/DL (ref 31.4–37.4)
MCV RBC AUTO: 102 FL (ref 82–98)
PLATELET # BLD AUTO: 211 THOUSANDS/UL (ref 149–390)
PMV BLD AUTO: 9.1 FL (ref 8.9–12.7)
POTASSIUM SERPL-SCNC: 3.6 MMOL/L (ref 3.5–5.3)
RBC # BLD AUTO: 4.03 MILLION/UL (ref 3.88–5.62)
SODIUM SERPL-SCNC: 133 MMOL/L (ref 135–147)
WBC # BLD AUTO: 9.71 THOUSAND/UL (ref 4.31–10.16)

## 2023-10-16 PROCEDURE — C1713 ANCHOR/SCREW BN/BN,TIS/BN: HCPCS | Performed by: ORTHOPAEDIC SURGERY

## 2023-10-16 PROCEDURE — C1769 GUIDE WIRE: HCPCS | Performed by: ORTHOPAEDIC SURGERY

## 2023-10-16 PROCEDURE — 27245 TREAT THIGH FRACTURE: CPT | Performed by: ORTHOPAEDIC SURGERY

## 2023-10-16 PROCEDURE — NC001 PR NO CHARGE: Performed by: PHYSICIAN ASSISTANT

## 2023-10-16 PROCEDURE — NC001 PR NO CHARGE: Performed by: ORTHOPAEDIC SURGERY

## 2023-10-16 PROCEDURE — 80048 BASIC METABOLIC PNL TOTAL CA: CPT

## 2023-10-16 PROCEDURE — 85027 COMPLETE CBC AUTOMATED: CPT

## 2023-10-16 PROCEDURE — 73502 X-RAY EXAM HIP UNI 2-3 VIEWS: CPT

## 2023-10-16 PROCEDURE — 0QS706Z REPOSITION LEFT UPPER FEMUR WITH INTRAMEDULLARY INTERNAL FIXATION DEVICE, OPEN APPROACH: ICD-10-PCS | Performed by: ORTHOPAEDIC SURGERY

## 2023-10-16 PROCEDURE — 99255 IP/OBS CONSLTJ NEW/EST HI 80: CPT | Performed by: ORTHOPAEDIC SURGERY

## 2023-10-16 PROCEDURE — 99232 SBSQ HOSP IP/OBS MODERATE 35: CPT | Performed by: PHYSICIAN ASSISTANT

## 2023-10-16 DEVICE — 10MM/130 DEG TI CANN TFNA 170MM - STERILE
Type: IMPLANTABLE DEVICE | Site: LEG | Status: FUNCTIONAL
Brand: TFN-ADVANCE

## 2023-10-16 DEVICE — TFNA FENESTRATED HELICAL BLADE 95MM - STERILE
Type: IMPLANTABLE DEVICE | Site: LEG | Status: FUNCTIONAL
Brand: TFN-ADVANCE

## 2023-10-16 DEVICE — 5.0MM TI LOCKING SCREW W/T25 STARDRIVE 32MM F/IM NAIL-STER: Type: IMPLANTABLE DEVICE | Site: LEG | Status: FUNCTIONAL

## 2023-10-16 RX ORDER — FENTANYL CITRATE 50 UG/ML
INJECTION, SOLUTION INTRAMUSCULAR; INTRAVENOUS AS NEEDED
Status: DISCONTINUED | OUTPATIENT
Start: 2023-10-16 | End: 2023-10-16

## 2023-10-16 RX ORDER — ONDANSETRON 2 MG/ML
4 INJECTION INTRAMUSCULAR; INTRAVENOUS EVERY 6 HOURS PRN
Status: CANCELLED | OUTPATIENT
Start: 2023-10-16

## 2023-10-16 RX ORDER — SODIUM CHLORIDE, SODIUM LACTATE, POTASSIUM CHLORIDE, CALCIUM CHLORIDE 600; 310; 30; 20 MG/100ML; MG/100ML; MG/100ML; MG/100ML
1.5 INJECTION, SOLUTION INTRAVENOUS CONTINUOUS
Status: DISCONTINUED | OUTPATIENT
Start: 2023-10-16 | End: 2023-10-17

## 2023-10-16 RX ORDER — CEFAZOLIN SODIUM 2 G/50ML
2000 SOLUTION INTRAVENOUS EVERY 8 HOURS
Status: COMPLETED | OUTPATIENT
Start: 2023-10-16 | End: 2023-10-17

## 2023-10-16 RX ORDER — MAGNESIUM HYDROXIDE 1200 MG/15ML
LIQUID ORAL AS NEEDED
Status: DISCONTINUED | OUTPATIENT
Start: 2023-10-16 | End: 2023-10-16 | Stop reason: HOSPADM

## 2023-10-16 RX ORDER — GLYCOPYRROLATE 0.2 MG/ML
INJECTION INTRAMUSCULAR; INTRAVENOUS AS NEEDED
Status: DISCONTINUED | OUTPATIENT
Start: 2023-10-16 | End: 2023-10-16

## 2023-10-16 RX ORDER — CALCIUM CARBONATE 500 MG/1
1000 TABLET, CHEWABLE ORAL DAILY PRN
Status: DISCONTINUED | OUTPATIENT
Start: 2023-10-16 | End: 2023-10-19 | Stop reason: HOSPADM

## 2023-10-16 RX ORDER — ASPIRIN AND DIPYRIDAMOLE 25; 200 MG/1; MG/1
1 CAPSULE, EXTENDED RELEASE ORAL 2 TIMES DAILY
Status: DISCONTINUED | OUTPATIENT
Start: 2023-10-17 | End: 2023-10-19 | Stop reason: HOSPADM

## 2023-10-16 RX ORDER — DOCUSATE SODIUM 100 MG/1
100 CAPSULE, LIQUID FILLED ORAL 2 TIMES DAILY
Status: DISCONTINUED | OUTPATIENT
Start: 2023-10-16 | End: 2023-10-19 | Stop reason: HOSPADM

## 2023-10-16 RX ORDER — HYDROMORPHONE HCL/PF 1 MG/ML
0.5 SYRINGE (ML) INJECTION
Status: DISCONTINUED | OUTPATIENT
Start: 2023-10-16 | End: 2023-10-16 | Stop reason: HOSPADM

## 2023-10-16 RX ORDER — HYDROMORPHONE HCL/PF 1 MG/ML
SYRINGE (ML) INJECTION AS NEEDED
Status: DISCONTINUED | OUTPATIENT
Start: 2023-10-16 | End: 2023-10-16

## 2023-10-16 RX ORDER — ROCURONIUM BROMIDE 10 MG/ML
INJECTION, SOLUTION INTRAVENOUS AS NEEDED
Status: DISCONTINUED | OUTPATIENT
Start: 2023-10-16 | End: 2023-10-16

## 2023-10-16 RX ORDER — EPHEDRINE SULFATE 50 MG/ML
INJECTION INTRAVENOUS AS NEEDED
Status: DISCONTINUED | OUTPATIENT
Start: 2023-10-16 | End: 2023-10-16

## 2023-10-16 RX ORDER — ENOXAPARIN SODIUM 100 MG/ML
30 INJECTION SUBCUTANEOUS EVERY 24 HOURS
Status: DISCONTINUED | OUTPATIENT
Start: 2023-10-16 | End: 2023-10-16

## 2023-10-16 RX ORDER — PROPOFOL 10 MG/ML
INJECTION, EMULSION INTRAVENOUS AS NEEDED
Status: DISCONTINUED | OUTPATIENT
Start: 2023-10-16 | End: 2023-10-16

## 2023-10-16 RX ORDER — LIDOCAINE HYDROCHLORIDE 20 MG/ML
INJECTION, SOLUTION EPIDURAL; INFILTRATION; INTRACAUDAL; PERINEURAL AS NEEDED
Status: DISCONTINUED | OUTPATIENT
Start: 2023-10-16 | End: 2023-10-16

## 2023-10-16 RX ORDER — NEOSTIGMINE METHYLSULFATE 1 MG/ML
INJECTION INTRAVENOUS AS NEEDED
Status: DISCONTINUED | OUTPATIENT
Start: 2023-10-16 | End: 2023-10-16

## 2023-10-16 RX ORDER — ENOXAPARIN SODIUM 100 MG/ML
30 INJECTION SUBCUTANEOUS EVERY 12 HOURS SCHEDULED
Status: DISCONTINUED | OUTPATIENT
Start: 2023-10-16 | End: 2023-10-19 | Stop reason: HOSPADM

## 2023-10-16 RX ORDER — SENNOSIDES 8.6 MG
1 TABLET ORAL DAILY
Status: CANCELLED | OUTPATIENT
Start: 2023-10-16

## 2023-10-16 RX ORDER — CEFAZOLIN SODIUM 2 G/50ML
2000 SOLUTION INTRAVENOUS ONCE
Status: COMPLETED | OUTPATIENT
Start: 2023-10-16 | End: 2023-10-16

## 2023-10-16 RX ORDER — ONDANSETRON 2 MG/ML
4 INJECTION INTRAMUSCULAR; INTRAVENOUS ONCE AS NEEDED
Status: DISCONTINUED | OUTPATIENT
Start: 2023-10-16 | End: 2023-10-16 | Stop reason: HOSPADM

## 2023-10-16 RX ORDER — FENTANYL CITRATE/PF 50 MCG/ML
25 SYRINGE (ML) INJECTION
Status: DISCONTINUED | OUTPATIENT
Start: 2023-10-16 | End: 2023-10-16 | Stop reason: HOSPADM

## 2023-10-16 RX ORDER — DEXAMETHASONE SODIUM PHOSPHATE 10 MG/ML
INJECTION, SOLUTION INTRAMUSCULAR; INTRAVENOUS AS NEEDED
Status: DISCONTINUED | OUTPATIENT
Start: 2023-10-16 | End: 2023-10-16

## 2023-10-16 RX ORDER — ONDANSETRON 2 MG/ML
INJECTION INTRAMUSCULAR; INTRAVENOUS AS NEEDED
Status: DISCONTINUED | OUTPATIENT
Start: 2023-10-16 | End: 2023-10-16

## 2023-10-16 RX ADMIN — CEFAZOLIN SODIUM 2000 MG: 2 SOLUTION INTRAVENOUS at 11:00

## 2023-10-16 RX ADMIN — CARBAMAZEPINE 600 MG: 200 TABLET ORAL at 05:04

## 2023-10-16 RX ADMIN — SODIUM CHLORIDE, SODIUM LACTATE, POTASSIUM CHLORIDE, AND CALCIUM CHLORIDE: .6; .31; .03; .02 INJECTION, SOLUTION INTRAVENOUS at 12:03

## 2023-10-16 RX ADMIN — OXYCODONE HYDROCHLORIDE 5 MG: 5 TABLET ORAL at 03:21

## 2023-10-16 RX ADMIN — CARBAMAZEPINE 400 MG: 200 TABLET ORAL at 17:18

## 2023-10-16 RX ADMIN — DEXAMETHASONE SODIUM PHOSPHATE 10 MG: 10 INJECTION, SOLUTION INTRAMUSCULAR; INTRAVENOUS at 10:48

## 2023-10-16 RX ADMIN — SODIUM CHLORIDE, SODIUM LACTATE, POTASSIUM CHLORIDE, AND CALCIUM CHLORIDE 75 ML/HR: .6; .31; .03; .02 INJECTION, SOLUTION INTRAVENOUS at 00:01

## 2023-10-16 RX ADMIN — LIDOCAINE HYDROCHLORIDE 100 MG: 20 INJECTION, SOLUTION EPIDURAL; INFILTRATION; INTRACAUDAL; PERINEURAL at 10:47

## 2023-10-16 RX ADMIN — LEVETIRACETAM 500 MG: 500 TABLET, FILM COATED ORAL at 05:03

## 2023-10-16 RX ADMIN — SODIUM CHLORIDE, SODIUM LACTATE, POTASSIUM CHLORIDE, AND CALCIUM CHLORIDE 1.5 ML/KG/HR: .6; .31; .03; .02 INJECTION, SOLUTION INTRAVENOUS at 14:24

## 2023-10-16 RX ADMIN — EPHEDRINE SULFATE 10 MG: 50 INJECTION INTRAVENOUS at 11:36

## 2023-10-16 RX ADMIN — CARBAMAZEPINE 400 MG: 200 TABLET ORAL at 21:19

## 2023-10-16 RX ADMIN — GLYCOPYRROLATE 0.6 MCG: 0.2 INJECTION, SOLUTION INTRAMUSCULAR; INTRAVENOUS at 11:35

## 2023-10-16 RX ADMIN — ROCURONIUM BROMIDE 50 MG: 10 INJECTION, SOLUTION INTRAVENOUS at 10:49

## 2023-10-16 RX ADMIN — PHENYLEPHRINE HYDROCHLORIDE 200 MCG: 10 INJECTION INTRAVENOUS at 11:06

## 2023-10-16 RX ADMIN — ENOXAPARIN SODIUM 30 MG: 30 INJECTION SUBCUTANEOUS at 14:25

## 2023-10-16 RX ADMIN — ROSUVASTATIN CALCIUM 10 MG: 10 TABLET, FILM COATED ORAL at 17:17

## 2023-10-16 RX ADMIN — PHENYLEPHRINE HYDROCHLORIDE 200 MCG: 10 INJECTION INTRAVENOUS at 11:17

## 2023-10-16 RX ADMIN — CEFAZOLIN SODIUM 2000 MG: 2 SOLUTION INTRAVENOUS at 19:44

## 2023-10-16 RX ADMIN — PROPOFOL 100 MG: 10 INJECTION, EMULSION INTRAVENOUS at 10:47

## 2023-10-16 RX ADMIN — HYDROMORPHONE HYDROCHLORIDE 0.5 MG: 1 INJECTION, SOLUTION INTRAMUSCULAR; INTRAVENOUS; SUBCUTANEOUS at 11:29

## 2023-10-16 RX ADMIN — ACETAMINOPHEN 650 MG: 325 TABLET, FILM COATED ORAL at 17:16

## 2023-10-16 RX ADMIN — PHENYLEPHRINE HYDROCHLORIDE 200 MCG: 10 INJECTION INTRAVENOUS at 11:12

## 2023-10-16 RX ADMIN — NEOSTIGMINE METHYLSULFATE 3 MG: 1 INJECTION INTRAVENOUS at 11:35

## 2023-10-16 RX ADMIN — FENTANYL CITRATE 50 MCG: 50 INJECTION INTRAMUSCULAR; INTRAVENOUS at 11:23

## 2023-10-16 RX ADMIN — ACETAMINOPHEN 650 MG: 325 TABLET, FILM COATED ORAL at 05:01

## 2023-10-16 RX ADMIN — ONDANSETRON 4 MG: 2 INJECTION INTRAMUSCULAR; INTRAVENOUS at 11:46

## 2023-10-16 RX ADMIN — FENTANYL CITRATE 50 MCG: 50 INJECTION INTRAMUSCULAR; INTRAVENOUS at 10:47

## 2023-10-16 RX ADMIN — PROPOFOL 50 MG: 10 INJECTION, EMULSION INTRAVENOUS at 10:50

## 2023-10-16 RX ADMIN — LEVETIRACETAM 500 MG: 500 TABLET, FILM COATED ORAL at 17:12

## 2023-10-16 RX ADMIN — ROSUVASTATIN CALCIUM 10 MG: 10 TABLET, FILM COATED ORAL at 00:01

## 2023-10-16 NOTE — DISCHARGE INSTR - AVS FIRST PAGE
Discharge Instructions - Asa Espinoza 61 y.o. male MRN: [id number]  Unit/Bed#: Operating Room    Weight Bearing Status:                                           Weight Bearing as tolerated to left leg. DVT prophylaxis:  Complete course of as directed    Pain:  Continue analgesics as directed    Showering Instructions:   Do not shower until followup     Dressing Instructions:   Keep dressing clean, dry and intact until follow up appointment. Driving Instructions:  No driving until cleared by Orthopaedic Surgery. PT/OT:  Continue PT/OT on outpatient basis as directed    Appt Instructions: If you do not have your appointment, please call the clinic at [phone] to follow up with Dr. Gabrielle William in 2-3 weeks from surgery date  Otherwise followup as scheduled. Contact the office sooner if you experience any increased numbness/tingling in the extremities.       Miscellaneous:  None

## 2023-10-16 NOTE — PLAN OF CARE
Problem: SAFETY ADULT  Goal: Maintain or return to baseline ADL function  Description: INTERVENTIONS:  -  Assess patient's ability to carry out ADLs; assess patient's baseline for ADL function and identify physical deficits which impact ability to perform ADLs (bathing, care of mouth/teeth, toileting, grooming, dressing, etc.)  - Assess/evaluate cause of self-care deficits   - Assess range of motion  - Assess patient's mobility; develop plan if impaired  - Assess patient's need for assistive devices and provide as appropriate  - Encourage maximum independence but intervene and supervise when necessary  - Involve family in performance of ADLs  - Assess for home care needs following discharge   - Consider OT consult to assist with ADL evaluation and planning for discharge  - Provide patient education as appropriate  Outcome: Progressing     Problem: INFECTION - ADULT  Goal: Absence of fever/infection during neutropenic period  Description: INTERVENTIONS:  - Monitor WBC    Outcome: Progressing     Problem: PAIN - ADULT  Goal: Verbalizes/displays adequate comfort level or baseline comfort level  Description: Interventions:  - Encourage patient to monitor pain and request assistance  - Assess pain using appropriate pain scale  - Administer analgesics based on type and severity of pain and evaluate response  - Implement non-pharmacological measures as appropriate and evaluate response  - Consider cultural and social influences on pain and pain management  - Notify physician/advanced practitioner if interventions unsuccessful or patient reports new pain  Outcome: Progressing     Problem: MOBILITY - ADULT  Goal: Maintains/Returns to pre admission functional level  Description: INTERVENTIONS:  - Perform BMAT or MOVE assessment daily.   - Set and communicate daily mobility goal to care team and patient/family/caregiver.    - Collaborate with rehabilitation services on mobility goals if consulted  - Perform Range of Motion 3 times a day. - Reposition patient every 2 hours.   - Dangle patient 3 times a day  - Stand patient 3 times a day  - Ambulate patient 3 times a day  - Out of bed to chair 3 times a day   - Out of bed for meals 3 times a day  - Out of bed for toileting  - Record patient progress and toleration of activity level   Outcome: Progressing

## 2023-10-16 NOTE — ANESTHESIA PREPROCEDURE EVALUATION
Procedure:  INSERTION NAIL IM FEMUR ANTEGRADE (TROCHANTERIC) (Left: Leg Upper)    Relevant Problems   CARDIO   (+) Essential hypertension   (+) Mixed hyperlipidemia      GI/HEPATIC   (+) GERD (gastroesophageal reflux disease)      MUSCULOSKELETAL   (+) GOA (generalized osteoarthritis)      NEURO/PSYCH   (+) Anxiety   (+) Cerebrovascular accident (CVA) due to thrombosis ([address])   (+) Seizure disorder ([address])      Recent labs personally reviewed:  Lab Results   Component Value Date    WBC 9.71 10/16/2023    HGB 13.9 10/16/2023     10/16/2023     Lab Results   Component Value Date     11/20/2015    K 3.6 10/16/2023    BUN 11 10/16/2023    CREATININE 0.53 (L) 10/16/2023    GLUCOSE 90 11/20/2015     Lab Results   Component Value Date    PTT 26 10/15/2023      Lab Results   Component Value Date    INR 0.99 10/15/2023       Lab Results   Component Value Date    HGBA1C 5.0 10/16/2020       Type and Screen:  A      Physical Exam    Airway    Mallampati score: III  TM Distance: >3 FB  Neck ROM: full     Dental   No notable dental hx     Cardiovascular  Cardiovascular exam normal    Pulmonary  Pulmonary exam normal     Other Findings        Anesthesia Plan  ASA Score- 3     Anesthesia Type- general with ASA Monitors. Additional Monitors:     Airway Plan: ETT. Plan Factors-    Chart reviewed. Existing labs reviewed. Patient summary reviewed. Patient is not a current smoker. Induction- intravenous. Postoperative Plan- Plan for postoperative opioid use. Planned trial extubation    Informed Consent- Anesthetic plan and risks discussed with patient. I personally reviewed this patient with the CRNA. Discussed and agreed on the Anesthesia Plan with the CRNA. Rachael Perera

## 2023-10-16 NOTE — PHYSICAL THERAPY NOTE
PT cancel note       10/16/23 0727   PT Last Visit   PT Visit Date 10/16/23   Note Type   Note type Cancelled Session   Cancel Reasons Patient to operating room     [address]

## 2023-10-16 NOTE — ASSESSMENT & PLAN NOTE
- Acute closed left femur fracture, present on admission.  - Appreciate Orthopedic surgery evaluation, recommendations and interventions as noted. - Status post ORIF of left femur with IM nail insertion on 10/16/2023.  - May be weightbearing as tolerated on the left lower extremity post-operatively. - Monitor left lower extremity neurovascular exam.  - Continue multimodal analgesic regimen.  - Continue DVT prophylaxis. - PT and OT evaluation and treatment as indicated. - Outpatient follow up with Orthopedic surgery for re-evaluation.

## 2023-10-16 NOTE — PROGRESS NOTES
Progress Note - Orthopedics   Georgette Espinoza 61 y.o. male MRN: [id number]  Unit/Bed#: [facility] 606-01      Subjective:    63 y.o.male No acute events, no new complaints. Pain well controlled. Denies fevers, chills, CP, SOB, N/V, numbness or tingling. Patient states that they are eager to have surgery and begin recovery.     Labs:  0   Lab Value Date/Time    HCT 43.1 10/15/2023 1425    HCT 43.3 01/06/2023 1555    HCT 43.6 04/08/2022 0800    HCT 42.8 11/20/2015 0548    HCT 38.4 11/19/2015 2159    HCT 43.8 12/04/2014 1213    HGB 15.0 10/15/2023 1425    HGB 15.2 01/06/2023 1555    HGB 15.2 04/08/2022 0800    HGB 14.9 11/20/2015 0548    HGB 13.4 11/19/2015 2159    HGB 15.1 12/04/2014 1213    INR 0.99 10/15/2023 1425    WBC 5.35 10/15/2023 1425    WBC 7.11 01/06/2023 1555    WBC 6.49 04/08/2022 0800    WBC 7.94 11/20/2015 0548    WBC 6.97 11/19/2015 2159    WBC 6.45 12/04/2014 1213       Meds:    Current Facility-Administered Medications:     acetaminophen (TYLENOL) tablet 650 mg, 650 mg, Oral, Q6H [facility], Rai Barnes MD, 650 mg at 10/16/23 0501    aspirin-dipyridamole (AGGRENOX)  mg per 12 hr capsule 1 capsule, 1 capsule, Oral, BID, Memo Maurer DO    calcium carbonate (TUMS) chewable tablet 500 mg, 500 mg, Oral, Daily PRN, Rai Barnes MD, 500 mg at 10/15/23 2137    carBAMazepine (TEGretol) tablet 200 mg, 200 mg, Oral, Daily With Lunch, Memo Maurer DO    carBAMazepine (TEGretol) tablet 400 mg, 400 mg, Oral, BID, Memo Maurer DO    carBAMazepine (TEGretol) tablet 600 mg, 600 mg, Oral, Early Morning, Memo Maurer DO, 600 mg at 10/16/23 8182    co-enzyme Q-10 capsule 90 mg, 90 mg, Oral, Daily, Rai Barnes MD    enoxaparin (LOVENOX) subcutaneous injection 30 mg, 30 mg, Subcutaneous, Q12H, Rai Barnes MD, 30 mg at 10/15/23 2059    HYDROmorphone (DILAUDID) injection 0.5 mg, 0.5 mg, Intravenous, Q4H PRN, Rai Barnes MD lactated ringers infusion, 75 mL/hr, Intravenous, Continuous, Teresa He MD, Last Rate: 75 mL/hr at 10/16/23 0001, 75 mL/hr at 10/16/23 0001    levETIRAcetam (KEPPRA) tablet 500 mg, 500 mg, Oral, Q12H [address], Memo Maurer DO, 500 mg at 10/16/23 0503    LORazepam (ATIVAN) tablet 0.5 mg, 0.5 mg, Oral, Daily PRN, Memo Maurer DO    ondansetron [facility]) injection 4 mg, 4 mg, Intravenous, Q6H PRN, Rai Barnes MD    oxyCODONE (ROXICODONE) immediate release tablet 10 mg, 10 mg, Oral, Q4H PRN, Rai Barnes MD    oxyCODONE (ROXICODONE) IR tablet 5 mg, 5 mg, Oral, Q4H PRN, Rai Barnes MD, 5 mg at 10/16/23 0321    polyethylene glycol (MIRALAX) packet 17 g, 17 g, Oral, Daily, Rai Barnes MD    rosuvastatin (CRESTOR) tablet 10 mg, 10 mg, Oral, Daily With Gail An MD, 10 mg at 10/16/23 0001    senna-docusate sodium (SENOKOT S) 8.6-50 mg per tablet 1 tablet, 1 tablet, Oral, HS, Rai Barnes MD, 1 tablet at 10/15/23 2100    Blood Culture:   No results found for: "Cynthia Disla"    Wound Culture:   No results found for: "WOUNDCULT"    Ins and Outs:  I/O last 24 hours: In: -   Out: 550 [Urine:550]          Physical:  Vitals:    10/16/23 0256   BP: 113/81   Pulse: 87   Resp: 18   Temp: 98.6 °F (37 °C)   SpO2: 98%     Musculoskeletal: left lower extremity  Left lower extremity shortened and externally rotated  Skin dry, and intact, no erythema  Painful range of motion of hip joint but no micromotion tenderness  Sensation intact to sural, saphenous, superficial peroneal, deep peroneal, tibial distributions  5/5 motor strength to ankle dorsi/plantar flexion, EHL/FHL  2+ DP/PT pulse  Musculature is soft and compressible, no pain with passive stretch    Assessment:    63 y.o.male left hip intertrochanteric fracture. Plan is for OR for operative fixation of left hip.     Plan:  NWB LLE  PT/OT  Pain control  DVT PPx per primary  Management of other medical conditions per primary  Dispo: Ortho will follow    Alexandra Fu MD

## 2023-10-16 NOTE — PROGRESS NOTES
Progress Note - Orthopedics   Feli Espinoza 61 y.o. male MRN: [id number]      Subjective:  No acute events since surgery. No acute distress. Denies fevers, chills, SOB. Pain controlled. Objective:  A 10 point ROS was performed; negative except as noted above. Labs:  Lab Results   Component Value Date/Time    WBC 9.71 10/16/2023 04:53 AM    WBC 7.94 11/20/2015 05:48 AM    HGB 13.9 10/16/2023 04:53 AM    HGB 14.9 11/20/2015 05:48 AM       Physical:  Vitals:    10/16/23 0752   BP: 141/95   Pulse: 88   Resp: 18   Temp: 98.6 °F (37 °C)   SpO2: 97%     Musculoskeletal: left Lower Extremity  Dressing C/D/I  Thigh soft and depressible  SILT dang/saph/sp/dp/tib nerve distributions   +FHL/EHL, +ankle DF/PF.     Toes WWP w BCR    Assessment:    61 y.o. male post op day #0 from Left femur CMN    Plan:  WBAT LLE  AM labs  PT/OT  Ancef x2  Pain control  DVT ppx: for 4 weeks postop; currently ordered lovenox and home aggrenox; discuss with primary team final dvt ppx plan  Will continue to assess for acute blood loss anemia  Dispo: Ortho will follow      Keith Butt MD

## 2023-10-16 NOTE — OCCUPATIONAL THERAPY NOTE
Occupational Therapy Cancel Note     10/16/23 0745   OT Last Visit   OT Visit Date 10/16/23   Note Type   Note type Cancelled Session   Cancel Reasons Patient to operating room           Adi Thompson, OT

## 2023-10-16 NOTE — UTILIZATION REVIEW
Initial Clinical Review    Admission: Date/Time/Statement:   Admission Orders (From admission, onward)       Ordered        10/15/23 1727  Inpatient Admission  Once                          Orders Placed This Encounter   Procedures    Inpatient Admission     Standing Status:   Standing     Number of Occurrences:   1     Order Specific Question:   Level of Care     Answer:   Med Surg [16]     Order Specific Question:   Estimated length of stay     Answer:   More than 2 Midnights     Order Specific Question:   Certification     Answer:   I certify that inpatient services are medically necessary for this patient for a duration of greater than two midnights. See H&P and MD Progress Notes for additional information about the patient's course of treatment. ED Arrival Information       Expected   -    Arrival   10/15/2023 14:05    Acuity   Urgent              Means of arrival   Ambulance    Escorted by   JOS[facility] Dalal)    Service   Trauma    Admission type   Emergency              Arrival complaint   fall             Chief Complaint   Patient presents with    Fall     Pt fell in his driveway on wet [place] landing on his L hip. Pt denies any head strike. Pt is on ASA. Initial Presentation: 61 y.o. male to ED presents for S/p Mechanical Fall with Left lower extremity pain. Nolan Jack He fell in his driveway earlier today on his left side. No head strike or no LOC. He was using his leaf blower outside his house when he slipped on his driveway because it was wet, fell onto his left side with immediate left lower extremity pain, . und with  acute minimally displaced left-sided intertrochanteric fracture. Ambulates independently at baseline. PMH for CVA on Aggrenox, HLD, GERD, seizure disorder. Admit Inpatient level of care for S/p Mechanical Fall with Left acute minimally displaced intertrochanteric fracture. S/p TXA given in ED. Orthopedic consult. NPO at MN for OR tomorrow 10/16. Pain control.      10/15  Orthopedic cons; Left hip  intertrochanteric fracture. Plan for OR. NWB LLE. Pain control. Date: 10/16   Day 2:   Per Orthopedic; Plan for OR today. NWB LLE. Progress notes; POD 0. Iv antibiotics. WBAT to LLE post op. Continue multimodal analgesic regimen. Hyponatremia. Na level improving preop on 10/16 from 131 to 133. Daily BMP. Pt still has some pain in his left hip.     10/16 S/p OR - Left - INSERTION NAIL IM FEMUR ANTEGRADE (TROCHANTERIC)       ED Triage Vitals [10/15/23 1413]   Temperature Pulse Respirations Blood Pressure SpO2   98.1 °F (36.7 °C) 99 18 (!) 200/94 97 %      Temp Source Heart Rate Source Patient Position - Orthostatic VS BP Location FiO2 (%)   Oral Monitor Sitting Right arm --      Pain Score       9          Wt Readings from Last 1 Encounters:   10/15/23 73.9 kg (163 lb)     Additional Vital Signs:   0/16/23 1206 98.4 °F (36.9 °C) 82 14 98/80 89 96 % 6 L/min Simple mask --   10/16/23 0900 -- -- -- -- -- -- -- None (Room air) --   10/16/23 07:52:46 98.6 °F (37 °C) 88 18 141/95 110 97 % -- -- --   10/16/23 02:56:34 98.6 °F (37 °C) 87 18 113/81 92 98 % -- -- --   10/15/23 2018 -- -- -- -- -- 95 % -- -- --   10/15/23 2000 -- 104 -- -- -- 94 % -- -- --   10/15/23 1901 -- -- -- -- -- -- -- None (Room air) --   10/15/23 18:49:45 98.4 °F (36.9 °C) 101 17 133/95 108 97 % -- -- --   10/15/23 18:30:49 98.5 °F (36.9 °C) 105 16 145/102 Abnormal  116 97 % -- -- --   10/15/23 1730 -- 104 20 181/79 Abnormal  114 96 % -- None (Room air) Lying   10/15/23 1700 -- 102 15 134/89 107 96 % -- None (Room air) Lying   10/15/23 1630 -- 100 18 126/89 104 98 % -- None (Room air) Lying     Pertinent Labs/Diagnostic Test Results:   XR chest 1 view portable   Final Result by Marsha Bran MD (10/15 1924)      No acute cardiopulmonary disease.                   Workstation performed: [id number]         XR pelvis ap only 1 or 2 vw   Final Result by Marsha Bran MD (10/15 1925)      Acute minimally displaced left-sided intertrochanteric fracture. This concurs with preliminary report documented in the electronic medical record. Workstation performed: [id number]         XR femur 2 vw left   ED Interpretation by Ryann Gandhi MD (07/18 2336)   Left intertrochanteric hip fx      Final Result by Gregor Hernandez MD (10/15 1925)      Acute minimally displaced and angulated left-sided intertrochanteric fracture.             Workstation performed: [id number]               Results from last 7 days   Lab Units 10/16/23  0453 10/15/23  2028 10/15/23  1425   WBC Thousand/uL 9.71  --  5.35   HEMOGLOBIN g/dL 13.9  --  15.0   HEMATOCRIT % 41.2  --  43.1   PLATELETS Thousands/uL 211 215 231   NEUTROS ABS Thousands/µL  --   --  2.94         Results from last 7 days   Lab Units 10/16/23  0453 10/15/23  1425   SODIUM mmol/L 133* 131*   POTASSIUM mmol/L 3.6 4.2   CHLORIDE mmol/L 100 98   CO2 mmol/L 27 25   ANION GAP mmol/L 6 8   BUN mg/dL 11 13   CREATININE mg/dL 0.53* 0.59*   EGFR ml/min/1.73sq m 112 107   CALCIUM mg/dL 8.6 9.1             Results from last 7 days   Lab Units 10/16/23  0453 10/15/23  1425   GLUCOSE RANDOM mg/dL 84 112       Results from last 7 days   Lab Units 10/15/23  1425   PROTIME seconds 13.0   INR  0.99   PTT seconds 26             ED Treatment:   Medication Administration from 10/15/2023 1405 to 10/15/2023 1827         Date/Time Order Dose Route Action     10/15/2023 1426 EDT ketorolac (TORADOL) injection 15 mg 15 mg Intravenous Given     10/15/2023 1426 EDT acetaminophen (TYLENOL) tablet 975 mg 975 mg Oral Given     10/15/2023 1632 EDT tranexamic Acid 740 mg in sodium chloride 0.9 % 100 mL IVPB 740 mg Intravenous New Bag     10/15/2023 1632 EDT ropivacaine (NAROPIN) 0.5 % injection 30 mL 30 mL Infiltration Given by Other     10/15/2023 1730 EDT levETIRAcetam (KEPPRA) tablet 500 mg 500 mg Oral Given     10/15/2023 1730 EDT carBAMazepine (TEGretol) tablet 400 mg 400 mg Oral Given          Past Medical History: Diagnosis Date    Arthritis     Hypertension     Seizures ([facility])     Stroke [facility])      Present on Admission:   Closed fracture of neck of left femur ([facility])      Admitting Diagnosis: Injury [T14.90XA]  Pain [R52]  Closed fracture of left hip, initial encounter ([facility]) [S72.002A]  Unspecified multiple injuries, initial encounter [T07. XXXA]  Age/Sex: 61 y.o. male    Admission Orders:  Scheduled Medications:  acetaminophen, 650 mg, Oral, Q6H [address]  [START ON 10/17/2023] aspirin-dipyridamole, 1 capsule, Oral, BID  carBAMazepine, 200 mg, Oral, Daily With Lunch  carBAMazepine, 400 mg, Oral, BID  carBAMazepine, 600 mg, Oral, Early Morning  cefazolin, 2,000 mg, Intravenous, Q8H  docusate sodium, 100 mg, Oral, BID  enoxaparin, 30 mg, Subcutaneous, Q24H  levETIRAcetam, 500 mg, Oral, Q12H [address]  multivitamin-minerals, 1 tablet, Oral, Daily  polyethylene glycol, 17 g, Oral, Daily  rosuvastatin, 10 mg, Oral, Daily With Dinner  senna-docusate sodium, 1 tablet, Oral, HS      Continuous IV Infusions:  lactated ringers, 75 mL/hr, Intravenous, Continuous  lactated ringers, 1.5 mL/kg/hr, Intravenous, Continuous      PRN Meds:  calcium carbonate, 1,000 mg, Oral, Daily PRN  HYDROmorphone, 0.5 mg, Intravenous, Q4H PRN  LORazepam, 0.5 mg, Oral, Daily PRN  [MAR Hold] ondansetron, 4 mg, Intravenous, Q6H PRN  oxyCODONE, 10 mg, Oral, Q4H PRN  oxyCODONE, 5 mg, Oral, Q4H PRN      IP CONSULT TO ORTHOPEDIC SURGERY  IP CONSULT TO CASE MANAGEMENT    Network Utilization Review Department  ATTENTION: Please call with any questions or concerns to [phone] and carefully listen to the prompts so that you are directed to the right person. All voicemails are confidential.   For Discharge needs, contact Care Management DC Support Team at [phone] opt. 2  Send all requests for admission clinical reviews, approved or denied determinations and any other requests to dedicated fax number below belonging to the campus where the patient is receiving treatment. List of dedicated fax numbers for the Facilities:  [place] DENMENDEZ (Administrative/Medical Necessity) [phone]   DISCHARGE SUPPORT TEAM (NETWORK) [id number] Deandre Larson (Maternity/NICU/Pediatrics) [phone]   [address] [facility] [phone] [address] 5220 [facility] [address] [address] [facility] [address] [phone]

## 2023-10-16 NOTE — OP NOTE
OPERATIVE REPORT  PATIENT NAME: Franklin Espinoza    :  1960  MRN: [id number]  Pt Location: BE OR ROOM 05    SURGERY DATE: 10/16/2023    Surgeon(s) and Role:     * Thee Bailey, DO - Primary     * Martir Erwin PA-C - Assisting     * Alessio Johnson MD - Assisting    Preop Diagnosis:  Closed fracture of left hip, initial encounter ([address]) Elvira Tang    Post-Op Diagnosis Codes:     * Closed fracture of left hip, initial encounter ([address]) Elvira Tang    Procedure(s):  Left - INSERTION NAIL IM FEMUR ANTEGRADE (TROCHANTERIC)    Specimen(s):  * No specimens in log *    Estimated Blood Loss:   Minimal    Drains:  * No LDAs found *    Anesthesia Type:   General    Operative Indications:  Closed fracture of left hip, initial encounter ([address]) [[id number]]  80-year-old male presenting with stable left intertrochanteric hip fracture. See consult note for full details. Based on fracture pattern he was indicated for cephalomedullary nail fixation. The risk and benefits of surgery as well as the postoperative recovery course were discussed in detail with the patient. These risks include but are not limited to bleeding, infection, damage to nerves or vessels, malunion, nonunion, hardware prominence, DVT, PE, death, need for additional surgery. He elected to proceed with surgery. Prior to proceeding with surgery he was deemed stable for surgery from the anesthesia team.    Operative Findings:  Fixation with anatomic reduction of intertrochanteric hip fracture with [employer] TFNA [id number] , 130deg. 95 mm helical blade    Complications:   None    Procedure and Technique:  Patient seen evaluated preoperative holding area risk and benefits of surgery discussed in detail informed consent confirmed. The left lower extremities marked appropriately. Patient was brought back to the operating room and general anesthesia was administered. The patient was then placed onto the fracture table.   The operative leg was affixed to the traction boot. The well leg was affixed to the center of the table and well-padded. All bony prominences were well-padded and the arms were well affixed. Traction was applied and the fracture was reduced with a combination of flexion, traction,adduction, and internal rotation. X-rays demonstrated excellent anatomic reduction of the fracture. The lower extremity was then prepped and draped in normal sterile fashion. A timeout was performed identifying the correct operative site, patient, procedure, administration of IV antibiotics as well as TXA. First began by percutaneously identifying the starting point for the opening reamer. The guidepin was at the tip of the greater trochanter and found to be centered on the lateral view. It was advanced past the lesser trochanter. A small incision was then made through the skin and soft tissue and fascia. The opening reamer was then utilized down to level lesser trochanter. At this point we inserted a 10 x 170 mm nail which sat nicely. The triple sleeve trocar was then started into the aiming arm and a separate lateral incision was made. Trocar was advanced down to the bone. The guidepin was then inserted up into the femoral neck while at the same time applying a posterior directed force on the anterior portion of the femoral neck to reduce any flexion component of the fracture. Once the guidepin was in a good position on both AP and lateral x-rays and centered in the neck we then measured. We then reamed the lateral cortex and reamed up to 95 mm. We then proceeded to place a 95 mm helical blade which sat nicely. Under live fluoroscopy there was no intra-articular penetration of the blade. It was found to be centered well on both AP and lateral x-rays with an excellent reduction of the fracture. This was then locked in proximally. Utilizing the trocar a separate lateral incision was made for the interlocking screw.   We then drilled and placed a 32 mm interlocking screw through the nail distally. Aiming arm was then removed final x-rays demonstrated anatomic alignment of the fracture on both AP and lateral x-rays with excellent position of the hardware. Wounds were then thoroughly irrigated and they were closed in layered fashion with 0 Vicryl 2-0 Monocryl and staples. Dressed with Mepilex dressings. Patient was taken off the fracture table and extubated. He was transferred to the recovery room in stable condition. Postoperatively he will be admitted back to the floor he will be weightbearing as tolerated. He will be continued on his dual antiplatelet therapy which will serve as DVT prophylaxis. He will work with PT while here in the hospital.   I was present for the entire procedure. and A physician assistant was required during the procedure for retraction, tissue handling, dissection and suturing.     Patient Disposition:  PACU         SIGNATURE: Apolinar Martin DO  DATE: October 16, 2023  TIME: 12:18 PM

## 2023-10-16 NOTE — PROGRESS NOTES
[address] [facility]  Progress Note AND Post-operative Evaluation  Name: Nate Rivera  MRN: [id number]  Unit/Bed#: [facility] 008-09 I Date of Admission: 10/15/2023   Date of Service: 10/16/2023 I Hospital Day: 1    Assessment/Plan   Fall  Assessment & Plan  - Status post mechanical fall with the below noted injuries. - Fall precautions.  - PT and OT evaluation and treatment as indicated. - Case Management consultation for disposition planning. * Closed fracture of neck of left femur ([address])  Assessment & Plan  - Acute closed left femur fracture, present on admission.  - Appreciate Orthopedic surgery evaluation, recommendations and interventions as noted. - Status post ORIF of left femur with IM nail insertion on 10/16/2023.  - May be weightbearing as tolerated on the left lower extremity post-operatively. - Monitor left lower extremity neurovascular exam.  - Continue multimodal analgesic regimen.  - Continue DVT prophylaxis. - PT and OT evaluation and treatment as indicated. - Outpatient follow up with Orthopedic surgery for re-evaluation. Hyponatremia  Assessment & Plan  - Patient with mild asymptomatic hyponatremia, present on presentation.   - Sodium level improving preoperatively on 10/16/2023 from 1 31-1 33.  - Continue to monitor with daily BMP. - If patient develops worsening hyponatremia, proceed with further work-up. - Recommend short-term outpatient follow-up with PCP. Essential hypertension  Assessment & Plan  - Patient with chronic history of hypertension.  - Continue current medication regimen and resume home medication therapy on discharge as appropriate. - Outpatient follow-up per routine. TRAUMA TERTIARY SURVEY NOTE    VTE Prophylaxis:Sequential compression device (Venodyne)  and Enoxaparin (Lovenox)     Disposition: Continue current level of care. Await therapy evaluation and recommendations postoperatively.   Case management consulted for disposition planning. Code status:  Level 1 - Full Code    Consultants: IP CONSULT TO ORTHOPEDIC SURGERY  IP CONSULT TO CASE MANAGEMENT    Subjective   Transfer from: N/A    Mechanism of Injury:Fall     Chief Complaint: "I feel better than I did before surgery."    HPI/Last 24 hour events: Patient is overall doing well. He still has some pain in his left hip by his incisions, but overall notes it is much better. He has no other complaints at this time and denies pain elsewhere. He is tolerated oral intake following surgery without any nausea or vomiting. He denies any shortness of breath or difficulty breathing. Objective   Vitals:   Temp:  [97.5 °F (36.4 °C)-98.6 °F (37 °C)] 98.3 °F (36.8 °C)  HR:  [] 91  Resp:  [11-20] 17  BP: ()/() 141/94    I/O         10/14 0701  10/15 0700 10/15 0701  10/16 0700 10/16 0701  10/17 0700    P. O.   0    I.V. (mL/kg)   1000 (13.5)    Total Intake(mL/kg)   1000 (13.5)    Urine (mL/kg/hr)  550 805 (1.3)    Total Output  550 805    Net  -550 +195                    Physical Exam:   GENERAL APPEARANCE: Patient in no acute distress. HEENT: NCAT; EOMs intact; Mucous membranes moist  NECK / BACK: No midline cervical, thoracic or lumbar spine tenderness, step-offs or deformities. No paraspinal muscular tenderness in the neck or back. CV: Regular rate and rhythm; no murmur/gallops/rubs appreciated. CHEST / LUNGS: Clear to auscultation; no wheezes/rales/rhonci. No chest wall tenderness, crepitus or deformities. ABD: NABS; soft; non-distended; non-tender. : Voiding spontaneously. EXT: +2 pulses bilaterally upper & lower extremities; no edema. Normal range of motion in the bilateral upper and right lower extremities without pain, tenderness or deformity.   Patient with clean/dry/intact postoperative dressings over the right hip and thigh with minimal tenderness and swelling, soft and compressible compartments with intact neurovascular exam distally. NEURO: GCS 15; no focal neurologic deficits; neurovascularly intact. SKIN: Warm, dry and well perfused; no rash; no jaundice. Invasive Devices       Peripheral Intravenous Line  Duration             Peripheral IV 10/15/23 Dorsal (posterior); Left Forearm 1 day    Peripheral IV 10/16/23 Right Forearm <1 day                            Lab Results: Results: I have personally reviewed all pertinent laboratory/tests results, BMP/CMP:   Lab Results   Component Value Date    SODIUM 133 (L) 10/16/2023    K 3.6 10/16/2023     10/16/2023    CO2 27 10/16/2023    BUN 11 10/16/2023    CREATININE 0.53 (L) 10/16/2023    CALCIUM 8.6 10/16/2023    EGFR 112 10/16/2023   , and CBC:   Lab Results   Component Value Date    WBC 9.71 10/16/2023    HGB 13.9 10/16/2023    HCT 41.2 10/16/2023     (H) 10/16/2023     10/16/2023    RBC 4.03 10/16/2023    MCH 34.5 (H) 10/16/2023    MCHC 33.7 10/16/2023    RDW 11.2 (L) 10/16/2023    MPV 9.1 10/16/2023       Imaging Results: I have personally reviewed pertinent reports. Chest Xray(s): negative for acute findings   FAST exam(s): N/A   CT Scan(s): N/A   Additional Xray(s): positive for acute findings: Acute minimally displaced and angulated left-sided intertrochanteric fracture.      Other Studies: N/A     Allie Noss, PAMedardoC  10/16/2023  3:53 PM

## 2023-10-16 NOTE — ASSESSMENT & PLAN NOTE
- Patient with mild asymptomatic hyponatremia, present on presentation.   - Sodium level improving preoperatively on 10/16/2023 from 1 31-1 33.  - Continue to monitor with daily BMP. - If patient develops worsening hyponatremia, proceed with further work-up. - Recommend short-term outpatient follow-up with PCP.

## 2023-10-16 NOTE — ANESTHESIA POSTPROCEDURE EVALUATION
Post-Op Assessment Note    CV Status:  Stable  Pain Score: 0    Pain management: adequate     Mental Status:  Sleepy   Hydration Status:  Stable   PONV Controlled:  Controlled   Airway Patency:  Patent   Two or more mitigation strategies used for obstructive sleep apnea   Post Op Vitals Reviewed: Yes      Staff: Anesthesiologist, CRNA         No notable events documented.     BP   98/80   Temp 98.4   Pulse 81   Resp 10   SpO2 100

## 2023-10-16 NOTE — PLAN OF CARE
Problem: MOBILITY - ADULT  Goal: Maintain or return to baseline ADL function  Description: INTERVENTIONS:  -  Assess patient's ability to carry out ADLs; assess patient's baseline for ADL function and identify physical deficits which impact ability to perform ADLs (bathing, care of mouth/teeth, toileting, grooming, dressing, etc.)  - Assess/evaluate cause of self-care deficits   - Assess range of motion  - Assess patient's mobility; develop plan if impaired  - Assess patient's need for assistive devices and provide as appropriate  - Encourage maximum independence but intervene and supervise when necessary  - Involve family in performance of ADLs  - Assess for home care needs following discharge   - Consider OT consult to assist with ADL evaluation and planning for discharge  - Provide patient education as appropriate  Outcome: Progressing  Goal: Maintains/Returns to pre admission functional level  Description: INTERVENTIONS:  - Perform BMAT or MOVE assessment daily.   - Set and communicate daily mobility goal to care team and patient/family/caregiver. - Collaborate with rehabilitation services on mobility goals if consulted  - Perform Range of Motion 3 times a day. - Reposition patient every 2 hours.   - Dangle patient 3 times a day  - Stand patient 3 times a day  - Ambulate patient 3 times a day  - Out of bed to chair 3 times a day   - Out of bed for meals 3 times a day  - Out of bed for toileting  - Record patient progress and toleration of activity level   Outcome: Progressing     Problem: PAIN - ADULT  Goal: Verbalizes/displays adequate comfort level or baseline comfort level  Description: Interventions:  - Encourage patient to monitor pain and request assistance  - Assess pain using appropriate pain scale  - Administer analgesics based on type and severity of pain and evaluate response  - Implement non-pharmacological measures as appropriate and evaluate response  - Consider cultural and social influences on pain and pain management  - Notify physician/advanced practitioner if interventions unsuccessful or patient reports new pain  Outcome: Progressing     Problem: INFECTION - ADULT  Goal: Absence or prevention of progression during hospitalization  Description: INTERVENTIONS:  - Assess and monitor for signs and symptoms of infection  - Monitor lab/diagnostic results  - Monitor all insertion sites, i.e. indwelling lines, tubes, and drains  - Monitor endotracheal if appropriate and nasal secretions for changes in amount and color  - [place] appropriate cooling/warming therapies per order  - Administer medications as ordered  - Instruct and encourage patient and family to use good hand hygiene technique  - Identify and instruct in appropriate isolation precautions for identified infection/condition  Outcome: Progressing  Goal: Absence of fever/infection during neutropenic period  Description: INTERVENTIONS:  - Monitor WBC    Outcome: Progressing     Problem: SAFETY ADULT  Goal: Maintain or return to baseline ADL function  Description: INTERVENTIONS:  -  Assess patient's ability to carry out ADLs; assess patient's baseline for ADL function and identify physical deficits which impact ability to perform ADLs (bathing, care of mouth/teeth, toileting, grooming, dressing, etc.)  - Assess/evaluate cause of self-care deficits   - Assess range of motion  - Assess patient's mobility; develop plan if impaired  - Assess patient's need for assistive devices and provide as appropriate  - Encourage maximum independence but intervene and supervise when necessary  - Involve family in performance of ADLs  - Assess for home care needs following discharge   - Consider OT consult to assist with ADL evaluation and planning for discharge  - Provide patient education as appropriate  Outcome: Progressing  Goal: Maintains/Returns to pre admission functional level  Description: INTERVENTIONS:  - Perform BMAT or MOVE assessment daily.   - Set and communicate daily mobility goal to care team and patient/family/caregiver. - Collaborate with rehabilitation services on mobility goals if consulted  - Perform Range of Motion 3 times a day. - Reposition patient every 2 hours.   - Dangle patient 3 times a day  - Stand patient 3 times a day  - Ambulate patient 3 times a day  - Out of bed to chair 3 times a day   - Out of bed for meals 3 times a day  - Out of bed for toileting  - Record patient progress and toleration of activity level   Outcome: Progressing  Goal: Patient will remain free of falls  Description: INTERVENTIONS:  - Educate patient/family on patient safety including physical limitations  - Instruct patient to call for assistance with activity   - Consult OT/PT to assist with strengthening/mobility   - Keep Call bell within reach  - Keep bed low and locked with side rails adjusted as appropriate  - Keep care items and personal belongings within reach  - Initiate and maintain comfort rounds  - Make Fall Risk Sign visible to staff  - Offer Toileting every 2 Hours, in advance of need  - Initiate/Maintain ON alarm  - Obtain necessary fall risk management equipment:   - Apply yellow socks and bracelet for high fall risk patients  - Consider moving patient to room near nurses station  Outcome: Progressing     Problem: DISCHARGE PLANNING  Goal: Discharge to home or other facility with appropriate resources  Description: INTERVENTIONS:  - Identify barriers to discharge w/patient and caregiver  - Arrange for needed discharge resources and transportation as appropriate  - Identify discharge learning needs (meds, wound care, etc.)  - Arrange for interpretive services to assist at discharge as needed  - Refer to Case Management Department for coordinating discharge planning if the patient needs post-hospital services based on physician/advanced practitioner order or complex needs related to functional status, cognitive ability, or social support system  Outcome: Progressing     Problem: Knowledge Deficit  Goal: Patient/family/caregiver demonstrates understanding of disease process, treatment plan, medications, and discharge instructions  Description: Complete learning assessment and assess knowledge base.   Interventions:  - Provide teaching at level of understanding  - Provide teaching via preferred learning methods  Outcome: Progressing

## 2023-10-16 NOTE — RESPIRATORY THERAPY NOTE
RT Protocol Note  Georgette Espinoza 61 y.o. male MRN: [id number]  Unit/Bed#: [facility] 606-01 Encounter: [id number]    Assessment    Principal Problem:    Closed fracture of neck of left femur (HCC)      Home Pulmonary Medications:  NA       Past Medical History:   Diagnosis Date    Arthritis     Hypertension     Seizures ([address])     Stroke ([address])      Social History     Socioeconomic History    Marital status: Single     Spouse name: None    Number of children: None    Years of education: None    Highest education level: None   Occupational History    None   Tobacco Use    Smoking status: Never    Smokeless tobacco: Never   Vaping Use    Vaping Use: Never used   Substance and Sexual Activity    Alcohol use: Yes     Comment: occasional    Drug use: No    Sexual activity: Not Currently   Other Topics Concern    None   Social History Narrative    Lives with mother    Retired     Social Determinants of Health     Financial Resource Strain: Not on file   Food Insecurity: Not on file   Transportation Needs: Not on file   Physical Activity: Not on file   Stress: Not on file   Social Connections: Not on file   Intimate Partner Violence: Not on file   Housing Stability: Not on file       Subjective         Objective    Physical Exam:   Assessment Type: Assess only  General Appearance: Alert, Awake  Respiratory Pattern: Normal  Chest Assessment: Chest expansion symmetrical  Bilateral Breath Sounds: Clear    Vitals:  Blood pressure 133/95, pulse 101, temperature 98.4 °F (36.9 °C), temperature source Oral, resp. rate 17, height 5' 10" (1.778 m), weight 73.9 kg (163 lb), SpO2 95 %. Imaging and other studies: I have personally reviewed pertinent reports. Plan    Respiratory Plan: No distress/Pulmonary history, Discontinue Protocol        Resp Comments: Pt is a 60 y/o male admitted s/p femur fracture. Evaluated per respiratory protocol. Breathe sounds are clear. No distress noted at this time.  No pulmonary history or home use of respiratory medications. Protocol discontinued at this time.

## 2023-10-16 NOTE — ASSESSMENT & PLAN NOTE
- Patient with chronic history of hypertension.  - Continue current medication regimen and resume home medication therapy on discharge as appropriate. - Outpatient follow-up per routine.

## 2023-10-16 NOTE — ASSESSMENT & PLAN NOTE
- Status post mechanical fall with the below noted injuries. - Fall precautions.  - PT and OT evaluation and treatment as indicated. - Case Management consultation for disposition planning.

## 2023-10-17 LAB
ANION GAP SERPL CALCULATED.3IONS-SCNC: 5 MMOL/L
BUN SERPL-MCNC: 11 MG/DL (ref 5–25)
CALCIUM SERPL-MCNC: 8.2 MG/DL (ref 8.4–10.2)
CHLORIDE SERPL-SCNC: 103 MMOL/L (ref 96–108)
CO2 SERPL-SCNC: 27 MMOL/L (ref 21–32)
CREAT SERPL-MCNC: 0.67 MG/DL (ref 0.6–1.3)
ERYTHROCYTE [DISTWIDTH] IN BLOOD BY AUTOMATED COUNT: 11.5 % (ref 11.6–15.1)
GFR SERPL CREATININE-BSD FRML MDRD: 102 ML/MIN/1.73SQ M
GLUCOSE SERPL-MCNC: 100 MG/DL (ref 65–140)
HCT VFR BLD AUTO: 35.8 % (ref 36.5–49.3)
HGB BLD-MCNC: 12.1 G/DL (ref 12–17)
MCH RBC QN AUTO: 34.3 PG (ref 26.8–34.3)
MCHC RBC AUTO-ENTMCNC: 33.8 G/DL (ref 31.4–37.4)
MCV RBC AUTO: 101 FL (ref 82–98)
PLATELET # BLD AUTO: 161 THOUSANDS/UL (ref 149–390)
PMV BLD AUTO: 8.9 FL (ref 8.9–12.7)
POTASSIUM SERPL-SCNC: 4.2 MMOL/L (ref 3.5–5.3)
RBC # BLD AUTO: 3.53 MILLION/UL (ref 3.88–5.62)
SODIUM SERPL-SCNC: 135 MMOL/L (ref 135–147)
WBC # BLD AUTO: 10.06 THOUSAND/UL (ref 4.31–10.16)

## 2023-10-17 PROCEDURE — 97167 OT EVAL HIGH COMPLEX 60 MIN: CPT

## 2023-10-17 PROCEDURE — 80048 BASIC METABOLIC PNL TOTAL CA: CPT | Performed by: ORTHOPAEDIC SURGERY

## 2023-10-17 PROCEDURE — 85027 COMPLETE CBC AUTOMATED: CPT | Performed by: ORTHOPAEDIC SURGERY

## 2023-10-17 PROCEDURE — 99232 SBSQ HOSP IP/OBS MODERATE 35: CPT | Performed by: NURSE PRACTITIONER

## 2023-10-17 PROCEDURE — NC001 PR NO CHARGE: Performed by: ORTHOPAEDIC SURGERY

## 2023-10-17 PROCEDURE — 97163 PT EVAL HIGH COMPLEX 45 MIN: CPT

## 2023-10-17 RX ADMIN — ENOXAPARIN SODIUM 30 MG: 30 INJECTION SUBCUTANEOUS at 08:37

## 2023-10-17 RX ADMIN — CEFAZOLIN SODIUM 2000 MG: 2 SOLUTION INTRAVENOUS at 02:44

## 2023-10-17 RX ADMIN — CARBAMAZEPINE 400 MG: 200 TABLET ORAL at 22:00

## 2023-10-17 RX ADMIN — CARBAMAZEPINE 600 MG: 200 TABLET ORAL at 05:33

## 2023-10-17 RX ADMIN — OXYCODONE HYDROCHLORIDE 10 MG: 10 TABLET ORAL at 02:51

## 2023-10-17 RX ADMIN — ASPIRIN AND DIPYRIDAMOLE 1 CAPSULE: 25; 200 CAPSULE, EXTENDED RELEASE ORAL at 17:09

## 2023-10-17 RX ADMIN — ENOXAPARIN SODIUM 30 MG: 30 INJECTION SUBCUTANEOUS at 21:59

## 2023-10-17 RX ADMIN — CARBAMAZEPINE 400 MG: 200 TABLET ORAL at 17:09

## 2023-10-17 RX ADMIN — ACETAMINOPHEN 650 MG: 325 TABLET, FILM COATED ORAL at 05:21

## 2023-10-17 RX ADMIN — LEVETIRACETAM 500 MG: 500 TABLET, FILM COATED ORAL at 05:21

## 2023-10-17 RX ADMIN — LEVETIRACETAM 500 MG: 500 TABLET, FILM COATED ORAL at 17:09

## 2023-10-17 RX ADMIN — ACETAMINOPHEN 650 MG: 325 TABLET, FILM COATED ORAL at 12:53

## 2023-10-17 RX ADMIN — Medication 1 TABLET: at 08:37

## 2023-10-17 RX ADMIN — ACETAMINOPHEN 650 MG: 325 TABLET, FILM COATED ORAL at 17:12

## 2023-10-17 RX ADMIN — ASPIRIN AND DIPYRIDAMOLE 1 CAPSULE: 25; 200 CAPSULE, EXTENDED RELEASE ORAL at 08:38

## 2023-10-17 RX ADMIN — CARBAMAZEPINE 200 MG: 200 TABLET ORAL at 12:53

## 2023-10-17 RX ADMIN — ROSUVASTATIN CALCIUM 10 MG: 10 TABLET, FILM COATED ORAL at 17:09

## 2023-10-17 NOTE — PHYSICAL THERAPY NOTE
PHYSICAL THERAPY EVALUATION  NAME:  Danie Espinoza  DATE: 10/17/23    AGE:   61 y.o. Mrn:   [id number]  ADMIT DX:  Injury [T14.90XA]  Pain [R52]  Closed fracture of left hip, initial encounter ([address]) [S72.002A]  Unspecified multiple injuries, initial encounter [T07. XXXA]    Past Medical History:   Diagnosis Date    Arthritis     Hypertension     Seizures ([address])     Stroke [facility])        Past Surgical History:   Procedure Laterality Date    ELBOW SURGERY  2017    RI OPTX FEM SHFT FX W/INSJ IMED IMPLT W/WO SCREW Left 10/16/2023    Procedure: INSERTION NAIL IM FEMUR ANTEGRADE (TROCHANTERIC); Surgeon: Dasha Tinoco DO;  Location: [facility];  Service: Orthopedics       Length Of Stay: 2    PHYSICAL THERAPY EVALUATION:       10/17/23 0909   Note Type   Note type Evaluation   Pain Assessment   Pain Assessment Tool 0-10   Pain Score 4   Pain Location/Orientation Orientation: Left; Location: Hip   Pain Onset/Description Onset: Ongoing;Frequency: Constant/Continuous; Descriptor: Aching   Effect of Pain on Daily Activities increased pain with activity   Patient's Stated Pain Goal No pain   Hospital Pain Intervention(s) Ambulation/increased activity;Repositioned   Restrictions/Precautions   Weight Bearing Precautions Per Order Yes   LLE Weight Bearing Per Order WBAT   Other Precautions Chair Alarm; Bed Alarm;Multiple lines; Fall Risk;Pain   Home Living   Type of Home House  (split level home)   Home Layout Two level  (5+5 steps between levels)   Home Equipment Walker   Additional Comments Pt reports living alone, but states he has neighbors who are able to check in one time a week   Prior Function   Level of [place] Independent with functional mobility   Lives With Alone   Receives Help [facility] in the last 6 months 1 to 4   Comments Pt denies the use of an AD for ambulation PTA   General   Family/Caregiver Present No   Cognition   Overall Cognitive Status [facility]   Arousal/Participation Alert Orientation Level Oriented X4   Memory Within functional limits   Following Commands Follows one step commands without difficulty   RUE Assessment   RUE Assessment WFL   LUE Assessment   LUE Assessment WFL   RLE Assessment   RLE Assessment WFL   Strength RLE   RLE Overall Strength 4+/5   LLE Assessment   LLE Assessment X   Strength LLE   LLE Overall Strength 3-/5   Bed Mobility   Supine to Sit 4  Minimal assistance   Additional items Assist x 1; Increased time required;Verbal cues   Transfers   Sit to Stand 4  Minimal assistance   Additional items Assist x 1; Increased time required;Verbal cues   Stand to Sit 4  Minimal assistance   Additional items Assist x 1; Increased time required;Verbal cues   Additional Comments cues needed for hand placement during transfers   Ambulation/Elevation   Gait pattern Short stride; Foward flexed; Inconsistent ag; Excessively slow   Gait Assistance 4  Minimal assist   Additional items Assist x 1   Assistive Device Rolling walker   Distance 15ft  (limited by pain)   Balance   Static Sitting Fair -   Static Standing Poor +   Ambulatory Poor +   Endurance Deficit   Endurance Deficit Yes   Endurance Deficit Description fatigue, pain   Activity Tolerance   Activity Tolerance Patient limited by fatigue;Patient limited by pain   Medical Staff Made Aware Chise, OT; OT present for co evaluation due to pts current medical presentation   Nurse Made Aware Pt appropriate to be seen and mobilize per nsg   Assessment   Prognosis Good   Problem List Decreased strength;Decreased range of motion;Decreased endurance; Impaired balance;Decreased mobility;Pain   Assessment Pt is 61 y.o. male seen for PT evaluation s/p admit to [address] [place] [address] on 10/15/2023. Two pt identifiers were used to confirm. Pt presented s/p fall. Pt was admitted with a primary dx of: closed displaced intertrochanteric fx of L femur, and other active problems including essential HTN, hyponatremia, fall.  Pt underwent Left - INSERTION NAIL IM FEMUR ANTEGRADE (TROCHANTERIC) which was performed on 10/16/23. PT now consulted for assessment of mobility and d/c needs. Pt with  activity- beginning POD #0  orders. Pts current co morbidities affecting treatment include:  has a past medical history of Arthritis, Hypertension, Seizures ([address]), and Stroke ([address]). . Pts current clinical presentation is Unstable/ Unpredictable (high complexity) due to Ongoing medical management for primary dx, Increased reliance on more restrictive AD compared to baseline, Decreased activity tolerance compared to baseline, Fall risk, Continuous pulse oximetry monitoring , s/p surgical intervention  . Upon evaluation, pt currently is requiring Min Ax1 for bed mobility; Min Ax1 for transfers and Min Ax1 for ambulation w/ RW . Pt presents at PT eval functioning below baseline and currently w/ overall mobility deficits 2* to: BLE weakness, decreased ROM, impaired balance, decreased endurance, pain, decreased activity tolerance compared to baseline, fall risk. Pt currently at a fall risk 2* to impairments listed above. Based on the aforementioned PT evaluation, pt will continue to benefit from skilled Acute PT interventions to address stated impairments; to maximize functional mobility; for ongoing pt/ family training; and DME needs. At conclusion of PT session pt returned back in chair and chair alarm engaged with phone and call bell within reach. Pt denies any further questions at this time. PT is currently recommending Rehab. PT will continue to follow during hospital stay. Barriers to Discharge Decreased caregiver support   Goals   Patient Goals " to get better and go home"   STG Expiration Date 10/27/23   Short Term Goal #1 In 10 days pt will complete: 1) Bed mobility skills with mod I to increase safety and independence as well as decrease caregiver burden. 2) Functional transfers with mod I to promote increased independence, safety, and QOL.  3) Ambulate 150' using least restrictive AD with mod I without LOB and stable vitals so that pt can negotiate previous living environment safely and promote independence with functional mobility and return to PLOF. 4) Stair training up/ down 3 step/s using rail/s with mod I so that pt can enter/negotiate previous living environment safely and decrease fall risk. 5) Improve balance grades by 1/2 grade to increase safety with all mobility and decrease fall risk. 6) Improve BLE strength by 1/2 grade to help increase overall functional mobility and decrease fall risk. Plan   Treatment/Interventions Functional transfer training;LE strengthening/ROM; Elevations; Therapeutic exercise; Endurance training;Patient/family training;Equipment eval/education; Bed mobility;Gait training;Spoke to nursing;OT   PT Frequency 3-5x/wk   Discharge Recommendation   PT Discharge Recommendation Post acute rehabilitation services   Equipment Recommended [facility] Recommended Wheeled walker   AM-PAC Basic Mobility Inpatient   Turning in Flat Bed Without Bedrails 3   Lying on Back to Sitting on Edge of Flat Bed Without Bedrails 3   Moving Bed to Chair 3   Standing Up From Chair Using Arms 3   Walk in Room 2   Climb 3-5 Stairs With Railing 2   Basic Mobility Inpatient Raw Score 16   Basic Mobility Standardized Score 38.32   Highest Level Of Mobility   JH-HLM Goal 5: Stand one or more mins   JH-HLM Achieved 6: Walk 10 steps or more   Modified Pender Scale   Modified Pender Scale 4   Barthel Index   Feeding 10   Bathing 0   Grooming Score 5   Dressing Score 5   Bladder Score 10   Bowels Score 10   Toilet Use Score 5   Transfers (Bed/Chair) Score 10   Mobility (Level Surface) Score 0   Stairs Score 0   Barthel Index Score 55   Portions of the documentation may have been created using voice recognition software. Occasional wrong word or sound alike substitutions may have occurred due to the inherent limitations of the voice recognition software.  Read the chart carefully and recognize, using context, where substitutions have occurred.     Ruddy Arnold, PT, DPT

## 2023-10-17 NOTE — ASSESSMENT & PLAN NOTE
- Patient with mild asymptomatic hyponatremia, present on presentation.   - Sodium level improving preoperatively on 10/16/2023 from 1 31-1 33.   - Sodium today 135 resolved  - Continue to monitor with daily BMP. - If patient develops worsening hyponatremia, proceed with further work-up. - Recommend short-term outpatient follow-up with PCP.

## 2023-10-17 NOTE — PROGRESS NOTES
Progress Note - Orthopedics   Danie Espinoza 61 y.o. male MRN: [id number]      Subjective:  No acute events overnight. No acute distress. Denies fevers, chills, SOB. Objective:  A 10 point ROS was performed; negative except as noted above. Labs:  Lab Results   Component Value Date/Time    WBC 9.71 10/16/2023 04:53 AM    WBC 7.94 11/20/2015 05:48 AM    HGB 13.9 10/16/2023 04:53 AM    HGB 14.9 11/20/2015 05:48 AM       Physical:  Vitals:    10/17/23 0248   BP: 116/67   Pulse: 100   Resp:    Temp: 99.7 °F (37.6 °C)   SpO2: 93%     Musculoskeletal: left Lower Extremity  Dressing C/D/I  Thigh soft and depressible  SILT dang/saph/sp/dp/tib nerve distributions   +FHL/EHL, +ankle DF/PF.     Toes WWP w BCR     Assessment:    61 y.o. male post op day #1 from Left femur CMN     Plan:  WBAT LLE  AM labs  PT/OT  Ancef x2  Pain control  DVT ppx: for 4 weeks postop; currently ordered lovenox and home aggrenox; discuss with primary team final dvt ppx plan  Will continue to assess for acute blood loss anemia  Dispo: Ortho will follow      Megan Holley MD

## 2023-10-17 NOTE — PLAN OF CARE
Problem: MOBILITY - ADULT  Goal: Maintain or return to baseline ADL function  Description: INTERVENTIONS:  -  Assess patient's ability to carry out ADLs; assess patient's baseline for ADL function and identify physical deficits which impact ability to perform ADLs (bathing, care of mouth/teeth, toileting, grooming, dressing, etc.)  - Assess/evaluate cause of self-care deficits   - Assess range of motion  - Assess patient's mobility; develop plan if impaired  - Assess patient's need for assistive devices and provide as appropriate  - Encourage maximum independence but intervene and supervise when necessary  - Involve family in performance of ADLs  - Assess for home care needs following discharge   - Consider OT consult to assist with ADL evaluation and planning for discharge  - Provide patient education as appropriate  Outcome: Progressing  Goal: Maintains/Returns to pre admission functional level  Description: INTERVENTIONS:  - Perform BMAT or MOVE assessment daily.   - Set and communicate daily mobility goal to care team and patient/family/caregiver. - Collaborate with rehabilitation services on mobility goals if consulted  - Perform Range of Motion 3 times a day. - Reposition patient every 2 hours.   - Dangle patient 3 times a day  - Stand patient 3 times a day  - Ambulate patient 3 times a day  - Out of bed to chair 3 times a day   - Out of bed for meals 3 times a day  - Out of bed for toileting  - Record patient progress and toleration of activity level   Outcome: Progressing     Problem: PAIN - ADULT  Goal: Verbalizes/displays adequate comfort level or baseline comfort level  Description: Interventions:  - Encourage patient to monitor pain and request assistance  - Assess pain using appropriate pain scale  - Administer analgesics based on type and severity of pain and evaluate response  - Implement non-pharmacological measures as appropriate and evaluate response  - Consider cultural and social influences on pain and pain management  - Notify physician/advanced practitioner if interventions unsuccessful or patient reports new pain  Outcome: Progressing     Problem: INFECTION - ADULT  Goal: Absence or prevention of progression during hospitalization  Description: INTERVENTIONS:  - Assess and monitor for signs and symptoms of infection  - Monitor lab/diagnostic results  - Monitor all insertion sites, i.e. indwelling lines, tubes, and drains  - Monitor endotracheal if appropriate and nasal secretions for changes in amount and color  - [place] appropriate cooling/warming therapies per order  - Administer medications as ordered  - Instruct and encourage patient and family to use good hand hygiene technique  - Identify and instruct in appropriate isolation precautions for identified infection/condition  Outcome: Progressing  Goal: Absence of fever/infection during neutropenic period  Description: INTERVENTIONS:  - Monitor WBC    Outcome: Progressing     Problem: SAFETY ADULT  Goal: Maintain or return to baseline ADL function  Description: INTERVENTIONS:  -  Assess patient's ability to carry out ADLs; assess patient's baseline for ADL function and identify physical deficits which impact ability to perform ADLs (bathing, care of mouth/teeth, toileting, grooming, dressing, etc.)  - Assess/evaluate cause of self-care deficits   - Assess range of motion  - Assess patient's mobility; develop plan if impaired  - Assess patient's need for assistive devices and provide as appropriate  - Encourage maximum independence but intervene and supervise when necessary  - Involve family in performance of ADLs  - Assess for home care needs following discharge   - Consider OT consult to assist with ADL evaluation and planning for discharge  - Provide patient education as appropriate  Outcome: Progressing  Goal: Maintains/Returns to pre admission functional level  Description: INTERVENTIONS:  - Perform BMAT or MOVE assessment daily.   - Set and communicate daily mobility goal to care team and patient/family/caregiver. - Collaborate with rehabilitation services on mobility goals if consulted  - Perform Range of Motion 3 times a day. - Reposition patient every 2 hours.   - Dangle patient 3 times a day  - Stand patient 3 times a day  - Ambulate patient 3 times a day  - Out of bed to chair 3 times a day   - Out of bed for meals 3 times a day  - Out of bed for toileting  - Record patient progress and toleration of activity level   Outcome: Progressing  Goal: Patient will remain free of falls  Description: INTERVENTIONS:  - Educate patient/family on patient safety including physical limitations  - Instruct patient to call for assistance with activity   - Consult OT/PT to assist with strengthening/mobility   - Keep Call bell within reach  - Keep bed low and locked with side rails adjusted as appropriate  - Keep care items and personal belongings within reach  - Initiate and maintain comfort rounds  - Make Fall Risk Sign visible to staff  - Offer Toileting every 2 Hours, in advance of need  - Initiate/Maintain ON alarm  - Obtain necessary fall risk management equipment:   - Apply yellow socks and bracelet for high fall risk patients  - Consider moving patient to room near nurses station  Outcome: Progressing     Problem: DISCHARGE PLANNING  Goal: Discharge to home or other facility with appropriate resources  Description: INTERVENTIONS:  - Identify barriers to discharge w/patient and caregiver  - Arrange for needed discharge resources and transportation as appropriate  - Identify discharge learning needs (meds, wound care, etc.)  - Arrange for interpretive services to assist at discharge as needed  - Refer to Case Management Department for coordinating discharge planning if the patient needs post-hospital services based on physician/advanced practitioner order or complex needs related to functional status, cognitive ability, or social support system  Outcome: Progressing     Problem: Knowledge Deficit  Goal: Patient/family/caregiver demonstrates understanding of disease process, treatment plan, medications, and discharge instructions  Description: Complete learning assessment and assess knowledge base.   Interventions:  - Provide teaching at level of understanding  - Provide teaching via preferred learning methods  Outcome: Progressing

## 2023-10-17 NOTE — UTILIZATION REVIEW
Continued Stay Review    Date: 10/17                          Current Patient Class: Inpatient  Current Level of Care: Med Surg    HPI:63 y.o. male initially admitted on 10/15     Assessment/Plan:   Na 135 today, improved. PT recommending Post acute rehab at d/c. Per CM notes; [facility]. If not accepted by [facility], then [facility] per pt and brother. Referrals sent and other [facility] and other [facility] as well.      Vital Signs:   10/17/23 15:24:43 99.1 °F (37.3 °C) 102 19 125/85 98 96 % -- -- --   10/17/23 10:38:42 98.5 °F (36.9 °C) 96 18 106/59 75 94 % -- -- --   10/17/23 0837 -- -- -- -- -- -- -- None (Room air)      Pertinent Labs/Diagnostic Results:       Results from last 7 days   Lab Units 10/17/23  0529 10/16/23  0453 10/15/23  2028 10/15/23  1425   WBC Thousand/uL 10.06 9.71  --  5.35   HEMOGLOBIN g/dL 12.1 13.9  --  15.0   HEMATOCRIT % 35.8* 41.2  --  43.1   PLATELETS Thousands/uL 161 211 215 231   NEUTROS ABS Thousands/µL  --   --   --  2.94         Results from last 7 days   Lab Units 10/17/23  0529 10/16/23  0453 10/15/23  1425   SODIUM mmol/L 135 133* 131*   POTASSIUM mmol/L 4.2 3.6 4.2   CHLORIDE mmol/L 103 100 98   CO2 mmol/L 27 27 25   ANION GAP mmol/L 5 6 8   BUN mg/dL 11 11 13   CREATININE mg/dL 0.67 0.53* 0.59*   EGFR ml/min/1.73sq m 102 112 107   CALCIUM mg/dL 8.2* 8.6 9.1             Results from last 7 days   Lab Units 10/17/23  0529 10/16/23  0453 10/15/23  1425   GLUCOSE RANDOM mg/dL 100 84 112       Results from last 7 days   Lab Units 10/15/23  1425   PROTIME seconds 13.0   INR  0.99   PTT seconds 26       Medications:   Scheduled Medications:  acetaminophen, 650 mg, Oral, Q6H HARRISON  aspirin-dipyridamole, 1 capsule, Oral, BID  carBAMazepine, 200 mg, Oral, Daily With Lunch  carBAMazepine, 400 mg, Oral, BID  carBAMazepine, 600 mg, Oral, Early Morning  docusate sodium, 100 mg, Oral, BID  enoxaparin, 30 mg, Subcutaneous, Q12H HARRISON  levETIRAcetam, 500 mg, Oral, Q12H [address]  multivitamin-minerals, 1 tablet, Oral, Daily  polyethylene glycol, 17 g, Oral, Daily  rosuvastatin, 10 mg, Oral, Daily With Dinner  senna-docusate sodium, 1 tablet, Oral, HS      Continuous IV Infusions:  lactated ringers, 1.5 mL/kg/hr, Intravenous, Continuous      PRN Meds:  calcium carbonate, 1,000 mg, Oral, Daily PRN  HYDROmorphone, 0.5 mg, Intravenous, Q4H PRN  LORazepam, 0.5 mg, Oral, Daily PRN  [MAR Hold] ondansetron, 4 mg, Intravenous, Q6H PRN  oxyCODONE, 10 mg, Oral, Q4H PRN  oxyCODONE, 5 mg, Oral, Q4H PRN    Discharge Plan: See above    Network Utilization Review Department  ATTENTION: Please call with any questions or concerns to [phone] and carefully listen to the prompts so that you are directed to the right person. All voicemails are confidential.   For Discharge needs, contact Care Management DC Support Team at [phone] opt. [phone]  Send all requests for admission clinical reviews, approved or denied determinations and any other requests to dedicated fax number below belonging to the campus where the patient is receiving treatment.  List of dedicated fax numbers for the Facilities:  [place] DENIALS (Administrative/Medical Necessity) [phone]   DISCHARGE SUPPORT TEAM (NETWORK) [id number] De[address]re Larson (Maternity/NICU/Pediatrics) [phone]   [address] 1000 [facility] [phone]   1508 [facility] 207 [facility] 5220 [facility] [address] [address] [facility] [address]0 [address] 1300 [facility] [address] [place] [address] [phone]

## 2023-10-17 NOTE — OCCUPATIONAL THERAPY NOTE
Occupational Therapy Evaluation     Patient Name: Farhat GONZALEZZ Date: 10/17/2023  Problem List  Principal Problem:    Closed displaced intertrochanteric fracture of left femur [facility])  Active Problems:    Essential hypertension    Hyponatremia    Fall    Past Medical History  Past Medical History:   Diagnosis Date    Arthritis     Hypertension     Seizures ([address])     Stroke [facility])      Past Surgical History  Past Surgical History:   Procedure Laterality Date    ELBOW SURGERY  2017    [place] OPTX FEM SHFT FX W/INSJ IMED IMPLT W/WO SCREW Left 10/16/2023    Procedure: INSERTION NAIL IM FEMUR ANTEGRADE (TROCHANTERIC); Surgeon: Jorden Guerin DO;  Location: [facility];  Service: Orthopedics         10/17/23 0903   OT Last Visit   OT Visit Date 10/17/23   Note Type   Note type Evaluation   Pain Assessment   Pain Assessment Tool 0-10   Pain Score 4   Pain Location/Orientation Orientation: Left; Location: Hip   Patient's Stated Pain Goal No pain   Hospital Pain Intervention(s) Repositioned; Ambulation/increased activity; Emotional support   Restrictions/Precautions   Weight Bearing Precautions Per Order Yes   LLE Weight Bearing Per Order WBAT   Other Precautions Chair Alarm; Bed Alarm;Cognitive;WBS;Multiple lines; Fall Risk;Pain   Home Living   Type of [address] [place] [address] Dr Two level;Stairs to enter with rails  (SPLIT LEVEL- 5+5 TAL)   Bathroom Shower/Tub Tub/shower unit   Bathroom Toilet Standard   Home Equipment Walker   Additional Comments NO USE OF DME   Prior Function   Level of [place] Independent with ADLs; Independent with functional mobility; Independent with IADLS   Lives With (S)  Alone   Receives Help From Neighbor   IADLs Independent with driving; Independent with meal prep; Independent with medication management   Falls in the last 6 months 1 to 4   Vocational Retired   [address] ADLS/IADLS/DRIVING PTA   Reciprocal Relationships LIVES ALONE.  REPORTS NEIGHBOR CAN PROVIDE WEEKLY CHECKS   Service to Others RETIRED; WORKED MOWING [address]   Intrinsic Gratification ENJOYS MOWING THE LAWN   ADL   Eating Assistance 7  Independent   Grooming Assistance 7  Independent   UB Bathing Assistance 5  Supervision/Setup   LB Bathing Assistance 3  Moderate Assistance   UB Dressing Assistance 5  Supervision/Setup   LB Dressing Assistance 3  Moderate Assistance   Toileting Assistance  3  Moderate Assistance   Functional Assistance 3  Moderate Assistance   Bed Mobility   Supine to Sit 4  Minimal assistance   Additional items Assist x 1; Increased time required;Verbal cues;LE management   Sit to Supine Unable to assess  (PT LEFT OOB WITH ALL NEEDS IN REACH + ALARM ON)   Transfers   Sit to Stand 4  Minimal assistance   Additional items Assist x 1; Increased time required;Verbal cues   Stand to Sit 4  Minimal assistance   Additional items Assist x 1; Increased time required;Verbal cues   Functional Mobility   Functional Mobility 4  Minimal assistance   Additional items Rolling walker   Balance   Static Sitting Fair -   Static Standing Poor +   Ambulatory Poor +   Activity Tolerance   Activity Tolerance Patient limited by pain; Patient limited by fatigue   Medical Staff Made Aware PT SEEN FOR CO-SESSION WITH SKILLED PHYSICAL THERAPIST 2' CLINICALLY UNSTABLE PRESENTATION, TRAUMATIC INJURIES, NEW PRECAUTIONS/LIMITATIONS, LIMITED ACTIVITY TOLERANCE AND PRESENT IMPAIRMENTS WHICH ARE A REGRESSION FROM THE PT'S BASELINE AND IMPACTING OVERALL OCCUPATIONAL PERFORMANCE. Nurse Made Aware APPROPRIATE TO SEE   RUE Assessment   RUE Assessment WFL   LUE Assessment   LUE Assessment WFL   Hand Function   Gross Motor Coordination Functional   Fine Motor Coordination Functional   Cognition   Overall Cognitive Status WFL   Arousal/Participation Alert; Cooperative   Attention Attends with cues to redirect   Orientation Level Oriented X4   Memory Within functional limits   Following Commands Follows one step commands without difficulty   Comments PT IS PLEASANT AND COOPERATIVE. PT IS EASILY DISTRACTED, REQUIRING CUES TO ATTEND TO TASK- BELIEVED TO BE PT'S BASELINE. LIMITED INSIGHT INTO DEFICITS- EAGEER TO RETURN HOME ALONE DESPITE LIMITATIONS. Assessment   Limitation Decreased ADL status; Decreased Safe judgement during ADL;Decreased endurance;Decreased cognition;Decreased self-care trans;Decreased high-level ADLs   Prognosis Good   Assessment 62 YO Male SEEN FOR INITIAL OCCUPATIONAL THERAPY EVALUATION FOLLOWING ADMISSION TO [facility] S/P FALL RESULTING IN CLOSED DISPLACED INTERTROCHANTERIC FX OF L FEMUR. PT IS S/P INSERTION OF L IMN ON 10/16/23. PER ORTHO, PT IS WBAT ON LLE. PROBLEMS LIST/PMH INCLUDES Arthritis, Hypertension, Seizures ([address]), and Stroke ([address]). PT IS FROM HOME ALONE WHERE HE REPORTS BEING INDEPENDENT WITH ADLS/IADLS/DRIVING PTA. PT CURRENTLY REQUIRES OVERALL S WITH UB ADLS, MOD A WITH LB ADLS, AND MIN A WITH TRANSFERS / FUNCTIONAL MOBILITY WITH USE OF RW. PT IS LIMITED 2' PAIN, FATIGUE, IMPAIRED BALANCE, FALL RISK , ORTHOPEDIC RESTRICTIONS, LIMITED INSIGHT INTO DEFICITS, LIMITED FAMILY/FRIEND SUPPORT , INACCESSIBLE HOME ENVIRONMENT, and OVERALL LIMITED ACTIVITY TOLERANCE. PT EDUCATED ON DEEP BREATHING TECHNIQUES T/O ACTIVITY, SLOWING OF PACE, ENERGY CONSERVATION TECHNIQUES FOR CARRY OVER UPON D/C, INCREASED FAMILY SUPPORT, and CONTINUE PARTICIPATION IN SELF-CARE/MOBILITY WITH [facility] [address] [facility] . The patient's raw score on the AM-PAC Daily Activity Inpatient Short Form is 17. A raw score of less than 19 suggests the patient may benefit from discharge to post-acute rehabilitation services. Please refer to the recommendation of the Occupational Therapist for safe discharge planning. FROM AN OCCUPATIONAL THERAPY PERSPECTIVE, PT WOULD BENEFIT FROM ADDITIONAL OT SERVICES IN AN INPT REHAB SETTING UPON D/C. WILL CONT TO FOLLOW TO ADDRESS THE BELOW DESCRIBED GOALS.    Goals   Patient Goals TO RETURN HOME   LTG Time Frame 10-14   Long Term Goal #1 SEE BELOW   Plan   Treatment Interventions ADL retraining;Functional transfer training; Endurance training;Cognitive reorientation;Patient/family training;Equipment evaluation/education; Compensatory technique education; Energy conservation; Activityengagement   Goal Expiration Date 10/31/23   OT Frequency 3-5x/wk   Discharge Recommendation   OT Discharge Recommendation Post acute rehabilitation services   AM-PAC Daily Activity Inpatient   Lower Body Dressing 2   Bathing 2   Toileting 2   Upper Body Dressing 3   Grooming 4   Eating 4   Daily Activity Raw Score 17   Daily Activity Standardized Score (Calc for Raw Score >=11) 37.26   AM-PAC Applied Cognition Inpatient   Following a Speech/Presentation 3   Understanding Ordinary Conversation 4   Taking Medications 4   Remembering Where Things Are Placed or Put Away 3   Remembering List of 4-5 Errands 3   Taking Care of Complicated Tasks 3   Applied Cognition Raw Score 20   Applied Cognition Standardized Score 41.76       OCCUPATIONAL THERAPY GOALS TO BE MET WITHIN 14 DAYS:    -Pt will increase bed mobility to MOD I to participate in functional activities with G tolerance and balance. -Pt will improve functional mobility and transfers to MOD I on/off all surfaces w/ G balance/safety including toileting.  -Pt will participate in lt grooming task with MOD I after set-up standing at sink ~3-5 minutes with G safety and balance.    -Pt will increase independence in all ADLS to MOD I with G balance sitting upright in chair.  -Pt will improve activity tolerance to G for 30 min txment sessions w/ G carry over of learned energy conservation techniques.  -Pt will improve independence in lt homemaking activities to MOD I without requiring cues for safety.  -Pt will demonstrate G carryover of learned safety techniques and proper body mechanics in functional and leisure activities with use of DME.  -Pt will complete additional cognitive assessment with 100% attention to task in order to assist with safe d/c plan. -Pt will follow 100% simple 2-step commands and be A&O x4 consistently with environmental cues to increase participation in functional activities.        Documentation completed by Afua Kelley, [address] [place], [place] [address]R/[facility] OF THE [facility] Certified ID# [id number]

## 2023-10-17 NOTE — PLAN OF CARE
Problem: OCCUPATIONAL THERAPY ADULT  Goal: Performs self-care activities at highest level of function for planned discharge setting. See evaluation for individualized goals. Description: Treatment Interventions: ADL retraining, Functional transfer training, Endurance training, Cognitive reorientation, Patient/family training, Equipment evaluation/education, Compensatory technique education, Energy conservation, Activityengagement          See flowsheet documentation for full assessment, interventions and recommendations. Note: Limitation: Decreased ADL status, Decreased Safe judgement during ADL, Decreased endurance, Decreased cognition, Decreased self-care trans, Decreased high-level ADLs  Prognosis: Good  Assessment: 60 YO Male SEEN FOR INITIAL OCCUPATIONAL THERAPY EVALUATION FOLLOWING ADMISSION TO [facility] S/P FALL RESULTING IN CLOSED DISPLACED INTERTROCHANTERIC FX OF L FEMUR. PT IS S/P INSERTION OF L IMN ON 10/16/23. PER ORTHO, PT IS WBAT ON LLE. PROBLEMS LIST/PMH INCLUDES Arthritis, Hypertension, Seizures ([address]), and Stroke ([address] PT IS FROM HOME ALONE WHERE HE REPORTS BEING INDEPENDENT WITH ADLS/IADLS/DRIVING PTA. PT CURRENTLY REQUIRES OVERALL S WITH UB ADLS, MOD A WITH LB ADLS, AND MIN A WITH TRANSFERS / FUNCTIONAL MOBILITY WITH USE OF RW. PT IS LIMITED 2' PAIN, FATIGUE, IMPAIRED BALANCE, FALL RISK , ORTHOPEDIC RESTRICTIONS, LIMITED INSIGHT INTO DEFICITS, LIMITED FAMILY/FRIEND SUPPORT , INACCESSIBLE HOME ENVIRONMENT, and OVERALL LIMITED ACTIVITY TOLERANCE. PT EDUCATED ON DEEP BREATHING TECHNIQUES T/O ACTIVITY, SLOWING OF PACE, ENERGY CONSERVATION TECHNIQUES FOR CARRY OVER UPON D/C, INCREASED FAMILY SUPPORT, and CONTINUE PARTICIPATION IN SELF-CARE/MOBILITY WITH STAFF [address] . The patient's raw score on the AM-PAC Daily Activity Inpatient Short Form is 17. A raw score of less than 19 suggests the patient may benefit from discharge to post-acute rehabilitation services.  Please refer to the recommendation of the Occupational Therapist for safe discharge planning. FROM AN OCCUPATIONAL THERAPY PERSPECTIVE, PT WOULD BENEFIT FROM ADDITIONAL OT SERVICES IN AN INPT REHAB SETTING UPON D/C. WILL CONT TO FOLLOW TO ADDRESS THE BELOW DESCRIBED GOALS.      OT Discharge Recommendation: Post acute rehabilitation services

## 2023-10-17 NOTE — PLAN OF CARE
Problem: PHYSICAL THERAPY ADULT  Goal: Performs mobility at highest level of function for planned discharge setting. See evaluation for individualized goals. Description: Treatment/Interventions: Functional transfer training, LE strengthening/ROM, Elevations, Therapeutic exercise, Endurance training, Patient/family training, Equipment eval/education, Bed mobility, Gait training, Spoke to nursing, OT  Equipment Recommended: Esther Chavez       See flowsheet documentation for full assessment, interventions and recommendations. Note: Prognosis: Good  Problem List: Decreased strength, Decreased range of motion, Decreased endurance, Impaired balance, Decreased mobility, Pain  Assessment: Pt is 61 y.o. male seen for PT evaluation s/p admit to [facility] on 10/15/2023. Two pt identifiers were used to confirm. Pt presented s/p fall. Pt was admitted with a primary dx of: closed displaced intertrochanteric fx of L femur, and other active problems including essential HTN, hyponatremia, fall. Pt underwent Left - INSERTION NAIL IM FEMUR ANTEGRADE (TROCHANTERIC) which was performed on 10/16/23. PT now consulted for assessment of mobility and d/c needs. Pt with  activity- beginning POD #0  orders. Pts current co morbidities affecting treatment include:  has a past medical history of Arthritis, Hypertension, Seizures ([address]), and Stroke ([address]). . Pts current clinical presentation is Unstable/ Unpredictable (high complexity) due to Ongoing medical management for primary dx, Increased reliance on more restrictive AD compared to baseline, Decreased activity tolerance compared to baseline, Fall risk, Continuous pulse oximetry monitoring , s/p surgical intervention  . Upon evaluation, pt currently is requiring Min Ax1 for bed mobility; Min Ax1 for transfers and Min Ax1 for ambulation w/ RW .  Pt presents at PT eval functioning below baseline and currently w/ overall mobility deficits 2* to: BLE weakness, decreased ROM, impaired balance, decreased endurance, pain, decreased activity tolerance compared to baseline, fall risk. Pt currently at a fall risk 2* to impairments listed above. Based on the aforementioned PT evaluation, pt will continue to benefit from skilled Acute PT interventions to address stated impairments; to maximize functional mobility; for ongoing pt/ family training; and DME needs. At conclusion of PT session pt returned back in chair and chair alarm engaged with phone and call bell within reach. Pt denies any further questions at this time. PT is currently recommending Rehab. PT will continue to follow during hospital stay. Barriers to Discharge: Decreased caregiver support     PT Discharge Recommendation: Post acute rehabilitation services    See flowsheet documentation for full assessment.

## 2023-10-17 NOTE — PROGRESS NOTES
[address] [facility]  Progress Note  Name: Tiffanie Eddy  MRN: [id number]  Unit/Bed#: PPHP 750-51 I Date of Admission: 10/15/2023   Date of Service: 10/17/2023 I Hospital Day: 2    Assessment/Plan   Fall  Assessment & Plan  - Status post mechanical fall with the below noted injuries. - Fall precautions.  - PT and OT evaluation and treatment as indicated. - Case Management consultation for disposition planning. Hyponatremia  Assessment & Plan  - Patient with mild asymptomatic hyponatremia, present on presentation.   - Sodium level improving preoperatively on 10/16/2023 from 1 31-1 33.   - Sodium today 135 resolved  - Continue to monitor with daily BMP. - If patient develops worsening hyponatremia, proceed with further work-up. - Recommend short-term outpatient follow-up with PCP. Essential hypertension  Assessment & Plan  - Patient with chronic history of hypertension.  - Continue current medication regimen and resume home medication therapy on discharge as appropriate. - Outpatient follow-up per routine. * Closed displaced intertrochanteric fracture of left femur ([address])  Assessment & Plan  - Acute closed left femur fracture, present on admission.  - Appreciate Orthopedic surgery evaluation, recommendations and interventions as noted. - Status post ORIF of left femur with IM nail insertion on 10/16/2023.  - May be weightbearing as tolerated on the left lower extremity post-operatively. - Monitor left lower extremity neurovascular exam.  - Continue multimodal analgesic regimen.  - Continue DVT prophylaxis. - PT and OT evaluation and treatment as indicated. - Outpatient follow up with Orthopedic surgery for re-evaluation.              Bowel Regimen: Colace, Senna  VTE Prophylaxis:Enoxaparin (Lovenox)     Disposition: PT OT recommending rehab, possibly [facility] CM placed referrals    Subjective   Chief Complaint: Left femur pain when moving    Subjective: Patient would like to go home today. Patient states he has been moving his own leg all night up and down assisted with his hands. He states he does live alone but his fridge is stocked. He also states he has someone who can help him when needed. Objective   Vitals:   Temp:  [97.5 °F (36.4 °C)-99.9 °F (37.7 °C)] 98.5 °F (36.9 °C)  HR:  [] 91  Resp:  [11-18] 18  BP: ()/(66-97) 126/78    I/O         10/15 0701  10/16 0700 10/16 0701  10/17 0700 10/17 0701  10/18 0700    P. O.  118     I.V. (mL/kg)  1000 (13.5)     Total Intake(mL/kg)  1118 (15.1)     Urine (mL/kg/hr) 550 3355 (1.9)     Total Output 550 3355     Net -611 -3123                     Physical Exam:   GENERAL APPEARANCE: NAD appears comfortable lying in bed  NEURO: Intact, GCS 15  HEENT: PERRL  CV: RRR  LUNGS: CTA B/L throughout  GI: Abdomen soft, NT, ND, +BS x 4  : Voiding on won  MSK: TOPETE's   SKIN: intact Left thigh with Dressing C/D/I     Invasive Devices       Peripheral Intravenous Line  Duration             Peripheral IV 10/15/23 Dorsal (posterior); Left Forearm 1 day    Peripheral IV 10/16/23 Right Forearm <1 day                          Lab Results: Results: I have personally reviewed all pertinent laboratory/tests results, BMP/CMP:   Lab Results   Component Value Date    SODIUM 135 10/17/2023    K 4.2 10/17/2023     10/17/2023    CO2 27 10/17/2023    BUN 11 10/17/2023    CREATININE 0.67 10/17/2023    CALCIUM 8.2 (L) 10/17/2023    EGFR 102 10/17/2023   , and CBC:   Lab Results   Component Value Date    WBC 10.06 10/17/2023    HGB 12.1 10/17/2023    HCT 35.8 (L) 10/17/2023     (H) 10/17/2023     10/17/2023    RBC 3.53 (L) 10/17/2023    MCH 34.3 10/17/2023    MCHC 33.8 10/17/2023    RDW 11.5 (L) 10/17/2023    MPV 8.9 10/17/2023     Imaging: I have personally reviewed pertinent reports.      Other Studies: None

## 2023-10-17 NOTE — PROGRESS NOTES
Progress Note - Orthopedics   Khurramvalentin Espinoza 61 y.o. male MRN: [id number]      Subjective:  No acute events overnight. No acute distress. Denies fevers, chills, SOB. Objective:  A 10 point ROS was performed; negative except as noted above. Labs:  Lab Results   Component Value Date/Time    WBC 10.06 10/17/2023 05:29 AM    WBC 7.94 11/20/2015 05:48 AM    HGB 12.1 10/17/2023 05:29 AM    HGB 14.9 11/20/2015 05:48 AM       Physical:  Vitals:    10/18/23 0213   BP: 126/78   Pulse: 99   Resp: 18   Temp: 100.3 °F (37.9 °C)   SpO2: 94%     Musculoskeletal: left Lower Extremity  Dressing C/D/I  Thigh soft and depressible  SILT dang/saph/sp/dp/tib nerve distributions   +FHL/EHL, +ankle DF/PF.     Toes WWP w BCR     Assessment:    61 y.o. male post op day #2 from Left femur CMN     Plan:  WBAT LLE  AM labs  PT/OT  Pain control  DVT ppx: home aggrenox  Dispo: Ortho will follow, ok to dc w/ follow up in 2 weeks      Alba Nolasco MD

## 2023-10-17 NOTE — CASE MANAGEMENT
Case Management Assessment & Discharge Planning Note    Patient name Baldomero Hinojosa  Location [facility] 606/[facility] 619-92 MRN [id number]  : 1960 Date 10/17/2023       Current Admission Date: 10/15/2023  Current Admission Diagnosis:Closed displaced intertrochanteric fracture of left femur [facility])   Patient Active Problem List    Diagnosis Date Noted    Fall 10/16/2023    Closed displaced intertrochanteric fracture of left femur ([address]) 10/15/2023    Cognitive deficit as late effect of cerebrovascular accident (CVA) 2022    Hyponatremia 2021    Cerebrovascular accident (CVA) due to thrombosis ([address]) 10/07/2021    Osteoporosis without current pathological fracture 2021    Anxiety 2020    Mixed hyperlipidemia 2020    GERD (gastroesophageal reflux disease) 06/15/2020    Neck pain 2020    Tremor of right hand 2019    Essential hypertension 10/01/2018    Erectile dysfunction 2018    Colon polyps 2017    Constipation 2017    GOA (generalized osteoarthritis) 2017    Seizure disorder ([address]) 2014      LOS (days): 2  Geometric Mean LOS (GMLOS) (days):   Days to GMLOS:     OBJECTIVE:    Risk of Unplanned Readmission Score: 12.83         Current admission status: Inpatient       Preferred Pharmacy:   [address] [facility] 5214-07 [address] [address]  Phone: [phone] Fax: [address] [place], [place] [address]karina Castro[address]  [address] [place] [address]  Phone: [phone] Fax: [phone]    [id number] [address]  [place] [address]  Phone: [phone] Fax: [phone]    Primary Care Provider: Denise Beck MD    Primary Insurance: Maria Guadalupe Sexton  Secondary Insurance:     ASSESSMENT:  Active Health Care Proxies    There are no active Health Care Proxies on file.        Advance Directives  Does patient have a [address]?: No  Was patient offered paperwork?: Yes  Does patient currently have a Health Care decision maker?: Yes, please see Health Care Proxy section  Does patient have Advance Directives?: No  Was patient offered paperwork?: Yes  Primary Contact: Kamryn Terrell (Brother) [phone]    Readmission Root Cause  30 Day Readmission: No    Patient Information  Admitted from[de-identified] Home  Mental Status: Alert  During Assessment patient was accompanied by: Not accompanied during assessment  Assessment information provided by[de-identified] Patient  Primary Caregiver: Self  Support Systems: Self, Family members  [facility]: [address] [place] [address] do you live in?: [place]  In the last 12 months, was there a time when you were not able to pay the mortgage or rent on time?: No  In the last 12 months, how many places have you lived?: 1  In the last 12 months, was there a time when you did not have a steady place to sleep or slept in a shelter (including now)?: No  Homeless/housing insecurity resource given?: N/A  Living Arrangements: Lives Alone  Is patient a ?: No    Activities of Daily Living Prior to Admission  Functional Status: Independent  Completes ADLs independently?: Yes  Ambulates independently?: Yes  Does patient use assisted devices?: No  Does patient currently own DME?: No  Does patient have a history of Outpatient Therapy (PT/OT)?: No  Does the patient have a history of Short-Term Rehab?: No  Does patient have a history of HHC?: Yes  Does patient currently have [address]?: No    Patient Information Continued  Income Source: Pension/skilled nursing  Does patient have prescription coverage?: Yes  Within the past 12 months, you worried that your food would run out before you got the money to buy more.: Never true  Within the past 12 months, the food you bought just didn't last and you didn't have money to get more.: Never true  Food insecurity resource given?: N/A    Means of Transportation  Means of Transport to Appts[de-identified] Family transport  In the past 12 months, has lack of transportation kept you from medical appointments or from getting medications?: No  In the past 12 months, has lack of transportation kept you from meetings, work, or from getting things needed for daily living?: No  Was application for public transport provided?: N/A    DISCHARGE DETAILS:    Discharge planning discussed with[de-identified] patient and brother  Freedom of Choice: Yes     CM contacted family/caregiver?: Yes  Were Treatment Team discharge recommendations reviewed with patient/caregiver?: Yes  Did patient/caregiver verbalize understanding of patient care needs?: N/A- going to facility  Were patient/caregiver advised of the risks associated with not following Treatment Team discharge recommendations?: Yes    Contacts  Patient Contacts: Harpreet Gant (Brother) [phone]  Relationship to Patient[de-identified] Family  Contact Method: Phone  Phone Number: [phone]  Reason/Outcome: Continuity of Care, Emergency Contact, Discharge [facility]         Is the patient interested in [facility] at discharge?: No    DME Referral Provided  Referral made for DME?: No    Other Referral/Resources/Interventions Provided:  Interventions: Short Term Rehab    Treatment Team Recommendation: Short Term Rehab  Discharge Destination Plan[de-identified] Short Term Rehab      CM met with pt and his brother to discuss d/c planning. Pt was evaluated by OT/PT and recommended for IP rehab. In speaking with pt and brother, both are in agreement to IP rehab with their preference being [facility]. If for some reason, [facility] couldn't accept, then preference is [facility]. If insurance won't approve acute then preference is [facility] and other SNF. CM placed all referrals.     CM reviewed d/c planning process including the following: identifying help at home, patient preference for d/c planning needs, Discharge Lounge, [facility] Meds to Bed program, availability of treatment team to discuss questions or concerns patient and/or family may have regarding understanding medications and recognizing signs and symptoms once discharged. CM also encouraged patient to follow up with all recommended appointments after discharge. Patient advised of importance for patient and family to participate in managing patient’s medical well being.

## 2023-10-18 LAB
ANION GAP SERPL CALCULATED.3IONS-SCNC: 7 MMOL/L
BUN SERPL-MCNC: 10 MG/DL (ref 5–25)
CALCIUM SERPL-MCNC: 8.4 MG/DL (ref 8.4–10.2)
CHLORIDE SERPL-SCNC: 100 MMOL/L (ref 96–108)
CO2 SERPL-SCNC: 26 MMOL/L (ref 21–32)
CREAT SERPL-MCNC: 0.51 MG/DL (ref 0.6–1.3)
ERYTHROCYTE [DISTWIDTH] IN BLOOD BY AUTOMATED COUNT: 11.4 % (ref 11.6–15.1)
GFR SERPL CREATININE-BSD FRML MDRD: 114 ML/MIN/1.73SQ M
GLUCOSE SERPL-MCNC: 102 MG/DL (ref 65–140)
HCT VFR BLD AUTO: 34.9 % (ref 36.5–49.3)
HGB BLD-MCNC: 11.7 G/DL (ref 12–17)
MCH RBC QN AUTO: 34.4 PG (ref 26.8–34.3)
MCHC RBC AUTO-ENTMCNC: 33.5 G/DL (ref 31.4–37.4)
MCV RBC AUTO: 103 FL (ref 82–98)
PLATELET # BLD AUTO: 170 THOUSANDS/UL (ref 149–390)
PMV BLD AUTO: 9.2 FL (ref 8.9–12.7)
POTASSIUM SERPL-SCNC: 3.8 MMOL/L (ref 3.5–5.3)
RBC # BLD AUTO: 3.4 MILLION/UL (ref 3.88–5.62)
SODIUM SERPL-SCNC: 133 MMOL/L (ref 135–147)
WBC # BLD AUTO: 9.98 THOUSAND/UL (ref 4.31–10.16)

## 2023-10-18 PROCEDURE — NC001 PR NO CHARGE: Performed by: ORTHOPAEDIC SURGERY

## 2023-10-18 PROCEDURE — 80048 BASIC METABOLIC PNL TOTAL CA: CPT | Performed by: ORTHOPAEDIC SURGERY

## 2023-10-18 PROCEDURE — 99232 SBSQ HOSP IP/OBS MODERATE 35: CPT | Performed by: NURSE PRACTITIONER

## 2023-10-18 PROCEDURE — 85027 COMPLETE CBC AUTOMATED: CPT | Performed by: ORTHOPAEDIC SURGERY

## 2023-10-18 RX ORDER — AMOXICILLIN 250 MG
1 CAPSULE ORAL 2 TIMES DAILY
Status: DISCONTINUED | OUTPATIENT
Start: 2023-10-18 | End: 2023-10-19 | Stop reason: HOSPADM

## 2023-10-18 RX ADMIN — ACETAMINOPHEN 650 MG: 325 TABLET, FILM COATED ORAL at 17:28

## 2023-10-18 RX ADMIN — CARBAMAZEPINE 400 MG: 200 TABLET ORAL at 17:25

## 2023-10-18 RX ADMIN — ENOXAPARIN SODIUM 30 MG: 30 INJECTION SUBCUTANEOUS at 21:37

## 2023-10-18 RX ADMIN — ENOXAPARIN SODIUM 30 MG: 30 INJECTION SUBCUTANEOUS at 08:14

## 2023-10-18 RX ADMIN — CARBAMAZEPINE 600 MG: 200 TABLET ORAL at 05:06

## 2023-10-18 RX ADMIN — ROSUVASTATIN CALCIUM 10 MG: 10 TABLET, FILM COATED ORAL at 17:31

## 2023-10-18 RX ADMIN — ASPIRIN AND DIPYRIDAMOLE 1 CAPSULE: 25; 200 CAPSULE, EXTENDED RELEASE ORAL at 17:26

## 2023-10-18 RX ADMIN — LEVETIRACETAM 500 MG: 500 TABLET, FILM COATED ORAL at 05:06

## 2023-10-18 RX ADMIN — Medication 1 TABLET: at 08:13

## 2023-10-18 RX ADMIN — LEVETIRACETAM 500 MG: 500 TABLET, FILM COATED ORAL at 17:26

## 2023-10-18 RX ADMIN — ASPIRIN AND DIPYRIDAMOLE 1 CAPSULE: 25; 200 CAPSULE, EXTENDED RELEASE ORAL at 08:14

## 2023-10-18 RX ADMIN — CARBAMAZEPINE 400 MG: 200 TABLET ORAL at 21:38

## 2023-10-18 NOTE — CASE MANAGEMENT
Case Management Discharge Planning Note    Patient name Farhat Thomas  [facility] MRN [id number]  : 1960 Date 10/18/2023       Current Admission Date: 10/15/2023  Current Admission Diagnosis:Closed displaced intertrochanteric fracture of left femur [facility])   Patient Active Problem List    Diagnosis Date Noted    Fall 10/16/2023    Closed displaced intertrochanteric fracture of left femur ([address]) 10/15/2023    Cognitive deficit as late effect of cerebrovascular accident (CVA) 2022    Hyponatremia 2021    Cerebrovascular accident (CVA) due to thrombosis ([address]) 10/07/2021    Osteoporosis without current pathological fracture 2021    Anxiety 2020    Mixed hyperlipidemia 2020    GERD (gastroesophageal reflux disease) 06/15/2020    Neck pain 2020    Tremor of right hand 2019    Essential hypertension 10/01/2018    Erectile dysfunction 2018    Colon polyps 2017    Constipation 2017    GOA (generalized osteoarthritis) 2017    Seizure disorder ([address]) 2014      LOS (days): 3  Geometric Mean LOS (GMLOS) (days):   Days to GMLOS:     OBJECTIVE:  Risk of Unplanned Readmission Score: 11.96         Current admission status: Inpatient   Preferred Pharmacy:   [address] [place] [id number] [address]  Phone: [phone] Fax: [address] [place] [address] Drive  [address] [place] [address]  Phone: [phone] Fax: [phone]    [id number]  [place] [address]  Phone: [phone] Fax: [phone]    Primary Care Provider: Sachi Hill MD    Primary Insurance: Dony Pablo  Secondary Insurance:     DISCHARGE DETAILS:    Pt accepted to [facility] and [facility] for ip rehab  Pt's preference is [facility].  They submitted for auth and will follow

## 2023-10-18 NOTE — ASSESSMENT & PLAN NOTE
- Patient with mild asymptomatic hyponatremia, present on presentation.   - Sodium level improving preoperatively on 10/16/2023 from 1 .   -Sodium 135 yesterday-133 today. - Continue to monitor with daily BMP. - If patient develops worsening hyponatremia, proceed with further work-up. - Recommend short-term outpatient follow-up with PCP.

## 2023-10-18 NOTE — PROGRESS NOTES
[address] [facility]  Progress Note  Name: Fidel Leblanc  MRN: [id number]  Unit/Bed#: PPHP 907-80 I Date of Admission: 10/15/2023   Date of Service: 10/18/2023 I Hospital Day: 3    Assessment/Plan   Fall  Assessment & Plan  - Status post mechanical fall with the below noted injuries. - Fall precautions.  - PT and OT evaluation and treatment as indicated. - Case Management consultation for disposition planning. * Closed displaced intertrochanteric fracture of left femur ([address])  Assessment & Plan  - Acute closed left femur fracture, present on admission.  - Appreciate Orthopedic surgery evaluation, recommendations and interventions as noted. - Status post ORIF of left femur with IM nail insertion on 10/16/2023.  - May be weightbearing as tolerated on the left lower extremity post-operatively. - Monitor left lower extremity neurovascular exam.  - Continue multimodal analgesic regimen.  - Continue DVT prophylaxis. - PT and OT evaluation and treatment as indicated. - Outpatient follow up with Orthopedic surgery for re-evaluation. Hyponatremia  Assessment & Plan  - Patient with mild asymptomatic hyponatremia, present on presentation.   - Sodium level improving preoperatively on 10/16/2023 from 1 .   -Sodium 135 yesterday-133 today. - Continue to monitor with daily BMP. - If patient develops worsening hyponatremia, proceed with further work-up. - Recommend short-term outpatient follow-up with PCP. Cerebrovascular accident (CVA) due to thrombosis [facility])  Assessment & Plan  Continue home Aggrenox and statin therapy-we will use hospital formulary statin while inpatient. May resume home regimen upon discharge.   Will discuss with orthopedics regarding Aggrenox satisfying DVT prophylaxis versus continuing Lovenox    Essential hypertension  Assessment & Plan  - Patient with chronic history of hypertension.  - Continue current medication regimen and resume home medication therapy on discharge as appropriate. - Outpatient follow-up per routine. Seizure disorder [facility])  Assessment & Plan  Continue home Tegretol and Keppra             Bowel Regimen: Colace  VTE Prophylaxis:Sequential compression device (Venodyne)  and Enoxaparin (Lovenox)     Disposition: pending placement    Subjective   Chief Complaint: "My hip is sore"    Subjective: Patient describes having left hip pain. He denies any new or worsening pain or discomfort. He denies any numbness or tingling. He denies any other complaints. He confirms that he has been tolerating his diet and passing significant flatus. He has not had a bowel movement since admission. He denies any abdominal pain, nausea, vomiting. Objective   Vitals:   Temp:  [98.5 °F (36.9 °C)-100.6 °F (38.1 °C)] 99.2 °F (37.3 °C)  HR:  [] 95  Resp:  [18-19] 19  BP: ()/(59-85) 99/67    I/O         10/16 0701  10/17 0700 10/17 0701  10/18 0700 10/18 0701  10/19 0700    P. O. 118 360     I.V. (mL/kg) 1000 (13.5)      Total Intake(mL/kg) 1118 (15.1) 360 (4.9)     Urine (mL/kg/hr) 3355 (1.9) 700 (0.4)     Total Output 3355 700     Net -2237 -340                     Physical Exam:   GENERAL APPEARANCE: No acute distress  NEURO: GCS is 15. Light touch sensation intact. HEENT: Normocephalic, atraumatic. Neck supple. CV: Regular rate and rhythm.  +2 radial and dorsalis pedis pulses, bilaterally. LUNGS: Clear to auscultation, bilaterally. Chest wall is nontender. GI: Abdomen is soft nontender. : Pelvis is stable. MSK: Swelling noted on left hip, limited range of motion of left hip. All other extremities display no deformities. SKIN: Warm, dry. Left hip surgical site is clean, dry, intact. Invasive Devices       Peripheral Intravenous Line  Duration             Peripheral IV 10/15/23 Dorsal (posterior); Left Forearm 2 days    Peripheral IV 10/16/23 Right Forearm 1 day                          Lab Results: Results: I have personally reviewed all pertinent laboratory/tests results, BMP/CMP:   Lab Results   Component Value Date    SODIUM 133 (L) 10/18/2023    K 3.8 10/18/2023     10/18/2023    CO2 26 10/18/2023    BUN 10 10/18/2023    CREATININE 0.51 (L) 10/18/2023    CALCIUM 8.4 10/18/2023    EGFR 114 10/18/2023   , and CBC:   Lab Results   Component Value Date    WBC 9.98 10/18/2023    HGB 11.7 (L) 10/18/2023    HCT 34.9 (L) 10/18/2023     (H) 10/18/2023     10/18/2023    RBC 3.40 (L) 10/18/2023    MCH 34.4 (H) 10/18/2023    MCHC 33.5 10/18/2023    RDW 11.4 (L) 10/18/2023    MPV 9.2 10/18/2023     Imaging: I have personally reviewed pertinent reports.      Other Studies: none

## 2023-10-18 NOTE — ASSESSMENT & PLAN NOTE
Continue home Aggrenox and statin therapy-we will use hospital formulary statin while inpatient. May resume home regimen upon discharge.   Will discuss with orthopedics regarding Aggrenox satisfying DVT prophylaxis versus continuing Lovenox

## 2023-10-19 VITALS
RESPIRATION RATE: 17 BRPM | SYSTOLIC BLOOD PRESSURE: 108 MMHG | DIASTOLIC BLOOD PRESSURE: 66 MMHG | HEIGHT: 70 IN | TEMPERATURE: 100.5 F | WEIGHT: 163 LBS | OXYGEN SATURATION: 93 % | BODY MASS INDEX: 23.34 KG/M2 | HEART RATE: 102 BPM

## 2023-10-19 LAB
ERYTHROCYTE [DISTWIDTH] IN BLOOD BY AUTOMATED COUNT: 11 % (ref 11.6–15.1)
HCT VFR BLD AUTO: 34.7 % (ref 36.5–49.3)
HGB BLD-MCNC: 12 G/DL (ref 12–17)
MCH RBC QN AUTO: 35.2 PG (ref 26.8–34.3)
MCHC RBC AUTO-ENTMCNC: 34.6 G/DL (ref 31.4–37.4)
MCV RBC AUTO: 102 FL (ref 82–98)
PLATELET # BLD AUTO: 177 THOUSANDS/UL (ref 149–390)
PMV BLD AUTO: 9.3 FL (ref 8.9–12.7)
RBC # BLD AUTO: 3.41 MILLION/UL (ref 3.88–5.62)
WBC # BLD AUTO: 7.27 THOUSAND/UL (ref 4.31–10.16)

## 2023-10-19 PROCEDURE — 85027 COMPLETE CBC AUTOMATED: CPT | Performed by: ORTHOPAEDIC SURGERY

## 2023-10-19 PROCEDURE — 99238 HOSP IP/OBS DSCHRG MGMT 30/<: CPT | Performed by: EMERGENCY MEDICINE

## 2023-10-19 PROCEDURE — NC001 PR NO CHARGE: Performed by: NURSE PRACTITIONER

## 2023-10-19 RX ORDER — DOCUSATE SODIUM 100 MG/1
100 CAPSULE, LIQUID FILLED ORAL 2 TIMES DAILY
Refills: 0
Start: 2023-10-19

## 2023-10-19 RX ORDER — ACETAMINOPHEN 325 MG/1
650 TABLET ORAL EVERY 6 HOURS SCHEDULED
Refills: 0
Start: 2023-10-19

## 2023-10-19 RX ORDER — OXYCODONE HYDROCHLORIDE 10 MG/1
TABLET ORAL
Qty: 20 TABLET | Refills: 0 | Status: SHIPPED | OUTPATIENT
Start: 2023-10-19

## 2023-10-19 RX ORDER — ENOXAPARIN SODIUM 100 MG/ML
40 INJECTION SUBCUTANEOUS DAILY
Refills: 0
Start: 2023-10-19 | End: 2023-11-16

## 2023-10-19 RX ORDER — POLYETHYLENE GLYCOL 3350 17 G/17G
17 POWDER, FOR SOLUTION ORAL DAILY
Refills: 0
Start: 2023-10-20

## 2023-10-19 RX ADMIN — ENOXAPARIN SODIUM 30 MG: 30 INJECTION SUBCUTANEOUS at 08:28

## 2023-10-19 RX ADMIN — ASPIRIN AND DIPYRIDAMOLE 1 CAPSULE: 25; 200 CAPSULE, EXTENDED RELEASE ORAL at 08:28

## 2023-10-19 RX ADMIN — ACETAMINOPHEN 650 MG: 325 TABLET, FILM COATED ORAL at 11:23

## 2023-10-19 RX ADMIN — Medication 1 TABLET: at 08:28

## 2023-10-19 RX ADMIN — SENNOSIDES AND DOCUSATE SODIUM 1 TABLET: 50; 8.6 TABLET ORAL at 08:28

## 2023-10-19 RX ADMIN — ACETAMINOPHEN 650 MG: 325 TABLET, FILM COATED ORAL at 06:03

## 2023-10-19 RX ADMIN — ACETAMINOPHEN 650 MG: 325 TABLET, FILM COATED ORAL at 00:19

## 2023-10-19 RX ADMIN — OXYCODONE HYDROCHLORIDE 5 MG: 5 TABLET ORAL at 06:09

## 2023-10-19 RX ADMIN — CARBAMAZEPINE 600 MG: 200 TABLET ORAL at 06:03

## 2023-10-19 RX ADMIN — CARBAMAZEPINE 200 MG: 200 TABLET ORAL at 11:24

## 2023-10-19 RX ADMIN — POLYETHYLENE GLYCOL 3350 17 G: 17 POWDER, FOR SOLUTION ORAL at 08:27

## 2023-10-19 RX ADMIN — DOCUSATE SODIUM 100 MG: 100 CAPSULE, LIQUID FILLED ORAL at 08:28

## 2023-10-19 RX ADMIN — LEVETIRACETAM 500 MG: 500 TABLET, FILM COATED ORAL at 06:03

## 2023-10-19 NOTE — DISCHARGE SUMMARY
4 8 21 Pt admitted yesterday to have PT eval this PM      04/14/2021  Pt daughter will be in for FT tomorrow afternoon and plan to focus on car transfers and education on brace management  Pt has achieved I goals for wc mobility at this time, and continues to progress towards I with all transfers with assist of leg lift for RLE  Pt is S for sit to stand and bed <> chair transfers  Plan to continue focus on transfers, wc mobility, and strengthing next session in order to improve overall independence and functional mobility to prepare for anticipated dc on 4/17, pending ortho input  Recommend DC to daughters at [facility] level and left platform RWfor short distance ambulation for bathroom access  Ordered walker and wc for dc  Home PT recommended for followup  Discharge Summary - Aydni Espinoza 61 y.o. male MRN: [id number]    Unit/Bed#: [facility] 606-01 Encounter: [id number]    Admission Date:   Admission Orders (From admission, onward)       Ordered        10/15/23 1727  Inpatient Admission  Once                            Admitting Diagnosis: Injury [T14.90XA]  Pain [R52]  Closed fracture of left hip, initial encounter ([address]) [S72.002A]  Unspecified multiple injuries, initial encounter [T07. XXXA]    HPI: Per Christa Carpio H&P: "Lizzy Carter is a 61 y.o. male w PMH of HTN, hx of CVA on Aggrenox, HLD, GERD, seizure disorder, presenting as a trauma evaluation after mechanical fall in his driveway earlier today, fell onto his left side, no head strike, no loss of consciousness, takes Aggrenox for history of CVA. Patient found to have acute minimally displaced left-sided intertrochanteric fracture. Patient states he was using his leaf blower outside his house when he slipped on his driveway because it was wet, fell onto his left side with immediate left lower extremity pain, denies head strike, denies loss of consciousness. Patient lives at home alone but his brother is nearby to help as needed, ambulates without a cane or walker at baseline without issues, no recent history of falls, no dizziness or lightheadedness, no headaches. Reports some left lower extremity pain on evaluation, otherwise stable, GCS 15."    Procedures Performed:   Orders Placed This Encounter   Procedures    Nerve block       Summary of Hospital Course: This is a 77-year-old male who suffered a fall resulting in a left IT fracture. Patient was taken to the OR on 10/16 by orthopedics. Patient did well postoperatively. He was deemed weightbearing as tolerated on his left lower extremity. He was evaluated by PT/OT and recommended for rehab. Placement achieved at [facility] rehab. He will continue on DVT prophylaxis and may follow-up with orthopedics as an outpatient.   Patient's vital signs and laboratory findings have been stable prior to discharge. Significant Findings, Care, Treatment and Services Provided:   XR hip/pelv 2-3 vws left if performed    Result Date: 10/16/2023  Impression: Fluoroscopic guidance provided for procedure guidance. Please refer to the separate procedure notes for additional details. Workstation performed: [id number]     XR femur 2 vw left    Result Date: 10/15/2023  Impression: Acute minimally displaced and angulated left-sided intertrochanteric fracture. Workstation performed: [id number]     XR pelvis ap only 1 or 2 vw    Result Date: 10/15/2023  Impression: Acute minimally displaced left-sided intertrochanteric fracture. This concurs with preliminary report documented in the electronic medical record. Workstation performed: [id number]     XR chest 1 view portable    Result Date: 10/15/2023  Impression: No acute cardiopulmonary disease. Workstation performed: [id number]        Complications: None    Discharge Diagnosis: Left hip fracture. Fall. Medical Problems       Resolved Problems  Date Reviewed: 10/19/2023   None         Condition at Discharge: good         Discharge instructions/Information to patient and family:   See after visit summary for information provided to patient and family. Provisions for Follow-Up Care:  See after visit summary for information related to follow-up care and any pertinent home health orders. PCP: Denise Beck MD    Disposition: See After Visit Summary for discharge disposition information. Planned Readmission: No      Discharge Statement   I spent 25 minutes discharging the patient. This time was spent on the day of discharge. I had direct contact with the patient on the day of discharge. Additional documentation is required if more than 30 minutes were spent on discharge. Discharge Medications:  See after visit summary for reconciled discharge medications provided to patient and family.

## 2023-10-19 NOTE — PLAN OF CARE
Problem: MOBILITY - ADULT  Goal: Maintain or return to baseline ADL function  Description: INTERVENTIONS:  -  Assess patient's ability to carry out ADLs; assess patient's baseline for ADL function and identify physical deficits which impact ability to perform ADLs (bathing, care of mouth/teeth, toileting, grooming, dressing, etc.)  - Assess/evaluate cause of self-care deficits   - Assess range of motion  - Assess patient's mobility; develop plan if impaired  - Assess patient's need for assistive devices and provide as appropriate  - Encourage maximum independence but intervene and supervise when necessary  - Involve family in performance of ADLs  - Assess for home care needs following discharge   - Consider OT consult to assist with ADL evaluation and planning for discharge  - Provide patient education as appropriate  Outcome: Progressing  Goal: Maintains/Returns to pre admission functional level  Description: INTERVENTIONS:  - Perform BMAT or MOVE assessment daily.   - Set and communicate daily mobility goal to care team and patient/family/caregiver. - Collaborate with rehabilitation services on mobility goals if consulted  - Perform Range of Motion 3 times a day. - Reposition patient every 2 hours.   - Dangle patient 3 times a day  - Stand patient 3 times a day  - Ambulate patient 3 times a day  - Out of bed to chair 3 times a day   - Out of bed for meals 3 times a day  - Out of bed for toileting  - Record patient progress and toleration of activity level   Outcome: Progressing     Problem: PAIN - ADULT  Goal: Verbalizes/displays adequate comfort level or baseline comfort level  Description: Interventions:  - Encourage patient to monitor pain and request assistance  - Assess pain using appropriate pain scale  - Administer analgesics based on type and severity of pain and evaluate response  - Implement non-pharmacological measures as appropriate and evaluate response  - Consider cultural and social influences on pain and pain management  - Notify physician/advanced practitioner if interventions unsuccessful or patient reports new pain  Outcome: Progressing     Problem: INFECTION - ADULT  Goal: Absence or prevention of progression during hospitalization  Description: INTERVENTIONS:  - Assess and monitor for signs and symptoms of infection  - Monitor lab/diagnostic results  - Monitor all insertion sites, i.e. indwelling lines, tubes, and drains  - Monitor endotracheal if appropriate and nasal secretions for changes in amount and color  - [place] appropriate cooling/warming therapies per order  - Administer medications as ordered  - Instruct and encourage patient and family to use good hand hygiene technique  - Identify and instruct in appropriate isolation precautions for identified infection/condition  Outcome: Progressing  Goal: Absence of fever/infection during neutropenic period  Description: INTERVENTIONS:  - Monitor WBC    Outcome: Progressing     Problem: SAFETY ADULT  Goal: Maintain or return to baseline ADL function  Description: INTERVENTIONS:  -  Assess patient's ability to carry out ADLs; assess patient's baseline for ADL function and identify physical deficits which impact ability to perform ADLs (bathing, care of mouth/teeth, toileting, grooming, dressing, etc.)  - Assess/evaluate cause of self-care deficits   - Assess range of motion  - Assess patient's mobility; develop plan if impaired  - Assess patient's need for assistive devices and provide as appropriate  - Encourage maximum independence but intervene and supervise when necessary  - Involve family in performance of ADLs  - Assess for home care needs following discharge   - Consider OT consult to assist with ADL evaluation and planning for discharge  - Provide patient education as appropriate  Outcome: Progressing  Goal: Maintains/Returns to pre admission functional level  Description: INTERVENTIONS:  - Perform BMAT or MOVE assessment daily.   - Set and communicate daily mobility goal to care team and patient/family/caregiver. - Collaborate with rehabilitation services on mobility goals if consulted  - Perform Range of Motion 3 times a day. - Reposition patient every 2 hours.   - Dangle patient 3 times a day  - Stand patient 3 times a day  - Ambulate patient 3 times a day  - Out of bed to chair 3 times a day   - Out of bed for meals 3 times a day  - Out of bed for toileting  - Record patient progress and toleration of activity level   Outcome: Progressing  Goal: Patient will remain free of falls  Description: INTERVENTIONS:  - Educate patient/family on patient safety including physical limitations  - Instruct patient to call for assistance with activity   - Consult OT/PT to assist with strengthening/mobility   - Keep Call bell within reach  - Keep bed low and locked with side rails adjusted as appropriate  - Keep care items and personal belongings within reach  - Initiate and maintain comfort rounds  - Make Fall Risk Sign visible to staff  - Offer Toileting every 2 Hours, in advance of need  - Initiate/Maintain ON alarm  - Obtain necessary fall risk management equipment:   - Apply yellow socks and bracelet for high fall risk patients  - Consider moving patient to room near nurses station  Outcome: Progressing     Problem: DISCHARGE PLANNING  Goal: Discharge to home or other facility with appropriate resources  Description: INTERVENTIONS:  - Identify barriers to discharge w/patient and caregiver  - Arrange for needed discharge resources and transportation as appropriate  - Identify discharge learning needs (meds, wound care, etc.)  - Arrange for interpretive services to assist at discharge as needed  - Refer to Case Management Department for coordinating discharge planning if the patient needs post-hospital services based on physician/advanced practitioner order or complex needs related to functional status, cognitive ability, or social support system  Outcome: Progressing     Problem: Knowledge Deficit  Goal: Patient/family/caregiver demonstrates understanding of disease process, treatment plan, medications, and discharge instructions  Description: Complete learning assessment and assess knowledge base.   Interventions:  - Provide teaching at level of understanding  - Provide teaching via preferred learning methods  Outcome: Progressing

## 2023-10-19 NOTE — CASE MANAGEMENT
Case Management Discharge Planning Note    Patient name Debo Wadsworth  [facility] MRN [id number]  : 1960 Date 10/19/2023       Current Admission Date: 10/15/2023  Current Admission Diagnosis:Closed displaced intertrochanteric fracture of left femur [facility])   Patient Active Problem List    Diagnosis Date Noted    Fall 10/16/2023    Closed displaced intertrochanteric fracture of left femur ([address]) 10/15/2023    Cognitive deficit as late effect of cerebrovascular accident (CVA) 2022    Hyponatremia 2021    Cerebrovascular accident (CVA) due to thrombosis ([address]) 10/07/2021    Osteoporosis without current pathological fracture 2021    Anxiety 2020    Mixed hyperlipidemia 2020    GERD (gastroesophageal reflux disease) 06/15/2020    Neck pain 2020    Tremor of right hand 2019    Essential hypertension 10/01/2018    Erectile dysfunction 2018    Colon polyps 2017    Constipation 2017    GOA (generalized osteoarthritis) 2017    Seizure disorder ([address]) 2014      LOS (days): 4  Geometric Mean LOS (GMLOS) (days):   Days to GMLOS:     OBJECTIVE:  Risk of Unplanned Readmission Score: 10.55         Current admission status: Inpatient   Preferred Pharmacy:   [address] [place], [place] - [address] [place] [address]  Phone: [phone] Fax: [address] [place] [address] Drive  [address] [place], [place] [id number]  Phone: [phone] Fax: [phone]    [id number] [address]  [place] [address]  Phone: [phone] Fax: [phone]    Primary Care Provider: Derek Byers MD    Primary Insurance: Amara Tee  Secondary Insurance:     DISCHARGE DETAILS:    Auth obtained  Pt will d/c to [facility] today.  CM submitted for wheelchair [employer] today after 11am.

## 2023-10-19 NOTE — CASE MANAGEMENT
Case Management Discharge Planning Note    Patient name Servando Moulton  Location [facility] 606/[facility] 177-82 MRN [id number]  : 1960 Date 10/19/2023       Current Admission Date: 10/15/2023  Current Admission Diagnosis:Closed displaced intertrochanteric fracture of left femur [facility])   Patient Active Problem List    Diagnosis Date Noted    Fall 10/16/2023    Closed displaced intertrochanteric fracture of left femur ([address]) 10/15/2023    Cognitive deficit as late effect of cerebrovascular accident (CVA) 2022    Hyponatremia 2021    Cerebrovascular accident (CVA) due to thrombosis ([address]) 10/07/2021    Osteoporosis without current pathological fracture 2021    Anxiety 2020    Mixed hyperlipidemia 2020    GERD (gastroesophageal reflux disease) 06/15/2020    Neck pain 2020    Tremor of right hand 2019    Essential hypertension 10/01/2018    Erectile dysfunction 2018    Colon polyps 2017    Constipation 2017    GOA (generalized osteoarthritis) 2017    Seizure disorder ([address]) 2014      LOS (days): 4  Geometric Mean LOS (GMLOS) (days):   Days to GMLOS:     OBJECTIVE:  Risk of Unplanned Readmission Score: 10.55         Current admission status: Inpatient   Preferred Pharmacy:   [address] [facility] [address] [address]  Phone: [phone] Fax: [address] [place] [address] Drive  [address] [place] [address]  Phone: [phone] Fax: [phone]    [id number] [address]  Phone: [phone] Fax: [phone]    Primary Care Provider: Lashawn Casey MD    Primary Insurance: Jose Rafael Ulloa  Secondary Insurance:     DISCHARGE DETAILS:    Pt will d/c today to [facility] @11am via [employer]. Pt's brother, and nurse were notified.  Pt will d/c into room 307

## 2023-10-19 NOTE — CASE MANAGEMENT
Case Management Discharge Planning Note    Patient name Trinh Carballo  Location [facility] 606/[facility] 644-74 MRN [id number]  : 1960 Date 10/19/2023       Current Admission Date: 10/15/2023  Current Admission Diagnosis:Closed displaced intertrochanteric fracture of left femur [facility])   Patient Active Problem List    Diagnosis Date Noted    Fall 10/16/2023    Closed displaced intertrochanteric fracture of left femur ([address]) 10/15/2023    Cognitive deficit as late effect of cerebrovascular accident (CVA) 2022    Hyponatremia 2021    Cerebrovascular accident (CVA) due to thrombosis ([address]) 10/07/2021    Osteoporosis without current pathological fracture 2021    Anxiety 2020    Mixed hyperlipidemia 2020    GERD (gastroesophageal reflux disease) 06/15/2020    Neck pain 2020    Tremor of right hand 2019    Essential hypertension 10/01/2018    Erectile dysfunction 2018    Colon polyps 2017    Constipation 2017    GOA (generalized osteoarthritis) 2017    Seizure disorder ([address]) 2014      LOS (days): 4  Geometric Mean LOS (GMLOS) (days):   Days to GMLOS:     OBJECTIVE:  Risk of Unplanned Readmission Score: 10.55         Current admission status: Inpatient   Preferred Pharmacy:   [address] [facility] [address] [address]  Phone: [phone] Fax: [address]  [address] [place] [address]  Phone: [phone] Fax: [phone]    [id number] [address]  [facility] [address]  Phone: [phone] Fax: [phone]    Primary Care Provider: Maria Fernanda Gates MD    Primary Insurance: Jam Howard  Secondary Insurance:     DISCHARGE DETAILS:    Pt's transport via [employer] was cancelled as there was an issue with their drivers.   They were going to do a 1720 but CM called and requested a sooner time, which [facility] will coordinate.

## 2023-10-19 NOTE — PLAN OF CARE
Problem: MOBILITY - ADULT  Goal: Maintain or return to baseline ADL function  Description: INTERVENTIONS:  -  Assess patient's ability to carry out ADLs; assess patient's baseline for ADL function and identify physical deficits which impact ability to perform ADLs (bathing, care of mouth/teeth, toileting, grooming, dressing, etc.)  - Assess/evaluate cause of self-care deficits   - Assess range of motion  - Assess patient's mobility; develop plan if impaired  - Assess patient's need for assistive devices and provide as appropriate  - Encourage maximum independence but intervene and supervise when necessary  - Involve family in performance of ADLs  - Assess for home care needs following discharge   - Consider OT consult to assist with ADL evaluation and planning for discharge  - Provide patient education as appropriate  10/19/2023 1048 by Tiff Lentz RN  Outcome: Adequate for Discharge  10/19/2023 0717 by Tiff Lentz RN  Outcome: Progressing  Goal: Maintains/Returns to pre admission functional level  Description: INTERVENTIONS:  - Perform BMAT or MOVE assessment daily.   - Set and communicate daily mobility goal to care team and patient/family/caregiver. - Collaborate with rehabilitation services on mobility goals if consulted  - Perform Range of Motion 3 times a day. - Reposition patient every 2 hours.   - Dangle patient 3 times a day  - Stand patient 3 times a day  - Ambulate patient 3 times a day  - Out of bed to chair 3 times a day   - Out of bed for meals 3 times a day  - Out of bed for toileting  - Record patient progress and toleration of activity level   10/19/2023 1048 by Tiff Lentz RN  Outcome: Adequate for Discharge  10/19/2023 0717 by Tiff Lentz RN  Outcome: Progressing     Problem: PAIN - ADULT  Goal: Verbalizes/displays adequate comfort level or baseline comfort level  Description: Interventions:  - Encourage patient to monitor pain and request assistance  - Assess pain using appropriate pain scale  - Administer analgesics based on type and severity of pain and evaluate response  - Implement non-pharmacological measures as appropriate and evaluate response  - Consider cultural and social influences on pain and pain management  - Notify physician/advanced practitioner if interventions unsuccessful or patient reports new pain  10/19/2023 1048 by Reema Waller RN  Outcome: Adequate for Discharge  10/19/2023 0717 by Reema Waller RN  Outcome: Progressing     Problem: INFECTION - ADULT  Goal: Absence or prevention of progression during hospitalization  Description: INTERVENTIONS:  - Assess and monitor for signs and symptoms of infection  - Monitor lab/diagnostic results  - Monitor all insertion sites, i.e. indwelling lines, tubes, and drains  - Monitor endotracheal if appropriate and nasal secretions for changes in amount and color  - [employer] appropriate cooling/warming therapies per order  - Administer medications as ordered  - Instruct and encourage patient and family to use good hand hygiene technique  - Identify and instruct in appropriate isolation precautions for identified infection/condition  10/19/2023 1048 by Reema Waller RN  Outcome: Adequate for Discharge  10/19/2023 0717 by Reema Waller RN  Outcome: Progressing  Goal: Absence of fever/infection during neutropenic period  Description: INTERVENTIONS:  - Monitor WBC    10/19/2023 1048 by Reema Waller RN  Outcome: Adequate for Discharge  10/19/2023 0717 by Reema Waller RN  Outcome: Progressing     Problem: SAFETY ADULT  Goal: Maintain or return to baseline ADL function  Description: INTERVENTIONS:  -  Assess patient's ability to carry out ADLs; assess patient's baseline for ADL function and identify physical deficits which impact ability to perform ADLs (bathing, care of mouth/teeth, toileting, grooming, dressing, etc.)  - Assess/evaluate cause of self-care deficits   - Assess range of motion  - Assess patient's mobility; develop plan if impaired  - Assess patient's need for assistive devices and provide as appropriate  - Encourage maximum independence but intervene and supervise when necessary  - Involve family in performance of ADLs  - Assess for home care needs following discharge   - Consider OT consult to assist with ADL evaluation and planning for discharge  - Provide patient education as appropriate  10/19/2023 1048 by Dilip Yañez RN  Outcome: Adequate for Discharge  10/19/2023 0717 by Dilip Yañez RN  Outcome: Progressing  Goal: Maintains/Returns to pre admission functional level  Description: INTERVENTIONS:  - Perform BMAT or MOVE assessment daily.   - Set and communicate daily mobility goal to care team and patient/family/caregiver. - Collaborate with rehabilitation services on mobility goals if consulted  - Perform Range of Motion 3 times a day. - Reposition patient every 2 hours.   - Dangle patient 3 times a day  - Stand patient 3 times a day  - Ambulate patient 3 times a day  - Out of bed to chair 3 times a day   - Out of bed for meals 3 times a day  - Out of bed for toileting  - Record patient progress and toleration of activity level   10/19/2023 1048 by Dilip Yañez RN  Outcome: Adequate for Discharge  10/19/2023 0717 by Dilip Yañez RN  Outcome: Progressing  Goal: Patient will remain free of falls  Description: INTERVENTIONS:  - Educate patient/family on patient safety including physical limitations  - Instruct patient to call for assistance with activity   - Consult OT/PT to assist with strengthening/mobility   - Keep Call bell within reach  - Keep bed low and locked with side rails adjusted as appropriate  - Keep care items and personal belongings within reach  - Initiate and maintain comfort rounds  - Make Fall Risk Sign visible to staff  - Offer Toileting every 2 Hours, in advance of need  - Initiate/Maintain ON alarm  - Obtain necessary fall risk management equipment:   - Apply yellow socks and bracelet for high fall risk patients  - Consider moving patient to room near nurses station  10/19/2023 1048 by Shruti Bishop RN  Outcome: Adequate for Discharge  10/19/2023 0717 by Shruti Bishop RN  Outcome: Progressing     Problem: DISCHARGE PLANNING  Goal: Discharge to home or other facility with appropriate resources  Description: INTERVENTIONS:  - Identify barriers to discharge w/patient and caregiver  - Arrange for needed discharge resources and transportation as appropriate  - Identify discharge learning needs (meds, wound care, etc.)  - Arrange for interpretive services to assist at discharge as needed  - Refer to Case Management Department for coordinating discharge planning if the patient needs post-hospital services based on physician/advanced practitioner order or complex needs related to functional status, cognitive ability, or social support system  10/19/2023 1048 by Shruti Bishop RN  Outcome: Adequate for Discharge  10/19/2023 0717 by Shruti Bishop RN  Outcome: Progressing     Problem: Knowledge Deficit  Goal: Patient/family/caregiver demonstrates understanding of disease process, treatment plan, medications, and discharge instructions  Description: Complete learning assessment and assess knowledge base.   Interventions:  - Provide teaching at level of understanding  - Provide teaching via preferred learning methods  10/19/2023 1048 by Shruti Bishop RN  Outcome: Adequate for Discharge  10/19/2023 0717 by Shruti Bishop RN  Outcome: Progressing

## 2023-10-19 NOTE — PROGRESS NOTES
[address] [facility]  Progress Note  Name: Kendall Graham  MRN: [id number]  Unit/Bed#: [facility]PHP 184-28 I Date of Admission: 10/15/2023   Date of Service: 10/19/2023 I Hospital Day: 4    Assessment/Plan   Fall  Assessment & Plan  - Status post mechanical fall with the below noted injuries. - Fall precautions.  - PT and OT evaluation and treatment as indicated. - Case Management consultation for disposition planning. * Closed displaced intertrochanteric fracture of left femur ([address])  Assessment & Plan  - Acute closed left femur fracture, present on admission.  - Appreciate Orthopedic surgery evaluation, recommendations and interventions as noted. - Status post ORIF of left femur with IM nail insertion on 10/16/2023.  - May be weightbearing as tolerated on the left lower extremity post-operatively. - Monitor left lower extremity neurovascular exam.  - Continue multimodal analgesic regimen.  - Continue DVT prophylaxis. - PT and OT evaluation and treatment as indicated. - Outpatient follow up with Orthopedic surgery for re-evaluation. Hyponatremia  Assessment & Plan  - Patient with mild asymptomatic hyponatremia, present on presentation.   - Sodium level improving preoperatively on 10/16/2023 from 131-133.   -133 yesterday--AM labs pending.   - Continue to monitor with daily BMP. - If patient develops worsening hyponatremia, proceed with further work-up. - Recommend short-term outpatient follow-up with PCP. Cerebrovascular accident (CVA) due to thrombosis [facility])  Assessment & Plan  Continue home Aggrenox and statin therapy-we will use hospital formulary statin while inpatient. May resume home regimen upon discharge. Essential hypertension  Assessment & Plan  - Patient with chronic history of hypertension.  - Continue current medication regimen and resume home medication therapy on discharge as appropriate. - Outpatient follow-up per routine.     Seizure disorder [facility])  Assessment & Plan  Continue home Tegretol and Keppra             Bowel Regimen: LBM 10/18--Miralax  VTE Prophylaxis:Sequential compression device (Venodyne)  and Enoxaparin (Lovenox)     Disposition: Pending placement    Subjective   Chief Complaint: "I want to shower"    Subjective: Patient describes wanting to shower today. He states that he has not showered since being here. He describes getting in wipes to wipe himself off but that he would like to take a shower. He confirms that he feels comfortable getting up with assistance and getting to the shower. He believes that he cannot care for himself in the shower. He states that his pain is under control. He notes having a bowel movement yesterday. He denies any other complaints. Objective   Vitals:   Temp:  [98.8 °F (37.1 °C)-99.3 °F (37.4 °C)] 99.3 °F (37.4 °C)  HR:  [88-95] 89  Resp:  [18-19] 18  BP: (113-128)/(72-87) 113/72    I/O         10/17 0701  10/18 0700 10/18 0701  10/19 0700 10/19 0701  10/20 0700    P. O. 360 1080     I.V. (mL/kg)       Total Intake(mL/kg) 360 (4.9) 1080 (14.6)     Urine (mL/kg/hr) 700 (0.4) 1850 (1)     Stool  0     Total Output 700 1850     Net -340 -770            Unmeasured Stool Occurrence  1 x              Physical Exam:   GENERAL APPEARANCE: No acute distress  NEURO: GCS is 15. Light touch sensation intact throughout. HEENT: Normocephalic, atraumatic. Neck supple. CV: Regular rate and rhythm.  +2 radial dorsalis pedis pulses, bilaterally. LUNGS: Clear to auscultation, bilaterally. Chest wall is nontender. GI: Abdomen is soft nontender. : Pelvis stable. MSK: Limited range of motion of left lower extremity. Patient has swelling on the left lateral anterior region of his left hip. All other extremities display full range of motion. SKIN: Warm, dry. Left hip surgical dressings clean, dry, intact.     Invasive Devices       Peripheral Intravenous Line  Duration             Peripheral IV 10/15/23 Dorsal (posterior); Left Forearm 3 days    Peripheral IV 10/16/23 Right Forearm 2 days                          Lab Results: Results: I have personally reviewed all pertinent laboratory/tests results, BMP/CMP: No results found for: "SODIUM", "K", "CL", "CO2", "ANIONGAP", "BUN", "CREATININE", "GLUCOSE", "CALCIUM", "AST", "ALT", "ALKPHOS", "PROT", "BILITOT", "EGFR", and CBC:   Lab Results   Component Value Date    WBC 7.27 10/19/2023    HGB 12.0 10/19/2023    HCT 34.7 (L) 10/19/2023     (H) 10/19/2023     10/19/2023    RBC 3.41 (L) 10/19/2023    MCH 35.2 (H) 10/19/2023    MCHC 34.6 10/19/2023    RDW 11.0 (L) 10/19/2023    MPV 9.3 10/19/2023     Imaging: I have personally reviewed pertinent reports.      Other Studies: none

## 2023-10-19 NOTE — ASSESSMENT & PLAN NOTE
Continue home Aggrenox and statin therapy-we will use hospital formulary statin while inpatient. May resume home regimen upon discharge.

## 2023-10-19 NOTE — ASSESSMENT & PLAN NOTE
- Patient with mild asymptomatic hyponatremia, present on presentation.   - Sodium level improving preoperatively on 10/16/2023 from 131-133.   -133 yesterday--AM labs pending.   - Continue to monitor with daily BMP. - If patient develops worsening hyponatremia, proceed with further work-up. - Recommend short-term outpatient follow-up with PCP.

## 2023-10-20 NOTE — UTILIZATION REVIEW
NOTIFICATION OF ADMISSION DISCHARGE   This is a Notification of Discharge from [address] [facility]. Please be advised that this patient has been discharge from our facility. Below you will find the admission and discharge date and time including the patient’s disposition. UTILIZATION REVIEW CONTACT:  Celeste Álvarez  Utilization   Network Utilization Review Department  Phone: [phone] x carefully listen to the prompts. All voicemails are confidential.  Email: [email or web address]     ADMISSION INFORMATION  PRESENTATION DATE: 10/15/2023  2:05 PM  OBERVATION ADMISSION DATE:   INPATIENT ADMISSION DATE: 10/15/23  5:27 PM   DISCHARGE DATE: 10/19/2023  3:33 PM   DISPOSITION:Non [facility]    Network Utilization Review Department  ATTENTION: Please call with any questions or concerns to [phone] and carefully listen to the prompts so that you are directed to the right person. All voicemails are confidential.   For Discharge needs, contact Care Management DC Support Team at [phone] opt. 2  Send all requests for admission clinical reviews, approved or denied determinations and any other requests to dedicated fax number below belonging to the campus where the patient is receiving treatment.  List of dedicated fax numbers for the Facilities:  [place] DENIALS (Administrative/Medical Necessity) [phone]   DISCHARGE SUPPORT TEAM (Network) [phone] [facility] [phone]   [address] [facility] [address] [place] [address] [address]  [facility] [phone]

## 2023-10-24 ENCOUNTER — TELEPHONE (OUTPATIENT)
Dept: NEUROLOGY | Facility: CLINIC | Age: 63
End: 2023-10-24

## 2023-10-24 NOTE — TELEPHONE ENCOUNTER
Regarding: Broken Hip  Contact: [phone]  ----- Message from Eleanor Snellen sent at 10/24/2023  3:34 PM EDT -----       ----- Message sent from Ace Shi DO to Alexis, [address] [facility] at 10/23/2023  8:53 AM -----   Angela Culver,      I do not treat osteoporosis so the question about treatment should be referred to his PCP. She was treating him with Fosamax after the abnormal dexa scan result in 2021. She had a ordered a repeat dexascan in July of 2023. It was not yet preformed     That is another reason I am tapering him off of Tegretol     Eliot Son DO       ----- Message -----       From:Agus Espinoza       Sent:10/22/2023 11:00 PM EDT         To:Patient Medical Advice Request Message List    Subject:Broken Hip    Hi Dr. William Dick, it's Achilles Graft again, regarding Ilulissat. He is in the [facility] after his surgery to repair his fractured hip femur dislocation. I am concerned about his past due dexascan bone density test. If you go back on   his chart here to February/2021 you will see his last dexascan bone density test. I suggested to him, after reading about long  term taking carbamazepine that bone density be checked. And sure enough that test revealed severe osteoporosis in the   left hip where the fracture occurred, and pelvis. Also is Fosamax a good choice? I know it was prescribed back then by another provider. I see there are a few other medicines to build density. I guess that decision could be made after his next dexascan. Thank you      ----- Message -----       From: (proxy for Ace Shi)       Sent:10/19/2023  9:24 AM EDT         To:Agus Espinoza    Subject:Broken Hip    Achilles Graft,     Thank you for letting me know . I reviewed the chart and it looks like everything went well.      Please remind the admitting teams not to alter the seizure regimens       ----- Message -----       From:Agus Espinoza       Sent:10/18/2023  2:31 PM EDT Raymundo Barnett, this is Ani Davenport fell and broke his left hip-femur on Steve 10/15/2023. He was on the sloped driveway in front of garage, when he slipped on water residue from rainfall the night before. Just letting you know , if you want to look at notes, test results etc. He had surgery at [facility] on Monday. Now awaiting insurance approval at [address] [place] [address]. [facility] has already approved accepted him.

## 2023-10-24 NOTE — TELEPHONE ENCOUNTER
Pt has accepted 's offer on 12/13/2023, 2:30 pm, [facility]. Awaiting for slot to be created.     Thank you,     Denis Burgos

## 2023-10-25 ENCOUNTER — TELEPHONE (OUTPATIENT)
Dept: NEUROLOGY | Facility: CLINIC | Age: 63
End: 2023-10-25

## 2023-10-26 ENCOUNTER — HOME HEALTH ADMISSION (OUTPATIENT)
Dept: HOME HEALTH SERVICES | Facility: HOME HEALTHCARE | Age: 63
End: 2023-10-26
Payer: COMMERCIAL

## 2023-10-30 ENCOUNTER — HOME CARE VISIT (OUTPATIENT)
Dept: HOME HEALTH SERVICES | Facility: HOME HEALTHCARE | Age: 63
End: 2023-10-30

## 2023-10-30 ENCOUNTER — TELEPHONE (OUTPATIENT)
Age: 63
End: 2023-10-30

## 2023-10-30 NOTE — TELEPHONE ENCOUNTER
Caller: Patient    Doctor: Lakhwinder     Reason for call: Patient is refusing oxycodone medication he is having constipation with this medication and pt is taking 3 tylenols in the morning and 3 at night and he is having lots of hip pain and pain on the right ankle pt had sx on 10/16   Please advise    Call back#: [phone]

## 2023-10-31 ENCOUNTER — HOME CARE VISIT (OUTPATIENT)
Dept: HOME HEALTH SERVICES | Facility: HOME HEALTHCARE | Age: 63
End: 2023-10-31
Payer: COMMERCIAL

## 2023-10-31 VITALS — DIASTOLIC BLOOD PRESSURE: 88 MMHG | HEART RATE: 85 BPM | SYSTOLIC BLOOD PRESSURE: 120 MMHG | OXYGEN SATURATION: 94 %

## 2023-10-31 PROCEDURE — G0152 HHCP-SERV OF OT,EA 15 MIN: HCPCS

## 2023-10-31 PROCEDURE — 400013 VN SOC

## 2023-11-01 ENCOUNTER — HOME CARE VISIT (OUTPATIENT)
Dept: HOME HEALTH SERVICES | Facility: HOME HEALTHCARE | Age: 63
End: 2023-11-01
Payer: COMMERCIAL

## 2023-11-01 VITALS
TEMPERATURE: 97.8 F | SYSTOLIC BLOOD PRESSURE: 124 MMHG | DIASTOLIC BLOOD PRESSURE: 80 MMHG | OXYGEN SATURATION: 99 % | HEART RATE: 87 BPM

## 2023-11-01 PROCEDURE — G0151 HHCP-SERV OF PT,EA 15 MIN: HCPCS

## 2023-11-02 ENCOUNTER — HOME CARE VISIT (OUTPATIENT)
Dept: HOME HEALTH SERVICES | Facility: HOME HEALTHCARE | Age: 63
End: 2023-11-02
Payer: COMMERCIAL

## 2023-11-02 ENCOUNTER — OFFICE VISIT (OUTPATIENT)
Dept: OBGYN CLINIC | Facility: HOSPITAL | Age: 63
End: 2023-11-02

## 2023-11-02 ENCOUNTER — HOSPITAL ENCOUNTER (OUTPATIENT)
Dept: RADIOLOGY | Facility: HOSPITAL | Age: 63
Discharge: HOME | End: 2023-11-02
Attending: ORTHOPAEDIC SURGERY
Payer: COMMERCIAL

## 2023-11-02 ENCOUNTER — TELEPHONE (OUTPATIENT)
Age: 63
End: 2023-11-02

## 2023-11-02 DIAGNOSIS — Z48.89 AFTERCARE FOLLOWING SURGERY: Primary | ICD-10-CM

## 2023-11-02 DIAGNOSIS — Z48.89 AFTERCARE FOLLOWING SURGERY: ICD-10-CM

## 2023-11-02 PROCEDURE — 73502 X-RAY EXAM HIP UNI 2-3 VIEWS: CPT

## 2023-11-02 PROCEDURE — 99024 POSTOP FOLLOW-UP VISIT: CPT | Performed by: ORTHOPAEDIC SURGERY

## 2023-11-02 NOTE — TELEPHONE ENCOUNTER
According to ov note, incision staples were removed and steri strips applied. Do not see msg re: dsg or dsg changes. Please review and advise can this now be HAN and may pt shower?

## 2023-11-02 NOTE — CASE COMMUNICATION
Patient will be seen for PT services for 2w1, 3w3 for home health PT services to improve his stair climbing ability, gait ability on level and sloped surfaces, balance to progress from [employer], transfer ability, and his LE strength.

## 2023-11-02 NOTE — PROGRESS NOTES
ASSESSMENT/PLAN:    Assessment:   61 y.o. male  DOS 10/16/2023 S/P antegrade IM nailing Left hip     Plan: Today I had a long discussion with the caregiver regarding the diagnosis and plan moving forward. Stable fixation of this right hip intertrochanteric fracture now 2 weeks out from surgery. Staples removed today and steri strips applied. Continue DVT prophylaxis x2 more weeks  Weightbearing as tolerated  Continue work with physical therapy  Follow-up 4 weeks    Follow up: 4 weeks     The above diagnosis and plan has been dicussed with the patient and caregiver. They verbalized an understanding and will follow up accordingly. I have seen and evaluated and performed the entire physical exam in this postoperative patient. A physician extender was present for documentation purposes. _____________________________________________________    SUBJECTIVE:  Otilio Soto is a 61 y.o. male who presents today for 2 week follow up left hip. He is currently residing at home and progressing in home PT. denies any fevers or chills. He is overall doing well. Denies any numbness or tingling. PAST MEDICAL HISTORY:  Past Medical History:   Diagnosis Date    Arthritis     Hypertension     Seizures ([address])     Stroke [facility])        PAST SURGICAL HISTORY:  Past Surgical History:   Procedure Laterality Date    ELBOW SURGERY  2017    NC OPTX FEM SHFT FX W/INSJ IMED IMPLT W/WO SCREW Left 10/16/2023    Procedure: INSERTION NAIL IM FEMUR ANTEGRADE (TROCHANTERIC);   Surgeon: Sukh Rothman DO;  Location: [facility];  Service: Orthopedics       FAMILY HISTORY:  Family History   Problem Relation Age of Onset    Dementia Mother     Alzheimer's disease Mother     Stroke Father     Hyperlipidemia Brother        SOCIAL HISTORY:  Social History     Tobacco Use    Smoking status: Never    Smokeless tobacco: Never   Vaping Use    Vaping Use: Never used   Substance Use Topics    Alcohol use: Yes     Comment: occasional    Drug use: No       MEDICATIONS:    Current Outpatient Medications:     acetaminophen (TYLENOL) 325 mg tablet, Take 2 tablets (650 mg total) by mouth every 6 (six) hours (Patient taking differently: Take 2 tablets by mouth every 6 (six) hours. Taking 500 mg 3 tablets in AM and PM  Indications: Pain), Disp: , Rfl: 0    alendronate (FOSAMAX) 70 mg tablet, Take 1 tablet (70 mg total) by mouth every 7 days, Disp: 4 tablet, Rfl: 5    aspirin-dipyridamole (AGGRENOX)  mg per 12 hr capsule, TAKE 1 CAPSULE BY MOUTH TWICE A DAY, Disp: 180 capsule, Rfl: 3    Calcium-Magnesium-Vitamin D (CALCIUM MAGNESIUM PO), Take by mouth, Disp: , Rfl:     carBAMazepine (TEGretol) 200 mg tablet, TAKE 3 TABLETS IN THE AM, 1 TABLETS AT LUNCH, 2 TABLETS AT DINNER, AND 2 TABLETS AT BEDTIME (Patient taking differently: TAKE 3 TABLETS IN THE AM, 1 TABLETS AT LUNCH, 2 TABLETS AT DINNER, AND 2 TABLETS AT BEDTIME  10/31/23 - no longer taking 1 tablet at lunch. Indications: Generalized Seizure Disorder), Disp: 240 tablet, Rfl: 5    co-enzyme Q-10 30 MG capsule, Take 100 mg by mouth daily  , Disp: , Rfl:     docusate sodium (COLACE) 100 mg capsule, Take 1 capsule (100 mg total) by mouth 2 (two) times a day, Disp: , Rfl: 0    enoxaparin (Lovenox) 40 mg/0.4 mL, Inject 0.4 mL (40 mg total) under the skin in the morning for 28 days, Disp: , Rfl: 0    levETIRAcetam (Keppra) 500 mg tablet, Take 1 tablet (500 mg total) by mouth every 12 (twelve) hours, Disp: 60 tablet, Rfl: 5    Lidocaine 4 % PTCH, Place 1 patch on the skin daily. Indications: Pain, Disp: , Rfl:     LORazepam (ATIVAN) 0.5 mg tablet, TAKE 1 TABLET BY MOUTH ONCE DAILY IF NEEDED for seizure. Limit of 1 in 24 hours, Disp: 10 tablet, Rfl: 0    NON FORMULARY, Super Beets, Disp: , Rfl:     oxyCODONE (ROXICODONE) 10 MG TABS, You may take 5 mg (0.5 tablet) for moderate pain or 10 mg (1 tab) for severe pain, every 4 hours, as needed. , Disp: 20 tablet, Rfl: 0    polyethylene glycol (MIRALAX) 17 g packet, Take 17 g by mouth daily Do not start before October 20, 2023., Disp: , Rfl: 0    rosuvastatin (CRESTOR) 10 MG tablet, Take 1 tablet (10 mg total) by mouth daily, Disp: 90 tablet, Rfl: 1    VALERIAN ROOT PO, Use, Disp: , Rfl:     Vitamin Mixture (VITAMIN E COMPLETE PO), Take 1 tablet by mouth daily , Disp: , Rfl:     ALLERGIES:  Allergies   Allergen Reactions    Meperidine Seizures    Amoxicillin     Azithromycin Seizures    Other Sneezing     Dust     Pollen Extract Sneezing       REVIEW OF SYSTEMS:  ROS is negative other than that noted in the HPI. Constitutional: Negative for fatigue and fever. HENT: Negative for sore throat. Respiratory: Negative for shortness of breath. Cardiovascular: Negative for chest pain. Gastrointestinal: Negative for abdominal pain. Endocrine: Negative for cold intolerance and heat intolerance. Genitourinary: Negative for flank pain. Musculoskeletal: Negative for back pain. Skin: Negative for rash. Allergic/Immunologic: Negative for immunocompromised state. Neurological: Negative for dizziness. Psychiatric/Behavioral: Negative for agitation. _____________________________________________________  PHYSICAL EXAMINATION:  General/Constitutional: NAD, well developed, well nourished  HENT: Normocephalic, atraumatic  CV: Intact distal pulses, regular rate  Resp: No respiratory distress or labored breathing  Lymphatic: No lymphadenopathy palpated  Neuro: Alert and  awake  Psych: Normal mood  Skin: Warm, dry, no rashes, no erythema      MUSCULOSKELETAL EXAMINATION:  Left hip:  Incisions clean dry and intact, no erythema edema or fluctuance, no drainage  Staples removed today without incident  No tenderness to palpation through the femur and knee  EHL plantarflexion dorsiflexion intact  Sural saphenous DPN SPN tibial sensation intact  Dorsalis pedis pulse palpable    He is able to ambulate with the assistance of a walker.   Leg lengths are grossly equal, foot progression angle appropriate    _____________________________________________________  STUDIES REVIEWED:  Imaging studies reviewed by Dr. Jhon Ansari and demonstrate AP lateral left hip demonstrates interval healing left intertrochanteric hip fracture maintained alignment status post cephalomedullary nail      PROCEDURES PERFORMED:    No Procedures performed today

## 2023-11-02 NOTE — TELEPHONE ENCOUNTER
Caller: Patient     Doctor: Margarito Allison    Reason for call: Patient had his post op appointment today with Dr. Margarito Allison and he is asking what he should do with bandages. Should they stay on until his next post op appointment or remove them. Please call him back to discuss.   INSERTION NAIL IM FEMUR ANTEGRADE (TROCHANTERIC) (Left: Leg Upper)    Call back#:[phone]

## 2023-11-02 NOTE — TELEPHONE ENCOUNTER
Called and spoke with pt and relayed R Maribel, pt also wanted to know if he could put biodine on incision to reduce scarring, I advised t avoid putting anything on incision and you can speak to to Dr Dillon Pedro about that at his next office visit. Pt had no further questions.

## 2023-11-03 ENCOUNTER — HOME CARE VISIT (OUTPATIENT)
Dept: HOME HEALTH SERVICES | Facility: HOME HEALTHCARE | Age: 63
End: 2023-11-03
Payer: COMMERCIAL

## 2023-11-03 VITALS — OXYGEN SATURATION: 100 % | DIASTOLIC BLOOD PRESSURE: 88 MMHG | SYSTOLIC BLOOD PRESSURE: 134 MMHG | HEART RATE: 79 BPM

## 2023-11-03 VITALS
SYSTOLIC BLOOD PRESSURE: 130 MMHG | TEMPERATURE: 98.8 F | DIASTOLIC BLOOD PRESSURE: 86 MMHG | OXYGEN SATURATION: 97 % | HEART RATE: 98 BPM

## 2023-11-03 PROCEDURE — G0152 HHCP-SERV OF OT,EA 15 MIN: HCPCS

## 2023-11-03 PROCEDURE — G0151 HHCP-SERV OF PT,EA 15 MIN: HCPCS

## 2023-11-06 ENCOUNTER — HOME CARE VISIT (OUTPATIENT)
Dept: HOME HEALTH SERVICES | Facility: HOME HEALTHCARE | Age: 63
End: 2023-11-06
Payer: COMMERCIAL

## 2023-11-06 PROCEDURE — G0151 HHCP-SERV OF PT,EA 15 MIN: HCPCS

## 2023-11-07 ENCOUNTER — HOME CARE VISIT (OUTPATIENT)
Dept: HOME HEALTH SERVICES | Facility: HOME HEALTHCARE | Age: 63
End: 2023-11-07
Payer: COMMERCIAL

## 2023-11-07 VITALS
SYSTOLIC BLOOD PRESSURE: 128 MMHG | TEMPERATURE: 97.7 F | DIASTOLIC BLOOD PRESSURE: 92 MMHG | OXYGEN SATURATION: 96 % | HEART RATE: 85 BPM

## 2023-11-07 VITALS — OXYGEN SATURATION: 98 % | HEART RATE: 66 BPM | SYSTOLIC BLOOD PRESSURE: 116 MMHG | DIASTOLIC BLOOD PRESSURE: 80 MMHG

## 2023-11-07 PROCEDURE — G0152 HHCP-SERV OF OT,EA 15 MIN: HCPCS

## 2023-11-07 NOTE — Clinical Note
Veena Martin,     I'm also working with [place] and he is desiring to get back to taking out his trash and blowing the leaves on the driveway. We did a partial run of taking out the trash but required A for opening the screen and wasn't willing to allow me to move the bags/blower he has in the area he could turn his walker towards by the door for improved ease in opening it in the first place. He reported he is stubborn, and even admitted he wasn't listening to instructions I was giving him. He needed mod A for the task, with min in the home due to safety awareness with retrieving the bag, and understandably didn't want to navigate the wet driveway. Are you able to work towards those goals with him outside? I'm not sure he's ready, but he kept asking me when he can progress from using the RW. I deferred those questions to you. Typically when I go in he is telling me what he needs me to do, i.e. move bags, clean off walker handles, take trash out. Today, and in the future, I am deferring the tasks back to him to work together towards him regaining his independence. This took a significant amount of redirecting and encouragement, as he kept repeating, "You don't want to do it," inwhich I kept responding with my job is to help him regain his independence as he lives alone. Just a heads-up. PS He presented with tremor-like, difficulty with motor initiation with R LE when entering lower bathroom, which he reports he did purposely to prevent a fall, although I encouraged he speak to someone to assess if he has a neurological deficit. He has tremors in R UE and min in L at times. I know it's in his hx and he said he was taking a medicine but had unpleasant side effects. I didn't see a dx like PD in his chart, only tremor, seizures and CVA/TIA. Wondering if you've also noticed the symptoms of a potential neurological dx with his gross motor initiation/coordination?     Thanks,    Kb GORDON OT

## 2023-11-08 ENCOUNTER — HOME CARE VISIT (OUTPATIENT)
Dept: HOME HEALTH SERVICES | Facility: HOME HEALTHCARE | Age: 63
End: 2023-11-08
Payer: COMMERCIAL

## 2023-11-08 VITALS
SYSTOLIC BLOOD PRESSURE: 130 MMHG | DIASTOLIC BLOOD PRESSURE: 84 MMHG | OXYGEN SATURATION: 96 % | TEMPERATURE: 97.3 F | HEART RATE: 84 BPM

## 2023-11-08 PROCEDURE — G0151 HHCP-SERV OF PT,EA 15 MIN: HCPCS

## 2023-11-09 ENCOUNTER — HOME CARE VISIT (OUTPATIENT)
Dept: HOME HEALTH SERVICES | Facility: HOME HEALTHCARE | Age: 63
End: 2023-11-09
Payer: COMMERCIAL

## 2023-11-09 VITALS — OXYGEN SATURATION: 99 % | HEART RATE: 66 BPM | SYSTOLIC BLOOD PRESSURE: 131 MMHG | DIASTOLIC BLOOD PRESSURE: 93 MMHG

## 2023-11-09 PROCEDURE — G0152 HHCP-SERV OF OT,EA 15 MIN: HCPCS

## 2023-11-09 NOTE — CASE COMMUNICATION
I noticed he is shaky on the steps but I always thought it was due to his pain. I can see how tremor, seizures would possibly affect that. He told me at [facility] that he had a TIA so he shouldn't be dealing with any aftereffects unless it was misdiagnosed as a TIA instead of a CVA. I'll look into it deeper when I see him Friday. Thanks for the heads up!  ----- Message -----  From: DENVER Arenas  Sent: 11/7/2023  10:37 AM EST  To: Eliot Finch  Covert, PT      Maureen Christopher,     I'm also working with [facility] [place] and he is desiring to get back to taking out his trash and blowing the leaves on the driveway. We did a partial run of taking out the trash but required A for opening the screen and wasn't willing to allow me to move the bags/blower he has in the area he could turn his walker towards by the door for improved ease in opening it in the first place. He reported he is  stubborn, and even admitted he wasn't listening to instructions I was giving him. He needed mod A for the task, with min in the home due to safety awareness with retrieving the bag, and understandably didn't want to navigate the wet driveway. Are you able to work towards those goals with him outside? I'm not sure he's ready, but he kept asking me when he can progress from using the RW. I deferred those questions to you. Typically wh en I go in he is telling me what he needs me to do, i.e. move bags, clean off walker handles, take trash out. Today, and in the future, I am deferring the tasks back to him to work together towards him regaining his independence. This took a significant amount of redirecting and encouragement, as he kept repeating, "You don't want to do it," inwhich I kept responding with my job is to help him regain his independence as he lives alone. Just a heads-up.      PS He presented with tremor-like, difficulty with motor initiation with R LE when entering lower bathroom, which he reports he did purposely to prevent a fall, although I encouraged he speak to someone to assess if he has a neurological deficit. He has tremors in R UE and min in L at times. I know it's in his hx and he said he was taking a medicine but had unpleasant side effects. I didn't see a dx like PD in his ch art, only tremor, seizures and CVA/TIA. Wondering if you've also noticed the symptoms of a potential neurological dx with his gross motor initiation/coordination?     Thanks,    Roshni GORDON OT

## 2023-11-10 ENCOUNTER — HOME CARE VISIT (OUTPATIENT)
Dept: HOME HEALTH SERVICES | Facility: HOME HEALTHCARE | Age: 63
End: 2023-11-10
Payer: COMMERCIAL

## 2023-11-10 VITALS
SYSTOLIC BLOOD PRESSURE: 122 MMHG | OXYGEN SATURATION: 97 % | HEART RATE: 85 BPM | DIASTOLIC BLOOD PRESSURE: 80 MMHG | TEMPERATURE: 97.5 F

## 2023-11-10 PROCEDURE — G0151 HHCP-SERV OF PT,EA 15 MIN: HCPCS

## 2023-11-13 ENCOUNTER — HOME CARE VISIT (OUTPATIENT)
Dept: HOME HEALTH SERVICES | Facility: HOME HEALTHCARE | Age: 63
End: 2023-11-13
Payer: COMMERCIAL

## 2023-11-13 PROCEDURE — G0151 HHCP-SERV OF PT,EA 15 MIN: HCPCS

## 2023-11-14 ENCOUNTER — HOME CARE VISIT (OUTPATIENT)
Dept: HOME HEALTH SERVICES | Facility: HOME HEALTHCARE | Age: 63
End: 2023-11-14
Payer: COMMERCIAL

## 2023-11-14 VITALS — HEART RATE: 80 BPM | DIASTOLIC BLOOD PRESSURE: 92 MMHG | SYSTOLIC BLOOD PRESSURE: 118 MMHG | OXYGEN SATURATION: 96 %

## 2023-11-14 VITALS
OXYGEN SATURATION: 96 % | SYSTOLIC BLOOD PRESSURE: 120 MMHG | DIASTOLIC BLOOD PRESSURE: 76 MMHG | HEART RATE: 60 BPM | TEMPERATURE: 97.4 F

## 2023-11-14 PROCEDURE — G0152 HHCP-SERV OF OT,EA 15 MIN: HCPCS

## 2023-11-15 ENCOUNTER — HOME CARE VISIT (OUTPATIENT)
Dept: HOME HEALTH SERVICES | Facility: HOME HEALTHCARE | Age: 63
End: 2023-11-15
Payer: COMMERCIAL

## 2023-11-15 VITALS
OXYGEN SATURATION: 97 % | TEMPERATURE: 97.7 F | HEART RATE: 69 BPM | DIASTOLIC BLOOD PRESSURE: 80 MMHG | SYSTOLIC BLOOD PRESSURE: 124 MMHG

## 2023-11-15 PROCEDURE — G0151 HHCP-SERV OF PT,EA 15 MIN: HCPCS

## 2023-11-16 ENCOUNTER — IN HOME VISIT (OUTPATIENT)
Dept: FAMILY MEDICINE CLINIC | Facility: CLINIC | Age: 63
End: 2023-11-16
Payer: COMMERCIAL

## 2023-11-16 ENCOUNTER — HOME CARE VISIT (OUTPATIENT)
Dept: HOME HEALTH SERVICES | Facility: HOME HEALTHCARE | Age: 63
End: 2023-11-16
Payer: COMMERCIAL

## 2023-11-16 VITALS — SYSTOLIC BLOOD PRESSURE: 140 MMHG | DIASTOLIC BLOOD PRESSURE: 95 MMHG | HEART RATE: 92 BPM | OXYGEN SATURATION: 97 %

## 2023-11-16 VITALS — HEART RATE: 80 BPM | SYSTOLIC BLOOD PRESSURE: 120 MMHG | DIASTOLIC BLOOD PRESSURE: 80 MMHG

## 2023-11-16 DIAGNOSIS — S72.142S CLOSED DISPLACED INTERTROCHANTERIC FRACTURE OF LEFT FEMUR, SEQUELA: ICD-10-CM

## 2023-11-16 DIAGNOSIS — I69.319 COGNITIVE DEFICIT AS LATE EFFECT OF CEREBROVASCULAR ACCIDENT (CVA): ICD-10-CM

## 2023-11-16 DIAGNOSIS — G40.909 SEIZURE DISORDER (HCC): ICD-10-CM

## 2023-11-16 DIAGNOSIS — I10 ESSENTIAL HYPERTENSION: Primary | ICD-10-CM

## 2023-11-16 DIAGNOSIS — M81.0 OSTEOPOROSIS WITHOUT CURRENT PATHOLOGICAL FRACTURE, UNSPECIFIED OSTEOPOROSIS TYPE: ICD-10-CM

## 2023-11-16 PROBLEM — E87.1 HYPONATREMIA: Status: RESOLVED | Noted: 2021-12-16 | Resolved: 2023-11-16
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
## 2023-11-16 PROCEDURE — G0152 HHCP-SERV OF OT,EA 15 MIN: HCPCS

## 2023-11-16 PROCEDURE — 99349 HOME/RES VST EST MOD MDM 40: CPT | Performed by: FAMILY MEDICINE

## 2023-11-17 ENCOUNTER — HOME CARE VISIT (OUTPATIENT)
Dept: HOME HEALTH SERVICES | Facility: HOME HEALTHCARE | Age: 63
End: 2023-11-17
Payer: COMMERCIAL

## 2023-11-17 ENCOUNTER — TELEPHONE (OUTPATIENT)
Dept: FAMILY MEDICINE CLINIC | Facility: CLINIC | Age: 63
End: 2023-11-17

## 2023-11-17 VITALS
OXYGEN SATURATION: 97 % | DIASTOLIC BLOOD PRESSURE: 80 MMHG | HEART RATE: 86 BPM | TEMPERATURE: 98.3 F | SYSTOLIC BLOOD PRESSURE: 122 MMHG

## 2023-11-17 DIAGNOSIS — M81.0 OSTEOPOROSIS WITHOUT CURRENT PATHOLOGICAL FRACTURE, UNSPECIFIED OSTEOPOROSIS TYPE: ICD-10-CM

## 2023-11-17 PROCEDURE — G0151 HHCP-SERV OF PT,EA 15 MIN: HCPCS

## 2023-11-17 NOTE — ASSESSMENT & PLAN NOTE
Slowly improving  upon discharge  from [facility], home PT  working  with him on climb up driveway to car, concerns  are  ability to get up driveway  if  bad weather, landers snot need  another fall

## 2023-11-17 NOTE — PATIENT INSTRUCTIONS
Will discuss  pt  with rheum for optimal osteoporosis therapy, discussed  with Mayra Dickens how  to best get pt  to outpt  therapy, will see how he  does  with next couple visits

## 2023-11-17 NOTE — PROGRESS NOTES
Name: Lizzy Carter      : 1960      MRN: [id number]  Encounter Provider: Lang Underwood MD  Encounter Date: 2023   Encounter department: [facility] PRIMARY CARE    Assessment & Plan     1. Essential hypertension  Assessment & Plan:  Blood  pressure is  doing well, will maintain on diet and exercise      2. Seizure disorder [facility])  Assessment & Plan:  Sees  neuro next  month, unsure  whether  he  will be  able to get into office  for appt. No seizures  on meds and  neuro  has  not wanted  to change  meds, also having tremors, will discuss  at  neuro visit      3. Closed displaced intertrochanteric fracture of left femur, sequela  Assessment & Plan:  Slowly improving  upon discharge  from [facility] [place] PT  working  with him on climb up driveway to car, concerns  are  ability to get up driveway  if  bad weather, landers snot need  another fall      4. Cognitive deficit as late effect of cerebrovascular accident (CVA)  Assessment & Plan:  Pt has  insight  into situation, determined  to get back on feet, brother  has  reservations pt  will progress as  quickly  as  Shyam Maher  thinks  he will      5. Osteoporosis without current pathological fracture, unspecified osteoporosis type  Assessment & Plan: Will speak with rheum whether  they think another  bisphosphonate  will be  better or Prolia  or  Reclast, pt adamant  he does  not want Prolia             Subjective      61  year  gentleman who lives  alone, previously dxed  by specialist  in   with osteoporosis  likely  from seizure  medication, transferred  to me  . Discussion about  osteoporosis  broached  23  when review  of records  showed  he was due for f/u DEXA, after  discussion of  multiple  options such as  Fosamax, Actonel, Boniva, Prolia  and Reclast pt opted for  Fosamax-didn't  want Prolia  because  his elderly  mother  had  dental issues  related  to this.  On 10/15  he  was leaf blowing the  bottom of  his  steep driveway and  slipped on a wet  spot  and fell and  broke l hip. Discharged  10/19  to [facility]  for rehab, came  home  10/31. Has  had Rebel  from PT  working with him  to  be  more ambulatory  around  house, there are  6  steps to outside  and  6  steps to upper  floor,living  room and kitchen on main floor. Has  a [address] [place] [address] Dr has  been working  with him to  get into car. Only problem it  is  parked  at the  curb and  the driveway  needs to be  climbed. Pt  is still using a walker and  has balance  issues as well as  bilat  hand  tremors. Neighbor  with stick shift  drove him to ortho appt  and  had  trouble  getting  back up driveway with shifting gears  per [employer]. Pt  has  [place]  [place] [facility] and  not  very available to take  pt  to appointments. Spoke with [employer] his PT  who ideally would  like to  get him to higher  level outpt OT  to get him stronger  and help wean him to cane. Pt/brother  not sure what he  should  do about  osteoporosis, was on meds approximately  3  months, sounds  like  mechaism of fall  could have  caused injury even without osteoporosis-told  him I would  speak with rheum      Review of Systems   Constitutional:  Negative for activity change, appetite change and fatigue. Respiratory:  Negative for shortness of breath. Cardiovascular:  Negative for chest pain. Genitourinary:  Negative for difficulty urinating. Musculoskeletal:  Positive for arthralgias and gait problem. L hip pain   Neurological:  Positive for tremors.    Reviwed  pmh.psh, family  and social fistory    Current Outpatient Medications on File Prior to Visit   Medication Sig    acetaminophen (TYLENOL) 325 mg tablet Take 2 tablets (650 mg total) by mouth every 6 (six) hours (Patient taking differently: Take 650 mg by mouth every 6 (six) hours Taking 500 mg 3 tablets in AM and PM)    alendronate (FOSAMAX) 70 mg tablet Take 1 tablet (70 mg total) by mouth every 7 days    aspirin-dipyridamole (AGGRENOX)  mg per 12 hr capsule TAKE 1 CAPSULE BY MOUTH TWICE A DAY    Calcium-Magnesium-Vitamin D (CALCIUM MAGNESIUM PO) Take by mouth    carBAMazepine (TEGretol) 200 mg tablet TAKE 3 TABLETS IN THE AM, 1 TABLETS AT LUNCH, 2 TABLETS AT DINNER, AND 2 TABLETS AT BEDTIME (Patient taking differently: TAKE 3 TABLETS IN THE AM, 1 TABLETS AT LUNCH, 2 TABLETS AT DINNER, AND 2 TABLETS AT BEDTIME    10/31/23 - no longer taking 1 tablet at lunch.)    co-enzyme Q-10 30 MG capsule Take 100 mg by mouth daily      docusate sodium (COLACE) 100 mg capsule Take 1 capsule (100 mg total) by mouth 2 (two) times a day    levETIRAcetam (Keppra) 500 mg tablet Take 1 tablet (500 mg total) by mouth every 12 (twelve) hours    Lidocaine 4 % PTCH Place 1 patch on the skin daily. Indications: Pain    LORazepam (ATIVAN) 0.5 mg tablet TAKE 1 TABLET BY MOUTH ONCE DAILY IF NEEDED for seizure. Limit of 1 in 24 hours    NON FORMULARY Super Beets    oxyCODONE (ROXICODONE) 10 MG TABS You may take 5 mg (0.5 tablet) for moderate pain or 10 mg (1 tab) for severe pain, every 4 hours, as needed. polyethylene glycol (MIRALAX) 17 g packet Take 17 g by mouth daily Do not start before October 20, 2023. rosuvastatin (CRESTOR) 10 MG tablet Take 1 tablet (10 mg total) by mouth daily    VALERIAN ROOT PO Use    Vitamin Mixture (VITAMIN E COMPLETE PO) Take 1 tablet by mouth daily     [DISCONTINUED] enoxaparin (Lovenox) 40 mg/0.4 mL Inject 0.4 mL (40 mg total) under the skin in the morning for 28 days       Objective     /80 (BP Location: Right arm, Patient Position: Sitting, Cuff Size: Standard)   Pulse 80     Physical Exam  Vitals reviewed. Constitutional:       Appearance: Normal appearance. Cardiovascular:      Rate and Rhythm: Normal rate and regular rhythm. Pulses: Normal pulses. Heart sounds: Normal heart sounds. Pulmonary:      Effort: Pulmonary effort is normal.      Breath sounds: Normal breath sounds.    Musculoskeletal: General: Tenderness present. Comments: L  hip   Lymphadenopathy:      Cervical: No cervical adenopathy. Neurological:      Mental Status: He is alert. Motor: Weakness present. Psychiatric:         Mood and Affect: Mood is anxious.       Comments: Anxious  about  mobility issues       Jagdeep Ward MD

## 2023-11-17 NOTE — ASSESSMENT & PLAN NOTE
Sees  neuro next  month, unsure  whether  he  will be  able to get into office  for appt.  No seizures  on meds and  neuro  has  not wanted  to change  meds, also having tremors, will discuss  at  neuro visit

## 2023-11-17 NOTE — ASSESSMENT & PLAN NOTE
Pt has  insight  into situation, determined  to get back on feet, brother  has  reservations pt  will progress as  quickly  as  Everett Townsend  thinks  he will

## 2023-11-17 NOTE — TELEPHONE ENCOUNTER
Patient is current is taking Fosamax 70 MG tablets and would like another refill of the medication to be sent to [facility] in [facility], [address] Phone is [phone]. Thank you.

## 2023-11-17 NOTE — ASSESSMENT & PLAN NOTE
Will speak with rheum whether  they think another  bisphosphonate  will be  better or Prolia  or  Reclast, pt adamant  he does  not want Prolia

## 2023-11-17 NOTE — CASE COMMUNICATION
Discussed with patient the options of the following as the patient is likely to be cleared to drive on [address]:  - possiblity of extending home health PT services one more week till 11/29  - discharging as planned next friday 11/24 with transition to outpatient housecalls until cleared to drive and attend outpatient PT in clinic  - discharging as planned next friday 11/24 with transition to HEP until cleared to drive and attend outpatient  PT in clinic    PT will follow up with , Gideon Cruz, then Dr. Shani Mensah about discussion.

## 2023-11-20 ENCOUNTER — HOME CARE VISIT (OUTPATIENT)
Dept: HOME HEALTH SERVICES | Facility: HOME HEALTHCARE | Age: 63
End: 2023-11-20
Payer: COMMERCIAL

## 2023-11-20 VITALS
OXYGEN SATURATION: 98 % | TEMPERATURE: 98 F | SYSTOLIC BLOOD PRESSURE: 130 MMHG | DIASTOLIC BLOOD PRESSURE: 78 MMHG | HEART RATE: 84 BPM

## 2023-11-20 PROCEDURE — G0151 HHCP-SERV OF PT,EA 15 MIN: HCPCS

## 2023-11-20 RX ORDER — ALENDRONATE SODIUM 70 MG/1
70 TABLET ORAL
Qty: 4 TABLET | Refills: 5 | Status: SHIPPED | OUTPATIENT
Start: 2023-11-20

## 2023-11-22 ENCOUNTER — HOME CARE VISIT (OUTPATIENT)
Dept: HOME HEALTH SERVICES | Facility: HOME HEALTHCARE | Age: 63
End: 2023-11-22
Payer: COMMERCIAL

## 2023-11-22 VITALS
OXYGEN SATURATION: 98 % | TEMPERATURE: 97.5 F | HEART RATE: 81 BPM | DIASTOLIC BLOOD PRESSURE: 78 MMHG | SYSTOLIC BLOOD PRESSURE: 122 MMHG

## 2023-11-22 PROCEDURE — G0151 HHCP-SERV OF PT,EA 15 MIN: HCPCS

## 2023-11-24 ENCOUNTER — HOME CARE VISIT (OUTPATIENT)
Dept: HOME HEALTH SERVICES | Facility: HOME HEALTHCARE | Age: 63
End: 2023-11-24
Payer: COMMERCIAL

## 2023-11-24 PROCEDURE — G0151 HHCP-SERV OF PT,EA 15 MIN: HCPCS

## 2023-11-25 VITALS
DIASTOLIC BLOOD PRESSURE: 82 MMHG | OXYGEN SATURATION: 98 % | SYSTOLIC BLOOD PRESSURE: 120 MMHG | HEART RATE: 86 BPM | TEMPERATURE: 98.5 F

## 2023-11-27 ENCOUNTER — HOME CARE VISIT (OUTPATIENT)
Dept: HOME HEALTH SERVICES | Facility: HOME HEALTHCARE | Age: 63
End: 2023-11-27
Payer: COMMERCIAL

## 2023-11-27 PROCEDURE — G0151 HHCP-SERV OF PT,EA 15 MIN: HCPCS

## 2023-11-28 VITALS
SYSTOLIC BLOOD PRESSURE: 114 MMHG | OXYGEN SATURATION: 95 % | HEART RATE: 86 BPM | DIASTOLIC BLOOD PRESSURE: 68 MMHG | TEMPERATURE: 98 F

## 2023-11-28 DIAGNOSIS — S72.142S CLOSED DISPLACED INTERTROCHANTERIC FRACTURE OF LEFT FEMUR, SEQUELA: Primary | ICD-10-CM

## 2023-11-28 NOTE — CASE COMMUNICATION
Patient is scheduled for OASIS DC this Wednesday. Requesting referral to outpatient PT services. Patient wishes to go to [facility]. [facility] PT on [address].

## 2023-11-29 ENCOUNTER — HOME CARE VISIT (OUTPATIENT)
Dept: HOME HEALTH SERVICES | Facility: HOME HEALTHCARE | Age: 63
End: 2023-11-29
Payer: COMMERCIAL

## 2023-11-29 VITALS
SYSTOLIC BLOOD PRESSURE: 122 MMHG | OXYGEN SATURATION: 96 % | HEART RATE: 79 BPM | DIASTOLIC BLOOD PRESSURE: 80 MMHG | TEMPERATURE: 97.8 F

## 2023-11-29 PROCEDURE — G0151 HHCP-SERV OF PT,EA 15 MIN: HCPCS

## 2023-11-29 NOTE — CASE COMMUNICATION
OASIS DC completed at this time. Patient to follow up with ortho tomorrow. Requested referral for outpatient PT in the clinic pending clearance to drive. If not cleared to drive, patient elects to receive outpatient house calls.

## 2023-11-30 ENCOUNTER — HOSPITAL ENCOUNTER (OUTPATIENT)
Dept: RADIOLOGY | Facility: HOSPITAL | Age: 63
Discharge: HOME | End: 2023-11-30
Attending: ORTHOPAEDIC SURGERY
Payer: COMMERCIAL

## 2023-11-30 ENCOUNTER — OFFICE VISIT (OUTPATIENT)
Dept: OBGYN CLINIC | Facility: HOSPITAL | Age: 63
End: 2023-11-30

## 2023-11-30 DIAGNOSIS — Z48.89 AFTERCARE FOLLOWING SURGERY: Primary | ICD-10-CM

## 2023-11-30 DIAGNOSIS — Z48.89 AFTERCARE FOLLOWING SURGERY: ICD-10-CM

## 2023-11-30 PROCEDURE — 99024 POSTOP FOLLOW-UP VISIT: CPT | Performed by: ORTHOPAEDIC SURGERY

## 2023-11-30 PROCEDURE — 73552 X-RAY EXAM OF FEMUR 2/>: CPT

## 2023-11-30 NOTE — PROGRESS NOTES
Assessment:   S/P Insertion Nail Im Femur Antegrade (trochanteric) - Left on 10/16/2023     Plan:   Patient presented on exam today. X-ray demonstrates interval healing of the left intertrochanteric fracture and hardware in appropriate alignment. New physical therapy prescription was placed today for outpatient physical therapy. I will plan to see him back in another 6 weeks for repeat evaluation and x-rays of the left hip. I have seen and evaluated and performed the entire physical exam in this postoperative patient. A physician extender was present for documentation purposes. SUBJECTIVE:  Addison Swift is a 61 y.o. male who presents for  6 week follow up S/P Insertion Nail Im Femur Antegrade (trochanteric) - Left on 10/16/2023. Patient has been doing well. He has been using a walker to assist with ambulation. He has been fully weightbearing. He discontinued narcotics, he is comfortable, minimal symptoms. He does notice that he is still having issues with his balance. He has been continuing physical therapy. He would like to continue physical therapy. PHYSICAL EXAMINATION:  Vital signs: There were no vitals taken for this visit. General: well developed and well nourished, alert, oriented times 3, and appears comfortable  Psychiatric: Normal    MUSCULOSKELETAL EXAMINATION:    Surgical Site: Left hip  Incision: healed  Neurovascular status: Neuro intact, good cap refill  Sural saphenous DPN SPN tibial sensation intact  EHL plantarflexion dorsiflexion intact  Fully weightbearing with assistance of a walker    STUDIES REVIEWED:  Imaging studies reviewed by Dr. Maria Elena Monahan and demonstrate interval healing of the left intertrochanteric fracture, maintained alignment with the hardware intact, no loosening or migration.       PROCEDURES PERFORMED:  Procedures  No Procedures performed today    Scribe Attestation      I,:   am acting as a scribe while in the presence of the attending physician.: I,:   personally performed the services described in this documentation    as scribed in my presence.:

## 2023-12-04 NOTE — PROGRESS NOTES
PT Evaluation     Today's date: 2023  Patient name: Baldomero Hinojosa  : 1960  MRN: [id number]  Referring provider: Dasha Tinoco DO  Dx:   Encounter Diagnosis     ICD-10-CM    1. Aftercare following surgery  Z48.89 Ambulatory referral to Physical Therapy          Start Time: 1030  Stop Time: 1100  Total time in clinic (min): 30 minutes    Assessment  Assessment details: Patient is a 61 y.o. male s/p L Fixation with anatomic reduction of intertrochanteric hip fracture on 10/16/23 due to fall. Patient is limited with independence with ambulation and household duties including cleaning and grocery shopping. On exam patient presents with decreased hip mobility, strength, and impaired gait. Patient will benefit from skilled PT to address the above impairments to improve his hip function and progress back to prior level of function. Impairments: abnormal gait, abnormal or restricted ROM, impaired balance, impaired physical strength, lacks appropriate home exercise program and safety issue  Understanding of Dx/Px/POC: good   Prognosis: good    Goals  Within 6 weeks,   1. Patient will demonstrate L hip ER strength >=3/5.   2. Patient will demonstrate L hip abduction strength >=3/5.   3. Patient will demonstrate at least 0* L hip ext mobility. 4. Patient will be able to ambulate 10 minutes continuously with LRAD. 5. Patient will be able to demonstrate proper gait mechanics with LRAD. Within 12 weeks or by discharge,   1. Patient will demonstrate L hip ER strength >=4/5.   2. Patient will demonstrate L hip abduction strength >=4/5.   3. Patient will be able to ambulate for >=10 minutes without AD. 4. Patient will be able to stand for >= 10 minutes without AD without hip pain. 5. Patient will be able to grocery shop independently. 6. Patient will be able to clean his home independently. 7. Patient will improve 6MWT by 200ft (IE: TBD). 8. Patient will improve TUG by 2s (IE: TBD).   9. Patient will improve 5x STS by 2s (IE: TBD). Plan  Patient would benefit from: skilled physical therapy  Planned modality interventions: cryotherapy and thermotherapy: hydrocollator packs  Planned therapy interventions: abdominal trunk stabilization, joint mobilization, manual therapy, activity modification, balance, balance/weight bearing training, neuromuscular re-education, body mechanics training, postural training, patient education, therapeutic activities, therapeutic exercise, functional ROM exercises, strengthening, stretching, transfer training, gait training and home exercise program  Frequency: 2x week  Duration in weeks: 12  Plan of Care beginning date: 2023  Plan of Care expiration date: 2024  Treatment plan discussed with: patient and referring physician        Subjective Evaluation    History of Present Illness  Mechanism of injury: Patient is s/p L Fixation with anatomic reduction of intertrochanteric hip fracture on 10/16/23 due to a fall. He was using a leaf blower and fell. Went to [facility] for 2 weeks for acute rehab. He went home with home PT.     PLOF: lives alone, would be able to walk independently without AD, had a stroke and reports he had lost function of BLE but it recovered    Limitations: cleaning, grocery shopping    Patient Goals  Patient goals for therapy: improved balance, decreased pain, increased strength, independence with ADLs/IADLs and increased motion  Patient goal: to be able to walk without RW  Pain  Current pain ratin  Aggravating factors: walking    Social Support  Steps to enter house: yes  5  Stairs in house: yes   8  Lives in: multiple-level home  Lives with: alone    Employment status: not working (retired)        Objective     Static Posture     Comments  R hand tremor    Passive Range of Motion   Left Hip   Flexion: 100 degrees with pain  Extension: -5 degrees   External rotation (90/90): 40 degrees   Internal rotation (90/90): 25 degrees     Right Hip Flexion: 100 degrees   Extension: 0 degrees   External rotation (90/90): 40 degrees   Internal rotation (90/90): 35 degrees     Strength/Myotome Testing     Left Hip   Planes of Motion   Abduction: 3-  External rotation: 3-    Right Hip   Planes of Motion   Abduction: 3-  External rotation: 3    Ambulation     Comments   Gait: knee flexion during stance, short step lengths, slow ag    General Comments:      Hip Comments   L hip incision: c/d/I, 3 incisions with mild increase in tissue density      Flowsheet Rows      Flowsheet Row Most Recent Value   PT/OT G-Codes    Current Score 69   Projected Score 74               POC expires Unit limit Auth Expiration date PT/OT/ST + Visit Limit?    2/29/23 4 11/30/24                            Visit/Unit Tracking  AUTH Status:  Date 12/7             Approved Used 1              Remaining                 Diagnosis: Fixation with anatomic reduction of intertrochanteric hip fracture on 10/16/23    Precautions:  PMH of HTN, hx of CVA on Aggrenox (2021), HLD, GERD, seizure disorder (medication)   Insurance: [employer]    12/7          Vitals 142/92  HR 90  SpO2 94 *6MWT  *TUG  *5xSTS   FOTO      Patient Ed           Scar mobility Educated                                                                            Neuro Re-Ed           Gait training Heel toe, extend knees, longer step lengths                                                                            Ther Ex           Aerobic conditioning           Luis stretch 3 x 30s b/l          Bridges 3 x 10          Clams 3 x 10                                                       Ther Activity                                 Manual                                                                  Modalities

## 2023-12-07 ENCOUNTER — EVALUATION (OUTPATIENT)
Dept: PHYSICAL THERAPY | Facility: REHABILITATION | Age: 63
End: 2023-12-07
Payer: COMMERCIAL

## 2023-12-07 DIAGNOSIS — Z48.89 AFTERCARE FOLLOWING SURGERY: ICD-10-CM

## 2023-12-07 PROCEDURE — 97110 THERAPEUTIC EXERCISES: CPT | Performed by: PHYSICAL THERAPIST

## 2023-12-07 PROCEDURE — 97161 PT EVAL LOW COMPLEX 20 MIN: CPT | Performed by: PHYSICAL THERAPIST

## 2023-12-11 NOTE — PROGRESS NOTES
Daily Note     Today's date: 23  Patient name: Nga Ponce  : 1960  MRN: [id number]  Referring provider: Mario Fontaine DO  Dx:   Encounter Diagnosis     ICD-10-CM    1. Aftercare following surgery  Z48.89           Start Time: 750  Stop Time: 5007  Total time in clinic (min): 45 minutes    Subjective: Patient reports he is having groin pain /discomfort 1/10 to Left side that is intermittent. He was able to ambulate at home with [facility] yesterday ;however he using RW today due to groin pain. Patient has been compliant with HEP. Objective: See treatment diary below      Assessment: Tolerated treatment well. First session since initial evaluation with focus on gait training and core strength. Patient presents with RW, shuffled steps, decreased stride length and pain to left groin region. Patient progressed with in gait training today with increased stride length, improved heel strike and toe off during with cues for upright posture. Patient advised to continue to work on gait at home and bring in his [facility] nv. Patient performed 6MWT, 5xSTS, TUG with use of RW today. Patient would benefit from further PT to address limitations and restore PLOF. Plan: Continue per plan of care. Goals  Within 6 weeks,   1. Patient will demonstrate L hip ER strength >=3/5.   2. Patient will demonstrate L hip abduction strength >=3/5.   3. Patient will demonstrate at least 0* L hip ext mobility. 4. Patient will be able to ambulate 10 minutes continuously with LRAD. 5. Patient will be able to demonstrate proper gait mechanics with LRAD. Within 12 weeks or by discharge,   1. Patient will demonstrate L hip ER strength >=4/5.   2. Patient will demonstrate L hip abduction strength >=4/5.   3. Patient will be able to ambulate for >=10 minutes without AD. 4. Patient will be able to stand for >= 10 minutes without AD without hip pain. 5. Patient will be able to grocery shop independently.   6. Patient will be able to clean his home independently. 7. Patient will improve 6MWT by 200ft (IE: TBD). 8. Patient will improve TUG by 2s (IE: TBD). 9. Patient will improve 5x STS by 2s (IE: TBD)     POC expires Unit limit Auth Expiration date PT/OT/ST + Visit Limit?    2/29/23 4 11/30/24                            Visit/Unit Tracking  AUTH Status:  Date 12/7 12/12            Approved Used 1 2             Remaining                 Diagnosis: Fixation with anatomic reduction of intertrochanteric hip fracture on 10/16/23    Precautions:  PMH of HTN, hx of CVA on Aggrenox (2021), HLD, GERD, seizure disorder (medication)   Insurance: [facility]    12/7 12/11         Vitals 142/92  HR 90  SpO2 94 6MWT- 990ft   TUG -14.6 sec  5xSTS with push off from chair 16 sec   FOTO      Patient Ed           Scar mobility Educated                                                                            Neuro Re-Ed           Gait training Heel toe, extend knees, longer step lengths Heel toe, extend knees, longer step lengths                                                                             Ther Ex           Aerobic conditioning  [facility]         Luis stretch 3 x 30s b/l 30 x30s b/l         Bridges 3 x 10 3 x 10         Clams 3 x 10  3x10                                                     Ther Activity                                 Manual           PROM  Left Hip    PROM                                                     Modalities

## 2023-12-12 ENCOUNTER — OFFICE VISIT (OUTPATIENT)
Dept: PHYSICAL THERAPY | Facility: REHABILITATION | Age: 63
End: 2023-12-12
Payer: COMMERCIAL

## 2023-12-12 DIAGNOSIS — Z48.89 AFTERCARE FOLLOWING SURGERY: Primary | ICD-10-CM

## 2023-12-12 PROCEDURE — 97116 GAIT TRAINING THERAPY: CPT

## 2023-12-12 PROCEDURE — 97140 MANUAL THERAPY 1/> REGIONS: CPT

## 2023-12-12 PROCEDURE — 97110 THERAPEUTIC EXERCISES: CPT

## 2023-12-13 ENCOUNTER — OFFICE VISIT (OUTPATIENT)
Dept: NEUROLOGY | Facility: CLINIC | Age: 63
End: 2023-12-13
Payer: COMMERCIAL

## 2023-12-13 VITALS
RESPIRATION RATE: 16 BRPM | TEMPERATURE: 97.9 F | DIASTOLIC BLOOD PRESSURE: 68 MMHG | HEART RATE: 89 BPM | BODY MASS INDEX: 22.96 KG/M2 | OXYGEN SATURATION: 98 % | WEIGHT: 160.4 LBS | HEIGHT: 70 IN | SYSTOLIC BLOOD PRESSURE: 102 MMHG

## 2023-12-13 DIAGNOSIS — G40.209 PARTIAL SYMPTOMATIC EPILEPSY WITH COMPLEX PARTIAL SEIZURES, NOT INTRACTABLE, WITHOUT STATUS EPILEPTICUS (HCC): ICD-10-CM

## 2023-12-13 DIAGNOSIS — I63.9 CEREBROVASCULAR ACCIDENT (CVA) DUE TO THROMBOSIS (HCC): Primary | ICD-10-CM

## 2023-12-13 PROCEDURE — 99213 OFFICE O/P EST LOW 20 MIN: CPT | Performed by: PSYCHIATRY & NEUROLOGY

## 2023-12-13 RX ORDER — ASPIRIN AND DIPYRIDAMOLE 25; 200 MG/1; MG/1
1 CAPSULE, EXTENDED RELEASE ORAL 2 TIMES DAILY
Qty: 180 CAPSULE | Refills: 3 | Status: SHIPPED | OUTPATIENT
Start: 2023-12-13

## 2023-12-13 RX ORDER — LEVETIRACETAM 500 MG/1
500 TABLET ORAL EVERY 12 HOURS SCHEDULED
Qty: 180 TABLET | Refills: 1 | Status: SHIPPED | OUTPATIENT
Start: 2023-12-13

## 2023-12-13 NOTE — PROGRESS NOTES
Patient ID: Maren Alejo is a 61 y.o. male. Assessment/Plan:    Seizure disorder ([address])  Ravin Nava  has a history of partial complex seizure . He was last seen in our offices in September. There has been some issues regarding seizures, extra doses of carbamazepine and hyponatremia. Subsequently the decision was to add Keppra. Keppra 500 twice daily was added to his regimen and then he was subsequently asked to reduce his Tegretol to 7 a day. I in the interim he was admitted to the hospital after a fracture of his left hip. He did have a sodium level done on 1018 which was 133. While he was hospitalized he was not noted to have any recurrent seizures. Plan I would like him to have a Tegretol level Keppra level CMP CBC performed in the next few weeks. Will monitor his sodium. If his sodium is less than 131 we may need to consider a lower dose of carbamazepine and potentially increasing the Keppra to 750 twice a day. However if the sodium is between 133 and 135 will remain on this regimen. He is quite reluctant to proceed with any changes due to the potential of seizures. Cognitive deficit as late effect of cerebrovascular accident (CVA)  Prior ischemic event and he is currently on Aggrenox and Crestor       Diagnoses and all orders for this visit:    Cerebrovascular accident (CVA) due to thrombosis ([address])  -     aspirin-dipyridamole (AGGRENOX)  mg per 12 hr capsule; Take 1 capsule by mouth 2 (two) times a day    Partial symptomatic epilepsy with complex partial seizures, not intractable, without status epilepticus (HCC)  -     Carbamazepine level, total; Future  -     Levetiracetam level; Future  -     Comprehensive metabolic panel; Future  -     CBC and Platelet; Future  -     levETIRAcetam (Keppra) 500 mg tablet; Take 1 tablet (500 mg total) by mouth every 12 (twelve) hours       Subjective:    Maren Alejo is a 60 y/o right hand male with a long history of seizures.  He had  a seizure on 4/20/18. In early September he had a bout of poor sleep morose requiring the use of extra doses of carbamazepine and Ativan. He then was noted to have dizziness vertigo and lower extremity weakness which he we attributed to extra doses of medications. Subsequently Keppra was added to his regimen at 500 twice a day and Tegretol was then decreased to 7 a day. He has been doing well without any different difficulty with ataxia diplopia falls or weakness. On 10/15/2023 well cleaning of leaves he slipped and fell and broke his left hip. He underwent surgical repair and subsequently physical therapy at [facility]. Osteoporosis was discussed with the patient in July 2023  . Various medications were discussed but he will be continued on Fosamax as per his PCP    He was receiving home physical therapy and then outpatient and is now receiving outpatient physical therapy. He is now walking with a cane. He is currently on Aggrenox for stroke prevention        After this event he did undergo an EEG that was normal and a CAT scan of the head that was normal.                                      The following portions of the patient's history were reviewed and updated as appropriate: He  has a past medical history of Arthritis, Hypertension, Seizures ([address]), and Stroke ([address]). He  has a past surgical history that includes Elbow surgery (2017) and pr optx fem shft fx w/insj imed implt w/wo screw (Left, 10/16/2023). His family history includes Alzheimer's disease in his mother; Dementia in his mother; Hyperlipidemia in his brother; Stroke in his father. He  reports that he has never smoked. He has never used smokeless tobacco. He reports current alcohol use. He reports that he does not use drugs.   Current Outpatient Medications   Medication Sig Dispense Refill    alendronate (FOSAMAX) 70 mg tablet Take 1 tablet (70 mg total) by mouth every 7 days 4 tablet 5    aspirin-dipyridamole (AGGRENOX)  mg per 12 hr capsule Take 1 capsule by mouth 2 (two) times a day 180 capsule 3    Calcium-Magnesium-Vitamin D (CALCIUM MAGNESIUM PO) Take by mouth      carBAMazepine (TEGretol) 200 mg tablet TAKE 3 TABLETS IN THE AM, 1 TABLETS AT LUNCH, 2 TABLETS AT DINNER, AND 2 TABLETS AT BEDTIME (Patient taking differently: TAKE 3 TABLETS IN THE AM, 1 TABLETS AT LUNCH, 2 TABLETS AT DINNER, AND 2 TABLETS AT BEDTIME    10/31/23 - no longer taking 1 tablet at lunch.) 240 tablet 5    co-enzyme Q-10 30 MG capsule Take 100 mg by mouth daily        docusate sodium (COLACE) 100 mg capsule Take 1 capsule (100 mg total) by mouth 2 (two) times a day  0    levETIRAcetam (Keppra) 500 mg tablet Take 1 tablet (500 mg total) by mouth every 12 (twelve) hours 180 tablet 1    LORazepam (ATIVAN) 0.5 mg tablet TAKE 1 TABLET BY MOUTH ONCE DAILY IF NEEDED for seizure. Limit of 1 in 24 hours 10 tablet 0    NON FORMULARY Super Beets      polyethylene glycol (MIRALAX) 17 g packet Take 17 g by mouth daily Do not start before October 20, 2023.  0    rosuvastatin (CRESTOR) 10 MG tablet Take 1 tablet (10 mg total) by mouth daily 90 tablet 1    VALERIAN ROOT PO Use      Vitamin Mixture (VITAMIN E COMPLETE PO) Take 1 tablet by mouth daily       acetaminophen (TYLENOL) 325 mg tablet Take 2 tablets (650 mg total) by mouth every 6 (six) hours (Patient not taking: Reported on 12/13/2023)  0    Lidocaine 4 % PTCH Place 1 patch on the skin daily. Indications: Pain (Patient not taking: Reported on 12/13/2023)      oxyCODONE (ROXICODONE) 10 MG TABS You may take 5 mg (0.5 tablet) for moderate pain or 10 mg (1 tab) for severe pain, every 4 hours, as needed. (Patient not taking: Reported on 11/30/2023) 20 tablet 0     No current facility-administered medications for this visit. He is allergic to meperidine, amoxicillin, azithromycin, other, and pollen extract. .         Objective:    Blood pressure 102/68, pulse 89, temperature 97.9 °F (36.6 °C), temperature source Temporal, resp. rate 16, height 5' 10" (1.778 m), weight 72.8 kg (160 lb 6.4 oz), SpO2 98%. Physical Exam  Neurological:      Deep Tendon Reflexes:      Reflex Scores:       Patellar reflexes are 2+ on the right side and 2+ on the left side. Achilles reflexes are 1+ on the right side and 1+ on the left side. Psychiatric:         Speech: Speech normal.         Neurological Exam  Mental Status   Oriented only to person. Speech is normal. Language is fluent with no aphasia. Cranial Nerves  CN I: Sense of smell is normal.    Motor    Strength is 5/5 in all four extremities except as noted. His right upper extremity had curling of the 4th and 5th digit with atrophy of the ADM. He had normal strength proximally but had difficulty with hand  on the right. This is ongoing and old. The left upper extremity strength was normal in the bilateral lower extremity strength was normal.  Tremors were minimal today. .    Sensory  Light touch is normal in upper and lower extremities. Temperature is normal in upper and lower extremities. .    Reflexes                                            Right                      Left  Patellar                                2+                         2+  Achilles                                1+                         1+  Right Plantar: downgoing  Left Plantar: downgoing    Right pathological reflexes: Jason's absent. Left pathological reflexes: Jason's absent. Coordination  Right: Finger-to-nose normal.Left: Finger-to-nose normal.    Gait    He uses a four point cane. He holds his cane on the right leg. He is able to stand from a sitting position. Review of systems obtained from the medical assistant as below was reviewed with the patient at today's visit    ROS:    Review of Systems   Constitutional:  Negative for appetite change, fatigue and fever. HENT: Negative. Negative for hearing loss, tinnitus, trouble swallowing and voice change.     Eyes: Negative. Negative for photophobia, pain and visual disturbance. Respiratory: Negative. Negative for shortness of breath. Cardiovascular: Negative. Negative for palpitations. Gastrointestinal: Negative. Negative for nausea and vomiting. Endocrine: Negative. Negative for cold intolerance. Genitourinary: Negative. Negative for dysuria, frequency and urgency. Musculoskeletal:  Positive for back pain and gait problem (uses cane). Negative for myalgias and neck pain. Skin: Negative. Negative for rash. Allergic/Immunologic: Negative. Neurological:  Positive for tremors (right hand). Negative for dizziness, seizures, syncope, facial asymmetry, speech difficulty, weakness, light-headedness, numbness and headaches. Hematological: Negative. Does not bruise/bleed easily. Psychiatric/Behavioral: Negative. Negative for confusion, hallucinations and sleep disturbance. All other systems reviewed and are negative. Can return to our offices in several months.

## 2023-12-13 NOTE — ASSESSMENT & PLAN NOTE
Lyssa Jewell  has a history of partial complex seizure . He was last seen in our offices in September. There has been some issues regarding seizures, extra doses of carbamazepine and hyponatremia. Subsequently the decision was to add Keppra. Keppra 500 twice daily was added to his regimen and then he was subsequently asked to reduce his Tegretol to 7 a day. I in the interim he was admitted to the hospital after a fracture of his left hip. He did have a sodium level done on 1018 which was 133. While he was hospitalized he was not noted to have any recurrent seizures. Plan I would like him to have a Tegretol level Keppra level CMP CBC performed in the next few weeks. Will monitor his sodium. If his sodium is less than 131 we may need to consider a lower dose of carbamazepine and potentially increasing the Keppra to 750 twice a day. However if the sodium is between 133 and 135 will remain on this regimen. He is quite reluctant to proceed with any changes due to the potential of seizures.

## 2023-12-14 ENCOUNTER — OFFICE VISIT (OUTPATIENT)
Dept: PHYSICAL THERAPY | Facility: REHABILITATION | Age: 63
End: 2023-12-14
Payer: COMMERCIAL

## 2023-12-14 DIAGNOSIS — Z48.89 AFTERCARE FOLLOWING SURGERY: Primary | ICD-10-CM

## 2023-12-14 PROCEDURE — 97140 MANUAL THERAPY 1/> REGIONS: CPT | Performed by: PHYSICAL THERAPIST

## 2023-12-14 PROCEDURE — 97110 THERAPEUTIC EXERCISES: CPT | Performed by: PHYSICAL THERAPIST

## 2023-12-14 NOTE — PROGRESS NOTES
Daily Note     Today's date: 23  Patient name: Annalee Shetty  : 1960  MRN: [id number]  Referring provider: Roselia Valiente DO  Dx:   Encounter Diagnosis     ICD-10-CM    1. Aftercare following surgery  Z48.89           Start Time: 9430  Stop Time: 1100  Total time in clinic (min): 45 minutes    Subjective: Patient trying to use his quad cane more. Objective: See treatment diary below      Assessment: Patient demonstrating increased ag and step length with ambulating quad cane. Patient benefiting from cuing to improve gait quality with increasing CAYETANO, keeping all 4 feet of quad cane on the floor (likely would benefit from [facility] instead), and slowing ag slightly for his safety. Patient benefiting from hip ext stretching and lateral hip strengthening which is his biggest deficit. Patient would benefit from further PT to address limitations and restore PLOF. Plan: Continue per plan of care. Goals  Within 6 weeks,   1. Patient will demonstrate L hip ER strength >=3/5.   2. Patient will demonstrate L hip abduction strength >=3/5.   3. Patient will demonstrate at least 0* L hip ext mobility. 4. Patient will be able to ambulate 10 minutes continuously with LRAD. 5. Patient will be able to demonstrate proper gait mechanics with LRAD. Within 12 weeks or by discharge,   1. Patient will demonstrate L hip ER strength >=4/5.   2. Patient will demonstrate L hip abduction strength >=4/5.   3. Patient will be able to ambulate for >=10 minutes without AD. 4. Patient will be able to stand for >= 10 minutes without AD without hip pain. 5. Patient will be able to grocery shop independently. 6. Patient will be able to clean his home independently. 7. Patient will improve 6MWT by 200ft (IE: 738ft). 8. Patient will improve TUG by 2s (IE: 14.6). 9. Patient will improve 5x STS by 2s (IE: 16s)     POC expires Unit limit Auth Expiration date PT/OT/ST + Visit Limit?     4 12/29/23                            Visit/Unit Tracking  AUTH Status:  Date 12/7 12/12 12/14           Approved Used 1 2 3            Remaining                 Diagnosis: Fixation with anatomic reduction of intertrochanteric hip fracture on 10/16/23    Precautions:  PMH of HTN, hx of CVA on Aggrenox (2021), HLD, GERD, seizure disorder (medication)   Insurance: [facility]    12/7 12/11 12/14        Vitals 142/92  HR 90  SpO2 94 6MWT- 738ft   TUG -14.6 sec  5xSTS with push off from chair 16 sec   FOTO      Patient Ed           Scar mobility Educated                                                                            Neuro Re-Ed           Gait training Heel toe, extend knees, longer step lengths Heel toe, extend knees, longer step lengths   W/ quad cane, adjusted height of cane  Cuing to increase CAYETANO, slow ag for safety, and keep all 4 legs of quad cane on the floor                                                                          Ther Ex           Aerobic conditioning  Nustep 8' Nustep   8', lvl 3  >45 SPM        Luis stretch 3 x 30s b/l 3 x 30s b/l 3 x 30s b/l        Bridges 3 x 10 3 x 10 3 x 15        Clams 3 x 10  3 x 10 2 x 10 L        SLR   2 x 10 ea  (Limited knee ext PROM)  Cuing for DB        LTR   15x ea                              Ther Activity                                 Manual           PROM  Left Hip    PROM         Hip abduction   Knee flexed  PT assist hip abduction  2 x 10                                         Modalities

## 2023-12-18 NOTE — PROGRESS NOTES
Daily Note     Today's date: 23  Patient name: Agus Espinoza  : 1960  MRN: [id number]  Referring provider: Brendan Keane DO  Dx:   Encounter Diagnosis     ICD-10-CM    1. Aftercare following surgery  Z48.89           Start Time: 810  Stop Time: 08  Total time in clinic (min): 45 minutes    Subjective: Patient states he just bought SPC 3 days ago. He has been using SPC or RW to get his mail. He does use the quad cane intermittently and uses 3 RW primarily for stairs at home. Patient continues have slight pain to lateral side of knee.       Objective: See treatment diary below      Assessment:  Patient presents today with SPC, tolerated session well with continued focus on gait training with SPC and strengthening. Patient demonstrates progress with noted improvements to rogelio stretch; however still limited in range. Patient practiced gait training with SPC with cueing to widen CAYETANO and increase stride length with slow controlled ag with good tolerance to activity. Patient is challenged the greatest with s/l clamshells due to muscular weakness and pain.  Tibia lag noted during SLR due to muscular quad /hip weakness. Patient would benefit from continued PT to improve ROM, strength and flexibility to optimize return to prior functional mobility.        Plan: Continue per plan of care.         Goals  Within 6 weeks,   1. Patient will demonstrate L hip ER strength >=3/5.   2. Patient will demonstrate L hip abduction strength >=3/5.   3. Patient will demonstrate at least 0* L hip ext mobility.   4. Patient will be able to ambulate 10 minutes continuously with LRAD.   5. Patient will be able to demonstrate proper gait mechanics with LRAD.     Within 12 weeks or by discharge,   1. Patient will demonstrate L hip ER strength >=4/5.   2. Patient will demonstrate L hip abduction strength >=4/5.   3. Patient will be able to ambulate for >=10 minutes without AD.  4. Patient will be able to stand for  >= 10 minutes without AD without hip pain.  5. Patient will be able to grocery shop independently.  6. Patient will be able to clean his home independently.  7. Patient will improve 6MWT by 200ft (IE: 738ft).  8. Patient will improve TUG by 2s (IE: 14.6).  9. Patient will improve 5x STS by 2s (IE: 16s)     POC expires Unit limit Auth Expiration date PT/OT/ST + Visit Limit?   2/29/23 4 12/29/23                            Visit/Unit Tracking  AUTH Status:  Date 12/7 12/12 12/14 12/19          Approved Used 1 2 3 4           Remaining                 Diagnosis: Fixation with anatomic reduction of intertrochanteric hip fracture on 10/16/23    Precautions:  PMH of HTN, hx of CVA on Aggrenox (2021), HLD, GERD, seizure disorder (medication)   Insurance: [facility]    12/7 12/11 12/14 12/19       Vitals 142/92  HR 90  SpO2 94 6MWT- 738ft   TUG -14.6 sec  5xSTS with push off from chair 16 sec   FOTO      Patient Ed           Scar mobility Educated                                                                            Neuro Re-Ed           Gait training Heel toe, extend knees, longer step lengths Heel toe, extend knees, longer step lengths   W/ quad cane, adjusted height of cane  Cuing to increase CAYETANO, slow ag for safety, and keep all 4 legs of quad cane on the floor W/ SPC cane, height of cane was checked and ok    Cuing to increase CAYETANO, slow ag for safety, and cueing  to use SPC with L while ambulating                                                                         Ther Ex           Aerobic conditioning  Nustep 8' Nustep   8', lvl 3  >45 SPM Nustep   8', lvl 3  >45 SPM       Luis stretch 3 x 30s b/l 3 x 30s b/l 3 x 30s b/l 3 x 30s b/l       Bridges 3 x 10 3 x 10 3 x 15 3 x 15       Clams 3 x 10  3 x 10 2 x 10 L 2 x 10 L       SLR   2 x 10 ea  (Limited knee ext PROM)  Cuing for DB 2 x 10 ea  (Limited knee ext PROM)  Cuing for DB       LTR   15x ea 15x ea                             Ther Activity                                  Manual           PROM  Left Hip    PROM         Hip abduction   Knee flexed  PT assist hip abduction  2 x 10 Knee flexed  PT assist hip abduction  2 x 10                                        Modalities

## 2023-12-18 NOTE — PROGRESS NOTES
Daily Note     Today's date: 23  Patient name: Agus Espinoza  : 1960  MRN: [id number]  Referring provider: Brendan Keane DO  Dx:   No diagnosis found.                 Subjective: Patient trying to use his quad cane more.       Objective: See treatment diary below      Assessment: Patient demonstrating increased ag and step length with ambulating quad cane. Patient benefiting from cuing to improve gait quality with increasing CAYETANO, keeping all 4 feet of quad cane on the floor (likely would benefit from SPC instead), and slowing ag slightly for his safety. Patient benefiting from hip ext stretching and lateral hip strengthening which is his biggest deficit. Patient would benefit from further PT to address limitations and restore PLOF.       Plan: Continue per plan of care.         Goals  Within 6 weeks,   1. Patient will demonstrate L hip ER strength >=3/5.   2. Patient will demonstrate L hip abduction strength >=3/5.   3. Patient will demonstrate at least 0* L hip ext mobility.   4. Patient will be able to ambulate 10 minutes continuously with LRAD.   5. Patient will be able to demonstrate proper gait mechanics with LRAD.     Within 12 weeks or by discharge,   1. Patient will demonstrate L hip ER strength >=4/5.   2. Patient will demonstrate L hip abduction strength >=4/5.   3. Patient will be able to ambulate for >=10 minutes without AD.  4. Patient will be able to stand for >= 10 minutes without AD without hip pain.  5. Patient will be able to grocery shop independently.  6. Patient will be able to clean his home independently.  7. Patient will improve 6MWT by 200ft (IE: 738ft).  8. Patient will improve TUG by 2s (IE: 14.6).  9. Patient will improve 5x STS by 2s (IE: 16s)     POC expires Unit limit Auth Expiration date PT/OT/ST + Visit Limit?    4 23                            Visit/Unit Tracking  AUTH Status:  Date            Approved Used 1 2 3             Remaining                 Diagnosis: Fixation with anatomic reduction of intertrochanteric hip fracture on 10/16/23    Precautions:  PMH of HTN, hx of CVA on Aggrenox (2021), HLD, GERD, seizure disorder (medication)   Insurance: [employer]    12/7 12/11 12/14        Vitals 142/92  HR 90  SpO2 94 6MWT- 738ft   TUG -14.6 sec  5xSTS with push off from chair 16 sec   FOTO      Patient Ed           Scar mobility Educated                                                                            Neuro Re-Ed           Gait training Heel toe, extend knees, longer step lengths Heel toe, extend knees, longer step lengths   W/ quad cane, adjusted height of cane  Cuing to increase CAYETANO, slow ag for safety, and keep all 4 legs of quad cane on the floor                                                                          Ther Ex           Aerobic conditioning  Nustep 8' Nustep   8', lvl 3  >45 SPM        Luis stretch 3 x 30s b/l 3 x 30s b/l 3 x 30s b/l        Bridges 3 x 10 3 x 10 3 x 15        Clams 3 x 10  3 x 10 2 x 10 L        SLR   2 x 10 ea  (Limited knee ext PROM)  Cuing for DB        LTR   15x ea                              Ther Activity                                 Manual           PROM  Left Hip    PROM         Hip abduction   Knee flexed  PT assist hip abduction  2 x 10                                         Modalities

## 2023-12-19 ENCOUNTER — OFFICE VISIT (OUTPATIENT)
Dept: PHYSICAL THERAPY | Facility: REHABILITATION | Age: 63
End: 2023-12-19
Payer: COMMERCIAL

## 2023-12-19 DIAGNOSIS — Z48.89 AFTERCARE FOLLOWING SURGERY: Primary | ICD-10-CM

## 2023-12-19 PROCEDURE — 97140 MANUAL THERAPY 1/> REGIONS: CPT

## 2023-12-19 PROCEDURE — 97110 THERAPEUTIC EXERCISES: CPT

## 2023-12-21 ENCOUNTER — OFFICE VISIT (OUTPATIENT)
Dept: PHYSICAL THERAPY | Facility: REHABILITATION | Age: 63
End: 2023-12-21
Payer: COMMERCIAL

## 2023-12-21 DIAGNOSIS — Z48.89 AFTERCARE FOLLOWING SURGERY: Primary | ICD-10-CM

## 2023-12-21 PROCEDURE — 97110 THERAPEUTIC EXERCISES: CPT | Performed by: PHYSICAL THERAPIST

## 2023-12-21 NOTE — PROGRESS NOTES
Daily Note     Today's date: 23  Patient name: Agus Espinoza  : 1960  MRN: [id number]  Referring provider: Brendan Keane DO  Dx:   Encounter Diagnosis     ICD-10-CM    1. Aftercare following surgery  Z48.89             Start Time: 0850  Stop Time: 09  Total time in clinic (min): 40 minutes    Subjective: Having some issues getting up from the couch. Was able to grocery shop himself the other day with the shopping cart. He fell two days ago with his walker at home, probably contributing to his knee pain.       Objective: See treatment diary below      Assessment: Continued to focus on improving patient's LE strength with addition of sit to stands. With gait, patient benefits from mod-max cuing for proper sequencing and decreasing ag for safety. Patient would benefit from continued PT to improve ROM, strength and flexibility to optimize return to prior functional mobility.        Plan: Continue per plan of care.         Goals  Within 6 weeks,   1. Patient will demonstrate L hip ER strength >=3/5.   2. Patient will demonstrate L hip abduction strength >=3/5.   3. Patient will demonstrate at least 0* L hip ext mobility.   4. Patient will be able to ambulate 10 minutes continuously with LRAD.   5. Patient will be able to demonstrate proper gait mechanics with LRAD.     Within 12 weeks or by discharge,   1. Patient will demonstrate L hip ER strength >=4/5.   2. Patient will demonstrate L hip abduction strength >=4/5.   3. Patient will be able to ambulate for >=10 minutes without AD.  4. Patient will be able to stand for >= 10 minutes without AD without hip pain.  5. Patient will be able to grocery shop independently.  6. Patient will be able to clean his home independently.  7. Patient will improve 6MWT by 200ft (IE: 738ft).  8. Patient will improve TUG by 2s (IE: 14.6).  9. Patient will improve 5x STS by 2s (IE: 16s)     POC expires Unit limit Auth Expiration date PT/OT/ST + Visit Limit?    2/29/23 4 12/29/23                            Visit/Unit Tracking  AUTH Status:  Date 12/7 12/12 12/14 12/19 12/21         Approved Used 1 2 3 4 5          Remaining                 Diagnosis: Fixation with anatomic reduction of intertrochanteric hip fracture on 10/16/23    Precautions:  PMH of HTN, hx of CVA on Aggrenox (2021), HLD, GERD, seizure disorder (medication)   Insurance: [employer]    12/7 12/11 12/14 12/19 12/21      Vitals 142/92  HR 90  SpO2 94 6MWT- 738ft   TUG -14.6 sec  5xSTS with push off from chair 16 sec   FOTO - above, completed      Patient Ed           Scar mobility Educated                                                                            Neuro Re-Ed           Gait training Heel toe, extend knees, longer step lengths Heel toe, extend knees, longer step lengths   W/ quad cane, adjusted height of cane  Cuing to increase CAYETANO, slow ag for safety, and keep all 4 legs of quad cane on the floor W/ SPC cane, height of cane was checked and ok    Cuing to increase CAYETANO, slow ag for safety, and cueing  to use SPC with L while ambulating W/ SPC  Instruction on proper sequencing for gait, cane placement                                         Ther Ex           Aerobic conditioning  Nustep 8' Nustep   8', lvl 3  >45 SPM Nustep   8', lvl 3  >45 SPM       Luis stretch 3 x 30s b/l 3 x 30s b/l 3 x 30s b/l 3 x 30s b/l 3 x 30s      Bridges 3 x 10 3 x 10 3 x 15 3 x 15 20x      Clams 3 x 10  3 x 10 2 x 10 L 2 x 10 L 2 x 10 L      SLR   2 x 10 ea  (Limited knee ext PROM)  Cuing for DB 2 x 10 ea  (Limited knee ext PROM)  Cuing for DB 2 x 10 on L      LTR   15x ea 15x ea       Sit to stand     Chair + foam  3 x 8      Heel raises     3 x 25 b/l      Ther Activity                                 Manual           PROM  Left Hip    PROM         Hip abduction   Knee flexed  PT assist hip abduction  2 x 10 Knee flexed  PT assist hip abduction  2 x 10                                        Modalities

## 2023-12-29 ENCOUNTER — OFFICE VISIT (OUTPATIENT)
Dept: PHYSICAL THERAPY | Facility: REHABILITATION | Age: 63
End: 2023-12-29
Payer: COMMERCIAL

## 2023-12-29 DIAGNOSIS — Z48.89 AFTERCARE FOLLOWING SURGERY: Primary | ICD-10-CM

## 2023-12-29 PROCEDURE — 97110 THERAPEUTIC EXERCISES: CPT | Performed by: PHYSICAL THERAPIST

## 2023-12-29 NOTE — PROGRESS NOTES
Daily Note     Today's date: 23  Patient name: Agus Espinoza  : 1960  MRN: [id number]  Referring provider: Brendan Keane DO  Dx:   Encounter Diagnosis     ICD-10-CM    1. Aftercare following surgery  Z48.89               Start Time: 905  Stop Time: 945  Total time in clinic (min): 40 minutes    Subjective: Practiced sit to stands but went on his couch which is low and difficult.      Objective: See treatment diary below      Assessment: Patient demonstrating improvement in AROM with hip ER with clams. With sit to stands, patient benefits from cuing to increase Wbing on LLE so it is more equal. Patient most challenged with progression towards SLS with hand support. With gait, patient benefits from mod-max cuing for proper sequencing and decreasing ag for safety. Patient would benefit from continued PT to improve ROM, strength and flexibility to optimize return to prior functional mobility.      Plan: Continue per plan of care.        POC expires Unit limit Auth Expiration date PT/OT/ST + Visit Limit?    4 23                            Visit/Unit Tracking  AUTH Status:  Date         Approved Used 1 2 3 4 5 6         Remaining                 Diagnosis: Fixation with anatomic reduction of intertrochanteric hip fracture on 10/16/23    Precautions:  PMH of HTN, hx of CVA on Aggrenox (), HLD, GERD, seizure disorder (medication)   Insurance: [facility]         Vitals 142/92  HR 90  SpO2 94 6MWT- 738ft   TUG -14.6 sec  5xSTS with push off from chair 16 sec   FOTO - above, completed      Patient Ed           Scar mobility Educated                                                                            Neuro Re-Ed           Gait training Heel toe, extend knees, longer step lengths Heel toe, extend knees, longer step lengths   W/ quad cane, adjusted height of cane  Cuing to increase CAYETANO, slow ag for safety,  and keep all 4 legs of quad cane on the floor W/ SPC cane, height of cane was checked and ok    Cuing to increase CAYETANO, slow ag for safety, and cueing  to use SPC with L while ambulating W/ SPC  Instruction on proper sequencing for gait, cane placement        SLS      BUE  L SLS   2 x 30s    RUE   L SLS  2 x 30s                           Ther Ex           Aerobic conditioning  Nustep 8' Nustep   8', lvl 3  >45 SPM Nustep   8', lvl 3  >45 SPM  Nustep   6', lvl 7  >50 SPM     Luis stretch 3 x 30s b/l 3 x 30s b/l 3 x 30s b/l 3 x 30s b/l 3 x 30s b/l 3 x 30s b/l     Bridges 3 x 10 3 x 10 3 x 15 3 x 15 20x      Clams 3 x 10  3 x 10 2 x 10 L 2 x 10 L 2 x 10 L 2 x 10 L     SLR   2 x 10 ea  (Limited knee ext PROM)  Cuing for DB 2 x 10 ea  (Limited knee ext PROM)  Cuing for DB 2 x 10 on L      LTR   15x ea 15x ea       Sit to stand     Chair + foam  3 x 8 Chair + foam  3 x 10  Cuing for increasing WB on LLE     Heel raises     3 x 25 b/l      Side steps      W/ railing  1-2 hand support  2 laps x 12 ft                Ther Activity                                 Manual           PROM  Left Hip    PROM         Hip abduction   Knee flexed  PT assist hip abduction  2 x 10 Knee flexed  PT assist hip abduction  2 x 10                                        Modalities

## 2023-12-29 NOTE — PROGRESS NOTES
Daily Note     Today's date: 23  Patient name: Agus Espinoza  : 1960  MRN: [id number]  Referring provider: Brendan Keane DO  Dx:   Encounter Diagnosis     ICD-10-CM    1. Aftercare following surgery  Z48.89           Start Time: 930  Stop Time: 1015  Total time in clinic (min): 45 minutes    Subjective: Needs to stretch more. Doing his exercises but not enough repetitions.      Objective: See treatment diary below      Assessment: Patient needs continued instruction on increasing dosage of his exercises for strengthening. Patient's gait continues to be variable, likely due to residual impairments from his previous CVA's and decreased knee extension mobility. This results in his gait deviations - scissoring gait pattern, knee flexion during stance, short step lengths. Continuing to focus on strengthening his L hip with mat level and standing exercises. Patient most challenged with L SLS and step up taps today which is to be expected. Patient would benefit from continued PT to improve ROM, strength and flexibility to optimize return to prior functional mobility.      Plan: Continue per plan of care.        POC expires Unit limit Auth Expiration date PT/OT/ST + Visit Limit?    4 23                            Visit/Unit Tracking  AUTH Status:  Date        Approved Used 1 2 3 4 5 6 7        Remaining                 Diagnosis: Fixation with anatomic reduction of intertrochanteric hip fracture on 10/16/23    Precautions:  PMH of HTN, hx of CVA ( - lacunar infarct, b/l MCA), HLD, GERD, seizure disorder (medication)   Insurance: [employer]     1/2    Vitals 142/92  HR 90  SpO2 94 6MWT- 738ft   TUG -14.6 sec  5xSTS with push off from chair 16 sec   FOTO - above, completed      Patient Ed           Scar mobility Educated                                                                            Neuro Re-Ed          "  Gait training Heel toe, extend knees, longer step lengths Heel toe, extend knees, longer step lengths   W/ quad cane, adjusted height of cane  Cuing to increase CAYETANO, slow ag for safety, and keep all 4 legs of quad cane on the floor W/ SPC cane, height of cane was checked and ok    Cuing to increase CAYETANO, slow ag for safety, and cueing  to use SPC with L while ambulating W/ SPC  Instruction on proper sequencing for gait, cane placement    W/ SPC  Instruction on wider CAYETANO, increasing step length    SLS      BUE  L SLS   2 x 30s    RUE   L SLS  2 x 30s BUE  L SLS   3 x 30s    Step up        Step up taps  6\"  3 x 10 on R  L hand support               Ther Ex           Aerobic conditioning  Nustep 8' Nustep   8', lvl 3  >45 SPM Nustep   8', lvl 3  >45 SPM  Nustep   6', lvl 7  >50 SPM Nustep  6', lvl 7  >50 SPM    Luis stretch 3 x 30s b/l 3 x 30s b/l 3 x 30s b/l 3 x 30s b/l 3 x 30s b/l 3 x 30s b/l 3 x 30s b/l    Bridges 3 x 10 3 x 10 3 x 15 3 x 15 20x      Clams 3 x 10  3 x 10 2 x 10 L 2 x 10 L 2 x 10 L 2 x 10 L 3 x 10 L    SLR   2 x 10 ea  (Limited knee ext PROM)  Cuing for DB 2 x 10 ea  (Limited knee ext PROM)  Cuing for DB 2 x 10 on L      LTR   15x ea 15x ea       Sit to stand     Chair + foam  3 x 8 Chair + foam  3 x 10  Cuing for increasing WB on LLE Chair + foam  10x    Stagger bias LLE  2 x 10    Heel raises     3 x 25 b/l      Side steps      W/ railing  1-2 hand support  2 laps x 12 ft W/o BUE support  3 laps x 10ft               Ther Activity                                 Manual           PROM  Left Hip    PROM         Hip abduction   Knee flexed  PT assist hip abduction  2 x 10 Knee flexed  PT assist hip abduction  2 x 10   Knee flexed  PT assist hip abduction  3 x 10                                     Modalities                                           "

## 2024-01-02 ENCOUNTER — OFFICE VISIT (OUTPATIENT)
Dept: PHYSICAL THERAPY | Facility: REHABILITATION | Age: 64
End: 2024-01-02
Payer: COMMERCIAL

## 2024-01-02 DIAGNOSIS — Z48.89 AFTERCARE FOLLOWING SURGERY: Primary | ICD-10-CM

## 2024-01-02 PROCEDURE — 97110 THERAPEUTIC EXERCISES: CPT | Performed by: PHYSICAL THERAPIST

## 2024-01-02 NOTE — PROGRESS NOTES
Daily Note     Today's date: 23  Patient name: Agus Espinoza  : 1960  MRN: [id number]  Referring provider: Brendan Keane DO  Dx:   Encounter Diagnosis     ICD-10-CM    1. Aftercare following surgery  Z48.89             Start Time: 08  Stop Time: 09  Total time in clinic (min): 45 minutes    Subjective: Feels like he is walking better. Reports has near falls when he gets out of bed because in the morning his muscles are warmed up yet.      Objective: See treatment diary below      Assessment: Patient demonstrating improved compliance with his HEP as demonstrated with improved performance on some of his exercises. Progressed patient to higher dosage with exercises and figure 4 bridge. Discussed that patient should leave his RW by his bed in the morning to reduce his fall risk. Patient would benefit from continued PT to improve ROM, strength and flexibility to optimize return to prior functional mobility.      Plan: Continue per plan of care.        POC expires Unit limit Auth Expiration date PT/OT/ST + Visit Limit?    4 23                            Visit/Unit Tracking  AUTH Status:  Date       Approved Used 1 2 3 4 5 6 7 8       Remaining                 Diagnosis: Fixation with anatomic reduction of intertrochanteric hip fracture on 10/16/23    Precautions:  PMH of HTN, hx of CVA ( - lacunar infarct, b/l MCA), HLD, GERD, seizure disorder (medication)   [facility]        Vitals   FOTO   FOTO    Patient Ed          Scar mobility          Getting out of bed      RW by the bed                                                       Neuro Re-Ed          Gait training W/ quad cane, adjusted height of cane  Cuing to increase CAYETANO, slow ag for safety, and keep all 4 legs of quad cane on the floor W/ SPC cane, height of cane was checked and ok    Cuing to increase CAYETANO, slow ag for safety, and  "cueing  to use SPC with L while ambulating W/ SPC  Instruction on proper sequencing for gait, cane placement    W/ SPC  Instruction on wider CAYETANO, increasing step length     SLS    BUE  L SLS   2 x 30s    RUE   L SLS  2 x 30s BUE  L SLS   3 x 30s BUE  L SLS   3 x 30s    Step up      Step up taps  6\"  3 x 10 on R  L hand support               Ther Ex          Aerobic conditioning Nustep   8', lvl 3  >45 SPM Nustep   8', lvl 3  >45 SPM  Nustep   6', lvl 7  >50 SPM Nustep  6', lvl 7  >50 SPM Nustep  6', lvl 7  >50 SPM    Luis stretch 3 x 30s b/l 3 x 30s b/l 3 x 30s b/l 3 x 30s b/l 3 x 30s b/l 3 x 30s b/l    Bridges 3 x 15 3 x 15 20x   Figure 4  3 x 10 on L    Clams 2 x 10 L 2 x 10 L 2 x 10 L 2 x 10 L 3 x 10 L 3 x 10 L    SLR 2 x 10 ea  (Limited knee ext PROM)  Cuing for DB 2 x 10 ea  (Limited knee ext PROM)  Cuing for DB 2 x 10 on L       LTR 15x ea 15x ea        Sit to stand   Chair + foam  3 x 8 Chair + foam  3 x 10  Cuing for increasing WB on LLE Chair + foam  10x    Stagger bias LLE  2 x 10 Chair + foam  Stagger bias LLE  3 x 10    Heel raises   3 x 25 b/l       Side steps    W/ railing  1-2 hand support  2 laps x 12 ft W/o BUE support  3 laps x 10ft               Ther Activity                              Manual          PROM          Hip abduction Knee flexed  PT assist hip abduction  2 x 10 Knee flexed  PT assist hip abduction  2 x 10   Knee flexed  PT assist hip abduction  3 x 10 Knee flexed  PT assist hip abduction  3 x 10                                  Modalities                                        "

## 2024-01-03 ENCOUNTER — APPOINTMENT (OUTPATIENT)
Dept: LAB | Facility: CLINIC | Age: 64
End: 2024-01-03
Payer: COMMERCIAL

## 2024-01-03 DIAGNOSIS — G40.209 PARTIAL SYMPTOMATIC EPILEPSY WITH COMPLEX PARTIAL SEIZURES, NOT INTRACTABLE, WITHOUT STATUS EPILEPTICUS (HCC): ICD-10-CM

## 2024-01-03 LAB
ALBUMIN SERPL BCP-MCNC: 4.5 G/DL (ref 3.5–5)
ALP SERPL-CCNC: 83 U/L (ref 34–104)
ALT SERPL W P-5'-P-CCNC: 21 U/L (ref 7–52)
ANION GAP SERPL CALCULATED.3IONS-SCNC: 9 MMOL/L
AST SERPL W P-5'-P-CCNC: 21 U/L (ref 13–39)
BILIRUB SERPL-MCNC: 0.39 MG/DL (ref 0.2–1)
BUN SERPL-MCNC: 13 MG/DL (ref 5–25)
CALCIUM SERPL-MCNC: 9.4 MG/DL (ref 8.4–10.2)
CARBAMAZEPINE SERPL-MCNC: 9.5 UG/ML (ref 4–12)
CHLORIDE SERPL-SCNC: 101 MMOL/L (ref 96–108)
CO2 SERPL-SCNC: 28 MMOL/L (ref 21–32)
CREAT SERPL-MCNC: 0.75 MG/DL (ref 0.6–1.3)
ERYTHROCYTE [DISTWIDTH] IN BLOOD BY AUTOMATED COUNT: 11.7 % (ref 11.6–15.1)
GFR SERPL CREATININE-BSD FRML MDRD: 97 ML/MIN/1.73SQ M
GLUCOSE P FAST SERPL-MCNC: 84 MG/DL (ref 65–99)
HCT VFR BLD AUTO: 44.9 % (ref 36.5–49.3)
HGB BLD-MCNC: 15.1 G/DL (ref 12–17)
MCH RBC QN AUTO: 33.5 PG (ref 26.8–34.3)
MCHC RBC AUTO-ENTMCNC: 33.6 G/DL (ref 31.4–37.4)
MCV RBC AUTO: 100 FL (ref 82–98)
PLATELET # BLD AUTO: 224 THOUSANDS/UL (ref 149–390)
PMV BLD AUTO: 8.9 FL (ref 8.9–12.7)
POTASSIUM SERPL-SCNC: 3.8 MMOL/L (ref 3.5–5.3)
PROT SERPL-MCNC: 7.1 G/DL (ref 6.4–8.4)
RBC # BLD AUTO: 4.51 MILLION/UL (ref 3.88–5.62)
SODIUM SERPL-SCNC: 138 MMOL/L (ref 135–147)
WBC # BLD AUTO: 7.21 THOUSAND/UL (ref 4.31–10.16)

## 2024-01-03 PROCEDURE — 80053 COMPREHEN METABOLIC PANEL: CPT

## 2024-01-03 PROCEDURE — 80156 ASSAY CARBAMAZEPINE TOTAL: CPT

## 2024-01-03 PROCEDURE — 80177 DRUG SCRN QUAN LEVETIRACETAM: CPT

## 2024-01-03 PROCEDURE — 36415 COLL VENOUS BLD VENIPUNCTURE: CPT

## 2024-01-03 PROCEDURE — 85027 COMPLETE CBC AUTOMATED: CPT

## 2024-01-04 ENCOUNTER — OFFICE VISIT (OUTPATIENT)
Dept: PHYSICAL THERAPY | Facility: REHABILITATION | Age: 64
End: 2024-01-04
Payer: COMMERCIAL

## 2024-01-04 DIAGNOSIS — Z48.89 AFTERCARE FOLLOWING SURGERY: Primary | ICD-10-CM

## 2024-01-04 PROCEDURE — 97110 THERAPEUTIC EXERCISES: CPT | Performed by: PHYSICAL THERAPIST

## 2024-01-05 LAB — LEVETIRACETAM SERPL-MCNC: 4 UG/ML (ref 10–40)

## 2024-01-08 ENCOUNTER — TELEPHONE (OUTPATIENT)
Dept: NEUROLOGY | Facility: CLINIC | Age: 64
End: 2024-01-08

## 2024-01-08 NOTE — PROGRESS NOTES
Daily Note     Today's date: 23  Patient name: Agus Espinoza  : 1960  MRN: [id number]  Referring provider: Brendan Keane DO  Dx:   Encounter Diagnosis     ICD-10-CM    1. Aftercare following surgery  Z48.89               Start Time: 930  Stop Time: 1015  Total time in clinic (min): 45 minutes    Subjective: Feels like he is walking better. Reports has near falls when he gets out of bed because in the morning his muscles are warmed up yet.      Objective: See treatment diary below      Assessment: Focused on standing and weight bearing exercises. Patient most challenged with side steps and SLS on his LLE. Patient would benefit from continued PT to improve ROM, strength and flexibility to optimize return to prior functional mobility.      Plan: Continue per plan of care.        POC expires Unit limit Auth Expiration date PT/OT/ST + Visit Limit?    4 23                            Visit/Unit Tracking  AUTH Status:  Date      Approved Used 1 2 3      Remaining           Diagnosis: Fixation with anatomic reduction of intertrochanteric hip fracture on 10/16/23    Precautions:  PMH of HTN, hx of CVA ( - lacunar infarct, b/l MCA), HLD, GERD, seizure disorder (medication)   [facility]: [facility]       Vitals   FOTO   FOTO    Patient Ed          Scar mobility          Getting out of bed      RW by the bed                                                       Neuro Re-Ed          Gait training W/ quad cane, adjusted height of cane  Cuing to increase CAYETANO, slow ag for safety, and keep all 4 legs of quad cane on the floor W/ SPC cane, height of cane was checked and ok    Cuing to increase CAYETANO, slow ag for safety, and cueing  to use SPC with L while ambulating W/ SPC  Instruction on proper sequencing for gait, cane placement    W/ SPC  Instruction on wider CAYETANO, increasing step length     SLS    BUE  L SLS   2 x 30s    RUE   L SLS  2 x 30s  "BUE  L SLS   3 x 30s BUE  L SLS   3 x 30s BUE  L SLS   3 x 45s   Step up      Step up taps  6\"  3 x 10 on R  L hand support  Lateral step ups  6\" BUE support  3 x 10             Ther Ex          Aerobic conditioning Nustep   8', lvl 3  >45 SPM Nustep   8', lvl 3  >45 SPM  Nustep   6', lvl 7  >50 SPM Nustep  6', lvl 7  >50 SPM Nustep  6', lvl 7  >50 SPM Nustep   8' lvl 7  >50 SPM   Luis stretch 3 x 30s b/l 3 x 30s b/l 3 x 30s b/l 3 x 30s b/l 3 x 30s b/l 3 x 30s b/l    Bridges 3 x 15 3 x 15 20x   Figure 4  3 x 10 on L    Clams 2 x 10 L 2 x 10 L 2 x 10 L 2 x 10 L 3 x 10 L 3 x 10 L    SLR 2 x 10 ea  (Limited knee ext PROM)  Cuing for DB 2 x 10 ea  (Limited knee ext PROM)  Cuing for DB 2 x 10 on L       LTR 15x ea 15x ea        Sit to stand   Chair + foam  3 x 8 Chair + foam  3 x 10  Cuing for increasing WB on LLE Chair + foam  10x    Stagger bias LLE  2 x 10 Chair + foam  Stagger bias LLE  3 x 10 Chair + foam  10x    Chair + foam  Stagger bias LLE  3 x 10   Heel raises   3 x 25 b/l       Side steps    W/ railing  1-2 hand support  2 laps x 12 ft W/o BUE support  3 laps x 10ft  W/ 1 hand support  4 laps x 10ft   Standing hip abduction       3 x 10, standing on L             Ther Activity                              Manual          PROM          Hip abduction Knee flexed  PT assist hip abduction  2 x 10 Knee flexed  PT assist hip abduction  2 x 10   Knee flexed  PT assist hip abduction  3 x 10 Knee flexed  PT assist hip abduction  3 x 10                                  Modalities                                        "

## 2024-01-08 NOTE — TELEPHONE ENCOUNTER
Hello,     Patient has called and his pharmacy has asked that a new prescription for his Asprin extended release be sent to the below pharmacy. He stated per pharmacy new script is needed.    Pharmacy:  [facility] - [place], [place] - [address]  [id number] [address]  Phone: [phone]  Fax: [phone]  MARY JO #: [id number]

## 2024-01-09 ENCOUNTER — OFFICE VISIT (OUTPATIENT)
Dept: PHYSICAL THERAPY | Facility: REHABILITATION | Age: 64
End: 2024-01-09
Payer: COMMERCIAL

## 2024-01-09 DIAGNOSIS — Z48.89 AFTERCARE FOLLOWING SURGERY: Primary | ICD-10-CM

## 2024-01-09 PROCEDURE — 97112 NEUROMUSCULAR REEDUCATION: CPT | Performed by: PHYSICAL THERAPIST

## 2024-01-09 PROCEDURE — 97110 THERAPEUTIC EXERCISES: CPT | Performed by: PHYSICAL THERAPIST

## 2024-01-09 NOTE — PROGRESS NOTES
"Daily Note     Today's date: 23  Patient name: Agus Espinoza  : 1960  MRN: [id number]  Referring provider: Brendan Keane DO  Dx:   Encounter Diagnosis     ICD-10-CM    1. Aftercare following surgery  Z48.89           Start Time: 0800  Stop Time: 0845  Total time in clinic (min): 45 minutes    Subjective: Feels like he is walking better. Reports has near falls when he gets out of bed because in the morning his muscles are warmed up yet.      Objective: See treatment diary below      Assessment: Patient continues to be challenged with step up taps and step up exercises. He benefits from BUE support to achieve proper form. Patient's gait continues to be variable likely due to residual impairments from his previous CVA's and decreased knee extension mobility. Patient would benefit from continued PT to improve ROM, strength and flexibility to optimize return to prior functional mobility.      Plan: Continue per plan of care.        POC expires Unit limit Auth Expiration date PT/OT/ST + Visit Limit?    4 23                            Visit/Unit Tracking  AUTH Status:  Date     Approved Used 1 2 3 4     Remaining           Diagnosis: Fixation with anatomic reduction of intertrochanteric hip fracture on 10/16/23    Precautions:  PMH of HTN, hx of CVA ( - lacunar infarct, b/l MCA), HLD, GERD, seizure disorder (medication)   Insurance: [employer]        Vitals FOTO   FOTO      Patient Ed          Scar mobility          Getting out of bed    RW by the bed                                                         Neuro Re-Ed          Gait training W/ SPC  Instruction on proper sequencing for gait, cane placement    W/ SPC  Instruction on wider CAYETANO, increasing step length       SLS  BUE  L SLS   2 x 30s    RUE   L SLS  2 x 30s BUE  L SLS   3 x 30s BUE  L SLS   3 x 30s BUE  L SLS   3 x 45s     Step up    Step up taps  6\"  3 x 10 on R  L hand support  " "Lateral step ups  6\" BUE support  3 x 10 Step up taps  6\"  3 x 10 on R  L hand support    Step ups  LLE  BUE support  6\" L 3 x 10              Ther Ex          Aerobic conditioning  Nustep   6', lvl 7  >50 SPM Nustep  6', lvl 7  >50 SPM Nustep  6', lvl 7  >50 SPM Nustep   8' lvl 7  >50 SPM Nustep  8' lvl 7  >50 SPM    Luis stretch 3 x 30s b/l 3 x 30s b/l 3 x 30s b/l 3 x 30s b/l      Bridges 20x   Figure 4  3 x 10 on L      Clams 2 x 10 L 2 x 10 L 3 x 10 L 3 x 10 L  2 x 15 L    SLR 2 x 10 on L         LTR          Sit to stand Chair + foam  3 x 8 Chair + foam  3 x 10  Cuing for increasing WB on LLE Chair + foam  10x    Stagger bias LLE  2 x 10 Chair + foam  Stagger bias LLE  3 x 10 Chair + foam  10x    Chair + foam  Stagger bias LLE  3 x 10 Chair + foam  Stagger bias LLE  3 x 10    Heel raises 3 x 25 b/l         Side steps  W/ railing  1-2 hand support  2 laps x 12 ft W/o BUE support  3 laps x 10ft  W/ 1 hand support  4 laps x 10ft W/ 1 hand support  4 laps x 10ft    Standing hip abduction     3 x 10, standing on L               Ther Activity                              Manual          PROM          Hip abduction   Knee flexed  PT assist hip abduction  3 x 10 Knee flexed  PT assist hip abduction  3 x 10  Knee flexed  PT assist hip abduction  3 x 10                                  Modalities                                        "

## 2024-01-10 ENCOUNTER — OFFICE VISIT (OUTPATIENT)
Dept: FAMILY MEDICINE CLINIC | Facility: CLINIC | Age: 64
End: 2024-01-10
Payer: COMMERCIAL

## 2024-01-10 VITALS
DIASTOLIC BLOOD PRESSURE: 84 MMHG | WEIGHT: 161.38 LBS | OXYGEN SATURATION: 97 % | TEMPERATURE: 96.8 F | SYSTOLIC BLOOD PRESSURE: 122 MMHG | BODY MASS INDEX: 23.15 KG/M2 | HEART RATE: 64 BPM

## 2024-01-10 DIAGNOSIS — E78.2 MIXED HYPERLIPIDEMIA: ICD-10-CM

## 2024-01-10 DIAGNOSIS — G40.209 PARTIAL SYMPTOMATIC EPILEPSY WITH COMPLEX PARTIAL SEIZURES, NOT INTRACTABLE, WITHOUT STATUS EPILEPTICUS (HCC): ICD-10-CM

## 2024-01-10 DIAGNOSIS — M81.0 OSTEOPOROSIS WITHOUT CURRENT PATHOLOGICAL FRACTURE, UNSPECIFIED OSTEOPOROSIS TYPE: Primary | ICD-10-CM

## 2024-01-10 DIAGNOSIS — S72.142S CLOSED DISPLACED INTERTROCHANTERIC FRACTURE OF LEFT FEMUR, SEQUELA: ICD-10-CM

## 2024-01-10 DIAGNOSIS — I63.00 CEREBROVASCULAR ACCIDENT (CVA) DUE TO THROMBOSIS OF PRECEREBRAL ARTERY (HCC): ICD-10-CM

## 2024-01-10 DIAGNOSIS — I10 ESSENTIAL HYPERTENSION: ICD-10-CM

## 2024-01-10 PROCEDURE — 99214 OFFICE O/P EST MOD 30 MIN: CPT | Performed by: FAMILY MEDICINE

## 2024-01-10 RX ORDER — ROSUVASTATIN CALCIUM 10 MG/1
10 TABLET, COATED ORAL DAILY
Qty: 90 TABLET | Refills: 1 | Status: SHIPPED | OUTPATIENT
Start: 2024-01-10
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
## 2024-01-11 ENCOUNTER — OFFICE VISIT (OUTPATIENT)
Dept: PHYSICAL THERAPY | Facility: REHABILITATION | Age: 64
End: 2024-01-11
Payer: COMMERCIAL

## 2024-01-11 ENCOUNTER — HOSPITAL ENCOUNTER (OUTPATIENT)
Dept: RADIOLOGY | Facility: HOSPITAL | Age: 64
Discharge: HOME | End: 2024-01-11
Attending: ORTHOPAEDIC SURGERY
Payer: COMMERCIAL

## 2024-01-11 ENCOUNTER — OFFICE VISIT (OUTPATIENT)
Dept: OBGYN CLINIC | Facility: HOSPITAL | Age: 64
End: 2024-01-11

## 2024-01-11 DIAGNOSIS — Z48.89 AFTERCARE FOLLOWING SURGERY: Primary | ICD-10-CM

## 2024-01-11 DIAGNOSIS — Z48.89 AFTERCARE FOLLOWING SURGERY: ICD-10-CM

## 2024-01-11 PROCEDURE — 99024 POSTOP FOLLOW-UP VISIT: CPT | Performed by: ORTHOPAEDIC SURGERY

## 2024-01-11 PROCEDURE — 73502 X-RAY EXAM HIP UNI 2-3 VIEWS: CPT

## 2024-01-11 PROCEDURE — 97110 THERAPEUTIC EXERCISES: CPT | Performed by: PHYSICAL THERAPIST

## 2024-01-11 NOTE — PROGRESS NOTES
Assessment:   S/P L CMN (DOS 10/6/23) for left intertrochanteric hip fracture    Plan:   Patient doing well. X-rays demonstrates interval healing of left intertrochanteric fracture, no hardware complications present. Doing well. F/u in 3 months with repeat XR.    No restrictions.  Can participate in physical therapy to his tolerance          I have personally seen and examined the patient, utilizing the extender/resident/physician's assistant for assistance with documentation.  The entire visit including physical exam and formulation/discussion of plan was performed by me.    SUBJECTIVE:  Agus Espinoza is a 63 y.o. male who presents for follow up after L CMN for intertrochanteric femur fracture (DOS 10/16/23). He reports he is continuing to improve. Still doing physical therapy. He has transitioned from a walker to a cane.     PHYSICAL EXAMINATION:  Vital signs: There were no vitals taken for this visit.  General: well developed and well nourished, alert, oriented times 3, and appears comfortable  Psychiatric: Normal    MUSCULOSKELETAL EXAMINATION:    Surgical Site: Non tender  Incision: Clean, dry, intact  Range of Motion: As expected  Neurovascular status: Neuro intact, good cap refill  EHL plantarflexion dorsiflexion intact  Sural saphenous DPN SPN tibial intact  He ambulates well with the assistance of a walker.  Negative Trendelenburg.      STUDIES REVIEWED:  Imaging studies interpreted by Dr. Keane and demonstrate maintained alignment with interval healing of left intertrochanteric hip fracture.  Hardware in good position.      PROCEDURES PERFORMED:  Procedures  No Procedures performed today

## 2024-01-15 ENCOUNTER — OFFICE VISIT (OUTPATIENT)
Dept: PHYSICAL THERAPY | Facility: REHABILITATION | Age: 64
End: 2024-01-15
Payer: COMMERCIAL

## 2024-01-15 DIAGNOSIS — Z48.89 AFTERCARE FOLLOWING SURGERY: Primary | ICD-10-CM

## 2024-01-15 PROCEDURE — 97110 THERAPEUTIC EXERCISES: CPT | Performed by: PHYSICAL THERAPIST

## 2024-01-15 PROCEDURE — 97112 NEUROMUSCULAR REEDUCATION: CPT | Performed by: PHYSICAL THERAPIST

## 2024-01-15 NOTE — PROGRESS NOTES
"Daily Note     Today's date: 23  Patient name: Agus Espinoza  : 1960  MRN: [id number]  Referring provider: Brendan Keane DO  Dx:   Encounter Diagnosis     ICD-10-CM    1. Aftercare following surgery  Z48.89             Start Time: 1145  Stop Time: 1230  Total time in clinic (min): 45 minutes    Subjective: He is trying to walk less with the cane but has the cane there just in case.       Objective: See treatment diary below      Assessment: Progressed to upright bike, hurdles, and balancing on uneven surfaces today. Patient able to complete all progressions without any major LOB. Patient still demonstrating decreased weight bearing tolerance and hip weakness on his surgical side which is to be expected. Patient would benefit from continued PT to improve ROM, strength and flexibility to optimize return to prior functional mobility.      Plan: Continue per plan of care.        POC expires Unit limit Auth Expiration date PT/OT/ST + Visit Limit?    4 24                            Visit/Unit Tracking  AUTH Status:  Date 1/2 1/4 1/9 1/11 1/15     Approved Used 1 2 3 4 5      Remaining             Diagnosis: Fixation with anatomic reduction of intertrochanteric hip fracture on 10/16/23    Precautions:  PMH of HTN, hx of CVA ( - lacunar infarct, b/l MCA), HLD, GERD, seizure disorder (on medication)   Insurance: [employer]    12/21 12/29 1/2 1/4 1/9 1/11 1/15   Vitals FOTO   FOTO      Patient Ed          Scar mobility          Getting out of bed    RW by the bed                                                         Neuro Re-Ed          Gait training W/ SPC  Instruction on proper sequencing for gait, cane placement    W/ SPC  Instruction on wider CAYETANO, increasing step length    Hurdles  Forward + lateral  4\" and 12\"  4 laps each  No UE support + supervision     Balance training  BUE  L SLS   2 x 30s    RUE   L SLS  2 x 30s BUE  L SLS   3 x 30s BUE  L SLS   3 x 30s BUE  L SLS   3 x 45s  " "Foam  Romberg  1 min    Semi tandem (L bias)   2 x 1 min   Step up    Step up taps  6\"  3 x 10 on R  L hand support  Lateral step ups  6\" BUE support  3 x 10 Step up taps  6\"  3 x 10 on R  L hand support    Step ups  LLE  BUE support  6\" L 3 x 10              Ther Ex          Aerobic conditioning  Nustep   6', lvl 7  >50 SPM Nustep  6', lvl 7  >50 SPM Nustep  6', lvl 7  >50 SPM Nustep   8' lvl 7  >50 SPM Nustep  8' lvl 7  >50 SPM Upright bike  Lvl 1  8 min   Luis stretch 3 x 30s b/l 3 x 30s b/l 3 x 30s b/l 3 x 30s b/l      Bridges 20x   Figure 4  3 x 10 on L      Clams 2 x 10 L 2 x 10 L 3 x 10 L 3 x 10 L  2 x 15 L    SLR 2 x 10 on L         LTR          Sit to stand Chair + foam  3 x 8 Chair + foam  3 x 10  Cuing for increasing WB on LLE Chair + foam  10x    Stagger bias LLE  2 x 10 Chair + foam  Stagger bias LLE  3 x 10 Chair + foam  10x    Chair + foam  Stagger bias LLE  3 x 10 Chair + foam  Stagger bias LLE  3 x 10 Chair + foam  Stagger bias LLE  3 x 10   Heel raises 3 x 25 b/l         Side steps  W/ railing  1-2 hand support  2 laps x 12 ft W/o BUE support  3 laps x 10ft  W/ 1 hand support  4 laps x 10ft W/ 1 hand support  4 laps x 10ft    Standing hip abduction     3 x 10, standing on L  3 x 10 each  1 hand support             Ther Activity                              Manual          PROM          Hip abduction   Knee flexed  PT assist hip abduction  3 x 10 Knee flexed  PT assist hip abduction  3 x 10  Knee flexed  PT assist hip abduction  3 x 10                                  Modalities                                        "

## 2024-01-16 ENCOUNTER — APPOINTMENT (OUTPATIENT)
Dept: PHYSICAL THERAPY | Facility: REHABILITATION | Age: 64
End: 2024-01-16
Payer: COMMERCIAL

## 2024-01-16 NOTE — PROGRESS NOTES
"Daily Note     Today's date: 23  Patient name: Agus Espinoza  : 1960  MRN: [id number]  Referring provider: Brendan Keane DO  Dx:   No diagnosis found.                   Subjective: He is trying to walk less with the cane but has the cane there just in case.       Objective: See treatment diary below      Assessment: Progressed to upright bike, hurdles, and balancing on uneven surfaces today. Patient able to complete all progressions without any major LOB. Patient still demonstrating decreased weight bearing tolerance and hip weakness on his surgical side which is to be expected. Patient would benefit from continued PT to improve ROM, strength and flexibility to optimize return to prior functional mobility.      Plan: Continue per plan of care.        POC expires Unit limit Auth Expiration date PT/OT/ST + Visit Limit?    4 24                            Visit/Unit Tracking  AUTH Status:  Date 1/2 1/4 1/9 1/11 1/15     Approved Used 1 2 3 4 5      Remaining             Diagnosis: Fixation with anatomic reduction of intertrochanteric hip fracture on 10/16/23    Precautions:  PMH of HTN, hx of CVA ( - lacunar infarct, b/l MCA), HLD, GERD, seizure disorder (on medication)   Insurance: [employer]    12/21 12/29 1/2 1/4 1/9 1/11 1/15   Vitals FOTO   FOTO      Patient Ed          Scar mobility          Getting out of bed    RW by the bed                                                         Neuro Re-Ed          Gait training W/ SPC  Instruction on proper sequencing for gait, cane placement    W/ SPC  Instruction on wider CAYETANO, increasing step length    Hurdles  Forward + lateral  4\" and 12\"  4 laps each  No UE support + supervision     Balance training  BUE  L SLS   2 x 30s    RUE   L SLS  2 x 30s BUE  L SLS   3 x 30s BUE  L SLS   3 x 30s BUE  L SLS   3 x 45s  Foam  Romberg  1 min    Semi tandem (L bias)   2 x 1 min   Step up    Step up taps  6\"  3 x 10 on R  L hand support  Lateral step " "ups  6\" BUE support  3 x 10 Step up taps  6\"  3 x 10 on R  L hand support    Step ups  LLE  BUE support  6\" L 3 x 10              Ther Ex          Aerobic conditioning  Nustep   6', lvl 7  >50 SPM Nustep  6', lvl 7  >50 SPM Nustep  6', lvl 7  >50 SPM Nustep   8' lvl 7  >50 SPM Nustep  8' lvl 7  >50 SPM Upright bike  Lvl 1  8 min   Luis stretch 3 x 30s b/l 3 x 30s b/l 3 x 30s b/l 3 x 30s b/l      Bridges 20x   Figure 4  3 x 10 on L      Clams 2 x 10 L 2 x 10 L 3 x 10 L 3 x 10 L  2 x 15 L    SLR 2 x 10 on L         LTR          Sit to stand Chair + foam  3 x 8 Chair + foam  3 x 10  Cuing for increasing WB on LLE Chair + foam  10x    Stagger bias LLE  2 x 10 Chair + foam  Stagger bias LLE  3 x 10 Chair + foam  10x    Chair + foam  Stagger bias LLE  3 x 10 Chair + foam  Stagger bias LLE  3 x 10 Chair + foam  Stagger bias LLE  3 x 10   Heel raises 3 x 25 b/l         Side steps  W/ railing  1-2 hand support  2 laps x 12 ft W/o BUE support  3 laps x 10ft  W/ 1 hand support  4 laps x 10ft W/ 1 hand support  4 laps x 10ft    Standing hip abduction     3 x 10, standing on L  3 x 10 each  1 hand support             Ther Activity                              Manual          PROM          Hip abduction   Knee flexed  PT assist hip abduction  3 x 10 Knee flexed  PT assist hip abduction  3 x 10  Knee flexed  PT assist hip abduction  3 x 10                                  Modalities                                        "

## 2024-01-17 ENCOUNTER — OFFICE VISIT (OUTPATIENT)
Dept: PHYSICAL THERAPY | Facility: REHABILITATION | Age: 64
End: 2024-01-17
Payer: COMMERCIAL

## 2024-01-17 DIAGNOSIS — Z48.89 AFTERCARE FOLLOWING SURGERY: Primary | ICD-10-CM

## 2024-01-17 PROCEDURE — 97112 NEUROMUSCULAR REEDUCATION: CPT

## 2024-01-17 PROCEDURE — 97110 THERAPEUTIC EXERCISES: CPT

## 2024-01-17 NOTE — PROGRESS NOTES
Daily Note     Today's date: 23  Patient name: Agus Espinoza  : 1960  MRN: [id number]  Referring provider: Brendan Keane DO  Dx:   Encounter Diagnosis     ICD-10-CM    1. Aftercare following surgery  Z48.89               Start Time: 1000  Stop Time: 1045  Total time in clinic (min): 45 minutes    Subjective: Patient reports he bent down too far to  a package and felt pain down center of back; however he feels ok now.       Objective: See treatment diary below      Assessment:  Tolerated session well with continued progressions from previous session including nustep, upright bike, hurdles, and balancing on uneven surfaces today. Patient able to complete balance exercise with no over LOB. Patient was challenged during tap ups with transfer of weight  to L side due to weakness. Patient able to progress during fwd step ups to Bilat HR assist >1 hand> no HR assist with good tolerance and close supervision. Tried some general weight shifting exercise side to side to side to improve weight bearing tranfers endurance and awareness. Performed gait ambulation around clinic to normalize gait pattern with emphasis on posture, heel to toe, pacing and extension of knee during ambulation.  Patient would benefit from continued PT to improve ROM, strength and flexibility to optimize return to prior functional mobility.      Plan: Continue per plan of care.        POC expires Unit limit Auth Expiration date PT/OT/ST + Visit Limit?    4 24                            Visit/Unit Tracking  AUTH Status:  Date 1/2 1/4 1/9 1/11 1/15 1/17    Approved Used 1 2 3 4 5 6     Remaining             Diagnosis: Fixation with anatomic reduction of intertrochanteric hip fracture on 10/16/23     Precautions:  PMH of HTN, hx of CVA ( - lacunar infarct, b/l MCA), HLD, GERD, seizure disorder (on medication)    Insurance: [employer]     12/21 12/29 1/2 1/4 1/9 1/11 1/15 1/17   Vitals FOTO   FOTO       Patient Ed   "         Scar mobility           Getting out of bed    RW by the bed                                                               Neuro Re-Ed           Gait training W/ SPC  Instruction on proper sequencing for gait, cane placement    W/ SPC  Instruction on wider CAYETANO, increasing step length    Hurdles  Forward + lateral  4\" and 12\"  4 laps each  No UE support + supervision   Hurdles  Forward + lateral  4\" and 12\"  4 laps each  No UE support + supervision    Walk around clinic, gait training education and awareness   Balance training  BUE  L SLS   2 x 30s    RUE   L SLS  2 x 30s BUE  L SLS   3 x 30s BUE  L SLS   3 x 30s BUE  L SLS   3 x 45s  Foam  Romberg  1 min    Semi tandem (L bias)   2 x 1 min Foam  Romberg  1 min    Semi tandem (L bias)   2 x 1 min   Step up    Step up taps  6\"  3 x 10 on R  L hand support  Lateral step ups  6\" BUE support  3 x 10 Step up taps  6\"  3 x 10 on R  L hand support    Step ups  LLE  BUE support  6\" L 3 x 10  Step up taps  6\"  3 x 10 on R  L hand support    Step ups  LLE  BUE support  6\" L 3 x 10  > one hand>  No hand x5 ea              Ther Ex           Aerobic conditioning  Nustep   6', lvl 7  >50 SPM Nustep  6', lvl 7  >50 SPM Nustep  6', lvl 7  >50 SPM Nustep   8' lvl 7  >50 SPM Nustep  8' lvl 7  >50 SPM Upright bike  Lvl 1  8 min Nu step pre 8' Upright >50  L7    Upright bike  Lvl 1  5 min post     Luis stretch 3 x 30s b/l 3 x 30s b/l 3 x 30s b/l 3 x 30s b/l       Bridges 20x   Figure 4  3 x 10 on L       Clams 2 x 10 L 2 x 10 L 3 x 10 L 3 x 10 L  2 x 15 L  2x15 L   SLR 2 x 10 on L          LTR           Sit to stand Chair + foam  3 x 8 Chair + foam  3 x 10  Cuing for increasing WB on LLE Chair + foam  10x    Stagger bias LLE  2 x 10 Chair + foam  Stagger bias LLE  3 x 10 Chair + foam  10x    Chair + foam  Stagger bias LLE  3 x 10 Chair + foam  Stagger bias LLE  3 x 10 Chair + foam  Stagger bias LLE  3 x 10 Chair + foam  Stagger bias LLE  3 x 10   Heel raises 3 x 25 b/l        " "  Side steps  W/ railing  1-2 hand support  2 laps x 12 ft W/o BUE support  3 laps x 10ft  W/ 1 hand support  4 laps x 10ft W/ 1 hand support  4 laps x 10ft     Standing hip abduction     3 x 10, standing on L  3 x 10 each  1 hand support 3 x 10 each  1 hand support   Weight shifts        X15 3\">5\" hold on ea              Ther Activity                                 Manual           PROM           Hip abduction   Knee flexed  PT assist hip abduction  3 x 10 Knee flexed  PT assist hip abduction  3 x 10  Knee flexed  PT assist hip abduction  3 x 10                                      Modalities                                           "

## 2024-01-17 NOTE — PROGRESS NOTES
Daily Note     Today's date: 23  Patient name: Agus Espinoza  : 1960  MRN: [id number]  Referring provider: Brendan Keane DO  Dx:   Encounter Diagnosis     ICD-10-CM    1. Aftercare following surgery  Z48.89               Start Time: 1000  Stop Time: 1045  Total time in clinic (min): 45 minutes    Subjective: Patient reports he bent down too far to  a package and felt pain down center of back; however he feels ok now.       Objective: See treatment diary below      Assessment:  Tolerated session well with continued progressions from previous session including nustep, upright bike, hurdles, and balancing on uneven surfaces today. Patient able to complete balance exercise with no over LOB. Patient was challenged during tap ups with transfer of weight  to L side due to weakness. Patient able to progress during fwd step ups to Bilat HR assist >1 hand> no HR assist with good tolerance and close supervision. Tried some general weight shifting exercise side to side to side to improve weight bearing tranfers endurance and awareness. Performed gait ambulation around clinic to normalize gait pattern with emphasis on posture, heel to toe, pacing and extension of knee during ambulation.  Patient would benefit from continued PT to improve ROM, strength and flexibility to optimize return to prior functional mobility.    Lynn Garnica PTA participated in session    Plan: Continue per plan of care.        POC expires Unit limit Auth Expiration date PT/OT/ST + Visit Limit?    4 24                            Visit/Unit Tracking  AUTH Status:  Date 1/2 1/4 1/9 1/11 1/15 1/17    Approved Used 1 2 3 4 5 6     Remaining             Diagnosis: Fixation with anatomic reduction of intertrochanteric hip fracture on 10/16/23     Precautions:  PMH of HTN, hx of CVA ( - lacunar infarct, b/l MCA), HLD, GERD, seizure disorder (on medication)    Insurance: [employer]     12/21 12/29 1/2 1/4 1/9 1/11 1/15  "1/17   Vitals FOTO   FOTO       Patient Ed           Scar mobility           Getting out of bed    RW by the bed                                                               Neuro Re-Ed           Gait training W/ SPC  Instruction on proper sequencing for gait, cane placement    W/ SPC  Instruction on wider CAYETANO, increasing step length    Hurdles  Forward + lateral  4\" and 12\"  4 laps each  No UE support + supervision   Hurdles  Forward + lateral  4\" and 12\"  4 laps each  No UE support + supervision    Walk around clinic, gait training education and awareness   Balance training  BUE  L SLS   2 x 30s    RUE   L SLS  2 x 30s BUE  L SLS   3 x 30s BUE  L SLS   3 x 30s BUE  L SLS   3 x 45s  Foam  Romberg  1 min    Semi tandem (L bias)   2 x 1 min Foam  Romberg  1 min    Semi tandem (L bias)   2 x 1 min   Step up    Step up taps  6\"  3 x 10 on R  L hand support  Lateral step ups  6\" BUE support  3 x 10 Step up taps  6\"  3 x 10 on R  L hand support    Step ups  LLE  BUE support  6\" L 3 x 10  Step up taps  6\"  3 x 10 on R  L hand support    Step ups  LLE  BUE support  6\" L 3 x 10  > one hand>  No hand x5 ea              Ther Ex           Aerobic conditioning  Nustep   6', lvl 7  >50 SPM Nustep  6', lvl 7  >50 SPM Nustep  6', lvl 7  >50 SPM Nustep   8' lvl 7  >50 SPM Nustep  8' lvl 7  >50 SPM Upright bike  Lvl 1  8 min Nu step pre 8' Upright >50  L7    Upright bike  Lvl 1  5 min post     Luis stretch 3 x 30s b/l 3 x 30s b/l 3 x 30s b/l 3 x 30s b/l       Bridges 20x   Figure 4  3 x 10 on L       Clams 2 x 10 L 2 x 10 L 3 x 10 L 3 x 10 L  2 x 15 L  2x15 L   SLR 2 x 10 on L          LTR           Sit to stand Chair + foam  3 x 8 Chair + foam  3 x 10  Cuing for increasing WB on LLE Chair + foam  10x    Stagger bias LLE  2 x 10 Chair + foam  Stagger bias LLE  3 x 10 Chair + foam  10x    Chair + foam  Stagger bias LLE  3 x 10 Chair + foam  Stagger bias LLE  3 x 10 Chair + foam  Stagger bias LLE  3 x 10 Chair + foam  Stagger bias " "LLE  3 x 10   Heel raises 3 x 25 b/l          Side steps  W/ railing  1-2 hand support  2 laps x 12 ft W/o BUE support  3 laps x 10ft  W/ 1 hand support  4 laps x 10ft W/ 1 hand support  4 laps x 10ft     Standing hip abduction     3 x 10, standing on L  3 x 10 each  1 hand support 3 x 10 each  1 hand support   Weight shifts        X15 3\">5\" hold on ea              Ther Activity                                 Manual           PROM           Hip abduction   Knee flexed  PT assist hip abduction  3 x 10 Knee flexed  PT assist hip abduction  3 x 10  Knee flexed  PT assist hip abduction  3 x 10                                      Modalities                                           "

## 2024-01-18 ENCOUNTER — APPOINTMENT (OUTPATIENT)
Dept: PHYSICAL THERAPY | Facility: REHABILITATION | Age: 64
End: 2024-01-18
Payer: COMMERCIAL

## 2024-01-23 ENCOUNTER — OFFICE VISIT (OUTPATIENT)
Dept: PHYSICAL THERAPY | Facility: REHABILITATION | Age: 64
End: 2024-01-23
Payer: COMMERCIAL

## 2024-01-23 DIAGNOSIS — Z48.89 AFTERCARE FOLLOWING SURGERY: Primary | ICD-10-CM

## 2024-01-23 PROCEDURE — 97112 NEUROMUSCULAR REEDUCATION: CPT

## 2024-01-23 PROCEDURE — 97110 THERAPEUTIC EXERCISES: CPT

## 2024-01-23 NOTE — PROGRESS NOTES
Daily Note     Today's date: 23  Patient name: Agus Espinoza  : 1960  MRN: [id number]  Referring provider: Brendan Keane DO  Dx:   Encounter Diagnosis     ICD-10-CM    1. Aftercare following surgery  Z48.89               Start Time: 1000  Stop Time: 1045  Total time in clinic (min): 45 minutes    Subjective: Patient reports he bent down too far to  a package and felt pain down center of back; however he feels ok now.       Objective: See treatment diary below      Assessment:  Tolerated session well with continued progressions from previous session including nustep, upright bike, hurdles, and balancing on uneven surfaces today. Patient able to complete balance exercise with no over LOB. Patient was challenged during tap ups with transfer of weight  to L side due to weakness. Patient able to progress during fwd step ups to Bilat HR assist >1 hand> no HR assist with good tolerance and close supervision. Tried some general weight shifting exercise side to side to side to improve weight bearing tranfers endurance and awareness. Performed gait ambulation around clinic to normalize gait pattern with emphasis on posture, heel to toe, pacing and extension of knee during ambulation.  Patient would benefit from continued PT to improve ROM, strength and flexibility to optimize return to prior functional mobility.    Lynn Garnica PTA participated in session    Plan: Continue per plan of care.        POC expires Unit limit Auth Expiration date PT/OT/ST + Visit Limit?    4 24                            Visit/Unit Tracking  AUTH Status:  Date 1/2 1/4 1/9 1/11 1/15 1/17    Approved Used 1 2 3 4 5 6     Remaining             Diagnosis: Fixation with anatomic reduction of intertrochanteric hip fracture on 10/16/23    Precautions:  PMH of HTN, hx of CVA ( - lacunar infarct, b/l MCA), HLD, GERD, seizure disorder (on medication)   Insurance: [employer]    1/15 1/17 1/23      Vitals        "  Patient Ed         Scar mobility         Getting out of bed                                                      Neuro Re-Ed         Gait training Hurdles  Forward + lateral  4\" and 12\"  4 laps each  No UE support + supervision   Hurdles  Forward + lateral  4\" and 12\"  4 laps each  No UE support + supervision    Walk around clinic, gait training education and awareness Hurdles  Forward + lateral  4\" and 12\"  4 laps each  No UE support + supervision        Balance training Foam  Romberg  1 min    Semi tandem (L bias)   2 x 1 min Foam  Romberg  1 min    Semi tandem (L bias)   2 x 1 min       Step up   Step up taps  6\"  3 x 10 on R  L hand support    Step ups  LLE  BUE support  6\" L 3 x 10  > one hand>  No hand x5 ea                Ther Ex         Aerobic conditioning Upright bike  Lvl 1  8 min Nu step pre 8' Upright >50  L7    Upright bike  Lvl 1  5 min post         Luis stretch         Bridges         Clams  2x15 L       SLR         LTR         Sit to stand Chair + foam  Stagger bias LLE  3 x 10 Chair + foam  Stagger bias LLE  3 x 10       Heel raises         Side steps         Standing hip abduction 3 x 10 each  1 hand support 3 x 10 each  1 hand support       Weight shifts  X15 3\">5\" hold on ea                Ther Activity                           Manual         PROM         Hip abduction                                    Modalities                                     "

## 2024-01-23 NOTE — PROGRESS NOTES
"Daily Note     Today's date: 23  Patient name: Agus Espinoza  : 1960  MRN: [id number]  Referring provider: Brendan Keane DO  Dx:   Encounter Diagnosis     ICD-10-CM    1. Aftercare following surgery  Z48.89               Start Time: 1000  Stop Time: 1045  Total time in clinic (min): 45 minutes    Subjective: Feels very challenged to get in and out of his [place].  I sometimes grab on to the seat or the steering wheel.     Objective: See treatment diary below      Assessment:  Discussed possibly purchasing a handle to facilitate getting in and out of his [place].  Did well with today's session. Continues to benefit from cuieng to decrease pace at times, tendency to  too much speed. Cueing also given t/o session to improve his hip extension.  Patient would benefit from continued PT to improve ROM, strength and flexibility to optimize return to prior functional mobility.    Plan: Continue per plan of care.        POC expires Unit limit Auth Expiration date PT/OT/ST + Visit Limit?    4 24                            Visit/Unit Tracking  AUTH Status:  Date 1/2 1/4 1/9 1/11 1/15 1/17 1/23   Approved Used 1 2 3 4 5 6 7    Remaining             Diagnosis: Fixation with anatomic reduction of intertrochanteric hip fracture on 10/16/23      Precautions:  PMH of HTN, hx of CVA ( - lacunar infarct, b/l MCA), HLD, GERD, seizure disorder (on medication)     Insurance: [employer]      1/15 1/17 1/23   Vitals      Patient Ed      Scar mobility      Getting out of bed                                    Neuro Re-Ed      Gait training Hurdles  Forward + lateral  4\" and 12\"  4 laps each  No UE support + supervision   Hurdles  Forward + lateral  4\" and 12\"  4 laps each  No UE support + supervision    Walk around clinic, gait training education and awareness Hurdles  Forward + lateral  4\" and 12\"  4 laps each  No UE support + supervision     Balance training Foam  Romberg  1 min    Semi tandem (L " "bias)   2 x 1 min Foam  Romberg  1 min    Semi tandem (L bias)   2 x 1 min Foam  Romberg  1 min    Semi tandem (L bias)   2 x 1 min   Step up   Step up taps  6\"  3 x 10 on R  L hand support    Step ups  LLE  BUE support  6\" L 3 x 10  > one hand>  No hand x5 ea Step up taps  6\"  3 x 10 on R  L hand support    Step ups  LLE  BUE support  6\" L 3 x 10  > one hand>  No hand x5 ea         Ther Ex      Aerobic conditioning Upright bike  Lvl 1  8 min Nu step pre 8' Upright >50  L7    Upright bike  Lvl 1  5 min post   Nustep 8' L7    UBE 6' reverse   Luis stretch      Bridges      Clams  2x15 L    SLR      LTR      Sit to stand Chair + foam  Stagger bias LLE  3 x 10 Chair + foam  Stagger bias LLE  3 x 10    Heel raises      Side steps      Standing hip abduction 3 x 10 each  1 hand support 3 x 10 each  1 hand support    Weight shifts  X15 3\">5\" hold on ea X15 3\">5\" hold on ea         Ther Activity                  Manual      PROM      Hip abduction                        Modalities                            "

## 2024-01-25 ENCOUNTER — OFFICE VISIT (OUTPATIENT)
Dept: PHYSICAL THERAPY | Facility: REHABILITATION | Age: 64
End: 2024-01-25
Payer: COMMERCIAL

## 2024-01-25 DIAGNOSIS — Z48.89 AFTERCARE FOLLOWING SURGERY: Primary | ICD-10-CM

## 2024-01-25 PROCEDURE — 97112 NEUROMUSCULAR REEDUCATION: CPT | Performed by: PHYSICAL THERAPIST

## 2024-01-25 PROCEDURE — 97110 THERAPEUTIC EXERCISES: CPT | Performed by: PHYSICAL THERAPIST

## 2024-01-25 NOTE — PROGRESS NOTES
"Daily Note     Today's date: 2024  Patient name: Agus Espinoza  : 1960  MRN: [id number]  Referring provider: Brendan Keane DO  Dx:   Encounter Diagnosis     ICD-10-CM    1. Aftercare following surgery  Z48.89           Start Time: 1145  Stop Time: 1230  Total time in clinic (min): 45 minutes    Subjective: Some L hip pain today. Overslept this morning which he never does.      Objective: See treatment diary below      Assessment: Patient demonstrating improvement in his sit to stands and hip abduction exercises. Still challenged with weight bearing tolerance on his LLE but this is improving over the last few sessions. Patient demonstrated fatigue post treatment, exhibited good technique with therapeutic exercises, and would benefit from continued PT to improve his gait and ambulation tolerance s/p L hip ORIF.      Plan: Continue per plan of care.      POC expires Unit limit Auth Expiration date PT/OT/ST + Visit Limit?    4 24                            Visit/Unit Tracking  AUTH Status:  Date 1/2 1/4 1/9 1/11 1/15 1/17 1/23 1/25    Approved Used 1 2 3 4 5 6 7 8     Remaining               Diagnosis: Fixation with anatomic reduction of intertrochanteric hip fracture on 10/16/23    Precautions:  PMH of HTN, hx of CVA ( - lacunar infarct, b/l MCA), HLD, GERD, seizure disorder (on medication)   Insurance: [employer]    1/15 1/17 1/23 1/25     Vitals         Patient Ed         Scar mobility         Getting out of bed                                                      Neuro Re-Ed         Gait training Hurdles  Forward + lateral  4\" and 12\"  4 laps each    W/ SPC   + supervision   Hurdles  Forward + lateral  4\" and 12\"  4 laps each  W/ SPC   + supervision    Walk around clinic, gait training education and awareness Hurdles  Forward + lateral  4\" and 12\"  4 laps each  W/ SPC   + supervision   Hurdles  Forward + lateral  4\" and 12\"  4 laps each  W/ SPC   + supervision     Balance training " "Foam  Romberg  1 min    Semi tandem (L bias)   2 x 1 min Foam  Romberg  1 min    Semi tandem (L bias)   2 x 1 min Foam  Romberg  1 min    Semi tandem (L bias)   2 x 1 min Semi tandem (L bias)   3 x 1 min     Step up   Step up taps  6\"  3 x 10 on R  L hand support    Step ups  LLE  BUE support  6\" L 3 x 10  > one hand>  No hand x5 ea Step up taps  6\"  3 x 10 on R  L hand support    Step ups  LLE  BUE support  6\" L 3 x 10  > one hand>  No hand x5 ea               Ther Ex         Aerobic conditioning Upright bike  Lvl 1  8 min Nu step pre 8' Upright >50  L7    Upright bike  Lvl 1  5 min post   Nustep 8' L7    UBE 6' reverse Upright bike  Lvl 7   8 min     Luis stretch         Bridges         Clams  2x15 L  2 x 15 L with PT assist for end range     SLR         LTR         Sit to stand Chair + foam  Stagger bias LLE  3 x 10 Chair + foam  Stagger bias LLE  3 x 10  Chair  2 x 12     Heel raises         Side steps         Standing hip abduction 3 x 10 each  1 hand support 3 x 10 each  1 hand support  Standing   Hip abduction with peach TB  3 x 12     Weight shifts  X15 3\">5\" hold on ea X15 3\">5\" hold on ea               Ther Activity                           Manual         PROM         Hip abduction                                    Modalities                                         "

## 2024-01-30 ENCOUNTER — OFFICE VISIT (OUTPATIENT)
Dept: PHYSICAL THERAPY | Facility: REHABILITATION | Age: 64
End: 2024-01-30
Payer: COMMERCIAL

## 2024-01-30 DIAGNOSIS — Z48.89 AFTERCARE FOLLOWING SURGERY: Primary | ICD-10-CM

## 2024-01-30 PROCEDURE — 97112 NEUROMUSCULAR REEDUCATION: CPT

## 2024-01-30 PROCEDURE — 97110 THERAPEUTIC EXERCISES: CPT

## 2024-01-30 NOTE — PROGRESS NOTES
"Daily Note     Today's date: 2024  Patient name: Agus Espinoza  : 1960  MRN: [id number]  Referring provider: Brendan Keane DO  Dx:   Encounter Diagnosis     ICD-10-CM    1. Aftercare following surgery  Z48.89                        Subjective: Brought in a picture so we can troubleshoot to use his bike which is on a .    Objective: See treatment diary below      Assessment: Practiced getting on and off the bike with the seat higher and a cross bar to simulate geeting on his  at home. Trainer is placed between boxes and a sofa, discussed putting against a wall for better support. Pt feels he could use a chair but was discouraged to do so. Patient demonstrated fatigue post treatment, exhibited good technique with therapeutic exercises, and would benefit from continued PT to improve his gait and ambulation tolerance s/p L hip ORIF.      Plan: Continue per plan of care.      POC expires Unit limit Auth Expiration date PT/OT/ST + Visit Limit?    4 24                            Visit/Unit Tracking  AUTH Status:  Date 1/2 1/4 1/9 1/11 1/15 1/17 1/23 1/25 1/30   Approved Used 1 2 3 4 5 6 7 8     Remaining               Diagnosis: Fixation with anatomic reduction of intertrochanteric hip fracture on 10/16/23    Precautions:  PMH of HTN, hx of CVA ( - lacunar infarct, b/l MCA), HLD, GERD, seizure disorder (on medication)   Insurance: [employer]    1/15 1/17 1/23 1/25 1/30    Vitals         Patient Ed         Scar mobility         Getting out of bed                                                      Neuro Re-Ed         Gait training Hurdles  Forward + lateral  4\" and 12\"  4 laps each    W/ SPC   + supervision   Hurdles  Forward + lateral  4\" and 12\"  4 laps each  W/ SPC   + supervision    Walk around clinic, gait training education and awareness Hurdles  Forward + lateral  4\" and 12\"  4 laps each  W/ SPC   + supervision   Hurdles  Forward + lateral  4\" and 12\"  4 laps " "each  W/ SPC   + supervision Hurdles  Forward + lateral  4\" and 12\"  4 laps each  W/ SPC   + supervision    + hurdles on tall side 19 inches    Balance training Foam  Romberg  1 min    Semi tandem (L bias)   2 x 1 min Foam  Romberg  1 min    Semi tandem (L bias)   2 x 1 min Foam  Romberg  1 min    Semi tandem (L bias)   2 x 1 min Semi tandem (L bias)   3 x 1 min Semi tandem (L bias)   3 x 1 min    Step up   Step up taps  6\"  3 x 10 on R  L hand support    Step ups  LLE  BUE support  6\" L 3 x 10  > one hand>  No hand x5 ea Step up taps  6\"  3 x 10 on R  L hand support    Step ups  LLE  BUE support  6\" L 3 x 10  > one hand>  No hand x5 ea               Ther Ex         Aerobic conditioning Upright bike  Lvl 1  8 min Nu step pre 8' Upright >50  L7    Upright bike  Lvl 1  5 min post   Nustep 8' L7    UBE 6' reverse Upright bike  Lvl 7   8 min L7 10' + practicing getting on and off    Luis stretch         Bridges         Clams  2x15 L  2 x 15 L with PT assist for end range     SLR         LTR         Sit to stand Chair + foam  Stagger bias LLE  3 x 10 Chair + foam  Stagger bias LLE  3 x 10  Chair  2 x 12 2x12    Heel raises         Side steps         Standing hip abduction 3 x 10 each  1 hand support 3 x 10 each  1 hand support  Standing   Hip abduction with peach TB  3 x 12 Standing   Hip abduction with peach TB  3 x 12       Weight shifts  X15 3\">5\" hold on ea X15 3\">5\" hold on ea  X15 3\">5\" hold on ea             Ther Activity                           Manual         PROM         Hip abduction                                    Modalities                                         "

## 2024-02-01 ENCOUNTER — EVALUATION (OUTPATIENT)
Dept: PHYSICAL THERAPY | Facility: REHABILITATION | Age: 64
End: 2024-02-01
Payer: COMMERCIAL

## 2024-02-01 DIAGNOSIS — Z48.89 AFTERCARE FOLLOWING SURGERY: Primary | ICD-10-CM

## 2024-02-01 PROCEDURE — 97164 PT RE-EVAL EST PLAN CARE: CPT | Performed by: PHYSICAL THERAPIST

## 2024-02-01 NOTE — PROGRESS NOTES
PT Re-Evaluation     Today's date: 2024  Patient name: Agus Espinoza  : 1960  MRN: [id number]  Referring provider: Brendan Keane DO  Dx:   Encounter Diagnosis     ICD-10-CM    1. Aftercare following surgery  Z48.89             Start Time: 0850  Stop Time: 0930  Total time in clinic (min): 40 minutes    Assessment  Assessment details: Patient is 63 y.o. male who has attended PT over the past 8 weeks for s/p L Fixation with anatomic reduction of intertrochanteric hip fracture on 10/16/23 due to fall. Subjectively, patient reports improvement in function in that he can walk longer distances with his SPC and short distances w/o SPC, he is able to go grocery shopping and to the diner, and he is able to use his stationtary bike at home. Objectively, patient presents improvement in his hip mobility, hip strength, 6MWT (738ft w/ RW to 982 ft w/ SPC), and TUG (14.6s w/ SPC to 8s w/ SPC). Patient c/o patellar pain at times which may be due to his decreased mobility/sedentary lifestyle and/or his impaired gait. Patient reports his gait deviations are due to pain, however his shuffling gait pattern may indicate underlying pre-existing issues. Patient also demonstrates poor insight to safety at times, such as ambulating on uneven terrain with ice and attempting to use his stationary bike without a stable surface to assist him in mounting the bike. This may also limit his progress in PT. Patient has met some of his goals. At this time patient will benefit from continuing skilled PT to continue to progress towards his goals.   Impairments: abnormal gait, abnormal or restricted ROM, impaired balance, impaired physical strength, lacks appropriate home exercise program and safety issue  Understanding of Dx/Px/POC: good   Prognosis: good    Goals  Within 6 weeks,   1. Patient will demonstrate L hip ER strength >=3/5. - Met  2. Patient will demonstrate L hip abduction strength >=3/5. - Met   3. Patient will  demonstrate at least 0* L hip ext mobility. - Met  4. Patient will be able to ambulate 10 minutes continuously with LRAD. - Met  5. Patient will be able to demonstrate proper gait mechanics with LRAD. - Progressing     Within 12 weeks or by discharge,   1. Patient will demonstrate L hip ER strength >=4/5. - Progressing  2. Patient will demonstrate L hip abduction strength >=4/5. - Progressing  3. Patient will be able to ambulate for >=10 minutes without AD. - Progressing  4. Patient will be able to stand for >= 10 minutes without AD without hip pain. - Met  5. Patient will be able to grocery shop independently. - Met  6. Patient will be able to clean his home independently. - Met  7. Patient will improve 6MWT by 200ft (IE: 738ft). - Met  8. Patient will improve TUG by 2s (IE: 14.6). - Met  9. Patient will improve 5x STS by 2s (IE: 16s) - Progressing    Plan  Patient would benefit from: skilled physical therapy  Planned modality interventions: cryotherapy and thermotherapy: hydrocollator packs  Planned therapy interventions: abdominal trunk stabilization, joint mobilization, manual therapy, activity modification, balance, balance/weight bearing training, neuromuscular re-education, body mechanics training, postural training, patient education, therapeutic activities, therapeutic exercise, functional ROM exercises, strengthening, stretching, transfer training, gait training and home exercise program  Frequency: 2x week  Duration in weeks: 8  Plan of Care beginning date: 2/1/2024  Plan of Care expiration date: 3/28/2024  Treatment plan discussed with: patient and referring physician        Subjective Evaluation    History of Present Illness  Mechanism of injury: Interval History: Since beginning PT, patient is able to walk short distances without his SPC. He reports he is able to grocery shop independently. He can clean his house. He is able to get into his tall car and drive. He got on his stationary   bike.    Patient goal: He would like to continue to work on improving his walking to get rid of the cane.    Initial Evaluation: Patient is s/p L Fixation with anatomic reduction of intertrochanteric hip fracture on 10/16/23 due to a fall. He was using a leaf blower and fell. Went to [facility] for 2 weeks for acute rehab. He went home with home PT.    PLOF: lives alone, would be able to walk independently without AD, had a stroke and reports he had lost function of BLE but it recovered    Limitations: cleaning, grocery shopping  Patient Goals  Patient goals for therapy: improved balance, decreased pain, increased strength, independence with ADLs/IADLs and increased motion    Pain  Current pain ratin  Location: L hip  Aggravating factors: walking    Social Support  Steps to enter house: yes  5  Stairs in house: yes   8  Lives in: multiple-level home  Lives with: alone    Employment status: not working (retired)        Objective     Static Posture     Comments  R hand tremor    Passive Range of Motion   Left Hip   Flexion: 115 degrees with pain  Extension: 10 degrees   External rotation (90/90): 40 degrees   Internal rotation (90/90): 25 degrees     Right Hip   Flexion: 123 degrees   Extension: 10 degrees   External rotation (90/90): 40 degrees   Internal rotation (90/90): 35 degrees     Strength/Myotome Testing     Left Hip   Planes of Motion   Abduction: 3  External rotation: 3+    Right Hip   Planes of Motion   Abduction: 3  External rotation: 3    Ambulation     Comments   Gait: mild knee flexion during stance, no L hip extension during terminal stance, shuffling gait  W/ SPC - normal stride lengths  W/o SPC - short stride lengths     General Comments:      Hip Comments   L hip incision: c/d/I, 3 incisions with mild increase in tissue density      Outcome Measure    6MWT 738ft w/  ft   TUG 14.6s with RW 8s w/ SPC; 11.7s w/o SPC   5x STS 16s 17.8 s       Flowsheet Rows      Flowsheet Row Most  "Recent Value   PT/OT G-Codes    Current Score 67   Projected Score 74                 POC expires Unit limit Auth Expiration date PT/OT/ST + Visit Limit?   3/28/23 4 12/31/24                            Visit/Unit Tracking  AUTH Status:  Date 1/17 1/23 1/25 1/30 2/1   Approved Used 6 7 8 9 10    Remaining           Diagnosis: Fixation with anatomic reduction of intertrochanteric hip fracture on 10/16/23    Precautions:  PMH of HTN, hx of CVA (2021 - lacunar infarct, b/l MCA), HLD, GERD, seizure disorder (on medication)   Insurance: [employer]    1/15 1/17 1/23 1/25 1/30 2/1    Vitals      Re-Eval *gait training, increase L hip extension during stance   Patient Ed          Scar mobility          Getting out of bed                                                            Neuro Re-Ed          Gait training Hurdles  Forward + lateral  4\" and 12\"  4 laps each    W/ SPC   + supervision   Hurdles  Forward + lateral  4\" and 12\"  4 laps each  W/ SPC   + supervision    Walk around clinic, gait training education and awareness Hurdles  Forward + lateral  4\" and 12\"  4 laps each  W/ SPC   + supervision   Hurdles  Forward + lateral  4\" and 12\"  4 laps each  W/ SPC   + supervision Hurdles  Forward + lateral  4\" and 12\"  4 laps each  W/ SPC   + supervision    + hurdles on tall side 19 inches     Balance training Foam  [place]berg  1 min    Semi tandem (L bias)   2 x 1 min Foam  [place]  1 min    Semi tandem (L bias)   2 x 1 min Foam  [place]  1 min    Semi tandem (L bias)   2 x 1 min Semi tandem (L bias)   3 x 1 min Semi tandem (L bias)   3 x 1 min     Step up   Step up taps  6\"  3 x 10 on R  L hand support    Step ups  LLE  BUE support  6\" L 3 x 10  > one hand>  No hand x5 ea Step up taps  6\"  3 x 10 on R  L hand support    Step ups  LLE  BUE support  6\" L 3 x 10  > one hand>  No hand x5 ea                 Ther Ex          Aerobic conditioning Upright bike  Lvl 1  8 min Nu step pre 8' Upright >50  L7    Upright bike  Lvl 1  5 min post   " "Nustep 8' L7    UBE 6' reverse Upright bike  Lvl 7   8 min L7 10' + practicing getting on and off Upright bike  L5  6 min    Luis stretch          Bridges          Clams  2x15 L  2 x 15 L with PT assist for end range      SLR          LTR          Sit to stand Chair + foam  Stagger bias LLE  3 x 10 Chair + foam  Stagger bias LLE  3 x 10  Chair  2 x 12 2x12     Heel raises          Side steps          Standing hip abduction 3 x 10 each  1 hand support 3 x 10 each  1 hand support  Standing   Hip abduction with peach TB  3 x 12 Standing   Hip abduction with peach TB  3 x 12        Weight shifts  X15 3\">5\" hold on ea X15 3\">5\" hold on ea  X15 3\">5\" hold on ea               Ther Activity                              Manual          PROM          Hip abduction                                        Modalities                                          "

## 2024-02-05 ENCOUNTER — OFFICE VISIT (OUTPATIENT)
Dept: PHYSICAL THERAPY | Facility: REHABILITATION | Age: 64
End: 2024-02-05
Payer: COMMERCIAL

## 2024-02-05 DIAGNOSIS — Z48.89 AFTERCARE FOLLOWING SURGERY: Primary | ICD-10-CM

## 2024-02-05 PROCEDURE — 97530 THERAPEUTIC ACTIVITIES: CPT | Performed by: PHYSICAL THERAPIST

## 2024-02-05 PROCEDURE — 97110 THERAPEUTIC EXERCISES: CPT | Performed by: PHYSICAL THERAPIST

## 2024-02-05 NOTE — PROGRESS NOTES
"Daily Note     Today's date: 2024  Patient name: Agus Espinoza  : 1960  MRN: [id number]  Referring provider: Brendan Keane DO  Dx:   Encounter Diagnosis     ICD-10-CM    1. Aftercare following surgery  Z48.89           Start Time: 1100  Stop Time: 1145  Total time in clinic (min): 45 minutes    Subjective: Went on long walk yesterday. Thinks he went too far and he was having b/l knee cap pain. He tried his bike for only 1 minute.       Objective: See treatment diary below      Assessment: Focused on gait training today to improve his hip extension during stance. Patient challenged by his limited hip extension mobility and strength. His gait deviations improved with this practice and improve his knee pain. Patient demonstrated fatigue post treatment, exhibited good technique with therapeutic exercises, and would benefit from continued PT to improve his gait and safety from falls.      Plan: Continue per plan of care.      POC expires Unit limit Auth Expiration date PT/OT/ST + Visit Limit?   3/28/23 4 24                            Visit/Unit Tracking  AUTH Status:  Date      Approved Used 6 7 8 9 10 11      Remaining              Diagnosis: Fixation with anatomic reduction of intertrochanteric hip fracture on 10/16/23    Precautions:  PMH of HTN, hx of CVA ( - lacunar infarct, b/l MCA), HLD, GERD, seizure disorder (on medication)   Insurance: [employer]          Vitals   Re-Eval  *step up     Patient Ed          Scar mobility          Getting out of bed                                                            Neuro Re-Ed          Gait training Hurdles  Forward + lateral  4\" and 12\"  4 laps each  W/ SPC   + supervision Hurdles  Forward + lateral  4\" and 12\"  4 laps each  W/ SPC   + supervision    + hurdles on tall side 19 inches  W/ railing support  L hip extension during stance, part task gait  15 min    W/ SPC  5 min       Balance training Semi " "tandem (L bias)   3 x 1 min Semi tandem (L bias)   3 x 1 min        Step up                     Ther Ex          Aerobic conditioning Upright bike  Lvl 7   8 min L7 10' + practicing getting on and off Upright bike  L5  6 min Upright bike  L5  8 min      Luis stretch          Bridges          Clams 2 x 15 L with PT assist for end range         SLR          LTR          Sit to stand Chair  2 x 12 2x12  2 x 10    Staggered L bias  10x      Heel raises          Side steps    [place] TB  3 laps x 12 ft      Standing hip abduction Standing   Hip abduction with peach TB  3 x 12 Standing   Hip abduction with peach TB  3 x 12     Standing   Hip abduction with peach TB  3 x 12 ea      Weight shifts  X15 3\">5\" hold on ea                  Ther Activity                              Manual          PROM          Hip abduction                                        Modalities                                          "

## 2024-02-06 NOTE — PROGRESS NOTES
"Daily Note     Today's date: 2024  Patient name: Agus Espinoza  : 1960  MRN: [id number]  Referring provider: Brendan Keane DO  Dx:   Encounter Diagnosis     ICD-10-CM    1. Aftercare following surgery  Z48.89             Start Time: 1140  Stop Time: 1230  Total time in clinic (min): 50 minutes    Subjective: Having some L ankle pain.       Objective: See treatment diary below      Assessment: Continued gait training to improve his hip extension during stance. Cuing him to ambulate this way improved his hip pain. Progressed step ups and heel raises which challenged his strength and endurance. Patient demonstrated fatigue post treatment, exhibited good technique with therapeutic exercises, and would benefit from continued PT to improve his gait and safety from falls.      Plan: Continue per plan of care.      POC expires Unit limit Auth Expiration date PT/OT/ST + Visit Limit?   3/28/23 4 24                            Visit/Unit Tracking  AUTH Status:  Date     Approved Used 6 7 8 9 10 11 12     Remaining              Diagnosis: Fixation with anatomic reduction of intertrochanteric hip fracture on 10/16/23    Precautions:  PMH of HTN, hx of CVA ( - lacunar infarct, b/l MCA), HLD, GERD, seizure disorder (on medication)   Insurance: [employer]         Vitals   Re-Eval       Patient Ed          Scar mobility          Getting out of bed                              Neuro Re-Ed          Gait training Hurdles  Forward + lateral  4\" and 12\"  4 laps each  W/ SPC   + supervision Hurdles  Forward + lateral  4\" and 12\"  4 laps each  W/ SPC   + supervision    + hurdles on tall side 19 inches  W/ railing support  L hip extension during stance, part task gait  15 min    W/ SPC  5 min  W/ SPC support  L hip extension during stance, part task gait  5 min     Balance training Semi tandem (L bias)   3 x 1 min Semi tandem (L bias)   3 x 1 min   L SLS  4 x " "30s  1 finger support on each hand     Step up      Railing support  Forward 4\"  2 x 10    Railing support  Lateral 4\"  2 x 10               Ther Ex          Aerobic conditioning Upright bike  Lvl 7   8 min L7 10' + practicing getting on and off Upright bike  L5  6 min Upright bike  L5  8 min Upright bike  L7  8 min     Luis stretch          Bridges          Clams 2 x 15 L with PT assist for end range         SLR          LTR          Sit to stand Chair  2 x 12 2x12  2 x 10    Staggered L bias  10x Staggered L bias  3 x 10     Heel raises     Eccentric L  3 x 10     Side steps    [place] TB  3 laps x 12 ft      Standing hip abduction Standing   Hip abduction with peach TB  3 x 12 Standing   Hip abduction with peach TB  3 x 12     Standing   Hip abduction with peach TB  3 x 12 ea      Weight shifts  X15 3\">5\" hold on ea                  Ther Activity                              Manual          PROM          Hip abduction                                        Modalities                                          "

## 2024-02-08 ENCOUNTER — OFFICE VISIT (OUTPATIENT)
Dept: PHYSICAL THERAPY | Facility: REHABILITATION | Age: 64
End: 2024-02-08
Payer: COMMERCIAL

## 2024-02-08 DIAGNOSIS — Z48.89 AFTERCARE FOLLOWING SURGERY: Primary | ICD-10-CM

## 2024-02-08 PROCEDURE — 97110 THERAPEUTIC EXERCISES: CPT | Performed by: PHYSICAL THERAPIST

## 2024-02-08 PROCEDURE — 97112 NEUROMUSCULAR REEDUCATION: CPT | Performed by: PHYSICAL THERAPIST

## 2024-02-13 ENCOUNTER — APPOINTMENT (OUTPATIENT)
Dept: PHYSICAL THERAPY | Facility: REHABILITATION | Age: 64
End: 2024-02-13
Payer: COMMERCIAL

## 2024-02-13 ENCOUNTER — OFFICE VISIT (OUTPATIENT)
Dept: PHYSICAL THERAPY | Facility: REHABILITATION | Age: 64
End: 2024-02-13
Payer: COMMERCIAL

## 2024-02-13 DIAGNOSIS — Z48.89 AFTERCARE FOLLOWING SURGERY: Primary | ICD-10-CM

## 2024-02-13 PROCEDURE — 97530 THERAPEUTIC ACTIVITIES: CPT | Performed by: PHYSICAL THERAPIST

## 2024-02-13 PROCEDURE — 97110 THERAPEUTIC EXERCISES: CPT | Performed by: PHYSICAL THERAPIST

## 2024-02-13 NOTE — PROGRESS NOTES
"Daily Note     Today's date: 2024  Patient name: Agus Espinoza  : 1960  MRN: [id number]  Referring provider: Brendan Keane DO  Dx:   Encounter Diagnosis     ICD-10-CM    1. Aftercare following surgery  Z48.89             Start Time: 1500  Stop Time: 1545  Total time in clinic (min): 45 minutes    Subjective: Walked for approx 1 hr. Had some knee and ankle pain.       Objective: See treatment diary below      Assessment: Patient's gait and ambulation is gradually improving. Patient challenged with stepping over hurdles and step up exercises. Patient benefited from lower step height for these exercises. Patient demonstrated fatigue post treatment, exhibited good technique with therapeutic exercises, and would benefit from continued PT to improve his gait and safety from falls.      Plan: Continue per plan of care.      POC expires Unit limit Auth Expiration date PT/OT/ST + Visit Limit?   3/28/23 4 24                            Visit/Unit Tracking  AUTH Status:  Date      Approved Used 8 9 10 11 12 13      Remaining              Diagnosis: Fixation with anatomic reduction of intertrochanteric hip fracture on 10/16/23    Precautions:  PMH of HTN, hx of CVA ( - lacunar infarct, b/l MCA), HLD, GERD, seizure disorder (on medication)   Insurance: [employer]        Vitals   Re-Eval       Patient Ed          Scar mobility          Getting out of bed                              Neuro Re-Ed          Gait training Hurdles  Forward + lateral  4\" and 12\"  4 laps each  W/ SPC   + supervision Hurdles  Forward + lateral  4\" and 12\"  4 laps each  W/ SPC   + supervision    + hurdles on tall side 19 inches  W/ railing support  L hip extension during stance, part task gait  15 min    W/ SPC  5 min  W/ SPC support  L hip extension during stance, part task gait  5 min W/ SPC and without SPC  L hip extension during stance, part task gait  5 min    Balance " "training Semi tandem (L bias)   3 x 1 min Semi tandem (L bias)   3 x 1 min   L SLS  4 x 30s  1 finger support on each hand     Step up      Railing support  Forward 4\"  2 x 10    Railing support  Lateral 4\"  2 x 10 Railing support  Forward 4\"  3 x 10    Railing support  Lateral 2\"  3 x 10    [place]      High knee lateral step over 12\" [place]  2 x 10              Ther Ex          Aerobic conditioning Upright bike  Lvl 7   8 min L7 10' + practicing getting on and off Upright bike  L5  6 min Upright bike  L5  8 min Upright bike  L7  8 min Upright bike  L7  8 min    Luis stretch          Bridges          Clams 2 x 15 L with PT assist for end range         SLR          LTR          Sit to stand Chair  2 x 12 2x12  2 x 10    Staggered L bias  10x Staggered L bias  3 x 10 Staggered L bias  3 x 10    Heel raises     Eccentric L  3 x 10 Eccentric L  3 x 10    B/l on incline board lvl 2  2 x 15    Side steps    [place] TB  3 laps x 12 ft  [place] TB  4 laps x 12 ft    Standing hip abduction Standing   Hip abduction with peach TB  3 x 12 Standing   Hip abduction with peach TB  3 x 12     Standing   Hip abduction with peach TB  3 x 12 ea  Standing   Hip abduction with peach TB  3 x 10 ea    Weight shifts  X15 3\">5\" hold on ea                  Ther Activity                              Manual          PROM          Hip abduction                                        Modalities                                          "

## 2024-02-15 ENCOUNTER — OFFICE VISIT (OUTPATIENT)
Dept: PHYSICAL THERAPY | Facility: REHABILITATION | Age: 64
End: 2024-02-15
Payer: COMMERCIAL

## 2024-02-15 DIAGNOSIS — Z48.89 AFTERCARE FOLLOWING SURGERY: Primary | ICD-10-CM

## 2024-02-15 PROCEDURE — 97112 NEUROMUSCULAR REEDUCATION: CPT

## 2024-02-15 PROCEDURE — 97110 THERAPEUTIC EXERCISES: CPT

## 2024-02-15 NOTE — PROGRESS NOTES
"Daily Note     Today's date: 2/15/2024  Patient name: Agus Espinoza  : 1960  MRN: [id number]  Referring provider: Brendan Keane DO  Dx:   Encounter Diagnosis     ICD-10-CM    1. Aftercare following surgery  Z48.89                        Subjective: Fell when walking into a new diner to him [place] diner, his cane got caught.  Pt denies hitting his head did fall on his L side.  Booked a follow up with his surgeon to make sure there is no break. Is able to get out of the shower without using the chair \"I can't believe it\".    Objective: See treatment diary below      Assessment: Able to work through entire session w/o aggravating any symptoms.  More discomfort after using the UBE. Encouraged to be mindful of his posture and also to avoid rushing. Appears to be making gains with improved ease of getting in and out of the shower.  Patient demonstrated fatigue post treatment, exhibited good technique with therapeutic exercises, and would benefit from continued PT to improve his gait and safety from falls.      Plan: Continue per plan of care.      POC expires Unit limit Auth Expiration date PT/OT/ST + Visit Limit?   3/28/23 4 24                            Visit/Unit Tracking  AUTH Status:  Date 1/25 1/30 2/1 2/5 2/8 2/13 2/15    Approved Used 8 9 10 11 12 13 14     Remaining              Diagnosis: Fixation with anatomic reduction of intertrochanteric hip fracture on 10/16/23    Precautions:  PMH of HTN, hx of CVA ( - lacunar infarct, b/l MCA), HLD, GERD, seizure disorder (on medication)   Insurance: [employer]    1/25 1/30 2/1 2/5 2/8 2/13 2/15   Vitals   Re-Eval       Patient Ed          Scar mobility          Getting out of bed                              Neuro Re-Ed          Gait training Hurdles  Forward + lateral  4\" and 12\"  4 laps each  W/ SPC   + supervision Hurdles  Forward + lateral  4\" and 12\"  4 laps each  W/ SPC   + supervision    + hurdles on tall side 19 inches  W/ railing " "support  L hip extension during stance, part task gait  15 min    W/ SPC  5 min  W/ SPC support  L hip extension during stance, part task gait  5 min W/ SPC and without SPC  L hip extension during stance, part task gait  5 min    Balance training Semi tandem (L bias)   3 x 1 min Semi tandem (L bias)   3 x 1 min   L SLS  4 x 30s  1 finger support on each hand     Step up      Railing support  Forward 4\"  2 x 10    Railing support  Lateral 4\"  2 x 10 Railing support  Forward 4\"  3 x 10    Railing support  Lateral 2\"  3 x 10 Railing support  Forward 4\"  3 x 10    Railing support  Lateral 2\"  3 x 10   [place]      High knee lateral step over 12\" [place]  2 x 10 High knee lateral step over 12\" [place]  2 x 10             Ther Ex          Aerobic conditioning Upright bike  Lvl 7   8 min L7 10' + practicing getting on and off Upright bike  L5  6 min Upright bike  L5  8 min Upright bike  L7  8 min Upright bike  L7  8 min L7 8'   UBE       L1.5 4' reverse   Luis stretch          Bridges          Clams 2 x 15 L with PT assist for end range         SLR          LTR          Sit to stand Chair  2 x 12 2x12  2 x 10    Staggered L bias  10x Staggered L bias  3 x 10 Staggered L bias  3 x 10 2x10   Heel raises     Eccentric L  3 x 10 Eccentric L  3 x 10    B/l on incline board lvl 2  2 x 15    Side steps    [place] TB  3 laps x 12 ft  [place] TB  4 laps x 12 ft [place] TB  4 laps x 12 ft   Standing hip abduction Standing   Hip abduction with peach TB  3 x 12 Standing   Hip abduction with peach TB  3 x 12     Standing   Hip abduction with pe[place] TB  3 x 12 ea  Standing   Hip abduction with peach TB  3 x 10 ea Standing   Hip abduction & extension  with [place] TB  3 x 10 ea on 4\" step   Weight shifts  X15 3\">5\" hold on ea                  Ther Activity                              Manual          PROM          Hip abduction                                        Modalities                                          "

## 2024-02-19 NOTE — PROGRESS NOTES
Daily Note     Today's date: 2024  Patient name: Agus Espinoza  : 1960  MRN: [id number]  Referring provider: Brendan Keane DO  Dx:   Encounter Diagnosis     ICD-10-CM    1. Aftercare following surgery  Z48.89           Start Time: 930  Stop Time: 1015  Total time in clinic (min): 45 minutes    Subjective: Seeing ortho later today, possible XR after the fall last week. He thinks he has a bruise on his hip. Liked the UBE from last PT session.       Objective: See treatment diary below      Assessment: Practiced floor to stand transfer with and without UE support. Patient able to complete without PT assist although he has increased reliance on UE on his thighs to push up vs using his legs in a lunge form to stand up. Added lunges which was difficult for the patient to complete with proper form. Patient will need more practice with this. Patient still with strength deficits with steps and will benefit from continuing to practice this. Patient demonstrated fatigue post treatment, exhibited good technique with therapeutic exercises, and would benefit from continued PT to improve his gait and safety from falls.      Plan: Continue per plan of care.      POC expires Unit limit Auth Expiration date PT/OT/ST + Visit Limit?   3/28/23 4 24                            Visit/Unit Tracking  AUTH Status:  Date 1/25 1/30 2/1 2/5 2/8 2/13 2/15 2/20    Approved Used 8 9 10 11 12 13 14 15     Remaining               Diagnosis: Fixation with anatomic reduction of intertrochanteric hip fracture on 10/16/23    Precautions:  PMH of HTN, hx of CVA ( - lacunar infarct, b/l MCA), HLD, GERD, seizure disorder (on medication)   Insurance: [employer]    2/1 2/5 2/8 2/13 2/15 2/20    Vitals Re-Eval         Patient Ed          Scar mobility          Getting out of bed                              Neuro Re-Ed          Gait training  W/ railing support  L hip extension during stance, part task gait  15 min    W/ SPC  5  "min  W/ SPC support  L hip extension during stance, part task gait  5 min W/ SPC and without SPC  L hip extension during stance, part task gait  5 min      Balance training   L SLS  4 x 30s  1 finger support on each hand       Step up    Railing support  Forward 4\"  2 x 10    Railing support  Lateral 4\"  2 x 10 Railing support  Forward 4\"  3 x 10    Railing support  Lateral 2\"  3 x 10 Railing support  Forward 4\"  3 x 10    Railing support  Lateral 2\"  3 x 10 Railing support  Forward 4\"  3 x 10    Railing support  Lateral 2\"  3 x 10    Elmira    High knee lateral step over 12\" elmira  2 x 10 High knee lateral step over 12\" elmira  2 x 10               Ther Ex          Aerobic conditioning Upright bike  L5  6 min Upright bike  L5  8 min Upright bike  L7  8 min Upright bike  L7  8 min L7 8' Upright bike  8 min      UBE     L1.5 4' reverse     Luis stretch          Bridges          Clams          SLR          LTR          Sit to stand  2 x 10    Staggered L bias  10x Staggered L bias  3 x 10 Staggered L bias  3 x 10 2x10     Heel raises   Eccentric L  3 x 10 Eccentric L  3 x 10    B/l on incline board lvl 2  2 x 15      Side steps  [place] TB  3 laps x 12 ft  [place] TB  4 laps x 12 ft [place] TB  4 laps x 12 ft     Standing hip abduction  Standing   Hip abduction with peach TB  3 x 12 ea  Standing   Hip abduction with peach TB  3 x 10 ea Standing   Hip abduction & extension  with peach TB  3 x 10 ea on 4\" step     Speed skater      [place] TB  3 x 10 ea    Lunges      3 x 8 ea with railing support              Ther Activity          Floor to stand transfer      5' with and without chair              Manual          PROM          Hip abduction                                        Modalities                                          "

## 2024-02-20 ENCOUNTER — OFFICE VISIT (OUTPATIENT)
Dept: OBGYN CLINIC | Facility: HOSPITAL | Age: 64
End: 2024-02-20
Payer: COMMERCIAL

## 2024-02-20 ENCOUNTER — HOSPITAL ENCOUNTER (OUTPATIENT)
Dept: RADIOLOGY | Facility: HOSPITAL | Age: 64
Discharge: HOME | End: 2024-02-20
Attending: ORTHOPAEDIC SURGERY
Payer: COMMERCIAL

## 2024-02-20 ENCOUNTER — OFFICE VISIT (OUTPATIENT)
Dept: PHYSICAL THERAPY | Facility: REHABILITATION | Age: 64
End: 2024-02-20
Payer: COMMERCIAL

## 2024-02-20 DIAGNOSIS — Z48.89 AFTERCARE FOLLOWING SURGERY: ICD-10-CM

## 2024-02-20 DIAGNOSIS — Z48.89 AFTERCARE FOLLOWING SURGERY: Primary | ICD-10-CM

## 2024-02-20 DIAGNOSIS — S72.142D INTERTROCHANTERIC FRACTURE OF LEFT HIP, CLOSED, WITH ROUTINE HEALING, SUBSEQUENT ENCOUNTER: Primary | ICD-10-CM

## 2024-02-20 PROCEDURE — 73502 X-RAY EXAM HIP UNI 2-3 VIEWS: CPT

## 2024-02-20 PROCEDURE — 99213 OFFICE O/P EST LOW 20 MIN: CPT | Performed by: ORTHOPAEDIC SURGERY

## 2024-02-20 PROCEDURE — 97110 THERAPEUTIC EXERCISES: CPT | Performed by: PHYSICAL THERAPIST

## 2024-02-20 NOTE — PROGRESS NOTES
ASSESSMENT/PLAN:    Assessment:   64 y.o. male  S/P L cephalomedullary nail (DOS 10/6/23) for left intertrochanteric hip fracture     Plan:  Today I had a long discussion with the caregiver regarding the diagnosis and plan moving forward.  Agus sustained a left hip contusion with negative Xray for implant issues or refracture.  He can continue to weight-bear and participate in activities to tolerance.  Continue with PT as he already is.  Use cane as needed.  He has an appointment scheduled for mid April and we will see him there for his routine postop visits.    The above diagnosis and plan has been dicussed with the patient and caregiver. They verbalized an understanding and will follow up accordingly.       _____________________________________________________    SUBJECTIVE:  Agus Espinoza is a 64 y.o. male who presents today for recheck of his left hip.  About 4 to 5 days ago he fell [place] diner onto his left hip.  He did have immediate pain in his left hip but was able to ambulate without a limp.  He has since returned to physical therapy and overall feels that he is doing very well.  He just wants to make sure today that there is nothing that was disrupted with his fall.    PAST MEDICAL HISTORY:  Past Medical History:   Diagnosis Date    Arthritis     Hypertension     Seizures (HCC)     Stroke (HCC)        PAST SURGICAL HISTORY:  Past Surgical History:   Procedure Laterality Date    ELBOW SURGERY  2017    FL OPTX FEM SHFT FX W/INSJ IMED IMPLT W/WO SCREW Left 10/16/2023    Procedure: INSERTION NAIL IM FEMUR ANTEGRADE (TROCHANTERIC);  Surgeon: Brendan Keane DO;  Location: [facility];  Service: Orthopedics       FAMILY HISTORY:  Family History   Problem Relation Age of Onset    Dementia Mother     Alzheimer's disease Mother     Stroke Father     Hyperlipidemia Brother        SOCIAL HISTORY:  Social History     Tobacco Use    Smoking status: Never    Smokeless tobacco: Never   Vaping Use    Vaping  status: Never Used   Substance Use Topics    Alcohol use: Yes     Comment: occasional    Drug use: No       MEDICATIONS:    Current Outpatient Medications:     alendronate (FOSAMAX) 70 mg tablet, Take 1 tablet (70 mg total) by mouth every 7 days, Disp: 4 tablet, Rfl: 5    aspirin-dipyridamole (AGGRENOX)  mg per 12 hr capsule, Take 1 capsule by mouth 2 (two) times a day, Disp: 180 capsule, Rfl: 3    Calcium-Magnesium-Vitamin D (CALCIUM MAGNESIUM PO), Take by mouth, Disp: , Rfl:     carBAMazepine (TEGretol) 200 mg tablet, TAKE 3 TABLETS IN THE AM, 1 TABLETS AT LUNCH, 2 TABLETS AT DINNER, AND 2 TABLETS AT BEDTIME (Patient taking differently: TAKE 3 TABLETS IN THE AM, 2 TABLETS AT DINNER, AND 2 TABLETS AT BEDTIME  10/31/23 - no longer taking 1 tablet at lunch.), Disp: 240 tablet, Rfl: 5    co-enzyme Q-10 30 MG capsule, Take 100 mg by mouth daily  , Disp: , Rfl:     docusate sodium (COLACE) 100 mg capsule, Take 1 capsule (100 mg total) by mouth 2 (two) times a day, Disp: , Rfl: 0    levETIRAcetam (Keppra) 500 mg tablet, Take 1 tablet (500 mg total) by mouth every 12 (twelve) hours, Disp: 180 tablet, Rfl: 1    Lidocaine 4 % PTCH, Place 1 patch on the skin daily, Disp: , Rfl:     NON FORMULARY, Super Beets, Disp: , Rfl:     polyethylene glycol (MIRALAX) 17 g packet, Take 17 g by mouth daily Do not start before October 20, 2023., Disp: , Rfl: 0    rosuvastatin (CRESTOR) 10 MG tablet, Take 1 tablet (10 mg total) by mouth daily, Disp: 90 tablet, Rfl: 1    VALERIAN ROOT PO, Use, Disp: , Rfl:     Vitamin Mixture (VITAMIN E COMPLETE PO), Take 1 tablet by mouth daily , Disp: , Rfl:     LORazepam (ATIVAN) 0.5 mg tablet, TAKE 1 TABLET BY MOUTH ONCE DAILY IF NEEDED for seizure.  Limit of 1 in 24 hours (Patient not taking: Reported on 2/20/2024), Disp: 10 tablet, Rfl: 0    ALLERGIES:  Allergies   Allergen Reactions    Meperidine Seizures    Amoxicillin     Azithromycin Seizures    Other Sneezing     Dust     Pollen Extract  Sneezing       REVIEW OF SYSTEMS:  ROS is negative other than that noted in the HPI.  Constitutional: Negative for fatigue and fever.   HENT: Negative for sore throat.    Respiratory: Negative for shortness of breath.    Cardiovascular: Negative for chest pain.   Gastrointestinal: Negative for abdominal pain.   Endocrine: Negative for cold intolerance and heat intolerance.   Genitourinary: Negative for flank pain.   Musculoskeletal: Negative for back pain.   Skin: Negative for rash.   Allergic/Immunologic: Negative for immunocompromised state.   Neurological: Negative for dizziness.   Psychiatric/Behavioral: Negative for agitation.         _____________________________________________________  PHYSICAL EXAMINATION:  General/Constitutional: NAD, well developed, well nourished  HENT: Normocephalic, atraumatic  CV: Intact distal pulses, regular rate  Resp: No respiratory distress or labored breathing  Lymphatic: No lymphadenopathy palpated  Neuro: Alert and  awake  Psych: Normal mood  Skin: Warm, dry, no rashes, no erythema      MUSCULOSKELETAL EXAMINATION:  Musculoskeletal: Left Hip     Skin Intact, healed incision   TTP none    Special Tests:  (-) Stinchfield. (-) Straight leg raise.  Negative logroll negative heel strike  Alignment:  Normal lumbar alignment. Normal resting hip posture. Normal knee alignment. Normal ankle alignment.  Ambulates well around the room with assistance from a cane.  There is a very mild Trendelenburg gait but otherwise minimal pain       LE NV Exam: +2 DP/PT pulses bilaterally  Sensation intact to light touch L2-S1 bilaterally     Bilateral Knee and ankle ROM demonstrates no pain actively or passively    No calf tenderness to palpation bilaterally      _____________________________________________________  STUDIES REVIEWED:  Imaging studies interpreted by Dr. Keane and demonstrate multiple views of the left hip demonstrates maintained alignment with interval healing of intertrochanteric  hip fracture.  There is no loosening or hardware complication.      PROCEDURES PERFORMED:    No Procedures performed today

## 2024-02-23 ENCOUNTER — OFFICE VISIT (OUTPATIENT)
Dept: PHYSICAL THERAPY | Facility: REHABILITATION | Age: 64
End: 2024-02-23
Payer: COMMERCIAL

## 2024-02-23 DIAGNOSIS — Z48.89 AFTERCARE FOLLOWING SURGERY: Primary | ICD-10-CM

## 2024-02-23 PROCEDURE — 97110 THERAPEUTIC EXERCISES: CPT

## 2024-02-23 NOTE — PROGRESS NOTES
"Daily Note     Today's date: 2024  Patient name: Agus Espinoza  : 1960  MRN: [id number]  Referring provider: Brendan Keane DO  Dx:   Encounter Diagnosis     ICD-10-CM    1. Aftercare following surgery  Z48.89             Start Time: 945  Stop Time: 1030  Total time in clinic (min): 45 minutes    Subjective: Ortho was pleased with his progress. Reassured him things would heal but take time \"you need to stretch\".     Objective: See treatment diary below      Assessment: Added different exercises to his HEP. Pt also educated on the neuroscience of pain t/o session. Challenged to get into prone position but was able to independently.   Patient demonstrated fatigue post treatment, exhibited good technique with therapeutic exercises, and would benefit from continued PT to improve his gait and safety from falls.    Access Code: [email or web address]  URL: [email or web address]  Date: 2024  Prepared by: Liz Crocker  - Standing Hip Circles with Ankle Floats  - 1 x daily - 7 x weekly - 2 sets - 10 reps - 5 hold  - Prone Hip Extension with Bent Knee  - 1 x daily - 7 x weekly - 2 sets - 10 reps - 5 hold  - Prone Press Up On Elbows  - 1 x daily - 7 x weekly - 1 sets - 2 reps - 5 hold  - Hip Flexor Stretch at Edge of Bed  - 1 x daily - 7 x weekly - 1 sets - 3 reps - 20 hold  - Hip Flexor Stretch on Step  - 1 x daily - 7 x weekly - 1 sets - 3 reps - 20 hold      Plan: Continue per plan of care.      POC expires Unit limit Auth Expiration date PT/OT/ST + Visit Limit?   3/28/23 4 24                            Visit/Unit Tracking  AUTH Status:  Date 1/25 1/30 2/1 2/5 2/8 2/13 2/15 2/20 2/23   Approved Used 8 9 10 11 12 13 14 15 16    Remaining               Diagnosis: Fixation with anatomic reduction of intertrochanteric hip fracture on 10/16/23    Precautions:  PMH of HTN, hx of CVA ( - lacunar infarct, b/l MCA), HLD, GERD, seizure disorder (on medication)   Insurance: [employer]     " "2/5 2/8 2/13 2/15 2/20 2/23   Vitals Re-Eval         Patient Ed          Scar mobility          Getting out of bed                              Neuro Re-Ed          Gait training  W/ railing support  L hip extension during stance, part task gait  15 min    W/ SPC  5 min  W/ SPC support  L hip extension during stance, part task gait  5 min W/ SPC and without SPC  L hip extension during stance, part task gait  5 min      Balance training   L SLS  4 x 30s  1 finger support on each hand       Step up    Railing support  Forward 4\"  2 x 10    Railing support  Lateral 4\"  2 x 10 Railing support  Forward 4\"  3 x 10    Railing support  Lateral 2\"  3 x 10 Railing support  Forward 4\"  3 x 10    Railing support  Lateral 2\"  3 x 10 Railing support  Forward 4\"  3 x 10    Railing support  Lateral 2\"  3 x 10    Elmira    High knee lateral step over 12\" elmira  2 x 10 High knee lateral step over 12\" elmira  2 x 10               Ther Ex          Aerobic conditioning Upright bike  L5  6 min Upright bike  L5  8 min Upright bike  L7  8 min Upright bike  L7  8 min L7 8' Upright bike  8 min   8'   UBE     L1.5 4' reverse     Luis stretch       3x20\" w/ strap   Prone laying       3'   Prone on elbows       5x   Prone hip extension w/ knee flexed       2x5   Bridges          Clams          SLR          LTR          Sit to stand  2 x 10    Staggered L bias  10x Staggered L bias  3 x 10 Staggered L bias  3 x 10 2x10     Heel raises   Eccentric L  3 x 10 Eccentric L  3 x 10    B/l on incline board lvl 2  2 x 15      Side steps  [place] TB  3 laps x 12 ft  [place] TB  4 laps x 12 ft [place] TB  4 laps x 12 ft  4x12 ft   Standing hip abduction  Standing   Hip abduction with peach TB  3 x 12 ea  Standing   Hip abduction with [place]ach TB  3 x 10 ea Standing   Hip abduction & extension  with [place]ach TB  3 x 10 ea on 4\" step     Speed skater      [place] TB  3 x 10 ea    Lunges      3 x 8 ea with railing support At step 10x             Ther Activity        "   Floor to stand transfer      5' with and without chair              Manual          PROM          Hip abduction                                        Modalities

## 2024-02-27 ENCOUNTER — OFFICE VISIT (OUTPATIENT)
Dept: PHYSICAL THERAPY | Facility: REHABILITATION | Age: 64
End: 2024-02-27
Payer: COMMERCIAL

## 2024-02-27 DIAGNOSIS — Z48.89 AFTERCARE FOLLOWING SURGERY: Primary | ICD-10-CM

## 2024-02-27 PROCEDURE — 97110 THERAPEUTIC EXERCISES: CPT | Performed by: PHYSICAL THERAPIST

## 2024-02-27 NOTE — PROGRESS NOTES
"Daily Note     Today's date: 2024  Patient name: Agus Espinoza  : 1960  MRN: [id number]  Referring provider: Brendan Keane DO  Dx:   Encounter Diagnosis     ICD-10-CM    1. Aftercare following surgery  Z48.89                      Subjective: Pt reports that his hip hurts.       Objective: See treatment diary below      Assessment: Tolerated treatment fair. Patient demonstrated fatigue post treatment, exhibited good technique with therapeutic exercises, and would benefit from continued PT. Pt continued to demonstrate hypomobility in the hip and unable to extend the hip. Continued prone lying in order to facilitate hip extension as he is unable to get to neutral at this point. He was also challenged with SLS and required to use BL UE in order to maintain upright balance. Continue to progress as able to.       Plan: Continue per plan of care.  Progress treatment as tolerated.       POC expires Unit limit Auth Expiration date PT/OT/ST + Visit Limit?   3/28/23 4 24                            Visit/Unit Tracking  AUTH Status:  Date 2/27  2/1 2/5 2/8 2/13 2/15 2/20 2/23   Approved Used 17  10 11 12 13 14 15 16    Remaining               Diagnosis: Fixation with anatomic reduction of intertrochanteric hip fracture on 10/16/23    Precautions:  PMH of HTN, hx of CVA ( - lacunar infarct, b/l MCA), HLD, GERD, seizure disorder (on medication)   Insurance: [employer]    2/27  2/8 2/13 2/15 2/20 2/23   Vitals          Patient Ed          Scar mobility          Getting out of bed                              Neuro Re-Ed          Gait training   W/ SPC support  L hip extension during stance, part task gait  5 min W/ SPC and without SPC  L hip extension during stance, part task gait  5 min      Balance training L SLS  4 x 30s  BL UE support on each hand  L SLS  4 x 30s  1 finger support on each hand       Step up  Railing support  Forward 4\"  3 x 10    Railing support  Lateral 4\"  3 x 10  Railing " "support  Forward 4\"  2 x 10    Railing support  Lateral 4\"  2 x 10 Railing support  Forward 4\"  3 x 10    Railing support  Lateral 2\"  3 x 10 Railing support  Forward 4\"  3 x 10    Railing support  Lateral 2\"  3 x 10 Railing support  Forward 4\"  3 x 10    Railing support  Lateral 2\"  3 x 10    Elmira    High knee lateral step over 12\" elmira  2 x 10 High knee lateral step over 12\" elmira  2 x 10               Ther Ex          Aerobic conditioning Upright bike 10 min L7  Upright bike  L7  8 min Upright bike  L7  8 min L7 8' Upright bike  8 min   8'   UBE     L1.5 4' reverse     Luis stretch 3x20\" w/ strap      3x20\" w/ strap   Prone laying 3'      3'   Prone on elbows 5x      5x   Prone hip extension w/ knee flexed 2x5      2x5   Bridges          Clams          SLR          LTR          Sit to stand   Staggered L bias  3 x 10 Staggered L bias  3 x 10 2x10     Heel raises   Eccentric L  3 x 10 Eccentric L  3 x 10    B/l on incline board lvl 2  2 x 15      Side steps    [place] TB  4 laps x 12 ft [place] TB  4 laps x 12 ft  4x12 ft   Standing hip abduction    Standing   Hip abduction with peach TB  3 x 10 ea Standing   Hip abduction & extension  with peach TB  3 x 10 ea on 4\" step     Speed skater      [place] TB  3 x 10 ea    Lunges      3 x 8 ea with railing support At step 10x             Ther Activity          Floor to stand transfer      5' with and without chair              Manual          PROM          Hip abduction                                        Modalities                                            "

## 2024-02-29 ENCOUNTER — OFFICE VISIT (OUTPATIENT)
Dept: PHYSICAL THERAPY | Facility: REHABILITATION | Age: 64
End: 2024-02-29
Payer: COMMERCIAL

## 2024-02-29 DIAGNOSIS — Z48.89 AFTERCARE FOLLOWING SURGERY: Primary | ICD-10-CM

## 2024-02-29 PROCEDURE — 97112 NEUROMUSCULAR REEDUCATION: CPT | Performed by: PHYSICAL MEDICINE & REHABILITATION

## 2024-02-29 PROCEDURE — 97110 THERAPEUTIC EXERCISES: CPT | Performed by: PHYSICAL MEDICINE & REHABILITATION

## 2024-02-29 NOTE — PROGRESS NOTES
"Daily Note     Today's date: 2024  Patient name: Agus Espinoza  : 1960  MRN: [id number]  Referring provider: Brendan Keane DO  Dx:   Encounter Diagnosis     ICD-10-CM    1. Aftercare following surgery  Z48.89                      Subjective: Patient notes continued knee pain, reports some L foot swelling likely secondary to long walk of up to 15 blocks.       Objective: See treatment diary below  No visible or palpable edema in L foot today, patient denies TTP    Assessment: Tolerated treatment fair. Continued L hip mobility deficits. Patient demonstrated fatigue post treatment, exhibited good technique with therapeutic exercises, and would benefit from continued PT.       Plan: Continue per plan of care.  Progress treatment as tolerated.       POC expires Unit limit Auth Expiration date PT/OT/ST + Visit Limit?   3/28/23 4 24                            Visit/Unit Tracking  AUTH Status:  Date   2/5 2/8 2/13 2/15 2/20 2/23   Approved Used 17 18  11 12 13 14 15 16    Remaining               Diagnosis: Fixation with anatomic reduction of intertrochanteric hip fracture on 10/16/23    Precautions:  PMH of HTN, hx of CVA ( - lacunar infarct, b/l MCA), HLD, GERD, seizure disorder (on medication)   Insurance: [employer]    2/27 2/29  2/13 2/15 2/20 2/23   Vitals          Patient Ed          Scar mobility          Getting out of bed                              Neuro Re-Ed          Gait training    W/ SPC and without SPC  L hip extension during stance, part task gait  5 min      Balance training L SLS  4 x 30s  BL UE support on each hand  L SLS  4 x 30s  BL UE support on each hand        Step up  Railing support  Forward 4\"  3 x 10    Railing support  Lateral 4\"  3 x 10 Railing support  Forward 4\"  3 x 10    Railing support  Lateral 4\"  3 x 10  Railing support  Forward 4\"  3 x 10    Railing support  Lateral 2\"  3 x 10 Railing support  Forward 4\"  3 x 10    Railing support  Lateral " "2\"  3 x 10 Railing support  Forward 4\"  3 x 10    Railing support  Lateral 2\"  3 x 10    [place]    High knee lateral step over 12\" elmira  2 x 10 High knee lateral step over 12\" elmira  2 x 10               Ther Ex  2/29        Aerobic conditioning Upright bike 10 min L7 L5 10'  Upright bike  L7  8 min L7 8' Upright bike  8 min   8'   UBE     L1.5 4' reverse     Luis stretch 3x20\" w/ strap 4x30\" w/ strap     3x20\" w/ strap   Prone laying 3' 3'      3'   Prone on elbows 5x 5x     5x   Prone hip extension w/ knee flexed 2x5 3x5     2x5   Bridges          Clams          SLR          LTR          Sit to stand    Staggered L bias  3 x 10 2x10     Heel raises    Eccentric L  3 x 10    B/l on incline board lvl 2  2 x 15      Side steps    [place] TB  4 laps x 12 ft [place] TB  4 laps x 12 ft  4x12 ft   Standing hip abduction    Standing   Hip abduction with [place] TB  3 x 10 ea Standing   Hip abduction & extension  with [place] TB  3 x 10 ea on 4\" step     Speed skater  10x    [place] TB  3 x 10 ea    Lunges      3 x 8 ea with railing support At step 10x             Ther Activity          Floor to stand transfer      5' with and without chair              Manual          PROM          Hip abduction                                        Modalities                                            "

## 2024-03-05 ENCOUNTER — OFFICE VISIT (OUTPATIENT)
Dept: PHYSICAL THERAPY | Facility: REHABILITATION | Age: 64
End: 2024-03-05
Payer: COMMERCIAL

## 2024-03-05 DIAGNOSIS — Z48.89 AFTERCARE FOLLOWING SURGERY: Primary | ICD-10-CM

## 2024-03-05 PROCEDURE — 97110 THERAPEUTIC EXERCISES: CPT
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
## 2024-03-07 ENCOUNTER — OFFICE VISIT (OUTPATIENT)
Dept: PHYSICAL THERAPY | Facility: REHABILITATION | Age: 64
End: 2024-03-07
Payer: COMMERCIAL

## 2024-03-07 DIAGNOSIS — Z48.89 AFTERCARE FOLLOWING SURGERY: Primary | ICD-10-CM

## 2024-03-07 PROCEDURE — 97110 THERAPEUTIC EXERCISES: CPT | Performed by: PHYSICAL MEDICINE & REHABILITATION

## 2024-03-07 PROCEDURE — 97112 NEUROMUSCULAR REEDUCATION: CPT | Performed by: PHYSICAL MEDICINE & REHABILITATION

## 2024-03-07 NOTE — PROGRESS NOTES
"Daily Note     Today's date: 3/7/2024  Patient name: Agus Espinoza  : 1960  MRN: [id number]  Referring provider: Brendan Keane DO  Dx:   Encounter Diagnosis     ICD-10-CM    1. Aftercare following surgery  Z48.89                        Subjective: Patient notes some L knee pain to begin session. Has been able to complete recent walks with minimal symptom provocation.     Objective: See treatment diary below    Assessment: Tolerated treatment fair. Able to include mostly standing/functional activity this date. Ended with gait training, patient with decreased reliance on SPC within clinic. Intermittent cueing for SPC placement/safety. Improved tolerance to standing hip flexor stretch vs. Prone. Patient demonstrated fatigue post treatment, exhibited good technique with therapeutic exercises, and would benefit from continued PT. Continue as able nv per patient tolerance.     Plan: Continue per plan of care.  Progress treatment as tolerated.       POC expires Unit limit Auth Expiration date PT/OT/ST + Visit Limit?   3/28/23 4 24                            Visit/Unit Tracking  AUTH Status:  Date 2/27 2/29  2/5 2/8 2/13 2/15 2/20 2/23 03/05   Approved Used 17 18  11 12 13 14 15 16 17    Remaining                Diagnosis: Fixation with anatomic reduction of intertrochanteric hip fracture on 10/16/23     Precautions:  PMH of HTN, hx of CVA ( - lacunar infarct, b/l MCA), HLD, GERD, seizure disorder (on medication)    Insurance: [employer]     3/7   2/13 2/15 2/20 2/23 03/05   Vitals           Patient Ed           Scar mobility           Getting out of bed                                 Neuro Re-Ed 3/7          Gait training W/ SPC within clinic, 2x3 laps   W/ SPC and without SPC  L hip extension during stance, part task gait  5 min       Balance training           Step up  Railing support Forward 4'' 2 x 10   Lateral 4\" 10x   Railing support  Forward 4\"  3 x 10    Railing support  Lateral 2\"  3 x 10 " "Railing support  Forward 4\"  3 x 10    Railing support  Lateral 2\"  3 x 10 Railing support  Forward 4\"  3 x 10    Railing support  Lateral 2\"  3 x 10  Railing support Forward 4'' 2 x 10    6'' x 10    Elmira    High knee lateral step over 12\" elmira  2 x 10 High knee lateral step over 12\" elmira  2 x 10                 Ther Ex 3/7          Aerobic conditioning 8'   Upright bike  L7  8 min L7 8' Upright bike  8 min   8'    UBE Slant board stretch 3x20\" L2    L1.5 4' reverse      Luis stretch Standing hip flexor stretch at rail 5x10\"      3x20\" w/ strap 8 x 10''   Prone laying       3' Held prone secondary to discomfort.    Prone on elbows       5x    Prone hip extension w/ knee flexed       2x5    Bridges        2 x 10 w/hip ABDuction     Clams        X15 w/ 3 second hold   SLR           LTR           Sit to stand 2x10   Staggered L bias  3 x 10 2x10      Heel raises    Eccentric L  3 x 10    B/l on incline board lvl 2  2 x 15       Side steps Peach 3 laps mirror   Peach TB  4 laps x 12 ft Peach TB  4 laps x 12 ft  4x12 ft    Standing hip abduction    Standing   Hip abduction with peach TB  3 x 10 ea Standing   Hip abduction & extension  with peach TB  3 x 10 ea on 4\" step      Speed skater      Peach TB  3 x 10 ea  Standing hip abduction with TB and hip extension with TB    2 x 10    2 x 10    Lunges      3 x 8 ea with railing support At step 10x    Standing hip flexor stretch with stool        8'' x 8    Ther Activity           Floor to stand transfer      5' with and without chair                Manual           PROM           Hip abduction                                            Modalities                                               "

## 2024-03-08 ENCOUNTER — TELEPHONE (OUTPATIENT)
Dept: NEUROLOGY | Facility: CLINIC | Age: 64
End: 2024-03-08

## 2024-03-08 NOTE — TELEPHONE ENCOUNTER
Made an appt reminder call no answer lmom. Appt with dr jovel on 3/13/24 @9:00am in [facility].

## 2024-03-11 ENCOUNTER — OFFICE VISIT (OUTPATIENT)
Dept: PHYSICAL THERAPY | Facility: REHABILITATION | Age: 64
End: 2024-03-11
Payer: COMMERCIAL

## 2024-03-11 DIAGNOSIS — Z48.89 AFTERCARE FOLLOWING SURGERY: Primary | ICD-10-CM

## 2024-03-11 PROCEDURE — 97110 THERAPEUTIC EXERCISES: CPT

## 2024-03-11 NOTE — PROGRESS NOTES
Daily Note     Today's date: 3/11/2024  Patient name: Agus Espinoza  : 1960  MRN: [id number]  Referring provider: Brendan Keane DO  Dx:   Encounter Diagnosis     ICD-10-CM    1. Aftercare following surgery  Z48.89                          Subjective: Patient reports some left lower lumbar back pain today. Patient reports he woke up with the pain. Since last PT visit, patient reports continued difficulty. Patient reports continued left ankle pain.      Objective: See treatment diary below    Assessment: Tolerated treatment fair. Patient presented into the clinic with increased lateral lean using SPC. Provided RW to assist with awareness for upright posture. Progressed ambulation with SPC. Patient responded well with improved reciprocal pattern.  Continued to progress appropriate hip ROM. Noted continued (+) trendelenburg without L SPC or L HHA. Progressed single limb hip abduction and hip extension in L SLS. Continued reduced SLS with hurdles (with HHA). Noted overall continued reduced L hip strength limiting tolerance for activities.  Patient demonstrated fatigue post treatment, exhibited good technique with therapeutic exercises, and would benefit from continued PT. Educated on importace of HEP.     Plan: Continue per plan of care.  Progress treatment as tolerated.       POC expires Unit limit Auth Expiration date PT/OT/ST + Visit Limit?   3/28/23 4 24                            Visit/Unit Tracking  AUTH Status:  Date 2/27 2/29  2/5 2/8 2/13 2/15 2/20 2/23 03/05   Approved Used 17 18  11 12 13 14 15 16 17    Remaining                Diagnosis: Fixation with anatomic reduction of intertrochanteric hip fracture on 10/16/23     Precautions:  PMH of HTN, hx of CVA ( - lacunar infarct, b/l MCA), HLD, GERD, seizure disorder (on medication)    Insurance: [employer]     3/7 3/11  2/13 2/15 2/20 2/23 03/05   Vitals           Patient Ed           Scar mobility           Getting out of bed               "                   Neuro Re-Ed 3/7          Gait training W/ SPC within clinic, 2x3 laps W/SPC wtihin clinc 2 laps around the clinic with cane raised     1 lap with walker to assess upright wihout lateral lean    W/ SPC and without SPC  L hip extension during stance, part task gait  5 min       Balance training           Step up  Railing support Forward 4'' 2 x 10   Lateral 4\" 10x   Railing support  Forward 4\"  3 x 10    Railing support  Lateral 2\"  3 x 10 Railing support  Forward 4\"  3 x 10    Railing support  Lateral 2\"  3 x 10 Railing support  Forward 4\"  3 x 10    Railing support  Lateral 2\"  3 x 10  Railing support Forward 4'' 2 x 10    6'' x 10    Elmira    High knee lateral step over 12\" elmira  2 x 10 High knee lateral step over 12\" elmira  2 x 10                 Ther Ex 3/7          Aerobic conditioning 8' 5 min  Upright bike  L7  8 min L7 8' Upright bike  8 min   8'    UBE Slant board stretch 3x20\" L2    L1.5 4' reverse      Luis stretch Standing hip flexor stretch at rail 5x10\"      3x20\" w/ strap 8 x 10''   Prone laying       3' Held prone secondary to discomfort.    Prone on elbows       5x    Prone hip extension w/ knee flexed       2x5    Bridges  2 x 12 with abduction      2 x 10 w/hip ABDuction     Clams  2 x 10 with TB (orange)       X15 w/ 3 second hold   SLR           LTR           Sit to stand 2x10 2 x 10 @ 17 inches. Verbal cueing for no hands and for appropriate breathing  Staggered L bias  3 x 10 2x10      Heel raises    Eccentric L  3 x 10    B/l on incline board lvl 2  2 x 15       Side steps Peach 3 laps mirror   Peach TB  4 laps x 12 ft Peach TB  4 laps x 12 ft  4x12 ft    Standing hip abduction  2 x 12 with abduction and TB (green    2 x 12 with hip extension and TB  Standing   Hip abduction with peach TB  3 x 10 ea Standing   Hip abduction & extension  with peach TB  3 x 10 ea on 4\" step      Speed skater      Peach TB  3 x 10 ea  Standing hip abduction with TB and hip extension with " TB    2 x 10    2 x 10    Lunges      3 x 8 ea with railing support At step 10x    Standing hip flexor stretch with stool  8'' x 8      8'' x 8    Ambulation along railing with elmira step overs.   3 laps          Ambulation along railing with HHA  4  laps (12 feet) difficulty with L HHA.          Ther Activity           Floor to stand transfer      5' with and without chair                Manual           PROM           Hip abduction                                            Modalities

## 2024-03-11 NOTE — PROGRESS NOTES
"Daily Note     Today's date: 3/11/2024  Patient name: Agus Espinoza  : 1960  MRN: [id number]  Referring provider: Brendan Keane DO  Dx:   No diagnosis found.                   Subjective: Patient notes some L knee pain to begin session. Has been able to complete recent walks with minimal symptom provocation.     Objective: See treatment diary below    Assessment: Tolerated treatment fair. Able to include mostly standing/functional activity this date. Ended with gait training, patient with decreased reliance on SPC within clinic. Intermittent cueing for SPC placement/safety. Improved tolerance to standing hip flexor stretch vs. Prone. Patient demonstrated fatigue post treatment, exhibited good technique with therapeutic exercises, and would benefit from continued PT. Continue as able nv per patient tolerance.     Plan: Continue per plan of care.  Progress treatment as tolerated.       POC expires Unit limit Auth Expiration date PT/OT/ST + Visit Limit?   3/28/23 4 24                            Visit/Unit Tracking  AUTH Status:  Date 2/27 2/29  2/5 2/8 2/13 2/15 2/20 2/23 03/05   Approved Used 17 18  11 12 13 14 15 16 17    Remaining                Diagnosis: Fixation with anatomic reduction of intertrochanteric hip fracture on 10/16/23     Precautions:  PMH of HTN, hx of CVA ( - lacunar infarct, b/l MCA), HLD, GERD, seizure disorder (on medication)    Insurance: Aet     3/7   2/13 2/15 2/20 2/23 03/05   Vitals           Patient Ed           Scar mobility           Getting out of bed                                 Neuro Re-Ed           Gait training W/ SPC within clinic, 2x3 laps   W/ SPC and without SPC  L hip extension during stance, part task gait  5 min       Balance training           Step up  Railing support Forward 4'' 2 x 10   Lateral 4\" 10x   Railing support  Forward 4\"  3 x 10    Railing support  Lateral 2\"  3 x 10 Railing support  Forward 4\"  3 x 10    Railing support  Lateral " "2\"  3 x 10 Railing support  Forward 4\"  3 x 10    Railing support  Lateral 2\"  3 x 10  Railing support Forward 4'' 2 x 10    6'' x 10    Elmira    High knee lateral step over 12\" elmira  2 x 10 High knee lateral step over 12\" elmira  2 x 10                 Ther Ex 3/7          Aerobic conditioning 8' Upright bike   Upright bike  L7  8 min L7 8' Upright bike  8 min   8'    UBE Slant board stretch 3x20\" L2    L1.5 4' reverse      Luis stretch Standing hip flexor stretch at rail 5x10\"      3x20\" w/ strap 8 x 10''   Prone laying       3' Held prone secondary to discomfort.    Prone on elbows       5x    Prone hip extension w/ knee flexed       2x5    Bridges        2 x 10 w/hip ABDuction     Clams        X15 w/ 3 second hold   SLR           LTR           Sit to stand 2x10   Staggered L bias  3 x 10 2x10      Heel raises    Eccentric L  3 x 10    B/l on incline board lvl 2  2 x 15       Side steps Peach 3 laps mirror   Peach TB  4 laps x 12 ft Peach TB  4 laps x 12 ft  4x12 ft    Standing hip abduction    Standing   Hip abduction with peach TB  3 x 10 ea Standing   Hip abduction & extension  with peach TB  3 x 10 ea on 4\" step      Speed skater      Peach TB  3 x 10 ea  Standing hip abduction with TB and hip extension with TB    2 x 10    2 x 10    Lunges      3 x 8 ea with railing support At step 10x    Standing hip flexor stretch with stool        8'' x 8    Ther Activity           Floor to stand transfer      5' with and without chair                Manual           PROM           Hip abduction                                            Modalities                                               "

## 2024-03-12 ENCOUNTER — APPOINTMENT (OUTPATIENT)
Dept: PHYSICAL THERAPY | Facility: REHABILITATION | Age: 64
End: 2024-03-12
Payer: COMMERCIAL

## 2024-03-13 ENCOUNTER — OFFICE VISIT (OUTPATIENT)
Dept: NEUROLOGY | Facility: CLINIC | Age: 64
End: 2024-03-13
Payer: COMMERCIAL

## 2024-03-13 VITALS
WEIGHT: 164.8 LBS | HEIGHT: 70 IN | DIASTOLIC BLOOD PRESSURE: 80 MMHG | HEART RATE: 91 BPM | SYSTOLIC BLOOD PRESSURE: 130 MMHG | OXYGEN SATURATION: 95 % | RESPIRATION RATE: 16 BRPM | BODY MASS INDEX: 23.59 KG/M2 | TEMPERATURE: 97.5 F

## 2024-03-13 DIAGNOSIS — G40.209 PARTIAL SYMPTOMATIC EPILEPSY WITH COMPLEX PARTIAL SEIZURES, NOT INTRACTABLE, WITHOUT STATUS EPILEPTICUS (HCC): ICD-10-CM

## 2024-03-13 PROCEDURE — 99213 OFFICE O/P EST LOW 20 MIN: CPT | Performed by: PSYCHIATRY & NEUROLOGY

## 2024-03-13 RX ORDER — LEVETIRACETAM 750 MG/1
750 TABLET ORAL EVERY 12 HOURS SCHEDULED
Qty: 60 TABLET | Refills: 5 | Status: SHIPPED | OUTPATIENT
Start: 2024-03-13

## 2024-03-13 NOTE — ASSESSMENT & PLAN NOTE
Is a 64-year-old patient with a history of epilepsy.  He is currently on Keppra 500 twice a day and Tegretol up to 7 a day.  He reports no seizures but his family did note an aura several weeks ago.  Therefore I have increased his Keppra to 750 twice a day with maintaining the same dose of Tegretol.  We could even consider lowering the dose of Tegretol in the future depending on his outcome.    Did order laboratory testing in approximately 1 month.

## 2024-03-13 NOTE — PROGRESS NOTES
Patient ID: Agus Espinoza is a 64 y.o. male.    Assessment/Plan:    Partial symptomatic epilepsy with complex partial seizures, not intractable, without status epilepticus (HCC)  Is a 64-year-old patient with a history of epilepsy.  He is currently on Keppra 500 twice a day and Tegretol up to 7 a day.  He reports no seizures but his family did note an aura several weeks ago.  Therefore I have increased his Keppra to 750 twice a day with maintaining the same dose of Tegretol.  We could even consider lowering the dose of Tegretol in the future depending on his outcome.    Did order laboratory testing in approximately 1 month.    He does  have a history of a prior CVA and is on Aggrenox and statin agent.  He does  need to utilize Aggrenox with food.     Diagnoses and all orders for this visit:    Partial symptomatic epilepsy with complex partial seizures, not intractable, without status epilepticus (HCC)  -     levETIRAcetam (Keppra) 750 mg tablet; Take 1 tablet (750 mg total) by mouth every 12 (twelve) hours  -     Comprehensive metabolic panel; Future  -     Levetiracetam level; Future  -     Carbamazepine level, total; Future       Subjective:    Agus Espinoza is a 63 y/o right hand male with a long history of seizures. He had  a seizure on 4/20/18.  Irequiring the use of extra doses of carbamazepine and Ativan.  He then was noted to have dizziness vertigo and lower extremity weakness which he we attributed to extra doses of medications.  Subsequently Keppra was added to his regimen at 500 twice a day and Tegretol was then decreased to 7 a day.  He has been doing well without any different difficulty with ataxia diplopia falls or weakness.  On 10/15/2023 well cleaning of leaves he slipped and fell and broke his left hip.  He underwent surgical repair and subsequently physical therapy at [facility].  Osteoporosis was discussed with the patient in July 2023  .   Various medications were discussed but  he will be continued on Fosamax as per his PCP.  He is for DEXA scan in April.  In the interim we discussed his antiepileptics.  A sodium level was 138 and the patient agreed to keep his current combination of Keppra 500 twice a day and carbamazepine 7 a day.    Today his brother Matteo informs me he did have an episode of stuttering speech and decreased attentiveness several weeks ago.  He did not have any auras.  He reports his prior auras are  hiccups.         He was receiving home physical therapy and then outpatient and is now receiving outpatient physical therapy.  He is now walking with a cane.       He is currently on Aggrenox for stroke prevention        After this event he did undergo an EEG that was normal and a CAT scan of the head that was normal.                                                                        The following portions of the patient's history were reviewed and updated as appropriate: He  has a past medical history of Arthritis, Hypertension, Seizures (HCC), and Stroke (HCC).  He  has a past surgical history that includes Elbow surgery (2017) and pr optx fem shft fx w/insj imed implt w/wo screw (Left, 10/16/2023).  His family history includes Alzheimer's disease in his mother; Dementia in his mother; Hyperlipidemia in his brother; Stroke in his father.  He  reports that he has never smoked. He has never used smokeless tobacco. He reports current alcohol use. He reports that he does not use drugs.  Current Outpatient Medications   Medication Sig Dispense Refill    alendronate (FOSAMAX) 70 mg tablet Take 1 tablet (70 mg total) by mouth every 7 days 4 tablet 5    aspirin-dipyridamole (AGGRENOX)  mg per 12 hr capsule Take 1 capsule by mouth 2 (two) times a day 180 capsule 3    Calcium-Magnesium-Vitamin D (CALCIUM MAGNESIUM PO) Take by mouth      carBAMazepine (TEGretol) 200 mg tablet TAKE 3 TABLETS IN THE AM, 1 TABLETS AT LUNCH, 2 TABLETS AT DINNER, AND 2 TABLETS AT BEDTIME (Patient  "taking differently: TAKE 3 TABLETS IN THE AM, 2 TABLETS AT DINNER, AND 2 TABLETS AT BEDTIME    10/31/23 - no longer taking 1 tablet at lunch.) 240 tablet 5    co-enzyme Q-10 30 MG capsule Take 100 mg by mouth daily        docusate sodium (COLACE) 100 mg capsule Take 1 capsule (100 mg total) by mouth 2 (two) times a day  0    levETIRAcetam (Keppra) 750 mg tablet Take 1 tablet (750 mg total) by mouth every 12 (twelve) hours 60 tablet 5    Lidocaine 4 % PTCH Place 1 patch on the skin daily      LORazepam (ATIVAN) 0.5 mg tablet TAKE 1 TABLET BY MOUTH ONCE DAILY IF NEEDED for seizure.  Limit of 1 in 24 hours 10 tablet 0    NON FORMULARY Super Beets      polyethylene glycol (MIRALAX) 17 g packet Take 17 g by mouth daily Do not start before October 20, 2023.  0    rosuvastatin (CRESTOR) 10 MG tablet Take 1 tablet (10 mg total) by mouth daily 90 tablet 1    VALERIAN ROOT PO Use      Vitamin Mixture (VITAMIN E COMPLETE PO) Take 1 tablet by mouth daily        No current facility-administered medications for this visit.     He is allergic to meperidine, amoxicillin, azithromycin, other, and pollen extract..         Objective:    Blood pressure 130/80, pulse 91, temperature 97.5 °F (36.4 °C), temperature source Temporal, resp. rate 16, height 5' 10\" (1.778 m), weight 74.8 kg (164 lb 12.8 oz), SpO2 95%.    Physical Exam  Eyes:      General: Lids are normal.      Extraocular Movements: Extraocular movements intact.      Pupils: Pupils are equal, round, and reactive to light.   Neurological:      Deep Tendon Reflexes:      Reflex Scores:       Tricep reflexes are 1+ on the right side and 1+ on the left side.       Bicep reflexes are 2+ on the right side and 2+ on the left side.       Patellar reflexes are 2+ on the right side and 2+ on the left side.       Achilles reflexes are 1+ on the right side and 1+ on the left side.  Psychiatric:         Speech: Speech normal.         Neurological Exam  Mental Status  Awake, alert and " oriented to person, place and time. Speech is normal.    Cranial Nerves  CN II: Visual acuity is normal. Visual fields full to confrontation.  CN III, IV, VI: Extraocular movements intact bilaterally. Normal lids and orbits bilaterally. Pupils equal round and reactive to light bilaterally.  CN V: Facial sensation is normal.  CN VII: Full and symmetric facial movement.  CN VIII: Hearing is normal.  CN IX, X: Palate elevates symmetrically. Normal gag reflex.  CN XI: Shoulder shrug strength is normal.  CN XII: Tongue midline without atrophy or fasciculations.    Motor    Strength is 5/5 in all four extremities except as noted.  His right upper extremity had curling of the 4th and 5th digit with atrophy of the ADM.  He had normal strength proximally but had difficulty with hand  on the right.  This is ongoing and old.  The left upper extremity strength was normal in the bilateral lower extremity strength was normal he did have bilateral tremors with extension worse on the right than the left.  There is no evidence of cogwheel rigidity....    There is mild tremor noted with extension.  .    Sensory  Decree sensation in the right palm..    Reflexes                                            Right                      Left  Biceps                                 2+                         2+  Triceps                                1+                         1+  Patellar                                2+                         2+  Achilles                                1+                         1+  Right Plantar: downgoing  Left Plantar: downgoing    Right pathological reflexes: Jason's absent.  Left pathological reflexes: Jason's absent.    Coordination  Left: Finger-to-nose normal.    Gait   Unable to rise from chair without using arms.  Antaglic gait..    Ros systems obtained from the medical assistant as below was reviewed with the patient at today's visit    ROS:    Review of Systems   Constitutional:   Negative for appetite change, fatigue and fever.   HENT: Negative.  Negative for hearing loss, tinnitus, trouble swallowing and voice change.    Eyes: Negative.  Negative for photophobia, pain and visual disturbance.   Respiratory: Negative.  Negative for shortness of breath.    Cardiovascular: Negative.  Negative for palpitations.   Gastrointestinal: Negative.  Negative for nausea and vomiting.   Endocrine: Negative.  Negative for cold intolerance.   Genitourinary: Negative.  Negative for dysuria, frequency and urgency.   Musculoskeletal:  Positive for back pain and gait problem. Negative for myalgias, neck pain and neck stiffness.   Skin: Negative.  Negative for rash.   Allergic/Immunologic: Negative.    Neurological:  Positive for tremors. Negative for dizziness, seizures, syncope, facial asymmetry, speech difficulty, weakness, light-headedness, numbness and headaches.   Hematological: Negative.  Does not bruise/bleed easily.   Psychiatric/Behavioral: Negative.  Negative for confusion, hallucinations and sleep disturbance.    All other systems reviewed and are negative.      Return to our offices in 6 months.      I have spent a total time of 22 minutes on 03/13/24 in caring for this patient including Counseling / Coordination of care, Documenting in the medical record, Reviewing / ordering tests, medicine, procedures  , and Obtaining or reviewing history  .

## 2024-03-14 ENCOUNTER — OFFICE VISIT (OUTPATIENT)
Dept: PHYSICAL THERAPY | Facility: REHABILITATION | Age: 64
End: 2024-03-14
Payer: COMMERCIAL

## 2024-03-14 DIAGNOSIS — Z48.89 AFTERCARE FOLLOWING SURGERY: Primary | ICD-10-CM

## 2024-03-14 PROCEDURE — 97140 MANUAL THERAPY 1/> REGIONS: CPT | Performed by: PHYSICAL THERAPIST

## 2024-03-14 PROCEDURE — 97110 THERAPEUTIC EXERCISES: CPT | Performed by: PHYSICAL THERAPIST

## 2024-03-19 ENCOUNTER — OFFICE VISIT (OUTPATIENT)
Dept: PHYSICAL THERAPY | Facility: REHABILITATION | Age: 64
End: 2024-03-19
Payer: COMMERCIAL

## 2024-03-19 DIAGNOSIS — Z48.89 AFTERCARE FOLLOWING SURGERY: Primary | ICD-10-CM

## 2024-03-19 PROCEDURE — 97110 THERAPEUTIC EXERCISES: CPT | Performed by: PHYSICAL THERAPIST

## 2024-03-19 NOTE — PROGRESS NOTES
Daily Note     Today's date: 3/19/2024  Patient name: Agus Espinoza  : 1960  MRN: [id number]  Referring provider: Brendan Keane DO  Dx:   Encounter Diagnosis     ICD-10-CM    1. Aftercare following surgery  Z48.89             Start Time: 930  Stop Time: 1015  Total time in clinic (min): 45 minutes    Subjective: Tried to walk but it was very cold and bothered his joints.      Objective: See treatment diary below      Assessment: Patient with more discomfort today, possibly due to change in weather. Regressed band resistance to adjust for this. Patient reporting improvement in pain with bridges. Patient challenged with SLS due to his hip strength deficit. Patient is distractible and needs frequent cuing to complete exercise with proper form. This is a poor prognostic indicator and therefore unclear on how much progress can be continued to be made. Will continue to focus on improving patient's functional mobility to improve his independence with mobility without AD.    Plan: Continue per plan of care.  Progress treatment as tolerated.       POC expires Unit limit Auth Expiration date PT/OT/ST + Visit Limit?   3/28/23 4 24                            Visit/Unit Tracking  AUTH Status:  Date 3/7 3/11 3/14 3/19    Approved Used 20 21 22 23     Remaining           Diagnosis: Fixation with anatomic reduction of intertrochanteric hip fracture on 10/16/23    Precautions:  PMH of HTN, hx of CVA ( - lacunar infarct, b/l MCA), HLD, GERD, seizure disorder (on medication)   Insurance: [employer]    3/7 3/11 3/14 3/19     Vitals         Patient Ed         Scar mobility         Getting out of bed                           Neuro Re-Ed 3/7        Gait training W/ SPC within clinic, 2x3 laps W/SPC wtihin clinc 2 laps around the clinic with cane raised     1 lap with walker to assess upright wihout lateral lean   SPC   200ft focus on longer step length    W/o SPC  400ft focus on longer step length and heel  "initial contact SPC   400ft focus on longer step length    W/o SPC  200ft focus on longer step length and heel initial contact     Balance training    SLS  4 x 30s with 1 hand support     Step up  Railing support Forward 4'' 2 x 10   Lateral 4\" 10x  Railing support Forward 6''   3 x 10   Lateral 6\"   3 x 10 b/l      [place]                  Ther Ex 3/7        Aerobic conditioning 8' 5 min Upright bike  Lvl 5  8 min Upright bike  Lvl 7  8 min     UBE Slant board stretch 3x20\" L2        Luis stretch Standing hip flexor stretch at rail 5x10\"        Quad set   2 x 10 x 5s hold      Calf stretch   4 x 30s   On slant board lvl 2 2 x 45s  On slant board  Lvl 2     Bridges  2 x 12 with abduction  3 x 12 with abduction     Clams  2 x 10 with TB (orange)   [place] TB  R 3 x 12    No band for  L 3 x 12     Sit to stand 2x10 2 x 10 @ 17 inches. Verbal cueing for no hands and for appropriate breathing  Chair  3 x 12     Side steps [place] 3 laps mirror        Standing hip abduction  2 x 12 with abduction and TB (green    2 x 12 with hip extension and TB 2 x 10 each hip abduction  W/ peach TB    3 x 10 each hip ext  W/ peach TB 3 x 10 each hip abduction  W/ peach TB    3 x 15 hip ext on L   W/ peach TB     Speed skater         Standing hip flexor stretch with stool  8'' x 8       Ambulation along railing with [place] step overs.   3 laps        Ambulation along railing with HHA  4  laps (12 feet) difficulty with L HHA.  4 laps (12 feet) difficulty with L HHA               Ther Activity         Floor to stand transfer                  Manual         Knee ext                                             Modalities                                         "

## 2024-03-21 ENCOUNTER — HOSPITAL ENCOUNTER (EMERGENCY)
Facility: HOSPITAL | Age: 64
Discharge: HOME | End: 2024-03-21
Attending: EMERGENCY MEDICINE | Admitting: EMERGENCY MEDICINE
Payer: COMMERCIAL

## 2024-03-21 ENCOUNTER — OFFICE VISIT (OUTPATIENT)
Dept: PHYSICAL THERAPY | Facility: REHABILITATION | Age: 64
End: 2024-03-21
Payer: COMMERCIAL

## 2024-03-21 ENCOUNTER — TELEPHONE (OUTPATIENT)
Age: 64
End: 2024-03-21

## 2024-03-21 VITALS
SYSTOLIC BLOOD PRESSURE: 131 MMHG | DIASTOLIC BLOOD PRESSURE: 74 MMHG | OXYGEN SATURATION: 99 % | HEART RATE: 87 BPM | RESPIRATION RATE: 18 BRPM | TEMPERATURE: 98.1 F

## 2024-03-21 DIAGNOSIS — Z48.89 AFTERCARE FOLLOWING SURGERY: Primary | ICD-10-CM

## 2024-03-21 DIAGNOSIS — D22.9 ATYPICAL MOLE: Primary | ICD-10-CM

## 2024-03-21 PROCEDURE — 97110 THERAPEUTIC EXERCISES: CPT | Performed by: PHYSICAL THERAPIST

## 2024-03-21 PROCEDURE — 99282 EMERGENCY DEPT VISIT SF MDM: CPT

## 2024-03-21 PROCEDURE — 99283 EMERGENCY DEPT VISIT LOW MDM: CPT | Performed by: EMERGENCY MEDICINE

## 2024-03-21 NOTE — TELEPHONE ENCOUNTER
Received call from patient asking to cancel his appt May 22.    Patient stated he would like to just cancel and not meera.

## 2024-03-21 NOTE — PROGRESS NOTES
Daily Note     Today's date: 3/21/2024  Patient name: Agus Espinoza  : 1960  MRN: [id number]  Referring provider: Brendan Keane DO  Dx:   Encounter Diagnosis     ICD-10-CM    1. Aftercare following surgery  Z48.89           Start Time: 930  Stop Time: 1030  Total time in clinic (min): 60 minutes    Subjective: Doing ok, cold weather. No knee, or ankle pain. Slight L hip pain. Has a mole that he needs to be assessed.       Objective: See treatment diary below      Assessment: Focused on reviewing patient's HEP. Patient requires moderate cuing for form and is not quite independent yet. Patient is demonstrating improved strength with the quality of his movements, such as with step ups. However his neurology deficits will likely be a barrier to him completing his HEP at home. Instructed patient to distributed HEP at home this weekend and will answer questions about it next session. Plan on discharge in 2 visits based on his progress thus far.      Plan: Continue per plan of care.  Progress treatment as tolerated.      Access Code: [email or web address]  URL: [email or web address]  Date: 2024  Prepared by: Ana Thompson    Exercises  - Modified Luis Stretch  - 1 x daily - 7 x weekly - 3 sets - 30 hold  - Bridge with Hip Abduction and Resistance  - 1 x daily - 7 x weekly - 3 sets - 15 reps  - Clamshell  - 1 x daily - 7 x weekly - 3 sets - 10 reps  - Step Up  - 1 x daily - 7 x weekly - 3 sets - 12 reps  - Lateral Step Up  - 1 x daily - 7 x weekly - 3 sets - 10 reps  - Single Leg Stance  - 1 x daily - 7 x weekly - 3 sets - 30 hold  - Sit to Stand with Arms Crossed  - 1 x daily - 7 x weekly - 3 sets - 12 reps  - Standing Hip Abduction with Resistance at Ankles and Counter Support  - 1 x daily - 7 x weekly - 3 sets - 10 reps  - Standing Hip Extension with Resistance at Ankles and Counter Support  - 1 x daily - 7 x weekly - 3 sets - 10 reps       POC expires Unit limit Auth Expiration date PT/OT/ST +  "Visit Limit?   3/28/23 4 12/31/24                            Visit/Unit Tracking  AUTH Status:  Date 3/7 3/11 3/14 3/19 3/21     Approved Used 20 21 22 23 24      Remaining             Diagnosis: Fixation with anatomic reduction of intertrochanteric hip fracture on 10/16/23    Precautions:  PMH of HTN, hx of CVA (2021 - lacunar infarct, b/l MCA), HLD, GERD, seizure disorder (on medication)   Insurance: [employer]    3/7 3/11 3/14 3/19 3/21     Vitals          Patient Ed          Scar mobility          Getting out of bed                              Neuro Re-Ed 3/7         Gait training W/ SPC within clinic, 2x3 laps W/SPC wtihin clinc 2 laps around the clinic with cane raised     1 lap with walker to assess upright wihout lateral lean   SPC   200ft focus on longer step length    W/o SPC  400ft focus on longer step length and heel initial contact SPC   400ft focus on longer step length    W/o SPC  200ft focus on longer step length and heel initial contact      Balance training    SLS  4 x 30s with 1 hand support SLS  3 x 30s  2 hand support     Step up  Railing support Forward 4'' 2 x 10   Lateral 4\" 10x  Railing support Forward 6''   3 x 10   Lateral 6\"   3 x 10 b/l  Railing support Forward 6''   3 x 10   Lateral 6\"   3 x 10 b/l     [place]                    Ther Ex 3/7         Aerobic conditioning 8' 5 min Upright bike  Lvl 5  8 min Upright bike  Lvl 7  8 min Upright bike  Lvl 7  10 min     UBE Slant board stretch 3x20\" L2         Luis stretch Standing hip flexor stretch at rail 5x10\"    3 x 30s off table     Quad set   2 x 10 x 5s hold       Calf stretch   4 x 30s   On slant board lvl 2 2 x 45s  On slant board  Lvl 2      Bridges  2 x 12 with abduction  3 x 12 with abduction  [place] TB 3 x 12 with abduction  [place] TB     Clams  2 x 10 with TB (orange)   [place] TB  R 3 x 12    No band for  L 3 x 12 No band for  L 3 x 10-13     Sit to stand 2x10 2 x 10 @ 17 inches. Verbal cueing for no hands and for appropriate " breathing  Chair  3 x 12 Chair  3 x 12     Side steps [place] 3 laps mirror         Standing hip abduction  2 x 12 with abduction and TB (green    2 x 12 with hip extension and TB 2 x 10 each hip abduction  W/ peach TB    3 x 10 each hip ext  W/ peach TB 3 x 10 each hip abduction  W/ peach TB    3 x 15 hip ext on L   W/ peach TB 3 x 10 each hip abduction b/l  W/ peach TB    3 x 10 hip ext b/  W/ peach TB     Speed skater          Standing hip flexor stretch with stool  8'' x 8        Ambulation along railing with [place] step overs.   3 laps         Ambulation along railing with HHA  4  laps (12 feet) difficulty with L HHA.  4 laps (12 feet) difficulty with L HHA                 Ther Activity          Floor to stand transfer                    Manual          Knee ext                                                  Modalities

## 2024-03-21 NOTE — ED PROVIDER NOTES
History  Chief Complaint   Patient presents with    Medical Problem     Patient has a mole that is getting bigger and darker over the past couple of weeks. Wants it removed.      64-year-old male presents to the emergency department for a mole on his abdomen.  Patient says that he has had this for a while but thinks it is increasing in size over the past 2 weeks.  He would like the mole removed.  Denies any surrounding redness or swelling.  No fevers.  No other complaints.        Prior to Admission Medications   Prescriptions Last Dose Informant Patient Reported? Taking?   Calcium-Magnesium-Vitamin D (CALCIUM MAGNESIUM PO)  Self Yes No   Sig: Take by mouth   LORazepam (ATIVAN) 0.5 mg tablet  Self No No   Sig: TAKE 1 TABLET BY MOUTH ONCE DAILY IF NEEDED for seizure.  Limit of 1 in 24 hours   Lidocaine 4 % PTCH  Self Yes No   Sig: Place 1 patch on the skin daily   NON FORMULARY  Self Yes No   Sig: Super Beets   VALERIAN ROOT PO  Self Yes No   Sig: Use   Vitamin Mixture (VITAMIN E COMPLETE PO)  Self Yes No   Sig: Take 1 tablet by mouth daily    alendronate (FOSAMAX) 70 mg tablet  Self No No   Sig: Take 1 tablet (70 mg total) by mouth every 7 days   aspirin-dipyridamole (AGGRENOX)  mg per 12 hr capsule  Self No No   Sig: Take 1 capsule by mouth 2 (two) times a day   carBAMazepine (TEGretol) 200 mg tablet  Self No No   Sig: TAKE 3 TABLETS IN THE AM, 1 TABLETS AT LUNCH, 2 TABLETS AT DINNER, AND 2 TABLETS AT BEDTIME   Patient taking differently: TAKE 3 TABLETS IN THE AM, 2 TABLETS AT DINNER, AND 2 TABLETS AT BEDTIME    10/31/23 - no longer taking 1 tablet at lunch.   co-enzyme Q-10 30 MG capsule  Self Yes No   Sig: Take 100 mg by mouth daily     docusate sodium (COLACE) 100 mg capsule  Self No No   Sig: Take 1 capsule (100 mg total) by mouth 2 (two) times a day   levETIRAcetam (Keppra) 750 mg tablet   No No   Sig: Take 1 tablet (750 mg total) by mouth every 12 (twelve) hours   polyethylene glycol (MIRALAX) 17 g  packet  Self No No   Sig: Take 17 g by mouth daily Do not start before October 20, 2023.   rosuvastatin (CRESTOR) 10 MG tablet  Self No No   Sig: Take 1 tablet (10 mg total) by mouth daily      Facility-Administered Medications: None       Past Medical History:   Diagnosis Date    Arthritis     Hypertension     Seizures (HCC)     Stroke (HCC)        Past Surgical History:   Procedure Laterality Date    ELBOW SURGERY  2017    MO OPTX FEM SHFT FX W/INSJ IMED IMPLT W/WO SCREW Left 10/16/2023    Procedure: INSERTION NAIL IM FEMUR ANTEGRADE (TROCHANTERIC);  Surgeon: Brendan Keane DO;  Location: [facility];  Service: Orthopedics       Family History   Problem Relation Age of Onset    Dementia Mother     Alzheimer's disease Mother     Stroke Father     Hyperlipidemia Brother      I have reviewed and agree with the history as documented.    E-Cigarette/Vaping    E-Cigarette Use Never User      E-Cigarette/Vaping Substances     Social History     Tobacco Use    Smoking status: Never    Smokeless tobacco: Never   Vaping Use    Vaping status: Never Used   Substance Use Topics    Alcohol use: Yes     Comment: occasional    Drug use: No       Review of Systems   Constitutional: Negative.  Negative for fever.   HENT: Negative.  Negative for sore throat.    Eyes: Negative.    Respiratory: Negative.  Negative for cough.    Gastrointestinal: Negative.  Negative for nausea.   Skin: Negative.  Negative for rash.   All other systems reviewed and are negative.      Physical Exam  Physical Exam  Constitutional:       General: He is not in acute distress.     Appearance: He is well-developed.   HENT:      Head: Normocephalic and atraumatic.   Cardiovascular:      Rate and Rhythm: Normal rate.   Pulmonary:      Effort: Pulmonary effort is normal.   Musculoskeletal:         General: Normal range of motion.      Cervical back: Normal range of motion and neck supple.   Skin:     General: Skin is warm and dry.      Capillary Refill:  Capillary refill takes less than 2 seconds.      Comments: Normal to right lateral abdomen, see media   Neurological:      Mental Status: He is alert and oriented to person, place, and time.        Media Information      Document Information    Clinical Image - Mobile Device      03/21/2024 12:24   Attached To:   Hospital Encounter on 3/21/24   Source Information    Autumn Taylor MD  [facility]       Vital Signs  ED Triage Vitals [03/21/24 1213]   Temperature Pulse Respirations Blood Pressure SpO2   98.1 °F (36.7 °C) 87 18 131/74 99 %      Temp Source Heart Rate Source Patient Position - Orthostatic VS BP Location FiO2 (%)   Temporal Monitor Sitting Right arm --      Pain Score       No Pain           Vitals:    03/21/24 1213   BP: 131/74   Pulse: 87   Patient Position - Orthostatic VS: Sitting         Visual Acuity      ED Medications  Medications - No data to display    Diagnostic Studies  Results Reviewed       None                   No orders to display              Procedures  Procedures         ED Course                                             Medical Decision Making  Patient here with a chronic mole that he is requesting to be biopsied.  Explained to patient that this is not something we do in the emergency department and provided dermatology referral. ED return precautions provided should symptoms worsen and patient can otherwise follow up outpatient. Patient expresses an understanding and agreement with the plan and remains in good condition for discharge.                  Disposition  Final diagnoses:   Atypical mole     Time reflects when diagnosis was documented in both MDM as applicable and the Disposition within this note       Time User Action Codes Description Comment    3/21/2024 12:22 PM Autumn Taylor Add [D22.9] Atypical mole           ED Disposition       ED Disposition   Discharge    Condition   Stable    Date/Time   Thu Mar 21, 2024 12:22 PM    Comment   Agus Espinoza discharge to  home/self care.                   Follow-up Information       Follow up With Specialties Details Why Contact Info Additional Information    Franchesca Chandra MD Family Medicine Call in 1 day  [address]  [address]  [facility] [address] [phone]       [facility] Emergency Department Emergency Medicine Go to  If symptoms worsen [address] [facility] [address]  [phone] [facility] Emergency Department, [address] [place], [place], [address]   [phone]    [facility] [place] [facility] Call in 1 day  [address] [facility]  [place] [address] [place] [address] [phone] [facility] [place], [address] [facility], [facility] [address], [place], [place], [address] [phone]            Discharge Medication List as of 3/21/2024 12:23 PM        CONTINUE these medications which have NOT CHANGED    Details   alendronate (FOSAMAX) 70 mg tablet Take 1 tablet (70 mg total) by mouth every 7 days, Starting Mon 11/20/2023, Normal      aspirin-dipyridamole (AGGRENOX)  mg per 12 hr capsule Take 1 capsule by mouth 2 (two) times a day, Starting Wed 12/13/2023, Normal      Calcium-Magnesium-Vitamin D (CALCIUM MAGNESIUM PO) Take by mouth, Historical Med      carBAMazepine (TEGretol) 200 mg tablet TAKE 3 TABLETS IN THE AM, 1 TABLETS AT LUNCH, 2 TABLETS AT DINNER, AND 2 TABLETS AT BEDTIME, Normal      co-enzyme Q-10 30 MG capsule Take 100 mg by mouth daily  , Historical Med      docusate sodium (COLACE) 100 mg capsule Take 1 capsule (100 mg total) by mouth 2 (two) times a day, Starting Thu 10/19/2023, No Print      levETIRAcetam (Keppra) 750 mg tablet Take 1 tablet (750 mg total) by mouth every 12 (twelve) hours, Starting Wed 3/13/2024, Normal      Lidocaine 4 % PTCH Place 1 patch on the skin daily, Historical Med      LORazepam (ATIVAN) 0.5 mg tablet TAKE 1 TABLET BY MOUTH ONCE DAILY IF NEEDED for seizure.  Limit of 1 in 24 hours,  Normal      NON FORMULARY Super Beets, Historical Med      polyethylene glycol (MIRALAX) 17 g packet Take 17 g by mouth daily Do not start before October 20, 2023., Starting Fri 10/20/2023, No Print      rosuvastatin (CRESTOR) 10 MG tablet Take 1 tablet (10 mg total) by mouth daily, Starting Wed 1/10/2024, Normal      VALERIAN ROOT PO Use, Historical Med      Vitamin Mixture (VITAMIN E COMPLETE PO) Take 1 tablet by mouth daily , Historical Med             No discharge procedures on file.    PDMP Review         Value Time User    PDMP Reviewed  Yes 9/20/2023  9:09 AM Norma Philip DO            ED Provider  Electronically Signed by             Autumn Taylor MD  03/21/24 2040

## 2024-03-25 NOTE — PROGRESS NOTES
Daily Note     Today's date: 3/14/2024  Patient name: Agus Espinoza  : 1960  MRN: [id number]  Referring provider: Brendan Keane DO  Dx:   Encounter Diagnosis     ICD-10-CM    1. Aftercare following surgery  Z48.89           Start Time: 930  Stop Time: 1030  Total time in clinic (min): 60 minutes    Subjective: Went on 1 hr walk yesterday. Had R knee pain during it.       Objective: See treatment diary below      Assessment: Focused on improving patient's gait today. Patient able to walk 400ft without SPC which is the longest he has ambulated without SPC. He continues to have gait deviations including knee flexion during stance and trendelenburg gait pattern. Added manual therapy and quad sets to address his knee flexion contracture. Continued hip strengthening which challenged his strength. Patient is distractible and needs frequent cuing to complete exercise with proper form. This is a poor prognostic indicator and therefore unclear on how much progress can be continued to be made. Will continue to focus on improving patient's functional mobility to improve his independence with mobility without AD.    Plan: Continue per plan of care.  Progress treatment as tolerated.       POC expires Unit limit Auth Expiration date PT/OT/ST + Visit Limit?   3/28/23 4 24                            Visit/Unit Tracking  AUTH Status:  Date 3/7 3/11 3/14     Approved Used 20  22      Remaining           Diagnosis: Fixation with anatomic reduction of intertrochanteric hip fracture on 10/16/23    Precautions:  PMH of HTN, hx of CVA ( - lacunar infarct, b/l MCA), HLD, GERD, seizure disorder (on medication)   Insurance: [employer]    3/7 3/11 3/14      Vitals         Patient Ed         Scar mobility         Getting out of bed                           Neuro Re-Ed 3/7        Gait training W/ SPC within clinic, 2x3 laps W/SPC wtihin clinc 2 laps around the clinic with cane raised     1 lap with walker to assess  "upright wihout lateral lean   SPC   200ft focus on longer step length    W/o SPC  400ft focus on longer step length and heel initial contact      Balance training         Step up  Railing support Forward 4'' 2 x 10   Lateral 4\" 10x  Railing support Forward 6''   3 x 10   Lateral 6\"   3 x 10 b/l      [place]                  Ther Ex 3/7        Aerobic conditioning 8' 5 min Upright bike  Lvl 5  8 min      UBE Slant board stretch 3x20\" L2        Luis stretch Standing hip flexor stretch at rail 5x10\"        Quad set   2 x 10 x 5s hold      Calf stretch   4 x 30s   On slant board lvl 2      Bridges  2 x 12 with abduction       Clams  2 x 10 with TB (orange)        Sit to stand 2x10 2 x 10 @ 17 inches. Verbal cueing for no hands and for appropriate breathing       Side steps [place] 3 laps mirror        Standing hip abduction  2 x 12 with abduction and TB (green    2 x 12 with hip extension and TB 2 x 10 each hip abduction  W/ peach TB    3 x 10 each hip ext  W/ peach TB      Speed skater         Standing hip flexor stretch with stool  8'' x 8       Ambulation along railing with [place] step overs.   3 laps        Ambulation along railing with HHA  4  laps (12 feet) difficulty with L HHA.  4 laps (12 feet) difficulty with L HHA               Ther Activity         Floor to stand transfer                  Manual         Knee ext                                             Modalities                                         " none

## 2024-03-26 ENCOUNTER — OFFICE VISIT (OUTPATIENT)
Dept: PHYSICAL THERAPY | Facility: REHABILITATION | Age: 64
End: 2024-03-26
Payer: COMMERCIAL

## 2024-03-26 DIAGNOSIS — Z48.89 AFTERCARE FOLLOWING SURGERY: Primary | ICD-10-CM

## 2024-03-26 PROCEDURE — 97110 THERAPEUTIC EXERCISES: CPT | Performed by: PHYSICAL THERAPIST

## 2024-03-26 NOTE — PROGRESS NOTES
Daily Note     Today's date: 3/26/2024  Patient name: Agus Espinoza  : 1960  MRN: [id number]  Referring provider: Brendan Keane DO  Dx:   Encounter Diagnosis     ICD-10-CM    1. Aftercare following surgery  Z48.89             Start Time: 930  Stop Time: 1015  Total time in clinic (min): 45 minutes    Subjective: Did some of his exercises over the weekend but not daily.       Objective: See treatment diary below      Assessment: Focused on reviewing patient's HEP. Patient only required cuing for sidelying hip abduction which is understandable as it is difficult for him to complete due to strength and mobility deficits. Plan on discharge next visit as patient is hitting a plateau in his progress.      Plan: Potential discharge next visit.    Access Code: [email or web address]  URL: [email or web address]  Date: 2024  Prepared by: Ana Thompson    Exercises  - Modified Luis Stretch  - 1 x daily - 7 x weekly - 3 sets - 30 hold  - Bridge with Hip Abduction and Resistance  - 1 x daily - 7 x weekly - 3 sets - 15 reps  - Clamshell  - 1 x daily - 7 x weekly - 3 sets - 10 reps  - Step Up  - 1 x daily - 7 x weekly - 3 sets - 12 reps  - Lateral Step Up  - 1 x daily - 7 x weekly - 3 sets - 10 reps  - Single Leg Stance  - 1 x daily - 7 x weekly - 3 sets - 30 hold  - Sit to Stand with Arms Crossed  - 1 x daily - 7 x weekly - 3 sets - 12 reps  - Standing Hip Abduction with Resistance at Ankles and Counter Support  - 1 x daily - 7 x weekly - 3 sets - 10 reps  - Standing Hip Extension with Resistance at Ankles and Counter Support  - 1 x daily - 7 x weekly - 3 sets - 10 reps       POC expires Unit limit Auth Expiration date PT/OT/ST + Visit Limit?   3/28/23 4 24                            Visit/Unit Tracking  AUTH Status:  Date 3/7 3/11 3/14 3/19 3/21 3/26    Approved Used 20 21 22 23 24 25     Remaining             Diagnosis: Fixation with anatomic reduction of intertrochanteric hip fracture on 10/16/23   "  Precautions:  PMH of HTN, hx of CVA (2021 - lacunar infarct, b/l MCA), HLD, GERD, seizure disorder (on medication)   Insurance: [employer]    3/7 3/11 3/14 3/19 3/21 3/26    Vitals          Patient Ed          Scar mobility          Getting out of bed                              Neuro Re-Ed 3/7         Gait training W/ SPC within clinic, 2x3 laps W/SPC wtihin clinc 2 laps around the clinic with cane raised     1 lap with walker to assess upright wihout lateral lean   SPC   200ft focus on longer step length    W/o SPC  400ft focus on longer step length and heel initial contact SPC   400ft focus on longer step length    W/o SPC  200ft focus on longer step length and heel initial contact  W/o SPC  500ft focus on longer step length and heel initial contact    Balance training    SLS  4 x 30s with 1 hand support SLS  3 x 30s  2 hand support SLS  3 x 30s  2 hand support    Step up  Railing support Forward 4'' 2 x 10   Lateral 4\" 10x  Railing support Forward 6''   3 x 10   Lateral 6\"   3 x 10 b/l  Railing support Forward 6''   3 x 10   Lateral 6\"   3 x 10 b/l     [place]                    Ther Ex 3/7         Aerobic conditioning 8' 5 min Upright bike  Lvl 5  8 min Upright bike  Lvl 7  8 min Upright bike  Lvl 7  10 min Upright bike   Lvl 7  10 min    UBE Slant board stretch 3x20\" L2         Luis stretch Standing hip flexor stretch at rail 5x10\"    3 x 30s off table     Quad set   2 x 10 x 5s hold       Calf stretch   4 x 30s   On slant board lvl 2 2 x 45s  On slant board  Lvl 2  3 x 30s  On slant board  Lvl 2    Bridges  2 x 12 with abduction  3 x 12 with abduction  [place] TB 3 x 12 with abduction  [place] TB     Clams  2 x 10 with TB (orange)   [place] TB  R 3 x 12    No band for  L 3 x 12 No band for  L 3 x 10-13 No band for  L 3 x 10-13    Sit to stand 2x10 2 x 10 @ 17 inches. Verbal cueing for no hands and for appropriate breathing  Chair  3 x 12 Chair  3 x 12 Chair  3 x 12    Side steps [place] 3 laps mirror       "   Standing hip abduction  2 x 12 with abduction and TB (green    2 x 12 with hip extension and TB 2 x 10 each hip abduction  W/ peach TB    3 x 10 each hip ext  W/ peach TB 3 x 10 each hip abduction  W/ [place] TB    3 x 15 hip ext on L   W/ [place] TB 3 x 10 each hip abduction b/l  W/ [place] TB    3 x 10 hip ext b/  W/ [place] TB Sidelying hip abduction   3 x 10    Standing hip flexor stretch with stool  8'' x 8        Ambulation along railing with [place] step overs.   3 laps         Ambulation along railing with HHA  4  laps (12 feet) difficulty with L HHA.  4 laps (12 feet) difficulty with L HHA                 Ther Activity          Floor to stand transfer                    Manual          Knee ext                                                  Modalities

## 2024-03-27 ENCOUNTER — HOSPITAL ENCOUNTER (OUTPATIENT)
Dept: RADIOLOGY | Age: 64
Discharge: HOME | End: 2024-03-27
Payer: COMMERCIAL

## 2024-03-27 DIAGNOSIS — M81.0 OSTEOPOROSIS WITHOUT CURRENT PATHOLOGICAL FRACTURE, UNSPECIFIED OSTEOPOROSIS TYPE: ICD-10-CM

## 2024-03-27 PROCEDURE — 77080 DXA BONE DENSITY AXIAL: CPT

## 2024-03-28 ENCOUNTER — OFFICE VISIT (OUTPATIENT)
Dept: PHYSICAL THERAPY | Facility: REHABILITATION | Age: 64
End: 2024-03-28
Payer: COMMERCIAL

## 2024-03-28 DIAGNOSIS — Z48.89 AFTERCARE FOLLOWING SURGERY: Primary | ICD-10-CM

## 2024-03-28 PROCEDURE — 97164 PT RE-EVAL EST PLAN CARE: CPT | Performed by: PHYSICAL THERAPIST

## 2024-03-28 NOTE — PROGRESS NOTES
PT Discharge    Today's date: 3/28/2024  Patient name: Agus Espinoza  : 1960  MRN: [id number]  Referring provider: Brendan Keane DO  Dx:   Encounter Diagnosis     ICD-10-CM    1. Aftercare following surgery  Z48.89               Start Time: 930  Stop Time: 1015  Total time in clinic (min): 45 minutes    Assessment  Assessment details: Patient has been seen in PT over the past 3 months for s/p L Fixation with anatomic reduction of intertrochanteric hip fracture on 10/16/23 due to fall. Patient reporting good improvement in his functional mobility with his walking tolerance, his gait, and stair negotiation. Objectively, patient presents improvement in his hip mobility (hip flexion 100 to 120, hip strength (hip ER 3-/5 to 4-/5 and hip abduction 3-/5 to 3/5), 6MWT (738ft w/ RW to 1020 ft w/ SPC), and TUG (14.6s w/ SPC to 8.82s w/o SPC). Patient has met some of his PT goals but is reaching plateau in his progress. At this time patient is independent with his HEP and is ready for discharge. Patient in agreement with plan and is aware to return to PT if his status changes.   Impairments: abnormal gait, abnormal or restricted ROM, impaired balance and impaired physical strength    Goals  Within 6 weeks,   1. Patient will demonstrate L hip ER strength >=3/5. - Met  2. Patient will demonstrate L hip abduction strength >=3/5. - Met   3. Patient will demonstrate at least 0* L hip ext mobility. - Met  4. Patient will be able to ambulate 10 minutes continuously with LRAD. - Met  5. Patient will be able to demonstrate proper gait mechanics with LRAD. - Not Met     Within 12 weeks or by discharge,   1. Patient will demonstrate L hip ER strength >=4/5. - Not Met  2. Patient will demonstrate L hip abduction strength >=4/5. - Not Met  3. Patient will be able to ambulate for >=10 minutes without AD. - Not Met  4. Patient will be able to stand for >= 10 minutes without AD without hip pain. - Met  5. Patient will be  able to grocery shop independently. - Met  6. Patient will be able to clean his home independently. - Met  7. Patient will improve 6MWT by 200ft (IE: 738ft). - Met  8. Patient will improve TUG by 2s (IE: 14.6). - Met  9. Patient will improve 5x STS by 2s (IE: 16s) - Met    Plan  Treatment plan discussed with: patient        Subjective Evaluation    History of Present Illness  Mechanism of injury: Interval History (3/28/24): Since beginning PT, patient reports he is walking more. He can walk up 5 steps in his house without his cane. He uses his cane sometimes in the house for balance. He has walked up to 30 minutes outside for exercise. He does report he is lazy with activity. He hasn't used his bike because it can be hard to get on it but he knows he needs to use it more.     Interval History (24): Since beginning PT, patient is able to walk short distances without his SPC. He reports he is able to grocery shop independently. He can clean his house. He is able to get into his tall car and drive. He got on his stationary  bike.    Patient goal: He would like to continue to work on improving his walking to get rid of the cane.    Initial Evaluation: Patient is s/p L Fixation with anatomic reduction of intertrochanteric hip fracture on 10/16/23 due to a fall. He was using a leaf blower and fell. Went to [facility] for 2 weeks for acute rehab. He went home with home PT.    PLOF: lives alone, would be able to walk independently without AD, had a stroke and reports he had lost function of BLE but it recovered    Limitations: cleaning, grocery shopping  Patient Goals  Patient goals for therapy: improved balance, decreased pain, increased strength, independence with ADLs/IADLs and increased motion    Pain  Current pain ratin  Location: L hip  Aggravating factors: walking    Social Support  Steps to enter house: yes  5  Stairs in house: yes   8  Lives in: multiple-level home  Lives with: alone    Employment  status: not working (retired)        Objective     Static Posture     Comments  R hand tremor    Passive Range of Motion   Left Hip   Flexion: 120 degrees with pain  Extension: 10 degrees   External rotation (90/90): 40 degrees   Internal rotation (90/90): 30 degrees     Right Hip   Flexion: 130 degrees   Extension: 10 degrees   External rotation (90/90): 40 degrees   Internal rotation (90/90): 35 degrees     Strength/Myotome Testing     Left Hip   Planes of Motion   Abduction: 3  External rotation: 4-    Right Hip   Planes of Motion   Abduction: 3  External rotation: 4-    Ambulation     Comments   Gait: mild knee flexion during stance, no L hip extension during terminal stance, decreased arm swing on L  W/ SPC - normal stride lengths  W/o SPC - short stride lengths       Outcome Measure 12/12 2/1 3/28   6MWT 738ft w/  ft w/ SPC 1020 ft w/ SPC   TUG 14.6s with RW 8s w/ SPC; 11.7s w/o SPC 8.82 s w/o SPC   5x STS 16s 17.8 s 14.19s              POC expires Unit limit Auth Expiration date PT/OT/ST + Visit Limit?   3/28/23 4 12/31/24                            Visit/Unit Tracking  AUTH Status:  Date 3/7 3/11 3/14 3/19 3/21 3/26 3/28   Approved Used 20 21 22 23 24 25 26    Remaining             Diagnosis: Fixation with anatomic reduction of intertrochanteric hip fracture on 10/16/23    Precautions:  PMH of HTN, hx of CVA (2021 - lacunar infarct, b/l MCA), HLD, GERD, seizure disorder (on medication)   Insurance: [employer]    3/7 3/11 3/14 3/19 3/21 3/26 3/28   Vitals       Discharge   Patient Ed          Scar mobility          Getting out of bed                              Neuro Re-Ed 3/7         Gait training W/ SPC within clinic, 2x3 laps W/SPC wtihin clinc 2 laps around the clinic with cane raised     1 lap with walker to assess upright wihout lateral lean   SPC   200ft focus on longer step length    W/o SPC  400ft focus on longer step length and heel initial contact SPC   400ft focus on longer step length    W/o  "SPC  200ft focus on longer step length and heel initial contact  W/o SPC  500ft focus on longer step length and heel initial contact    Balance training    SLS  4 x 30s with 1 hand support SLS  3 x 30s  2 hand support SLS  3 x 30s  2 hand support    Step up  Railing support Forward 4'' 2 x 10   Lateral 4\" 10x  Railing support Forward 6''   3 x 10   Lateral 6\"   3 x 10 b/l  Railing support Forward 6''   3 x 10   Lateral 6\"   3 x 10 b/l     [place]                    Ther Ex 3/7         Aerobic conditioning 8' 5 min Upright bike  Lvl 5  8 min Upright bike  Lvl 7  8 min Upright bike  Lvl 7  10 min Upright bike   Lvl 7  10 min Upright bike   Lvl 7  8 min   UBE Slant board stretch 3x20\" L2         Luis stretch Standing hip flexor stretch at rail 5x10\"    3 x 30s off table     Quad set   2 x 10 x 5s hold       Calf stretch   4 x 30s   On slant board lvl 2 2 x 45s  On slant board  Lvl 2  3 x 30s  On slant board  Lvl 2    Bridges  2 x 12 with abduction  3 x 12 with abduction  Raywick TB 3 x 12 with abduction  Raywick TB     Clams  2 x 10 with TB (orange)   [place] TB  R 3 x 12    No band for  L 3 x 12 No band for  L 3 x 10-13 No band for  L 3 x 10-13    Sit to stand 2x10 2 x 10 @ 17 inches. Verbal cueing for no hands and for appropriate breathing  Chair  3 x 12 Chair  3 x 12 Chair  3 x 12    Side steps [place] 3 laps mirror         Standing hip abduction  2 x 12 with abduction and TB (green    2 x 12 with hip extension and TB 2 x 10 each hip abduction  W/ peach TB    3 x 10 each hip ext  W/ peach TB 3 x 10 each hip abduction  W/ peach TB    3 x 15 hip ext on L   W/ peach TB 3 x 10 each hip abduction b/l  W/ peach TB    3 x 10 hip ext b/  W/ peach TB Sidelying hip abduction   3 x 10    Standing hip flexor stretch with stool  8'' x 8        Ambulation along railing with [place] step overs.   3 laps         Ambulation along railing with HHA  4  laps (12 feet) difficulty with L HHA.  4 laps (12 feet) difficulty with L HHA            "      Ther Activity          Floor to stand transfer                    Manual          Knee ext                                                  Modalities

## 2024-03-29 ENCOUNTER — APPOINTMENT (OUTPATIENT)
Dept: LAB | Facility: CLINIC | Age: 64
End: 2024-03-29
Payer: COMMERCIAL

## 2024-03-29 ENCOUNTER — TELEPHONE (OUTPATIENT)
Dept: NEUROLOGY | Facility: CLINIC | Age: 64
End: 2024-03-29

## 2024-03-29 DIAGNOSIS — G40.209 PARTIAL SYMPTOMATIC EPILEPSY WITH COMPLEX PARTIAL SEIZURES, NOT INTRACTABLE, WITHOUT STATUS EPILEPTICUS (HCC): ICD-10-CM

## 2024-03-29 DIAGNOSIS — E78.2 MIXED HYPERLIPIDEMIA: ICD-10-CM

## 2024-03-29 DIAGNOSIS — G40.909 SEIZURE DISORDER (HCC): Primary | ICD-10-CM

## 2024-03-29 LAB
ALBUMIN SERPL BCP-MCNC: 4.3 G/DL (ref 3.5–5)
ALP SERPL-CCNC: 63 U/L (ref 34–104)
ALT SERPL W P-5'-P-CCNC: 23 U/L (ref 7–52)
ANION GAP SERPL CALCULATED.3IONS-SCNC: 8 MMOL/L (ref 4–13)
AST SERPL W P-5'-P-CCNC: 20 U/L (ref 13–39)
BILIRUB SERPL-MCNC: 0.45 MG/DL (ref 0.2–1)
BUN SERPL-MCNC: 15 MG/DL (ref 5–25)
CALCIUM SERPL-MCNC: 9 MG/DL (ref 8.4–10.2)
CARBAMAZEPINE SERPL-MCNC: 15.9 UG/ML (ref 4–12)
CHLORIDE SERPL-SCNC: 107 MMOL/L (ref 96–108)
CHOLEST SERPL-MCNC: 161 MG/DL
CO2 SERPL-SCNC: 28 MMOL/L (ref 21–32)
CREAT SERPL-MCNC: 0.74 MG/DL (ref 0.6–1.3)
GFR SERPL CREATININE-BSD FRML MDRD: 97 ML/MIN/1.73SQ M
GLUCOSE P FAST SERPL-MCNC: 90 MG/DL (ref 65–99)
HDLC SERPL-MCNC: 89 MG/DL
LDLC SERPL CALC-MCNC: 54 MG/DL (ref 0–100)
NONHDLC SERPL-MCNC: 72 MG/DL
POTASSIUM SERPL-SCNC: 3.9 MMOL/L (ref 3.5–5.3)
PROT SERPL-MCNC: 6.8 G/DL (ref 6.4–8.4)
SODIUM SERPL-SCNC: 143 MMOL/L (ref 135–147)
TRIGL SERPL-MCNC: 88 MG/DL
TSH SERPL DL<=0.05 MIU/L-ACNC: 1.83 UIU/ML (ref 0.45–4.5)

## 2024-03-29 PROCEDURE — 80156 ASSAY CARBAMAZEPINE TOTAL: CPT

## 2024-03-29 PROCEDURE — 36415 COLL VENOUS BLD VENIPUNCTURE: CPT

## 2024-03-29 PROCEDURE — 80177 DRUG SCRN QUAN LEVETIRACETAM: CPT

## 2024-03-29 PROCEDURE — 84443 ASSAY THYROID STIM HORMONE: CPT

## 2024-03-29 PROCEDURE — 80053 COMPREHEN METABOLIC PANEL: CPT

## 2024-03-29 PROCEDURE — 80061 LIPID PANEL: CPT

## 2024-03-29 NOTE — TELEPHONE ENCOUNTER
Neurology    Received a message from the lab that the Tegretol level was 15.9.    I called Agus and he informed me that he had taken his morning dose of Tegretol an hour before getting the blood work.    He denies any problems with walking,  double vision or any recurrent seizures.  He was at a diner having lunch.  I have asked him to continue on the current regimen of Tegretol is up to 7 a day and Keppra 750 twice a day.  He should repeat the Tegretol level prior to his morning dose of Tegretol in 3 to 4 weeks.  He is aware     The cmp was normal , Keppra level is pending

## 2024-04-01 LAB — LEVETIRACETAM SERPL-MCNC: 28.9 UG/ML (ref 10–40)

## 2024-04-02 ENCOUNTER — OFFICE VISIT (OUTPATIENT)
Dept: SURGERY | Facility: CLINIC | Age: 64
End: 2024-04-02
Payer: COMMERCIAL

## 2024-04-02 DIAGNOSIS — L98.9 SKIN LESION: Primary | ICD-10-CM

## 2024-04-02 PROCEDURE — 99202 OFFICE O/P NEW SF 15 MIN: CPT | Performed by: SURGERY

## 2024-04-02 PROCEDURE — 88305 TISSUE EXAM BY PATHOLOGIST: CPT | Performed by: PATHOLOGY

## 2024-04-02 PROCEDURE — 11401 EXC TR-EXT B9+MARG 0.6-1 CM: CPT | Performed by: SURGERY

## 2024-04-02 NOTE — PROGRESS NOTES
Assessment/Plan: Patient presents with a benign-appearing skin lesion over the right chest.  After consultation this was removed.  He tolerated this well.  Wound care instructions provided.    There are no diagnoses linked to this encounter.    Subjective:      Patient ID: Agus Espinoza is a 64 y.o. male.    Patient presents for evaluation of a skin lesion on his abdomen.  States he has had the skin lesion for 2 to 3 weeks.  The skin lesion has increased in size.        The following portions of the patient's history were reviewed and updated as appropriate:     He  has a past medical history of Arthritis, Hypertension, Seizures (HCC), and Stroke (HCC).  He  has a past surgical history that includes Elbow surgery (2017) and pr optx fem shft fx w/insj imed implt w/wo screw (Left, 10/16/2023).  His family history includes Alzheimer's disease in his mother; Dementia in his mother; Hyperlipidemia in his brother; Stroke in his father.  He  reports that he has never smoked. He has never used smokeless tobacco. He reports current alcohol use. He reports that he does not use drugs.  Current Outpatient Medications   Medication Sig Dispense Refill    alendronate (FOSAMAX) 70 mg tablet Take 1 tablet (70 mg total) by mouth every 7 days 4 tablet 5    aspirin-dipyridamole (AGGRENOX)  mg per 12 hr capsule Take 1 capsule by mouth 2 (two) times a day 180 capsule 3    Calcium-Magnesium-Vitamin D (CALCIUM MAGNESIUM PO) Take by mouth      carBAMazepine (TEGretol) 200 mg tablet TAKE 3 TABLETS IN THE AM, 1 TABLETS AT LUNCH, 2 TABLETS AT DINNER, AND 2 TABLETS AT BEDTIME (Patient taking differently: TAKE 3 TABLETS IN THE AM, 2 TABLETS AT DINNER, AND 2 TABLETS AT BEDTIME    10/31/23 - no longer taking 1 tablet at lunch.) 240 tablet 5    co-enzyme Q-10 30 MG capsule Take 100 mg by mouth daily        docusate sodium (COLACE) 100 mg capsule Take 1 capsule (100 mg total) by mouth 2 (two) times a day  0    levETIRAcetam (Keppra)  750 mg tablet Take 1 tablet (750 mg total) by mouth every 12 (twelve) hours 60 tablet 5    Lidocaine 4 % PTCH Place 1 patch on the skin daily      LORazepam (ATIVAN) 0.5 mg tablet TAKE 1 TABLET BY MOUTH ONCE DAILY IF NEEDED for seizure.  Limit of 1 in 24 hours 10 tablet 0    NON FORMULARY Super Beets      polyethylene glycol (MIRALAX) 17 g packet Take 17 g by mouth daily Do not start before October 20, 2023.  0    rosuvastatin (CRESTOR) 10 MG tablet Take 1 tablet (10 mg total) by mouth daily 90 tablet 1    VALERIAN ROOT PO Use      Vitamin Mixture (VITAMIN E COMPLETE PO) Take 1 tablet by mouth daily        No current facility-administered medications for this visit.     He is allergic to meperidine, amoxicillin, azithromycin, other, and pollen extract..    Review of Systems      Objective:      There were no vitals taken for this visit.         Physical Exam  Biopsy    Date/Time: 4/2/2024 12:30 PM    Performed by: David Butler MD  Authorized by: David Butler MD  Universal Protocol:  Patient understanding: patient states understanding of the procedure being performed  Patient identity confirmed: verbally with patient    Procedure Details - Lesion Biopsy:     Biopsy method: shave biopsy      Biopsy tissue type: skin    Initial size (mm):  8    Final defect size (mm):  8    Malignancy: benign lesion

## 2024-04-04 ENCOUNTER — TELEPHONE (OUTPATIENT)
Dept: NEUROLOGY | Facility: CLINIC | Age: 64
End: 2024-04-04

## 2024-04-04 NOTE — TELEPHONE ENCOUNTER
----- Message from Norma Philip DO sent at 1/3/2024  3:42 PM EST -----  Please call the patient regarding his abnormal result.    Please let Agus know that his Tegretol level is 9.5 and his sodium was normal at 138.  Therefore all his dose of Tegretol and Keppra will remain the same.

## 2024-04-04 NOTE — TELEPHONE ENCOUNTER
----- Message from Norma Philip DO sent at 1/8/2024  8:44 AM EST -----  Please call the patient regarding his abnormal result.  Please contact Agus  and let him know the cmp is normal Sodium is 138 ,  keppra level is low but tegretol level is normal .. Will keep him on the current aed regimen

## 2024-04-04 NOTE — TELEPHONE ENCOUNTER
Patient had more labs done since then in March, and provider Dr Philip has spoken to the patient with an update on 3/29/24 with those lab results.

## 2024-04-11 ENCOUNTER — OFFICE VISIT (OUTPATIENT)
Dept: OBGYN CLINIC | Facility: HOSPITAL | Age: 64
End: 2024-04-11
Payer: COMMERCIAL

## 2024-04-11 ENCOUNTER — HOSPITAL ENCOUNTER (OUTPATIENT)
Dept: RADIOLOGY | Facility: HOSPITAL | Age: 64
Discharge: HOME | End: 2024-04-11
Attending: ORTHOPAEDIC SURGERY
Payer: COMMERCIAL

## 2024-04-11 DIAGNOSIS — S72.142D INTERTROCHANTERIC FRACTURE OF LEFT HIP, CLOSED, WITH ROUTINE HEALING, SUBSEQUENT ENCOUNTER: Primary | ICD-10-CM

## 2024-04-11 DIAGNOSIS — S72.142D INTERTROCHANTERIC FRACTURE OF LEFT HIP, CLOSED, WITH ROUTINE HEALING, SUBSEQUENT ENCOUNTER: ICD-10-CM

## 2024-04-11 PROCEDURE — 99213 OFFICE O/P EST LOW 20 MIN: CPT | Performed by: ORTHOPAEDIC SURGERY

## 2024-04-11 PROCEDURE — 73502 X-RAY EXAM HIP UNI 2-3 VIEWS: CPT

## 2024-04-11 NOTE — PROGRESS NOTES
ASSESSMENT/PLAN:    Assessment:   64 y.o. male  S/P L cephalomedullary nail (DOS 10/6/23) for left intertrochanteric hip fracture     Plan:  Today I had a long discussion with the caregiver regarding the diagnosis and plan moving forward.  Patient presented on exam today.  X-ray demonstrates continued consolidation of the left intertrochanteric hip fracture.  He should continue activities as tolerated.  Continue using cane for additional assistance as needed.  I will plan to see him back in 6 months for repeat evaluation and x-rays.  If any issues or concerns arise return, he is recommended to contact the office.    Follow up in 6 months for repeat evaluation and XR     The above diagnosis and plan has been dicussed with the patient and caregiver. They verbalized an understanding and will follow up accordingly.       _____________________________________________________    SUBJECTIVE:  Agus Espinoza is a 64 y.o. male who presents today for recheck of his left hip. Patient is 6 months S/P L cephalomedullary nail (DOS 10/6/23) for left intertrochanteric hip fracture. He has been doping very well. Feels his gait is improving. He is no longer in therapy. He cut his grass yesterday.  Denies any numbness or tingling.  No fevers no chills    PAST MEDICAL HISTORY:  Past Medical History:   Diagnosis Date    Arthritis     Hypertension     Seizures (HCC)     Stroke (HCC)        PAST SURGICAL HISTORY:  Past Surgical History:   Procedure Laterality Date    ELBOW SURGERY  2017    TX OPTX FEM SHFT FX W/INSJ IMED IMPLT W/WO SCREW Left 10/16/2023    Procedure: INSERTION NAIL IM FEMUR ANTEGRADE (TROCHANTERIC);  Surgeon: Brendan Keane DO;  Location: [facility];  Service: Orthopedics       FAMILY HISTORY:  Family History   Problem Relation Age of Onset    Dementia Mother     Alzheimer's disease Mother     Stroke Father     Hyperlipidemia Brother        SOCIAL HISTORY:  Social History     Tobacco Use    Smoking status: Never     Smokeless tobacco: Never   Vaping Use    Vaping status: Never Used   Substance Use Topics    Alcohol use: Yes     Comment: occasional    Drug use: No       MEDICATIONS:    Current Outpatient Medications:     alendronate (FOSAMAX) 70 mg tablet, Take 1 tablet (70 mg total) by mouth every 7 days, Disp: 4 tablet, Rfl: 5    aspirin-dipyridamole (AGGRENOX)  mg per 12 hr capsule, Take 1 capsule by mouth 2 (two) times a day, Disp: 180 capsule, Rfl: 3    Calcium-Magnesium-Vitamin D (CALCIUM MAGNESIUM PO), Take by mouth, Disp: , Rfl:     carBAMazepine (TEGretol) 200 mg tablet, TAKE 3 TABLETS IN THE AM, 1 TABLETS AT LUNCH, 2 TABLETS AT DINNER, AND 2 TABLETS AT BEDTIME (Patient taking differently: TAKE 3 TABLETS IN THE AM, 2 TABLETS AT DINNER, AND 2 TABLETS AT BEDTIME  10/31/23 - no longer taking 1 tablet at lunch.), Disp: 240 tablet, Rfl: 5    co-enzyme Q-10 30 MG capsule, Take 100 mg by mouth daily  , Disp: , Rfl:     docusate sodium (COLACE) 100 mg capsule, Take 1 capsule (100 mg total) by mouth 2 (two) times a day, Disp: , Rfl: 0    levETIRAcetam (Keppra) 750 mg tablet, Take 1 tablet (750 mg total) by mouth every 12 (twelve) hours, Disp: 60 tablet, Rfl: 5    Lidocaine 4 % PTCH, Place 1 patch on the skin daily, Disp: , Rfl:     LORazepam (ATIVAN) 0.5 mg tablet, TAKE 1 TABLET BY MOUTH ONCE DAILY IF NEEDED for seizure.  Limit of 1 in 24 hours, Disp: 10 tablet, Rfl: 0    NON FORMULARY, Super Beets, Disp: , Rfl:     polyethylene glycol (MIRALAX) 17 g packet, Take 17 g by mouth daily Do not start before October 20, 2023., Disp: , Rfl: 0    rosuvastatin (CRESTOR) 10 MG tablet, Take 1 tablet (10 mg total) by mouth daily, Disp: 90 tablet, Rfl: 1    VALERIAN ROOT PO, Use, Disp: , Rfl:     Vitamin Mixture (VITAMIN E COMPLETE PO), Take 1 tablet by mouth daily , Disp: , Rfl:     ALLERGIES:  Allergies   Allergen Reactions    Meperidine Seizures    Amoxicillin     Azithromycin Seizures    Other Sneezing     Dust     Pollen  Extract Sneezing       REVIEW OF SYSTEMS:  ROS is negative other than that noted in the HPI.  Constitutional: Negative for fatigue and fever.   HENT: Negative for sore throat.    Respiratory: Negative for shortness of breath.    Cardiovascular: Negative for chest pain.   Gastrointestinal: Negative for abdominal pain.   Endocrine: Negative for cold intolerance and heat intolerance.   Genitourinary: Negative for flank pain.   Musculoskeletal: Negative for back pain.   Skin: Negative for rash.   Allergic/Immunologic: Negative for immunocompromised state.   Neurological: Negative for dizziness.   Psychiatric/Behavioral: Negative for agitation.         _____________________________________________________  PHYSICAL EXAMINATION:  General/Constitutional: NAD, well developed, well nourished  HENT: Normocephalic, atraumatic  CV: Intact distal pulses, regular rate  Resp: No respiratory distress or labored breathing  Lymphatic: No lymphadenopathy palpated  Neuro: Alert and  awake  Psych: Normal mood  Skin: Warm, dry, no rashes, no erythema      MUSCULOSKELETAL EXAMINATION:  Musculoskeletal: Left Hip     Skin Intact, healed incision   TTP none    Special Tests:  (-) Stinchfield. (-) Straight leg raise.  Negative logroll negative heel strike  Alignment:  Normal lumbar alignment. Normal resting hip posture. Normal knee alignment. Normal ankle alignment.  Ambulates well around the room with assistance from a cane.  There is a very mild Trendelenburg gait but otherwise minimal pain       LE NV Exam: +2 DP/PT pulses bilaterally  Sensation intact to light touch L2-S1 bilaterally     Bilateral Knee and ankle ROM demonstrates no pain actively or passively    No calf tenderness to palpation bilaterally      _____________________________________________________  STUDIES REVIEWED:  Imaging studies interpreted by Dr. Keane and demonstrate multiple views of the left hip demonstrates maintained alignment with interval healing of  intertrochanteric hip fracture.  There is no loosening or hardware complication.      PROCEDURES PERFORMED:  No Procedures performed today    I have personally seen and examined the patient, utilizing [employer], a Certified Athletic Trainer for assistance with documentation.  The entire visit including physical exam and formulation/discussion of plan was performed by me.

## 2024-04-12 NOTE — H&P
Assessment:   64 y.o. male  S/P L cephalomedullary nail (DOS 10/6/23) for left intertrochanteric hip fracture      Plan:  Today I had a long discussion with the caregiver regarding the diagnosis and plan moving forward.  Patient presented on exam today.  X-ray demonstrates continued consolidation of the left intertrochanteric hip fracture.  He should continue activities as tolerated.  Continue using cane for additional assistance as needed.  I will plan to see him back in 6 months for repeat evaluation and x-rays.  If any issues or concerns arise return, he is recommended to contact the office.     Follow up in 6 months for repeat evaluation and XR      The above diagnosis and plan has been dicussed with the patient and caregiver. They verbalized an understanding and will follow up accordingly.         _____________________________________________________     SUBJECTIVE:  Agus Espinoza is a 64 y.o. male who presents today for recheck of his left hip. Patient is 6 months S/P L cephalomedullary nail (DOS 10/6/23) for left intertrochanteric hip fracture. He has been doping very well. Feels his gait is improving. He is no longer in therapy. He cut his grass yesterday.  Denies any numbness or tingling.  No fevers no chills     PAST MEDICAL HISTORY:  Medical History        Past Medical History:   Diagnosis Date    Arthritis      Hypertension      Seizures (HCC)      Stroke (HCC)              PAST SURGICAL HISTORY:  Surgical History         Past Surgical History:   Procedure Laterality Date    ELBOW SURGERY   2017    [place] OPTX FEM SHFT FX W/INSJ IMED IMPLT W/WO SCREW Left 10/16/2023     Procedure: INSERTION NAIL IM FEMUR ANTEGRADE (TROCHANTERIC);  Surgeon: Brendan Keane DO;  Location: [facility];  Service: Orthopedics            FAMILY HISTORY:  Family History         Family History   Problem Relation Age of Onset    Dementia Mother      Alzheimer's disease Mother      Stroke Father      Hyperlipidemia Brother               SOCIAL HISTORY:  Social History            Tobacco Use    Smoking status: Never    Smokeless tobacco: Never   Vaping Use    Vaping status: Never Used   Substance Use Topics    Alcohol use: Yes       Comment: occasional    Drug use: No         MEDICATIONS:     Current Outpatient Medications:     alendronate (FOSAMAX) 70 mg tablet, Take 1 tablet (70 mg total) by mouth every 7 days, Disp: 4 tablet, Rfl: 5    aspirin-dipyridamole (AGGRENOX)  mg per 12 hr capsule, Take 1 capsule by mouth 2 (two) times a day, Disp: 180 capsule, Rfl: 3    Calcium-Magnesium-Vitamin D (CALCIUM MAGNESIUM PO), Take by mouth, Disp: , Rfl:     carBAMazepine (TEGretol) 200 mg tablet, TAKE 3 TABLETS IN THE AM, 1 TABLETS AT LUNCH, 2 TABLETS AT DINNER, AND 2 TABLETS AT BEDTIME (Patient taking differently: TAKE 3 TABLETS IN THE AM, 2 TABLETS AT DINNER, AND 2 TABLETS AT BEDTIME  10/31/23 - no longer taking 1 tablet at lunch.), Disp: 240 tablet, Rfl: 5    co-enzyme Q-10 30 MG capsule, Take 100 mg by mouth daily  , Disp: , Rfl:     docusate sodium (COLACE) 100 mg capsule, Take 1 capsule (100 mg total) by mouth 2 (two) times a day, Disp: , Rfl: 0    levETIRAcetam (Keppra) 750 mg tablet, Take 1 tablet (750 mg total) by mouth every 12 (twelve) hours, Disp: 60 tablet, Rfl: 5    Lidocaine 4 % PTCH, Place 1 patch on the skin daily, Disp: , Rfl:     LORazepam (ATIVAN) 0.5 mg tablet, TAKE 1 TABLET BY MOUTH ONCE DAILY IF NEEDED for seizure.  Limit of 1 in 24 hours, Disp: 10 tablet, Rfl: 0    NON FORMULARY, Super Beets, Disp: , Rfl:     polyethylene glycol (MIRALAX) 17 g packet, Take 17 g by mouth daily Do not start before October 20, 2023., Disp: , Rfl: 0    rosuvastatin (CRESTOR) 10 MG tablet, Take 1 tablet (10 mg total) by mouth daily, Disp: 90 tablet, Rfl: 1    VALERIAN ROOT PO, Use, Disp: , Rfl:     Vitamin Mixture (VITAMIN E COMPLETE PO), Take 1 tablet by mouth daily , Disp: , Rfl:      ALLERGIES:        Allergies   Allergen Reactions     Meperidine Seizures    Amoxicillin      Azithromycin Seizures    Other Sneezing       Dust     Pollen Extract Sneezing         REVIEW OF SYSTEMS:  ROS is negative other than that noted in the HPI.  Constitutional: Negative for fatigue and fever.   HENT: Negative for sore throat.    Respiratory: Negative for shortness of breath.    Cardiovascular: Negative for chest pain.   Gastrointestinal: Negative for abdominal pain.   Endocrine: Negative for cold intolerance and heat intolerance.   Genitourinary: Negative for flank pain.   Musculoskeletal: Negative for back pain.   Skin: Negative for rash.   Allergic/Immunologic: Negative for immunocompromised state.   Neurological: Negative for dizziness.   Psychiatric/Behavioral: Negative for agitation.            _____________________________________________________  PHYSICAL EXAMINATION:  General/Constitutional: NAD, well developed, well nourished  HENT: Normocephalic, atraumatic  CV: Intact distal pulses, regular rate  Resp: No respiratory distress or labored breathing  Lymphatic: No lymphadenopathy palpated  Neuro: Alert and  awake  Psych: Normal mood  Skin: Warm, dry, no rashes, no erythema        MUSCULOSKELETAL EXAMINATION:  Musculoskeletal: Left Hip      Skin Intact, healed incision   TTP none     Special Tests:  (-) Stinchfield. (-) Straight leg raise.  Negative logroll negative heel strike  Alignment:  Normal lumbar alignment. Normal resting hip posture. Normal knee alignment. Normal ankle alignment.  Ambulates well around the room with assistance from a cane.  There is a very mild Trendelenburg gait but otherwise minimal pain         LE NV Exam: +2 DP/PT pulses bilaterally  Sensation intact to light touch L2-S1 bilaterally     Bilateral Knee and ankle ROM demonstrates no pain actively or passively     No calf tenderness to palpation bilaterally        _____________________________________________________  STUDIES REVIEWED:  Imaging studies interpreted by Dr. Keane  and demonstrate multiple views of the left hip demonstrates maintained alignment with interval healing of intertrochanteric hip fracture.  There is no loosening or hardware complication.        PROCEDURES PERFORMED:  No Procedures performed today     I have personally seen and examined the patient, utilizing [employer], a Certified Athletic Trainer for assistance with documentation.  The entire visit including physical exam and formulation/discussion of plan was performed by me.

## 2024-04-12 NOTE — PROGRESS NOTES
ASSESSMENT/PLAN:    Assessment:   64 y.o. male  S/P L cephalomedullary nail (DOS 10/6/23) for left intertrochanteric hip fracture     Plan:  Today I had a long discussion with the caregiver regarding the diagnosis and plan moving forward.  Patient presented on exam today.  X-ray demonstrates continued consolidation of the left intertrochanteric hip fracture.  He should continue activities as tolerated.  Continue using cane for additional assistance as needed.  I will plan to see him back in 6 months for repeat evaluation and x-rays.  If any issues or concerns arise return, he is recommended to contact the office.    Follow up in 6 months for repeat evaluation and XR     The above diagnosis and plan has been dicussed with the patient and caregiver. They verbalized an understanding and will follow up accordingly.       _____________________________________________________    SUBJECTIVE:  Agus Espinoza is a 64 y.o. male who presents today for recheck of his left hip. Patient is 6 months S/P L cephalomedullary nail (DOS 10/6/23) for left intertrochanteric hip fracture. He has been doping very well. Feels his gait is improving. He is no longer in therapy. He cut his grass yesterday.  Denies any numbness or tingling.  No fevers no chills    PAST MEDICAL HISTORY:  Past Medical History:   Diagnosis Date    Arthritis     Hypertension     Seizures (HCC)     Stroke (HCC)        PAST SURGICAL HISTORY:  Past Surgical History:   Procedure Laterality Date    ELBOW SURGERY  2017    [place] OPTX FEM SHFT FX W/INSJ IMED IMPLT W/WO SCREW Left 10/16/2023    Procedure: INSERTION NAIL IM FEMUR ANTEGRADE (TROCHANTERIC);  Surgeon: Brendan Keane DO;  Location: [facility];  Service: Orthopedics       FAMILY HISTORY:  Family History   Problem Relation Age of Onset    Dementia Mother     Alzheimer's disease Mother     Stroke Father     Hyperlipidemia Brother        SOCIAL HISTORY:  Social History     Tobacco Use    Smoking status: Never     Smokeless tobacco: Never   Vaping Use    Vaping status: Never Used   Substance Use Topics    Alcohol use: Yes     Comment: occasional    Drug use: No       MEDICATIONS:    Current Outpatient Medications:     alendronate (FOSAMAX) 70 mg tablet, Take 1 tablet (70 mg total) by mouth every 7 days, Disp: 4 tablet, Rfl: 5    aspirin-dipyridamole (AGGRENOX)  mg per 12 hr capsule, Take 1 capsule by mouth 2 (two) times a day, Disp: 180 capsule, Rfl: 3    Calcium-Magnesium-Vitamin D (CALCIUM MAGNESIUM PO), Take by mouth, Disp: , Rfl:     carBAMazepine (TEGretol) 200 mg tablet, TAKE 3 TABLETS IN THE AM, 1 TABLETS AT LUNCH, 2 TABLETS AT DINNER, AND 2 TABLETS AT BEDTIME (Patient taking differently: TAKE 3 TABLETS IN THE AM, 2 TABLETS AT DINNER, AND 2 TABLETS AT BEDTIME  10/31/23 - no longer taking 1 tablet at lunch.), Disp: 240 tablet, Rfl: 5    co-enzyme Q-10 30 MG capsule, Take 100 mg by mouth daily  , Disp: , Rfl:     docusate sodium (COLACE) 100 mg capsule, Take 1 capsule (100 mg total) by mouth 2 (two) times a day, Disp: , Rfl: 0    levETIRAcetam (Keppra) 750 mg tablet, Take 1 tablet (750 mg total) by mouth every 12 (twelve) hours, Disp: 60 tablet, Rfl: 5    Lidocaine 4 % PTCH, Place 1 patch on the skin daily, Disp: , Rfl:     LORazepam (ATIVAN) 0.5 mg tablet, TAKE 1 TABLET BY MOUTH ONCE DAILY IF NEEDED for seizure.  Limit of 1 in 24 hours, Disp: 10 tablet, Rfl: 0    NON FORMULARY, Super Beets, Disp: , Rfl:     polyethylene glycol (MIRALAX) 17 g packet, Take 17 g by mouth daily Do not start before October 20, 2023., Disp: , Rfl: 0    rosuvastatin (CRESTOR) 10 MG tablet, Take 1 tablet (10 mg total) by mouth daily, Disp: 90 tablet, Rfl: 1    VALERIAN ROOT PO, Use, Disp: , Rfl:     Vitamin Mixture (VITAMIN E COMPLETE PO), Take 1 tablet by mouth daily , Disp: , Rfl:     ALLERGIES:  Allergies   Allergen Reactions    Meperidine Seizures    Amoxicillin     Azithromycin Seizures    Other Sneezing     Dust     Pollen  Extract Sneezing       REVIEW OF SYSTEMS:  ROS is negative other than that noted in the HPI.  Constitutional: Negative for fatigue and fever.   HENT: Negative for sore throat.    Respiratory: Negative for shortness of breath.    Cardiovascular: Negative for chest pain.   Gastrointestinal: Negative for abdominal pain.   Endocrine: Negative for cold intolerance and heat intolerance.   Genitourinary: Negative for flank pain.   Musculoskeletal: Negative for back pain.   Skin: Negative for rash.   Allergic/Immunologic: Negative for immunocompromised state.   Neurological: Negative for dizziness.   Psychiatric/Behavioral: Negative for agitation.         _____________________________________________________  PHYSICAL EXAMINATION:  General/Constitutional: NAD, well developed, well nourished  HENT: Normocephalic, atraumatic  CV: Intact distal pulses, regular rate  Resp: No respiratory distress or labored breathing  Lymphatic: No lymphadenopathy palpated  Neuro: Alert and  awake  Psych: Normal mood  Skin: Warm, dry, no rashes, no erythema      MUSCULOSKELETAL EXAMINATION:  Musculoskeletal: Left Hip     Skin Intact, healed incision   TTP none    Special Tests:  (-) Stinchfield. (-) Straight leg raise.  Negative logroll negative heel strike  Alignment:  Normal lumbar alignment. Normal resting hip posture. Normal knee alignment. Normal ankle alignment.  Ambulates well around the room with assistance from a cane.  There is a very mild Trendelenburg gait but otherwise minimal pain       LE NV Exam: +2 DP/PT pulses bilaterally  Sensation intact to light touch L2-S1 bilaterally     Bilateral Knee and ankle ROM demonstrates no pain actively or passively    No calf tenderness to palpation bilaterally      _____________________________________________________  STUDIES REVIEWED:  Imaging studies interpreted by Dr. Keane and demonstrate multiple views of the left hip demonstrates maintained alignment with interval healing of  intertrochanteric hip fracture.  There is no loosening or hardware complication.      PROCEDURES PERFORMED:  No Procedures performed today    I have personally seen and examined the patient, utilizing [employer], a Certified Athletic Trainer for assistance with documentation.  The entire visit including physical exam and formulation/discussion of plan was performed by me.

## 2024-04-22 ENCOUNTER — OFFICE VISIT (OUTPATIENT)
Dept: FAMILY MEDICINE CLINIC | Facility: CLINIC | Age: 64
End: 2024-04-22
Payer: COMMERCIAL

## 2024-04-22 VITALS
HEART RATE: 80 BPM | SYSTOLIC BLOOD PRESSURE: 108 MMHG | DIASTOLIC BLOOD PRESSURE: 60 MMHG | WEIGHT: 165 LBS | BODY MASS INDEX: 23.62 KG/M2 | OXYGEN SATURATION: 97 % | HEIGHT: 70 IN

## 2024-04-22 DIAGNOSIS — E78.2 MIXED HYPERLIPIDEMIA: ICD-10-CM

## 2024-04-22 DIAGNOSIS — K21.9 GASTROESOPHAGEAL REFLUX DISEASE WITHOUT ESOPHAGITIS: Primary | ICD-10-CM

## 2024-04-22 DIAGNOSIS — G40.209 PARTIAL SYMPTOMATIC EPILEPSY WITH COMPLEX PARTIAL SEIZURES, NOT INTRACTABLE, WITHOUT STATUS EPILEPTICUS (HCC): ICD-10-CM

## 2024-04-22 DIAGNOSIS — M81.0 OSTEOPOROSIS WITHOUT CURRENT PATHOLOGICAL FRACTURE, UNSPECIFIED OSTEOPOROSIS TYPE: ICD-10-CM

## 2024-04-22 DIAGNOSIS — I10 ESSENTIAL HYPERTENSION: ICD-10-CM

## 2024-04-22 PROBLEM — S72.142A CLOSED DISPLACED INTERTROCHANTERIC FRACTURE OF LEFT FEMUR (HCC): Status: RESOLVED | Noted: 2023-10-15 | Resolved: 2024-04-22

## 2024-04-22 PROCEDURE — 99214 OFFICE O/P EST MOD 30 MIN: CPT | Performed by: FAMILY MEDICINE

## 2024-04-22 RX ORDER — ALENDRONATE SODIUM 70 MG/1
70 TABLET ORAL
Qty: 12 TABLET | Refills: 1 | Status: SHIPPED | OUTPATIENT
Start: 2024-04-22

## 2024-04-22 RX ORDER — ROSUVASTATIN CALCIUM 10 MG/1
10 TABLET, COATED ORAL DAILY
Qty: 30 TABLET | Refills: 1 | Status: SHIPPED | OUTPATIENT
Start: 2024-04-22

## 2024-04-22 RX ORDER — RABEPRAZOLE SODIUM 20 MG/1
20 TABLET, DELAYED RELEASE ORAL
Qty: 90 TABLET | Refills: 1 | Status: SHIPPED | OUTPATIENT
Start: 2024-04-22 | End: 2024-10-19

## 2024-04-22 NOTE — PROGRESS NOTES
Name: Agus Espinoza      : 1960      MRN: [id number]  Encounter Provider: Franchesca Chandra MD  Encounter Date: 2024   Encounter department: [facility] PRIMARY [facility]    Assessment & Plan     1. Gastroesophageal reflux disease without esophagitis  -     RABEprazole (ACIPHEX) 20 MG tablet; Take 1 tablet (20 mg total) by mouth daily in the early morning    2. Mixed hyperlipidemia  Assessment & Plan:  Ldl 54 on Rosuvastatin,stable at present    Orders:  -     rosuvastatin (CRESTOR) 10 MG tablet; Take 1 tablet (10 mg total) by mouth daily    3. Osteoporosis without current pathological fracture, unspecified osteoporosis type  -     alendronate (FOSAMAX) 70 mg tablet; Take 1 tablet (70 mg total) by mouth every 7 days    4. Essential hypertension  Assessment & Plan:  BP stable off meds      5. Partial symptomatic epilepsy with complex partial seizures, not intractable, without status epilepticus (HCC)  Assessment & Plan:  Sees neuro             Subjective      Patient presents with:  Follow-up: Patient present today for his routine 4 month follow up.  Pt will like to get Aciphex for acid reflux.  Patient would also like a 90-day on his Fosamax will be due for a DEXA scan next March he seems to be tolerating the Fosamax okay but he is having a little bit of reflux is not sure if that could be from the meds or just have he is having reflux because he has a history in the past of gerd      Review of Systems   Constitutional:  Negative for activity change, appetite change and fatigue.   Respiratory:  Negative for shortness of breath.    Cardiovascular:  Negative for chest pain.   Gastrointestinal:         Gerd   Neurological:  Negative for dizziness, seizures, light-headedness and headaches.       Current Outpatient Medications on File Prior to Visit   Medication Sig    aspirin-dipyridamole (AGGRENOX)  mg per 12 hr capsule Take 1 capsule by mouth 2 (two) times a day    Calcium-Magnesium-Vitamin D  "(CALCIUM MAGNESIUM PO) Take by mouth    carBAMazepine (TEGretol) 200 mg tablet TAKE 3 TABLETS IN THE AM, 1 TABLETS AT LUNCH, 2 TABLETS AT DINNER, AND 2 TABLETS AT BEDTIME (Patient taking differently: TAKE 3 TABLETS IN THE AM, 2 TABLETS AT DINNER, AND 2 TABLETS AT BEDTIME    10/31/23 - no longer taking 1 tablet at lunch.)    co-enzyme Q-10 30 MG capsule Take 100 mg by mouth daily      docusate sodium (COLACE) 100 mg capsule Take 1 capsule (100 mg total) by mouth 2 (two) times a day    levETIRAcetam (Keppra) 750 mg tablet Take 1 tablet (750 mg total) by mouth every 12 (twelve) hours    LORazepam (ATIVAN) 0.5 mg tablet TAKE 1 TABLET BY MOUTH ONCE DAILY IF NEEDED for seizure.  Limit of 1 in 24 hours    NON FORMULARY Super Beets    polyethylene glycol (MIRALAX) 17 g packet Take 17 g by mouth daily Do not start before October 20, 2023.    VALERIAN ROOT PO Use    Vitamin Mixture (VITAMIN E COMPLETE PO) Take 1 tablet by mouth daily     [DISCONTINUED] alendronate (FOSAMAX) 70 mg tablet Take 1 tablet (70 mg total) by mouth every 7 days    [DISCONTINUED] rosuvastatin (CRESTOR) 10 MG tablet Take 1 tablet (10 mg total) by mouth daily    [DISCONTINUED] Lidocaine 4 % PTCH Place 1 patch on the skin daily (Patient not taking: Reported on 4/22/2024)       Objective     /60 (BP Location: Right arm, Patient Position: Sitting, Cuff Size: Standard)   Pulse 80   Ht 5' 10\" (1.778 m)   Wt 74.8 kg (165 lb)   SpO2 97%   BMI 23.68 kg/m²     Physical Exam  Vitals reviewed.   Constitutional:       Appearance: Normal appearance.   Neck:      Vascular: No carotid bruit.   Cardiovascular:      Rate and Rhythm: Normal rate and regular rhythm.      Pulses: Normal pulses.      Heart sounds: Normal heart sounds.   Pulmonary:      Effort: Pulmonary effort is normal.      Breath sounds: Normal breath sounds.   Musculoskeletal:      Right lower leg: No edema.      Left lower leg: No edema.   Lymphadenopathy:      Cervical: No cervical " adenopathy.   Neurological:      Mental Status: He is alert.   Psychiatric:         Mood and Affect: Mood normal.       Franchesca Chandra MD

## 2024-04-23 ENCOUNTER — APPOINTMENT (OUTPATIENT)
Dept: LAB | Facility: CLINIC | Age: 64
End: 2024-04-23
Payer: COMMERCIAL

## 2024-04-23 DIAGNOSIS — G40.909 SEIZURE DISORDER (HCC): ICD-10-CM

## 2024-04-23 LAB — CARBAMAZEPINE SERPL-MCNC: 8.5 UG/ML (ref 4–12)

## 2024-04-23 PROCEDURE — 36415 COLL VENOUS BLD VENIPUNCTURE: CPT

## 2024-04-23 PROCEDURE — 80156 ASSAY CARBAMAZEPINE TOTAL: CPT

## 2024-04-25 ENCOUNTER — OFFICE VISIT (OUTPATIENT)
Dept: OBGYN CLINIC | Facility: HOSPITAL | Age: 64
End: 2024-04-25
Payer: COMMERCIAL

## 2024-04-25 ENCOUNTER — HOSPITAL ENCOUNTER (OUTPATIENT)
Dept: RADIOLOGY | Facility: HOSPITAL | Age: 64
Discharge: HOME | End: 2024-04-25
Attending: ORTHOPAEDIC SURGERY
Payer: COMMERCIAL

## 2024-04-25 DIAGNOSIS — S90.122A CONTUSION OF LEFT LESSER TOE(S) WITHOUT DAMAGE TO NAIL, INITIAL ENCOUNTER: Primary | ICD-10-CM

## 2024-04-25 DIAGNOSIS — G40.209 PARTIAL SYMPTOMATIC EPILEPSY WITH COMPLEX PARTIAL SEIZURES, NOT INTRACTABLE, WITHOUT STATUS EPILEPTICUS (HCC): Primary | ICD-10-CM

## 2024-04-25 DIAGNOSIS — R52 PAIN: ICD-10-CM

## 2024-04-25 PROCEDURE — 73630 X-RAY EXAM OF FOOT: CPT

## 2024-04-25 PROCEDURE — 99213 OFFICE O/P EST LOW 20 MIN: CPT | Performed by: ORTHOPAEDIC SURGERY

## 2024-04-25 NOTE — TELEPHONE ENCOUNTER
Pt needs a refill on  carBAMazepine (TEGretol) 200 mg tablet .    He would like it sent over to:   [facility]   [id number] [facility] [address]       Pt would like call once it is filled.

## 2024-04-25 NOTE — PROGRESS NOTES
ASSESSMENT/PLAN:    Assessment:   64 y.o. male Left second toe contusion    Plan:   Agus's left great toe negative for fracture on x-ray.  He should wear supportive and stiff soled shoes for comfort.  He is relatively comfortable with ambulation and does not require any immediate intervention or treatment.  He can follow-up for the toe on an as-needed basis.  We will see him on his regularly scheduled visit for his left hip.        The above diagnosis and plan has been dicussed with the patient and caregiver. They verbalized an understanding and will follow up accordingly.     I have personally seen and examined the patient, utilizing the extender/resident/physician's assistant for assistance with documentation.  The entire visit including physical exam and formulation/discussion of plan was performed by me.      _____________________________________________________  CHIEF COMPLAINT:  No chief complaint on file.        SUBJECTIVE:  Agus Espinoza is a 64 y.o. male who presents today for his left second toe.  He is known to us for previous surgically treated left hip fracture.  He is still ambulating with his cane and yesterday his cane came down onto his left foot.  Some discomfort but no real pain.  This morning he woke up and his left great toe is swollen and bruised.  He was able to walk in from his parked car without any issues.      PAST MEDICAL HISTORY:  Past Medical History:   Diagnosis Date    Arthritis     Hypertension     Seizures (HCC)     Stroke (HCC)        PAST SURGICAL HISTORY:  Past Surgical History:   Procedure Laterality Date    ELBOW SURGERY  2017    [place] OPTX FEM SHFT FX W/INSJ IMED IMPLT W/WO SCREW Left 10/16/2023    Procedure: INSERTION NAIL IM FEMUR ANTEGRADE (TROCHANTERIC);  Surgeon: Brendan Keane DO;  Location: [facility];  Service: Orthopedics       FAMILY HISTORY:  Family History   Problem Relation Age of Onset    Dementia Mother     Alzheimer's disease Mother     Stroke Father      Hyperlipidemia Brother        SOCIAL HISTORY:  Social History     Tobacco Use    Smoking status: Never    Smokeless tobacco: Never   Vaping Use    Vaping status: Never Used   Substance Use Topics    Alcohol use: Yes     Comment: occasional    Drug use: No       MEDICATIONS:    Current Outpatient Medications:     alendronate (FOSAMAX) 70 mg tablet, Take 1 tablet (70 mg total) by mouth every 7 days, Disp: 12 tablet, Rfl: 1    aspirin-dipyridamole (AGGRENOX)  mg per 12 hr capsule, Take 1 capsule by mouth 2 (two) times a day, Disp: 180 capsule, Rfl: 3    Calcium-Magnesium-Vitamin D (CALCIUM MAGNESIUM PO), Take by mouth, Disp: , Rfl:     carBAMazepine (TEGretol) 200 mg tablet, TAKE 3 TABLETS IN THE AM, 1 TABLETS AT LUNCH, 2 TABLETS AT DINNER, AND 2 TABLETS AT BEDTIME (Patient taking differently: TAKE 3 TABLETS IN THE AM, 2 TABLETS AT DINNER, AND 2 TABLETS AT BEDTIME  10/31/23 - no longer taking 1 tablet at lunch.), Disp: 240 tablet, Rfl: 5    co-enzyme Q-10 30 MG capsule, Take 100 mg by mouth daily  , Disp: , Rfl:     docusate sodium (COLACE) 100 mg capsule, Take 1 capsule (100 mg total) by mouth 2 (two) times a day, Disp: , Rfl: 0    levETIRAcetam (Keppra) 750 mg tablet, Take 1 tablet (750 mg total) by mouth every 12 (twelve) hours, Disp: 60 tablet, Rfl: 5    LORazepam (ATIVAN) 0.5 mg tablet, TAKE 1 TABLET BY MOUTH ONCE DAILY IF NEEDED for seizure.  Limit of 1 in 24 hours, Disp: 10 tablet, Rfl: 0    NON FORMULARY, Super Beets, Disp: , Rfl:     polyethylene glycol (MIRALAX) 17 g packet, Take 17 g by mouth daily Do not start before October 20, 2023., Disp: , Rfl: 0    RABEprazole (ACIPHEX) 20 MG tablet, Take 1 tablet (20 mg total) by mouth daily in the early morning, Disp: 90 tablet, Rfl: 1    rosuvastatin (CRESTOR) 10 MG tablet, Take 1 tablet (10 mg total) by mouth daily, Disp: 30 tablet, Rfl: 1    VALERIAN ROOT PO, Use, Disp: , Rfl:     Vitamin Mixture (VITAMIN E COMPLETE PO), Take 1 tablet by mouth daily , Disp:  , Rfl:     ALLERGIES:  Allergies   Allergen Reactions    Meperidine Seizures    Amoxicillin     Azithromycin Seizures    Other Sneezing     Dust     Pollen Extract Sneezing       REVIEW OF SYSTEMS:  ROS is negative other than that noted in the HPI.  Constitutional: Negative for fatigue and fever.   HENT: Negative for sore throat.    Respiratory: Negative for shortness of breath.    Cardiovascular: Negative for chest pain.   Gastrointestinal: Negative for abdominal pain.   Endocrine: Negative for cold intolerance and heat intolerance.   Genitourinary: Negative for flank pain.   Musculoskeletal: Negative for back pain.   Skin: Negative for rash.   Allergic/Immunologic: Negative for immunocompromised state.   Neurological: Negative for dizziness.   Psychiatric/Behavioral: Negative for agitation.         _____________________________________________________  PHYSICAL EXAMINATION:  There were no vitals filed for this visit.  General/Constitutional: NAD, well developed, well nourished  HENT: Normocephalic, atraumatic  CV: Intact distal pulses, regular rate  Resp: No respiratory distress or labored breathing  Abd: Soft and NT  Lymphatic: No lymphadenopathy palpated  Neuro: Alert,no focal deficits  Psych: Normal mood  Skin: Warm, dry, no rashes, no erythema      MUSCULOSKELETAL EXAMINATION:  Musculoskeletal: Left foot   Skin Intact               Swelling Positive there is ecchymosis present.  The nailbed is intact there is no lacerations              Deformity Negative   TTP  mild left second toe proximal and distal phalanx tenderness   ROM Normal   Sensation intact throughout Superficial peroneal, Deep peroneal, Tibial, Sural, Saphenous distributions              EHL/TA/PF motor function intact to testing.               Capillary refill < 2 seconds.     Knee and hip demonstrate no swelling or deformity. There is no tenderness to palpation throughout. The patient has full painless ROM and stability of all  joints.     The  contralateral lower extremity is negative for any tenderness to palpation. There is no deformity present. Patient is neurovascularly intact throughout.           _____________________________________________________  STUDIES REVIEWED:  Imaging studies interpreted by Dr. Keane and demonstrate no acute fractures or dislocations of the left foot.      PROCEDURES PERFORMED:    No Procedures performed today

## 2024-04-26 RX ORDER — CARBAMAZEPINE 200 MG/1
TABLET ORAL
Qty: 210 TABLET | Refills: 5 | Status: SHIPPED | OUTPATIENT
Start: 2024-04-26

## 2024-04-26 NOTE — TELEPHONE ENCOUNTER
Patient requesting refills for Tegretol.      Dr Philip, please sign pended script if in agreement. Thank you!      Last OV 3-13-24  Next OV 8-14-24

## 2024-04-30 ENCOUNTER — OFFICE VISIT (OUTPATIENT)
Dept: PHYSICAL THERAPY | Facility: REHABILITATION | Age: 64
End: 2024-04-30
Payer: COMMERCIAL

## 2024-04-30 DIAGNOSIS — M25.562 ACUTE PAIN OF LEFT KNEE: Primary | ICD-10-CM

## 2024-04-30 PROCEDURE — 97161 PT EVAL LOW COMPLEX 20 MIN: CPT | Performed by: PHYSICAL THERAPIST

## 2024-04-30 PROCEDURE — 97110 THERAPEUTIC EXERCISES: CPT | Performed by: PHYSICAL THERAPIST

## 2024-04-30 NOTE — PROGRESS NOTES
PT Evaluation     Today's date: 2024  Patient name: Agus Espinoza  : 1960  MRN: [id number]  Referring provider: Brendan Keane DO  Dx:   Encounter Diagnosis     ICD-10-CM    1. Acute pain of left knee  M25.562           Start Time: 1545  Stop Time: 1630  Total time in clinic (min): 45 minutes    Assessment  Assessment details: Patient is a 64 y.o. male with c/o of acute L knee pain. Patient's symptoms do not limit him functionally as the pain has been transient. Patient familiar to this PT for his episode of care s/p fixation with anatomic reduction of intertrochanteric hip fracture on 10/16/23. Patient reporting decreased compliance with his HEP and also pain with ambulating without SPC. These factors are likely contributing to his new L knee pain. Reviewed patient's HEP and modified some exercises that would improve his impairments of L hip strength and R knee extension deficit. Patient verbalizes good understanding of his exercises and based on his performance today, he is still appropriately challenged and should continue them. Patient should also continue to use SPC until these impairments improve which would in turn improve his gait. His barriers to progress include his motivation and compliance with his HEP. Will follow up in 4-6 weeks to allow time for patient to complete his exercises independently. If patient is ready for higher level progressions, will progress then. If patient reports independence with his HEP, possible discharge next session.   Impairments: abnormal gait, abnormal or restricted ROM, impaired physical strength and lacks appropriate home exercise program  Understanding of Dx/Px/POC: good   Prognosis: good    Goals  Within 6 weeks or by discharge,   1. Patient will demonstrate independence with HEP.  2. Patient will be able to ambulate 3 miles without L knee pain.  3. Patient will be able to ambulate into diners without SPC without BLE pain.  4. Patient will  demonstrate R knee full knee extension.    Plan  Patient would benefit from: skilled physical therapy  Planned modality interventions: cryotherapy and thermotherapy: hydrocollator packs  Planned therapy interventions: abdominal trunk stabilization, joint mobilization, manual therapy, activity modification, balance, balance/weight bearing training, neuromuscular re-education, body mechanics training, postural training, patient education, therapeutic activities, therapeutic exercise, functional ROM exercises, strengthening, stretching, transfer training, gait training and home exercise program  Frequency: 1x month  Duration in weeks: 8  Plan of Care beginning date: 4/30/2024  Plan of Care expiration date: 6/25/2024  Treatment plan discussed with: patient        Subjective Evaluation    History of Present Illness  Mechanism of injury: Returning to PT for L knee pain. He reports he had L anterior knee pain occur 3 days ago. Not sure why it happened. It has gone away with his walks and hasn't come back since. He thinks he needs a little more rehab.     Current activity level: walking 3 miles daily which feels ok. He is trying to walk without his cane but it hurts. Doing his exercises but not all of them. Thinks he needs to do them more.   Patient Goals  Patient goals for therapy: increased strength  Patient goal: to be able to walk without his cane  Pain  No pain reported    Social Support  Lives in: multiple-level home  Lives with: alone    Employment status: not working        Objective     Passive Range of Motion   Left Knee   Flexion: WFL  Extension: 0 degrees     Right Knee   Flexion: WFL  Extension: -5 degrees     Strength/Myotome Testing     Left Knee   Flexion: 4  Extension: 4    Right Knee   Flexion: 4  Extension: 4    Ambulation     Comments   R knee: decreased knee extension during stance    Functional Assessment        Comments  Sit to stand: decreased eccentric control but improved from previous episode of  care      Flowsheet Rows      Flowsheet Row Most Recent Value   PT/OT G-Codes    Current Score 56   Projected Score 74               POC expires Unit limit Auth Expiration date PT/OT/ST + Visit Limit?   6/25 4                               Visit/Unit Tracking  AUTH Status:  Date 4/30             Approved Used 1              Remaining                 Diagnosis: L anterior knee pain   Precautions: PMH of HTN, hx of CVA (2021 - lacunar infarct, b/l MCA), HLD, GERD, seizure disorder (on medication), Fixation with anatomic reduction of intertrochanteric hip fracture on 10/16/23    [facility]    4/30          Vitals     FOTO      Patient Ed           HEP Continue  Sit to stands  Clams  Bridges  SLS  Standing hip abduction  Standing hip ext                                                                            Neuro Re-Ed                                                                                        Ther Ex           Aerobic conditioning           Clam At wall          Prone knee hang 5 min                                                                 Ther Activity                                 Manual                                                                  Modalities

## 2024-05-30 NOTE — PROGRESS NOTES
"Daily Note     Today's date: 6/3/2024  Patient name: Agus Espinoza  : 1960  MRN: [id number]  Referring provider: Brendan Keane DO  Dx:   Encounter Diagnosis     ICD-10-CM    1. Acute pain of right knee  M25.561           Start Time: 935  Stop Time: 1015  Total time in clinic (min): 40 minutes    Subjective: He is walking everyday 45 min - 1 hr. Walking feels good. Once in a while he feels some knee pain and L ankle pain but he can do his daily walk. Getting in and out of his [place] has gotten easier. He continues to use his SPC as he still has a limp due to his L hip pain. His brother wonders if he should have a cortisone injection. He does the rogelio stretch, clam, bridges every morning. But he is skipping the other exercises. He says the exercises are tough to do and therefore he doesn't want to do it.       Objective: See treatment diary below      Assessment: Patient returns to PT after 6 week hiatus to progress his HEP. Patient reporting poor compliance to some of his HEP, although he has been walking daily which he contributes to his functional improvements. Patient perseverates on his beliefs that solely walking with improve his L hip pain s/p fixation with anatomic reduction of intertrochanteric hip fracture on 10/16/23. Extended timet to educate and to strongly reinforce the benefits of strength training to improve his symptoms. Patient's barrier to completing the exercises appear to be his \"laziness\" and belief that strength training would no improve his sx. Patient's current HEP was reviewed and is still appropriate in difficulty and challenges him. No progression is warranted at this time. Patient is independent with his program but has lacked compliance to do it and therefore he has not progressed since PT session. Based on this, patient is ready to be discharged from PT.       Plan: Continue per plan of care.      POC expires Unit limit Auth Expiration date PT/OT/ST + Visit Limit? "   6/25 4                               Visit/Unit Tracking  AUTH Status:  Date 4/30 6/3            Approved Used 27 28             Remaining                 Diagnosis: L anterior knee pain   Precautions: PMH of HTN, hx of CVA (2021 - lacunar infarct, b/l MCA), HLD, GERD, seizure disorder (on medication), Fixation with anatomic reduction of intertrochanteric hip fracture on 10/16/23    Insurance: [facility]    4/30 6/3         Vitals           Patient Ed           HEP Continue  Sit to stands  Clams  Bridges  SLS  Standing hip abduction  Standing hip ext Reinforced continuing daily                                                                           Neuro Re-Ed                                                                                        Ther Ex           Aerobic conditioning           Clam At wall          Prone knee hang 5 min                                                                 Ther Activity           Motivational interviewing  Completed                    Manual                                                                  Modalities

## 2024-06-03 ENCOUNTER — OFFICE VISIT (OUTPATIENT)
Dept: PHYSICAL THERAPY | Facility: REHABILITATION | Age: 64
End: 2024-06-03
Payer: COMMERCIAL

## 2024-06-03 DIAGNOSIS — M25.561 ACUTE PAIN OF RIGHT KNEE: Primary | ICD-10-CM

## 2024-06-03 PROCEDURE — 97530 THERAPEUTIC ACTIVITIES: CPT | Performed by: PHYSICAL THERAPIST

## 2024-06-06 ENCOUNTER — TELEPHONE (OUTPATIENT)
Dept: OBGYN CLINIC | Facility: HOSPITAL | Age: 64
End: 2024-06-06

## 2024-06-06 DIAGNOSIS — E78.2 MIXED HYPERLIPIDEMIA: ICD-10-CM

## 2024-06-06 NOTE — TELEPHONE ENCOUNTER
Called and left message on [facility] phone. Patient is coming in 3 months early for apt. If rozian is not having any problems he can reschedule.

## 2024-06-07 ENCOUNTER — OFFICE VISIT (OUTPATIENT)
Dept: OBGYN CLINIC | Facility: HOSPITAL | Age: 64
End: 2024-06-07
Payer: COMMERCIAL

## 2024-06-07 ENCOUNTER — HOSPITAL ENCOUNTER (OUTPATIENT)
Dept: RADIOLOGY | Facility: HOSPITAL | Age: 64
Discharge: HOME | End: 2024-06-07
Payer: COMMERCIAL

## 2024-06-07 ENCOUNTER — HOSPITAL ENCOUNTER (OUTPATIENT)
Dept: RADIOLOGY | Facility: HOSPITAL | Age: 64
Discharge: HOME | End: 2024-06-07
Attending: ORTHOPAEDIC SURGERY
Payer: COMMERCIAL

## 2024-06-07 DIAGNOSIS — M70.62 GREATER TROCHANTERIC BURSITIS OF LEFT HIP: ICD-10-CM

## 2024-06-07 DIAGNOSIS — S72.142D INTERTROCHANTERIC FRACTURE OF LEFT HIP, CLOSED, WITH ROUTINE HEALING, SUBSEQUENT ENCOUNTER: Primary | ICD-10-CM

## 2024-06-07 DIAGNOSIS — S72.142D INTERTROCHANTERIC FRACTURE OF LEFT HIP, CLOSED, WITH ROUTINE HEALING, SUBSEQUENT ENCOUNTER: ICD-10-CM

## 2024-06-07 PROCEDURE — 20610 DRAIN/INJ JOINT/BURSA W/O US: CPT | Performed by: ORTHOPAEDIC SURGERY

## 2024-06-07 PROCEDURE — 73502 X-RAY EXAM HIP UNI 2-3 VIEWS: CPT

## 2024-06-07 PROCEDURE — 99213 OFFICE O/P EST LOW 20 MIN: CPT | Performed by: ORTHOPAEDIC SURGERY

## 2024-06-07 RX ORDER — BETAMETHASONE SODIUM PHOSPHATE AND BETAMETHASONE ACETATE 3; 3 MG/ML; MG/ML
6 INJECTION, SUSPENSION INTRA-ARTICULAR; INTRALESIONAL; INTRAMUSCULAR; SOFT TISSUE
Status: COMPLETED | OUTPATIENT
Start: 2024-06-07 | End: 2024-06-07

## 2024-06-07 RX ORDER — ROSUVASTATIN CALCIUM 10 MG/1
10 TABLET, COATED ORAL DAILY
Qty: 30 TABLET | Refills: 1 | Status: SHIPPED | OUTPATIENT
Start: 2024-06-07

## 2024-06-07 RX ORDER — BUPIVACAINE HYDROCHLORIDE 2.5 MG/ML
2 INJECTION, SOLUTION INFILTRATION; PERINEURAL
Status: COMPLETED | OUTPATIENT
Start: 2024-06-07 | End: 2024-06-07

## 2024-06-07 RX ORDER — LIDOCAINE HYDROCHLORIDE 10 MG/ML
2 INJECTION, SOLUTION INFILTRATION; PERINEURAL
Status: COMPLETED | OUTPATIENT
Start: 2024-06-07 | End: 2024-06-07

## 2024-06-07 RX ADMIN — LIDOCAINE HYDROCHLORIDE 2 ML: 10 INJECTION, SOLUTION INFILTRATION; PERINEURAL at 10:45

## 2024-06-07 RX ADMIN — BETAMETHASONE SODIUM PHOSPHATE AND BETAMETHASONE ACETATE 6 MG: 3; 3 INJECTION, SUSPENSION INTRA-ARTICULAR; INTRALESIONAL; INTRAMUSCULAR; SOFT TISSUE at 10:45

## 2024-06-07 RX ADMIN — BUPIVACAINE HYDROCHLORIDE 2 ML: 2.5 INJECTION, SOLUTION INFILTRATION; PERINEURAL at 10:45

## 2024-06-07 NOTE — PROGRESS NOTES
ASSESSMENT/PLAN:    Assessment:  64 y.o. male  S/P L cephalomedullary nail (DOS 10/6/23) for left intertrochanteric hip fracture    Left greater trochanter bursitis    Plan:  Today I had a long discussion with the patient regarding the diagnosis and plan moving forward.  His presentation today is consistent with trochanteric bursitis.  We discussed conservative measures in the form of NSAIDs ice and rest from activities.  We also discussed a corticosteroid injection.  He elected to proceed with corticosteroid injection    Local corticosteroid injection to the left greater trochanter bursa for inflamed bursitis as outlined below.  He has a routine follow-up appointment scheduled for 3 months from today.      Follow up: 3 monthd     The above diagnosis and plan has been dicussed with the patient and caregiver. They verbalized an understanding and will follow up accordingly.       _____________________________________________________    SUBJECTIVE:  Agus Espinoza is a 64 y.o. male who presents today for follow-up of left hip approximately 8 months S/P L cephalomedullary nail (DOS 10/6/23) for left intertrochanteric hip fracture.  He is having some left lateral hip pain.  This pain started several weeks ago and has continued despite conservative measures.  He notes definite improvement in his gait.    PAST MEDICAL HISTORY:  Past Medical History:   Diagnosis Date    Arthritis     Hypertension     Seizures (HCC)     Stroke (HCC)        PAST SURGICAL HISTORY:  Past Surgical History:   Procedure Laterality Date    ELBOW SURGERY  2017    IL OPTX FEM SHFT FX W/INSJ IMED IMPLT W/WO SCREW Left 10/16/2023    Procedure: INSERTION NAIL IM FEMUR ANTEGRADE (TROCHANTERIC);  Surgeon: Brendan Keane DO;  Location: [facility];  Service: Orthopedics       FAMILY HISTORY:  Family History   Problem Relation Age of Onset    Dementia Mother     Alzheimer's disease Mother     Stroke Father     Hyperlipidemia Brother        SOCIAL  HISTORY:  Social History     Tobacco Use    Smoking status: Never    Smokeless tobacco: Never   Vaping Use    Vaping status: Never Used   Substance Use Topics    Alcohol use: Yes     Comment: occasional    Drug use: No       MEDICATIONS:    Current Outpatient Medications:     alendronate (FOSAMAX) 70 mg tablet, Take 1 tablet (70 mg total) by mouth every 7 days, Disp: 12 tablet, Rfl: 1    aspirin-dipyridamole (AGGRENOX)  mg per 12 hr capsule, Take 1 capsule by mouth 2 (two) times a day, Disp: 180 capsule, Rfl: 3    Calcium-Magnesium-Vitamin D (CALCIUM MAGNESIUM PO), Take by mouth, Disp: , Rfl:     carBAMazepine (TEGretol) 200 mg tablet, TAKE 3 TABLETS IN THE AM, 2 TABLETS AT DINNER, AND 2 TABLETS AT BEDTIME 10/31/23 - on 7 tablets daily, Disp: 210 tablet, Rfl: 5    co-enzyme Q-10 30 MG capsule, Take 100 mg by mouth daily  , Disp: , Rfl:     docusate sodium (COLACE) 100 mg capsule, Take 1 capsule (100 mg total) by mouth 2 (two) times a day, Disp: , Rfl: 0    levETIRAcetam (Keppra) 750 mg tablet, Take 1 tablet (750 mg total) by mouth every 12 (twelve) hours, Disp: 60 tablet, Rfl: 5    LORazepam (ATIVAN) 0.5 mg tablet, TAKE 1 TABLET BY MOUTH ONCE DAILY IF NEEDED for seizure.  Limit of 1 in 24 hours, Disp: 10 tablet, Rfl: 0    NON FORMULARY, Super Beets, Disp: , Rfl:     polyethylene glycol (MIRALAX) 17 g packet, Take 17 g by mouth daily Do not start before October 20, 2023., Disp: , Rfl: 0    RABEprazole (ACIPHEX) 20 MG tablet, Take 1 tablet (20 mg total) by mouth daily in the early morning, Disp: 90 tablet, Rfl: 1    rosuvastatin (CRESTOR) 10 MG tablet, Take 1 tablet (10 mg total) by mouth daily, Disp: 30 tablet, Rfl: 1    VALERIAN ROOT PO, Use, Disp: , Rfl:     Vitamin Mixture (VITAMIN E COMPLETE PO), Take 1 tablet by mouth daily , Disp: , Rfl:     ALLERGIES:  Allergies   Allergen Reactions    Meperidine Seizures    Amoxicillin     Azithromycin Seizures    Other Sneezing     Dust     Pollen Extract Sneezing        REVIEW OF SYSTEMS:  ROS is negative other than that noted in the HPI.  Constitutional: Negative for fatigue and fever.   HENT: Negative for sore throat.    Respiratory: Negative for shortness of breath.    Cardiovascular: Negative for chest pain.   Gastrointestinal: Negative for abdominal pain.   Endocrine: Negative for cold intolerance and heat intolerance.   Genitourinary: Negative for flank pain.   Musculoskeletal: Negative for back pain.   Skin: Negative for rash.   Allergic/Immunologic: Negative for immunocompromised state.   Neurological: Negative for dizziness.   Psychiatric/Behavioral: Negative for agitation.         _____________________________________________________  PHYSICAL EXAMINATION:  General/Constitutional: NAD, well developed, well nourished  HENT: Normocephalic, atraumatic  CV: Intact distal pulses, regular rate  Resp: No respiratory distress or labored breathing  Lymphatic: No lymphadenopathy palpated  Neuro: Alert and  awake  Psych: Normal mood  Skin: Warm, dry, no rashes, no erythema      MUSCULOSKELETAL EXAMINATION:  Musculoskeletal: Left Hip tenderness localized over the left greater trochanteric bursa     Skin Intact, healed incision   TTP at the greater troches bursa  No inflammation no erythema no fluctuance    Special Tests:  (-) Stinchfield. (-) Straight leg raise.  Negative logroll negative heel strike  Alignment:  Normal lumbar alignment. Normal resting hip posture. Normal knee alignment. Normal ankle alignment.  Ambulates well around the room with assistance from a cane.  There is a very mild Trendelenburg gait but otherwise minimal pain       LE NV Exam: +2 DP/PT pulses bilaterally  Sensation intact to light touch L2-S1 bilaterally     Bilateral Knee and ankle ROM demonstrates no pain actively or passively    No calf tenderness to palpation bilaterally      _____________________________________________________  STUDIES REVIEWED:  Imaging studies interpreted by Dr. Keane and  demonstrate multiple views of the left hip demonstrates maintained alignment with interval healing of intertrochanteric hip fracture.  There is no loosening or hardware complication.      PROCEDURES PERFORMED:    Large joint arthrocentesis: L greater trochanteric bursa  Universal Protocol:  Consent given by: patient  Supporting Documentation  Indications: pain   Procedure Details  Location: hip - L greater trochanteric bursa  Needle size: 22 G (spinal)  Ultrasound guidance: no  Approach: lateral  Medications administered: 2 mL lidocaine 1 %; 6 mg betamethasone acetate-betamethasone sodium phosphate 6 (3-3) mg/mL; 2 mL bupivacaine 0.25 %    Patient tolerance: patient tolerated the procedure well with no immediate complications

## 2024-06-11 DIAGNOSIS — E78.2 MIXED HYPERLIPIDEMIA: ICD-10-CM

## 2024-06-11 RX ORDER — ROSUVASTATIN CALCIUM 10 MG/1
10 TABLET, COATED ORAL DAILY
Qty: 30 TABLET | Refills: 1 | Status: CANCELLED | OUTPATIENT
Start: 2024-06-11

## 2024-06-11 NOTE — TELEPHONE ENCOUNTER
Patient called, needs refill for the medication    Medication:     rosuvastatin (CRESTOR) 10 MG tablet       Dose/Frequency:     Take 1 tablet (10 mg total) by mouth daily       Quantity: 30 tablets    Pharmacy: [facility] - [place], [place] - [address]     Office:   [x] PCP/Provider -  Franchesca Chandra MD   [] Speciality/Provider -     Does the patient have enough for 3 days?   [x] Yes   [] No - Send as HP to POD

## 2024-07-31 ENCOUNTER — TELEPHONE (OUTPATIENT)
Age: 64
End: 2024-07-31

## 2024-07-31 DIAGNOSIS — I63.9 CEREBROVASCULAR ACCIDENT (CVA) DUE TO THROMBOSIS (HCC): ICD-10-CM

## 2024-07-31 NOTE — TELEPHONE ENCOUNTER
Medication:   aspirin-dipyridamole (AGGRENOX)  mg per 12 hr capsule     Dose/Frequency:  Take 1 capsule by mouth 2 (two) times a day     Quantity: 180 capsule     Pharmacy: [facility] [place]  [address] [address]  Phone: [phone]  Fax: [phone]  MARY JO #: [id number]     Office:   [] PCP/Provider -   [x] Speciality/Provider -   Norma Philip, DO  [address] [facility] [place] [address]  Phone: [phone]   Fax: [phone]  MARY JO #: [id number]   NPI: [id number]     Does the patient have enough for 3 days?   [x] Yes   [] No - Send as HP to POD

## 2024-07-31 NOTE — TELEPHONE ENCOUNTER
Chart reviewed  Aggrenox was sent to [facility] on 12/13/23, 90 day supply w/ 3 refills    Called [facility] pharmacy, spoke to pharmacist Umer who confirmed that there are refills avail. States that they will reach out to the pt when the med is ready for .

## 2024-08-08 ENCOUNTER — TELEPHONE (OUTPATIENT)
Dept: NEUROLOGY | Facility: CLINIC | Age: 64
End: 2024-08-08

## 2024-08-08 NOTE — TELEPHONE ENCOUNTER
Made an appt reminder call no answer lmom. Appt with dr jovel on 8/14/24 @9:00am in [facility].

## 2024-08-09 DIAGNOSIS — M81.0 OSTEOPOROSIS WITHOUT CURRENT PATHOLOGICAL FRACTURE, UNSPECIFIED OSTEOPOROSIS TYPE: Primary | ICD-10-CM

## 2024-08-09 RX ORDER — ASPIRIN AND DIPYRIDAMOLE 25; 200 MG/1; MG/1
1 CAPSULE, EXTENDED RELEASE ORAL 2 TIMES DAILY
Qty: 180 CAPSULE | Refills: 3 | Status: SHIPPED | OUTPATIENT
Start: 2024-08-09

## 2024-08-13 DIAGNOSIS — E78.2 MIXED HYPERLIPIDEMIA: ICD-10-CM

## 2024-08-13 NOTE — TELEPHONE ENCOUNTER
Reason for call:   [x] Refill   [] Prior Auth  [] Other:     Office:   [x] PCP/Provider -  [facility]  [] Specialty/Provider -           Does the patient have enough for 3 days?   [x] Yes   [] No - Send as HP to POD

## 2024-08-14 ENCOUNTER — OFFICE VISIT (OUTPATIENT)
Dept: NEUROLOGY | Facility: CLINIC | Age: 64
End: 2024-08-14
Payer: COMMERCIAL

## 2024-08-14 ENCOUNTER — DOCUMENTATION (OUTPATIENT)
Dept: NEUROLOGY | Facility: CLINIC | Age: 64
End: 2024-08-14

## 2024-08-14 VITALS
DIASTOLIC BLOOD PRESSURE: 80 MMHG | TEMPERATURE: 97.7 F | HEART RATE: 84 BPM | BODY MASS INDEX: 23.13 KG/M2 | RESPIRATION RATE: 14 BRPM | OXYGEN SATURATION: 98 % | HEIGHT: 70 IN | WEIGHT: 161.6 LBS | SYSTOLIC BLOOD PRESSURE: 128 MMHG

## 2024-08-14 DIAGNOSIS — G40.209 PARTIAL SYMPTOMATIC EPILEPSY WITH COMPLEX PARTIAL SEIZURES, NOT INTRACTABLE, WITHOUT STATUS EPILEPTICUS (HCC): Primary | ICD-10-CM

## 2024-08-14 DIAGNOSIS — R25.1 TREMOR OF BOTH HANDS: ICD-10-CM

## 2024-08-14 PROCEDURE — 99213 OFFICE O/P EST LOW 20 MIN: CPT | Performed by: PSYCHIATRY & NEUROLOGY

## 2024-08-14 RX ORDER — CARBAMAZEPINE 200 MG/1
TABLET ORAL
Qty: 210 TABLET | Refills: 5 | Status: SHIPPED | OUTPATIENT
Start: 2024-08-14

## 2024-08-14 RX ORDER — MELATONIN/PYRIDOXINE HCL (B6) 5 MG-10 MG
TABLET,IMMED, EXTENDED RELEASE, BIPHASIC ORAL
COMMUNITY

## 2024-08-14 RX ORDER — ROSUVASTATIN CALCIUM 10 MG/1
10 TABLET, COATED ORAL DAILY
Qty: 90 TABLET | Refills: 1 | Status: SHIPPED | OUTPATIENT
Start: 2024-08-14

## 2024-08-14 NOTE — PATIENT INSTRUCTIONS
Keepra same dose     Decrease the Tegretol by one pill  if problem , return  to 7 a day and let me know     Will place an  order movement disorder specialist mao Moncada     Tegretol / Keppra level in one month

## 2024-08-14 NOTE — ASSESSMENT & PLAN NOTE
He does have tremor in his extremities worse on the right than the left.  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  His brother Matteo was concerned about the potential of Parkinson's.  He has no evidence of cogwheel rigidity facial bradykinesia or shuffling gait.  He therefore has minimal stick stigmata and this is more likely due to essential tremor.  He was tried on Inderal which resulted in lightheadedness and he stopped it after several tablets.  Mysoline is relatively contraindicated due to his current use of antiepileptics.    His brother Matteo has some questions regarding ultrasound treatments.  I have referred him to movement disorder specialist outside of the network due to access issues.:  gAus is willing to proceed.

## 2024-08-14 NOTE — PROGRESS NOTES
Patient ID: Agus Espinoza is a 64 y.o. adult.    Assessment/Plan:    Partial symptomatic epilepsy with complex partial seizures, not intractable, without status epilepticus (HCC)  Is a 64-year-old patient with partial epilepsy at the last visit his family had noted increase in staring spells some slurred speech his dose of Keppra was increased to 750 twice a day and he was continued on Tegretol 7 tablets a day.  In the interim orts no further episodes.  However Matteo his brother is concerned about the potential risk of osteoporosis we discussed that Tegretol is more likely to result in osteopenia or osteoporosis rather than Keppra.  Therefore I would like Agus to try a lower dose of Tegretol.  This will be lowered by 1 pill.  He should have a Tegretol and Keppra level performed several weeks after the lowering the dose.  If he does have an aura he is to contact our offices where the Keppra could be increased as he tolerates to 1000 twice a day.  He was agreeable.    Tremor of both hands  He does have tremor in his extremities worse on the right than the left.  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  His brother Matteo was concerned about the potential of Parkinson's.  He has no evidence of cogwheel rigidity facial bradykinesia or shuffling gait.  He therefore has minimal stick stigmata and this is more likely due to essential tremor.  He was tried on Inderal which resulted in lightheadedness and he stopped it after several tablets.  Mysoline is relatively contraindicated due to his current use of antiepileptics.    His brother Matteo has some questions regarding ultrasound treatments.  I have referred him to movement disorder specialist outside of the network due to access issues.:  Agus is willing to proceed.       Diagnoses and all orders for this visit:    Partial symptomatic epilepsy with complex partial seizures, not intractable, without status epilepticus (HCC)  -     Carbamazepine level, total; Future  -     Levetiracetam  level; Future  -     Comprehensive metabolic panel; Future  -     CBC and differential; Future  -     carBAMazepine (TEGretol) 200 mg tablet; TAKE 3 TABLETS IN THE AM, 2 TABLETS AT DINNER, AND 2 TABLETS AT BEDTIME 10/31/23 - on 7 tablets daily    Tremor of both hands  -     Ambulatory Referral to Neurology; Future    Other orders  -     Discontinue: Aspirin-Dipyridamole (AGGRENOX PO)  -     Coenzyme Q10 (CoQ-10) 100 MG capsule  -     Calcium Carb-Cholecalciferol (CALCIUM 500+D PO)       Subjective:       Agus Espinoza is a 63 y/o right hand male with a long history of seizures.  His last tonic-clonic seizure was in 2018.  However he continues to have intermittent auras..  2023 to have an aura and extra Tegretol and Ativan.  45 minutes later while at the diner and upon standing episode of confusion and weakness and lightheadedness.  I have informed him that this may have been secondary to excessive carbamazepine use.  It is similar episode in January where his Tegretol level was 19.   he did have an EEG that was normal and a CAT scan of the head that was normal.  Keppra was added to his regimen and 2024 and the dose was increased to 750 mg twice a day due to more staring spells . On 10/15/2023 well cleaning of leaves he slipped and fell and broke his left hip.  He underwent surgical repair and subsequently physical therapy at [facility].  Osteoporosis was discussed with the patient in July 2023  .   He is overall doing well without any reoccurrence of auras or seizures.  He is ambulating with a cane.    He does continue to have tremors of his hands specially when holding items.  Does not have the rest his brother only was concerned that he could have Parkinson's disease.  He denies any postural instability shuffling gait facial bradykinesia.           He is now walking with a cane.       He is currently on Aggrenox for stroke prevention                                                                                             The following portions of the patient's history were reviewed and updated as appropriate: He  has a past medical history of Arthritis, Hypertension, Seizures (HCC), and Stroke (HCC).  He  has a past surgical history that includes Elbow surgery (2017) and pr optx fem shft fx w/insj imed implt w/wo screw (Left, 10/16/2023).  His family history includes Alzheimer's disease in his mother; Dementia in his mother; Hyperlipidemia in his brother; Stroke in his father.  He  reports that he has never smoked. He has never used smokeless tobacco. He reports current alcohol use. He reports that he does not use drugs.  Current Outpatient Medications   Medication Sig Dispense Refill    alendronate (FOSAMAX) 70 mg tablet Take 1 tablet (70 mg total) by mouth every 7 days 12 tablet 1    aspirin-dipyridamole (AGGRENOX)  mg per 12 hr capsule TAKE 1 CAPSULE BY MOUTH 2 (TWO) TIMES A  capsule 3    Calcium Carb-Cholecalciferol (CALCIUM 500+D PO)       Calcium-Magnesium-Vitamin D (CALCIUM MAGNESIUM PO) Take by mouth      carBAMazepine (TEGretol) 200 mg tablet TAKE 3 TABLETS IN THE AM, 2 TABLETS AT DINNER, AND 2 TABLETS AT BEDTIME 10/31/23 - on 7 tablets daily 210 tablet 5    co-enzyme Q-10 30 MG capsule Take 100 mg by mouth daily        Coenzyme Q10 (CoQ-10) 100 MG capsule       docusate sodium (COLACE) 100 mg capsule Take 1 capsule (100 mg total) by mouth 2 (two) times a day  0    levETIRAcetam (Keppra) 750 mg tablet Take 1 tablet (750 mg total) by mouth every 12 (twelve) hours 60 tablet 5    LORazepam (ATIVAN) 0.5 mg tablet TAKE 1 TABLET BY MOUTH ONCE DAILY IF NEEDED for seizure.  Limit of 1 in 24 hours 10 tablet 0    NON FORMULARY Super Beets      polyethylene glycol (MIRALAX) 17 g packet Take 17 g by mouth daily Do not start before October 20, 2023.  0    RABEprazole (ACIPHEX) 20 MG tablet Take 1 tablet (20 mg total) by mouth daily in the early morning 90 tablet 1    VALERIAN ROOT PO Use      Vitamin  "Mixture (VITAMIN E COMPLETE PO) Take 1 tablet by mouth daily       rosuvastatin (CRESTOR) 10 MG tablet Take 1 tablet (10 mg total) by mouth daily 90 tablet 1     No current facility-administered medications for this visit.     He is allergic to meperidine, amoxicillin, azithromycin, other, and pollen extract..         Objective:    Blood pressure 128/80, pulse 84, temperature 97.7 °F (36.5 °C), temperature source Temporal, resp. rate 14, height 5' 10\" (1.778 m), weight 73.3 kg (161 lb 9.6 oz), SpO2 98%.    Physical Exam  Neurological:      Deep Tendon Reflexes:      Reflex Scores:       Patellar reflexes are 1+ on the right side and 1+ on the left side.       Achilles reflexes are 1+ on the right side and 1+ on the left side.        Neurological Exam  Mental Status  Awake, alert and oriented to person, place and time. Oriented to person, place and time. Language is fluent with no aphasia.    Cranial Nerves  No facial bradakineia .    Motor    TStrength is 5/5 in all four extremities except as noted.  His right upper extremity had curling of the 4th and 5th digit with atrophy of the ADM.  He had normal strength proximally but had difficulty with hand  on the right.  This is ongoing and old.  The left upper extremity strength was normal in the bilateral lower extremity strength was normal he did have bilateral tremors with extension worse on the right than the left.  There is no evidence of cogwheel rigidity....     There is mild tremor noted with extension.  There is no evidence of cogwheel rigidity or a resting tremor..    Sensory  Light touch is normal in upper and lower extremities. Temperature is normal in upper and lower extremities.     Reflexes                                            Right                      Left  Patellar                                1+                         1+  Achilles                                1+                         1+  Right Plantar: downgoing  Left Plantar: " downgoing    Coordination  Right: Finger-to-nose normal.Left: Finger-to-nose normal.  No ataxia on finger-nose testing he did have tremor with extension.  No facial bradykinesia was noted and there is no change in arm swing..    Gait   Able to rise from chair without using arms.  Is able to stand from sitting position.  There is no shuffling type gait..    Review of systems obtained from the medical assistant as below was reviewed with the patient at today's visit    ROS:    Review of Systems   Constitutional:  Negative for appetite change, fatigue and fever.   HENT: Negative.  Negative for hearing loss, tinnitus, trouble swallowing and voice change.    Eyes:  Positive for pain. Negative for photophobia and visual disturbance.   Respiratory: Negative.  Negative for shortness of breath.    Cardiovascular: Negative.  Negative for palpitations.   Gastrointestinal: Negative.  Negative for nausea and vomiting.   Endocrine: Negative.  Negative for cold intolerance.   Genitourinary: Negative.  Negative for dysuria, frequency and urgency.   Musculoskeletal:  Negative for back pain, gait problem, myalgias, neck pain and neck stiffness.   Skin: Negative.  Negative for rash.   Allergic/Immunologic: Negative.    Neurological:  Positive for dizziness and tremors. Negative for seizures, syncope, facial asymmetry, speech difficulty, weakness, light-headedness, numbness and headaches.   Hematological: Negative.  Does not bruise/bleed easily.   Psychiatric/Behavioral:  Positive for sleep disturbance. Negative for confusion and hallucinations.        I have spent a total time of 25 minutes in caring for this patient on the day of the visit/encounter including Counseling / Coordination of care, Documenting in the medical record, Reviewing / ordering tests, medicine, procedures  , and Obtaining or reviewing history  .

## 2024-08-14 NOTE — ASSESSMENT & PLAN NOTE
Is a 64-year-old patient with partial epilepsy at the last visit his family had noted increase in staring spells some slurred speech his dose of Keppra was increased to 750 twice a day and he was continued on Tegretol 7 tablets a day.  In the interim orts no further episodes.  However Matteo his brother is concerned about the potential risk of osteoporosis we discussed that Tegretol is more likely to result in osteopenia or osteoporosis rather than Keppra.  Therefore I would like Agus to try a lower dose of Tegretol.  This will be lowered by 1 pill.  He should have a Tegretol and Keppra level performed several weeks after the lowering the dose.  If he does have an aura he is to contact our offices where the Keppra could be increased as he tolerates to 1000 twice a day.  He was agreeable.

## 2024-08-14 NOTE — PROGRESS NOTES
Team    I did place an order for a neurology referral outside the network due to patient access.  Please help the patient and his brother obtain an appointment.  This is for a movement disorder Dr. Mondragon or   at [phone]

## 2024-09-02 ENCOUNTER — NURSE TRIAGE (OUTPATIENT)
Dept: OTHER | Facility: OTHER | Age: 64
End: 2024-09-02

## 2024-09-02 NOTE — TELEPHONE ENCOUNTER
"Reason for Disposition  • Caller has medicine question only, adult not sick, AND triager answers question    Answer Assessment - Initial Assessment Questions  1. NAME of MEDICATION: \"What medicine are you calling about?\"      Crestor  2. QUESTION: \"What is your question?\" (e.g., medication refill, side effect)      \"I need a refill\"  3. PRESCRIBING HCP: \"Who prescribed it?\" Reason: if prescribed by specialist, call should be referred to that group.      Dr. Chandra    Spoke with Patient to let him know that on 8/14 a refill was sent into his pharmacy for 90 tablets with 1 refill. Pt will call and speak with his pharmacy to have this refill processed.    Protocols used: Medication Question Call-ADULT-AH    "

## 2024-09-02 NOTE — TELEPHONE ENCOUNTER
Regarding: Medication refill  ----- Message from Damaris UMANZOR sent at 9/2/2024 10:51 AM EDT -----  rosuvastatin (CRESTOR) 10 MG tablet  Dose: 10 mg Route: Oral Frequency: Daily  Dispense Quantity: 90 tablet Refills: 1        Sig: Take 1 tablet (10 mg total) by mouth daily

## 2024-09-23 DIAGNOSIS — G40.209 PARTIAL SYMPTOMATIC EPILEPSY WITH COMPLEX PARTIAL SEIZURES, NOT INTRACTABLE, WITHOUT STATUS EPILEPTICUS (HCC): ICD-10-CM

## 2024-09-23 DIAGNOSIS — K21.9 GASTROESOPHAGEAL REFLUX DISEASE WITHOUT ESOPHAGITIS: ICD-10-CM

## 2024-09-24 RX ORDER — RABEPRAZOLE SODIUM 20 MG/1
20 TABLET, DELAYED RELEASE ORAL
Qty: 30 TABLET | Refills: 5 | Status: SHIPPED | OUTPATIENT
Start: 2024-09-24 | End: 2025-03-23

## 2024-09-24 RX ORDER — LEVETIRACETAM 750 MG/1
750 TABLET ORAL EVERY 12 HOURS SCHEDULED
Qty: 60 TABLET | Refills: 5 | Status: SHIPPED | OUTPATIENT
Start: 2024-09-24

## 2024-10-11 ENCOUNTER — HOSPITAL ENCOUNTER (OUTPATIENT)
Dept: RADIOLOGY | Facility: HOSPITAL | Age: 64
Discharge: HOME | End: 2024-10-11
Attending: ORTHOPAEDIC SURGERY
Payer: COMMERCIAL

## 2024-10-11 ENCOUNTER — OFFICE VISIT (OUTPATIENT)
Dept: OBGYN CLINIC | Facility: HOSPITAL | Age: 64
End: 2024-10-11
Payer: COMMERCIAL

## 2024-10-11 DIAGNOSIS — R52 PAIN: ICD-10-CM

## 2024-10-11 DIAGNOSIS — S72.142D INTERTROCHANTERIC FRACTURE OF LEFT HIP, CLOSED, WITH ROUTINE HEALING, SUBSEQUENT ENCOUNTER: ICD-10-CM

## 2024-10-11 DIAGNOSIS — S72.142D INTERTROCHANTERIC FRACTURE OF LEFT HIP, CLOSED, WITH ROUTINE HEALING, SUBSEQUENT ENCOUNTER: Primary | ICD-10-CM

## 2024-10-11 DIAGNOSIS — M17.11 ARTHRITIS OF RIGHT KNEE: ICD-10-CM

## 2024-10-11 PROCEDURE — 20610 DRAIN/INJ JOINT/BURSA W/O US: CPT | Performed by: ORTHOPAEDIC SURGERY

## 2024-10-11 PROCEDURE — 73502 X-RAY EXAM HIP UNI 2-3 VIEWS: CPT

## 2024-10-11 PROCEDURE — 73562 X-RAY EXAM OF KNEE 3: CPT

## 2024-10-11 PROCEDURE — 99214 OFFICE O/P EST MOD 30 MIN: CPT | Performed by: ORTHOPAEDIC SURGERY

## 2024-10-11 RX ORDER — LIDOCAINE HYDROCHLORIDE 5 MG/ML
2 INJECTION, SOLUTION INFILTRATION; PERINEURAL
Status: COMPLETED | OUTPATIENT
Start: 2024-10-11 | End: 2024-10-11

## 2024-10-11 RX ORDER — METHYLPREDNISOLONE ACETATE 40 MG/ML
2 INJECTION, SUSPENSION INTRA-ARTICULAR; INTRALESIONAL; INTRAMUSCULAR; SOFT TISSUE
Status: COMPLETED | OUTPATIENT
Start: 2024-10-11 | End: 2024-10-11

## 2024-10-11 RX ADMIN — METHYLPREDNISOLONE ACETATE 2 ML: 40 INJECTION, SUSPENSION INTRA-ARTICULAR; INTRALESIONAL; INTRAMUSCULAR; SOFT TISSUE at 09:45

## 2024-10-11 RX ADMIN — LIDOCAINE HYDROCHLORIDE 2 ML: 5 INJECTION, SOLUTION INFILTRATION; PERINEURAL at 09:45
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
## 2024-10-18 DIAGNOSIS — K21.9 GASTROESOPHAGEAL REFLUX DISEASE WITHOUT ESOPHAGITIS: ICD-10-CM

## 2024-10-18 RX ORDER — RABEPRAZOLE SODIUM 20 MG/1
20 TABLET, DELAYED RELEASE ORAL
Qty: 30 TABLET | Refills: 5 | Status: SHIPPED | OUTPATIENT
Start: 2024-10-18 | End: 2025-04-16

## 2024-10-20 ENCOUNTER — APPOINTMENT (OUTPATIENT)
Dept: LAB | Facility: CLINIC | Age: 64
End: 2024-10-20
Payer: COMMERCIAL

## 2024-10-20 DIAGNOSIS — G40.209 PARTIAL SYMPTOMATIC EPILEPSY WITH COMPLEX PARTIAL SEIZURES, NOT INTRACTABLE, WITHOUT STATUS EPILEPTICUS (HCC): ICD-10-CM

## 2024-10-20 LAB
ALBUMIN SERPL BCG-MCNC: 4.7 G/DL (ref 3.5–5)
ALP SERPL-CCNC: 59 U/L (ref 34–104)
ALT SERPL W P-5'-P-CCNC: 22 U/L (ref 7–52)
ANION GAP SERPL CALCULATED.3IONS-SCNC: 9 MMOL/L (ref 4–13)
AST SERPL W P-5'-P-CCNC: 22 U/L (ref 13–39)
BASOPHILS # BLD AUTO: 0.05 THOUSANDS/ΜL (ref 0–0.1)
BASOPHILS NFR BLD AUTO: 1 % (ref 0–1)
BILIRUB SERPL-MCNC: 0.42 MG/DL (ref 0.2–1)
BUN SERPL-MCNC: 15 MG/DL (ref 5–25)
CALCIUM SERPL-MCNC: 9.4 MG/DL (ref 8.4–10.2)
CARBAMAZEPINE SERPL-MCNC: 8.8 UG/ML (ref 4–12)
CHLORIDE SERPL-SCNC: 102 MMOL/L (ref 96–108)
CO2 SERPL-SCNC: 28 MMOL/L (ref 21–32)
CREAT SERPL-MCNC: 0.67 MG/DL (ref 0.6–1.3)
EOSINOPHIL # BLD AUTO: 0.11 THOUSAND/ΜL (ref 0–0.61)
EOSINOPHIL NFR BLD AUTO: 2 % (ref 0–6)
ERYTHROCYTE [DISTWIDTH] IN BLOOD BY AUTOMATED COUNT: 11.9 % (ref 11.6–15.1)
GFR SERPL CREATININE-BSD FRML MDRD: 101 ML/MIN/1.73SQ M
GLUCOSE P FAST SERPL-MCNC: 89 MG/DL (ref 65–99)
HCT VFR BLD AUTO: 46.4 % (ref 36.5–49.3)
HGB BLD-MCNC: 15.6 G/DL (ref 12–17)
IMM GRANULOCYTES # BLD AUTO: 0.02 THOUSAND/UL (ref 0–0.2)
IMM GRANULOCYTES NFR BLD AUTO: 0 % (ref 0–2)
LYMPHOCYTES # BLD AUTO: 2.26 THOUSANDS/ΜL (ref 0.6–4.47)
LYMPHOCYTES NFR BLD AUTO: 31 % (ref 14–44)
MCH RBC QN AUTO: 34.1 PG (ref 26.8–34.3)
MCHC RBC AUTO-ENTMCNC: 33.6 G/DL (ref 31.4–37.4)
MCV RBC AUTO: 102 FL (ref 82–98)
MONOCYTES # BLD AUTO: 0.58 THOUSAND/ΜL (ref 0.17–1.22)
MONOCYTES NFR BLD AUTO: 8 % (ref 4–12)
NEUTROPHILS # BLD AUTO: 4.39 THOUSANDS/ΜL (ref 1.85–7.62)
NEUTS SEG NFR BLD AUTO: 58 % (ref 43–75)
NRBC BLD AUTO-RTO: 0 /100 WBCS
PLATELET # BLD AUTO: 238 THOUSANDS/UL (ref 149–390)
PMV BLD AUTO: 9.1 FL (ref 8.9–12.7)
POTASSIUM SERPL-SCNC: 3.9 MMOL/L (ref 3.5–5.3)
PROT SERPL-MCNC: 7.6 G/DL (ref 6.4–8.4)
RBC # BLD AUTO: 4.57 MILLION/UL (ref 3.88–5.62)
SODIUM SERPL-SCNC: 139 MMOL/L (ref 135–147)
WBC # BLD AUTO: 7.41 THOUSAND/UL (ref 4.31–10.16)

## 2024-10-20 PROCEDURE — 80053 COMPREHEN METABOLIC PANEL: CPT

## 2024-10-20 PROCEDURE — 80156 ASSAY CARBAMAZEPINE TOTAL: CPT

## 2024-10-20 PROCEDURE — 85025 COMPLETE CBC W/AUTO DIFF WBC: CPT

## 2024-10-20 PROCEDURE — 36415 COLL VENOUS BLD VENIPUNCTURE: CPT

## 2024-10-21 ENCOUNTER — TELEPHONE (OUTPATIENT)
Dept: NEUROLOGY | Facility: CLINIC | Age: 64
End: 2024-10-21

## 2024-10-21 ENCOUNTER — APPOINTMENT (OUTPATIENT)
Dept: LAB | Facility: CLINIC | Age: 64
End: 2024-10-21
Payer: COMMERCIAL

## 2024-10-21 DIAGNOSIS — G40.209 PARTIAL SYMPTOMATIC EPILEPSY WITH COMPLEX PARTIAL SEIZURES, NOT INTRACTABLE, WITHOUT STATUS EPILEPTICUS (HCC): Primary | ICD-10-CM

## 2024-10-21 DIAGNOSIS — G40.209 PARTIAL SYMPTOMATIC EPILEPSY WITH COMPLEX PARTIAL SEIZURES, NOT INTRACTABLE, WITHOUT STATUS EPILEPTICUS (HCC): ICD-10-CM

## 2024-10-21 LAB — LEVETIRACETAM SERPL-MCNC: <2 UG/ML (ref 12–46)

## 2024-10-21 PROCEDURE — 83520 IMMUNOASSAY QUANT NOS NONAB: CPT

## 2024-10-21 PROCEDURE — 36415 COLL VENOUS BLD VENIPUNCTURE: CPT

## 2024-10-21 NOTE — TELEPHONE ENCOUNTER
I spoke to Agus today.  He is currently on Keppra 750 twice a day and Tegretol generic carbamazepine 1 mg tablets 6 a day.  He has had no auras and no seizures.    We discussed recent laboratory studies.  His Keppra level is less than 2, Tegretol level is 8.8, P and CBC is normal.  DM is 139.  I have aspirated to repeat a Keppra level in approximately 6 weeks.  Since he is doing well   we will not change his antiepileptic regimen.     He did request lowering the dose of Tegretol but I wanted to see a repeat Keppra level before this is decreased further.  He was agreeable.    He also describes knee pain and recently had an x-ray of the right knee.  He will be progressing with a steroid injection.

## 2024-11-22 DIAGNOSIS — M81.0 OSTEOPOROSIS WITHOUT CURRENT PATHOLOGICAL FRACTURE, UNSPECIFIED OSTEOPOROSIS TYPE: ICD-10-CM

## 2024-11-22 RX ORDER — ALENDRONATE SODIUM 70 MG/1
70 TABLET ORAL
Qty: 5 TABLET | Refills: 0 | Status: SHIPPED | OUTPATIENT
Start: 2024-11-22

## 2024-12-02 NOTE — PROGRESS NOTES
"Name: Agus Espinoza      : 1960      MRN: [id number]  Encounter Provider: Jo Pedraza DO  Encounter Date: 12/3/2024   Encounter department: [facility] [address]  :  Assessment & Plan  Osteoporosis without current pathological fracture, unspecified osteoporosis type    : Dexa scan with L hip T-score of -2.8; started on fosamax ca/vitD  : left intertrochanteric hip fx s/p cephalomedullary nail (10/6/23)  3/2024: DEXA with R total hip -2.4, right femoral neck -3.5  Offending agents likely contributing carbamazepine and keppra     Plan: start Prolia; repeat DEXA scan in 2-3 years    Orders:    Ambulatory Referral to Rheumatology    Vitamin D 25 hydroxy; Future    Protein electrophoresis, serum; Future    Protein electrophoresis, urine; Future        History of Present Illness     HPI  Agus Espinoza is a 64 y.o. male with a history of hypertension, CVA, GERD, seizures, osteoarthritis, anxiety, hyperlipidemia who presents for further management of osteoporosis.In , he had a DEXA scan with L hip T score of -2.8 and was started on fosamax ca/vitD. 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 His f/up DEXA in 3/202 showed a T score of right femoral neck of -3.5. He denies any new falls or fractures. He denies fatigue, change in libido, hair loss, or weight changes. No hx of long term glucocorticoids. No hx of androgen deprivation therapy. He has been EtOH free for \"many\" years. Hx of cigarette smoking in his late teens. He has been compliant with his Ca/Vit D/ and fosamax medication. He has been on carbazepine and keppra since 1970s.     History obtained from: patient and pateint's brother Matteo via telephone call      Review of Systems   Constitutional:  Negative for chills and fever.   HENT:  Negative for ear pain and sore throat.    Eyes:  Negative for pain and " visual disturbance.   Respiratory:  Negative for cough and shortness of breath.    Cardiovascular:  Negative for chest pain and palpitations.   Gastrointestinal:  Negative for abdominal pain and vomiting.   Genitourinary:  Negative for dysuria and hematuria.   Musculoskeletal:  Negative for arthralgias and back pain.   Skin:  Negative for color change and rash.   Neurological:  Negative for seizures and syncope.   All other systems reviewed and are negative.    Past Medical History   Past Medical History:   Diagnosis Date    Arthritis     Hypertension     Seizures (HCC)     Stroke (HCC)      Past Surgical History:   Procedure Laterality Date    ELBOW SURGERY  2017    SD OPTX FEM SHFT FX W/INSJ IMED IMPLT W/WO SCREW Left 10/16/2023    Procedure: INSERTION NAIL IM FEMUR ANTEGRADE (TROCHANTERIC);  Surgeon: Brendan Keane DO;  Location: [facility];  Service: Orthopedics     Family History   Problem Relation Age of Onset    Dementia Mother     Alzheimer's disease Mother     Stroke Father     Hyperlipidemia Brother       reports that he has never smoked. He has never used smokeless tobacco. He reports current alcohol use. He reports that he does not use drugs.  Current Outpatient Medications on File Prior to Visit   Medication Sig Dispense Refill    alendronate (FOSAMAX) 70 mg tablet TAKE 1 TABLET (70 MG TOTAL) BY MOUTH EVERY 7 DAYS 5 tablet 0    aspirin-dipyridamole (AGGRENOX)  mg per 12 hr capsule TAKE 1 CAPSULE BY MOUTH 2 (TWO) TIMES A  capsule 3    Calcium Carb-Cholecalciferol (CALCIUM 500+D PO)       Calcium-Magnesium-Vitamin D (CALCIUM MAGNESIUM PO) Take by mouth      carBAMazepine (TEGretol) 200 mg tablet TAKE 3 TABLETS IN THE AM, 2 TABLETS AT DINNER, AND 2 TABLETS AT BEDTIME 10/31/23 - on 7 tablets daily 210 tablet 5    co-enzyme Q-10 30 MG capsule Take 100 mg by mouth daily        Coenzyme Q10 (CoQ-10) 100 MG capsule       docusate sodium (COLACE) 100 mg capsule Take 1 capsule (100 mg total) by  mouth 2 (two) times a day  0    levETIRAcetam (KEPPRA) 750 mg tablet TAKE 1 TABLET (750 MG TOTAL) BY MOUTH EVERY 12 (TWELVE) HOURS 60 tablet 5    LORazepam (ATIVAN) 0.5 mg tablet TAKE 1 TABLET BY MOUTH ONCE DAILY IF NEEDED for seizure.  Limit of 1 in 24 hours 10 tablet 0    NON FORMULARY Super Beets      polyethylene glycol (MIRALAX) 17 g packet Take 17 g by mouth daily Do not start before October 20, 2023.  0    RABEprazole (ACIPHEX) 20 MG tablet TAKE 1 TABLET (20 MG TOTAL) BY MOUTH DAILY IN THE EARLY MORNING 30 tablet 5    rosuvastatin (CRESTOR) 10 MG tablet Take 1 tablet (10 mg total) by mouth daily 90 tablet 1    VALERIAN ROOT PO Use      Vitamin Mixture (VITAMIN E COMPLETE PO) Take 1 tablet by mouth daily        No current facility-administered medications on file prior to visit.     Allergies   Allergen Reactions    Meperidine Seizures    Amoxicillin     Azithromycin Seizures    Other Sneezing     Dust     Pollen Extract Sneezing      Current Outpatient Medications on File Prior to Visit   Medication Sig Dispense Refill    alendronate (FOSAMAX) 70 mg tablet TAKE 1 TABLET (70 MG TOTAL) BY MOUTH EVERY 7 DAYS 5 tablet 0    aspirin-dipyridamole (AGGRENOX)  mg per 12 hr capsule TAKE 1 CAPSULE BY MOUTH 2 (TWO) TIMES A  capsule 3    Calcium Carb-Cholecalciferol (CALCIUM 500+D PO)       Calcium-Magnesium-Vitamin D (CALCIUM MAGNESIUM PO) Take by mouth      carBAMazepine (TEGretol) 200 mg tablet TAKE 3 TABLETS IN THE AM, 2 TABLETS AT DINNER, AND 2 TABLETS AT BEDTIME 10/31/23 - on 7 tablets daily 210 tablet 5    co-enzyme Q-10 30 MG capsule Take 100 mg by mouth daily        Coenzyme Q10 (CoQ-10) 100 MG capsule       docusate sodium (COLACE) 100 mg capsule Take 1 capsule (100 mg total) by mouth 2 (two) times a day  0    levETIRAcetam (KEPPRA) 750 mg tablet TAKE 1 TABLET (750 MG TOTAL) BY MOUTH EVERY 12 (TWELVE) HOURS 60 tablet 5    LORazepam (ATIVAN) 0.5 mg tablet TAKE 1 TABLET BY MOUTH ONCE DAILY IF NEEDED  "for seizure.  Limit of 1 in 24 hours 10 tablet 0    NON FORMULARY Super Beets      polyethylene glycol (MIRALAX) 17 g packet Take 17 g by mouth daily Do not start before October 20, 2023.  0    RABEprazole (ACIPHEX) 20 MG tablet TAKE 1 TABLET (20 MG TOTAL) BY MOUTH DAILY IN THE EARLY MORNING 30 tablet 5    rosuvastatin (CRESTOR) 10 MG tablet Take 1 tablet (10 mg total) by mouth daily 90 tablet 1    VALERIAN ROOT PO Use      Vitamin Mixture (VITAMIN E COMPLETE PO) Take 1 tablet by mouth daily        No current facility-administered medications on file prior to visit.      Social History     Tobacco Use    Smoking status: Never    Smokeless tobacco: Never   Vaping Use    Vaping status: Never Used   Substance and Sexual Activity    Alcohol use: Yes     Comment: occasional    Drug use: No    Sexual activity: Not Currently        Objective   /68 (BP Location: Right arm, Patient Position: Sitting, Cuff Size: Adult)   Pulse 71   Temp (!) 95.9 °F (35.5 °C) (Tympanic)   Ht 5' 10\" (1.778 m)   Wt 73 kg (161 lb)   SpO2 99%   BMI 23.10 kg/m²      Physical Exam  Constitutional:       General: He is not in acute distress.     Appearance: Normal appearance. He is not ill-appearing.   HENT:      Head: Normocephalic and atraumatic.      Nose: Nose normal.      Mouth/Throat:      Mouth: Mucous membranes are moist.   Eyes:      Extraocular Movements: Extraocular movements intact.   Cardiovascular:      Rate and Rhythm: Normal rate and regular rhythm.      Pulses: Normal pulses.      Heart sounds: No murmur heard.  Pulmonary:      Effort: Pulmonary effort is normal. No respiratory distress.      Breath sounds: No wheezing.   Musculoskeletal:      Right lower leg: No edema.      Left lower leg: No edema.   Skin:     General: Skin is warm and dry.   Neurological:      Mental Status: He is alert and oriented to person, place, and time.       I have personally reviewed notes, labs, and imaging available in the chart. "   Creatinine 0.67  GFR 0.42  Calcium 9.4    DEXA 3/20/2024  LUMBAR SPINE  Level: L1-L3  (L4 vertebra excluded from analysis due to local structural abnormalities or artifact):  BMD: 0.920 gm/cm2  T-score: -1.4    RIGHT TOTAL HIP:  BMD: 0.672 gm/cm2  T-score: -2.4     RIGHT FEMORAL NECK:  BMD: 0.459 gm/cm2  T score: -3.5    Jo Pedraza DO, DOMINICK, [facility]

## 2024-12-03 ENCOUNTER — CONSULT (OUTPATIENT)
Age: 64
End: 2024-12-03
Payer: COMMERCIAL

## 2024-12-03 ENCOUNTER — TELEPHONE (OUTPATIENT)
Age: 64
End: 2024-12-03

## 2024-12-03 VITALS
HEIGHT: 70 IN | HEART RATE: 71 BPM | OXYGEN SATURATION: 99 % | BODY MASS INDEX: 23.05 KG/M2 | DIASTOLIC BLOOD PRESSURE: 68 MMHG | WEIGHT: 161 LBS | SYSTOLIC BLOOD PRESSURE: 122 MMHG | TEMPERATURE: 95.9 F

## 2024-12-03 DIAGNOSIS — M81.0 OSTEOPOROSIS WITHOUT CURRENT PATHOLOGICAL FRACTURE, UNSPECIFIED OSTEOPOROSIS TYPE: ICD-10-CM

## 2024-12-03 PROCEDURE — 99204 OFFICE O/P NEW MOD 45 MIN: CPT | Performed by: STUDENT IN AN ORGANIZED HEALTH CARE EDUCATION/TRAINING PROGRAM

## 2024-12-03 NOTE — ASSESSMENT & PLAN NOTE
2021: Dexa scan with L hip T-score of -2.8; started on fosamax ca/vitD  2023: left intertrochanteric hip fx s/p cephalomedullary nail (10/6/23)  3/2024: DEXA with R total hip -2.4, right femoral neck -3.5  Offending agents likely contributing carbamazepine and keppra     Plan: start Prolia; repeat DEXA scan in 2-3 years    Orders:    Ambulatory Referral to Rheumatology    Vitamin D 25 hydroxy; Future    Protein electrophoresis, serum; Future    Protein electrophoresis, urine; Future

## 2024-12-03 NOTE — TELEPHONE ENCOUNTER
Rheumatology Pre-Certification Request     Medication/Disease State Information:   Diagnosis: Osteoporosis   Medication: Prolia every 6 months  Failed or Intolerant to or Contraindicated: fosamax   Screening  Renal function:normal   Site of medication administration (if applicable): n/a  Comments:n/a    Jo Pedraza DO, CCD, Marilyn  NPI: [id number]  Lic: [id number]

## 2024-12-04 ENCOUNTER — OFFICE VISIT (OUTPATIENT)
Dept: FAMILY MEDICINE CLINIC | Facility: CLINIC | Age: 64
End: 2024-12-04
Payer: COMMERCIAL

## 2024-12-04 ENCOUNTER — OFFICE VISIT (OUTPATIENT)
Dept: OBGYN CLINIC | Facility: HOSPITAL | Age: 64
End: 2024-12-04
Payer: COMMERCIAL

## 2024-12-04 VITALS
WEIGHT: 167 LBS | SYSTOLIC BLOOD PRESSURE: 153 MMHG | DIASTOLIC BLOOD PRESSURE: 93 MMHG | HEART RATE: 86 BPM | BODY MASS INDEX: 23.91 KG/M2 | HEIGHT: 70 IN

## 2024-12-04 VITALS
HEIGHT: 70 IN | HEART RATE: 72 BPM | TEMPERATURE: 98 F | WEIGHT: 167.4 LBS | DIASTOLIC BLOOD PRESSURE: 82 MMHG | SYSTOLIC BLOOD PRESSURE: 119 MMHG | OXYGEN SATURATION: 99 % | BODY MASS INDEX: 23.96 KG/M2

## 2024-12-04 DIAGNOSIS — G89.29 CHRONIC PAIN OF RIGHT KNEE: Primary | ICD-10-CM

## 2024-12-04 DIAGNOSIS — M17.11 PRIMARY OSTEOARTHRITIS OF RIGHT KNEE: ICD-10-CM

## 2024-12-04 DIAGNOSIS — I10 ESSENTIAL HYPERTENSION: Primary | ICD-10-CM

## 2024-12-04 DIAGNOSIS — E78.2 MIXED HYPERLIPIDEMIA: ICD-10-CM

## 2024-12-04 DIAGNOSIS — M17.11 ARTHRITIS OF RIGHT KNEE: ICD-10-CM

## 2024-12-04 DIAGNOSIS — R25.1 TREMOR OF BOTH HANDS: ICD-10-CM

## 2024-12-04 DIAGNOSIS — M81.0 OSTEOPOROSIS WITHOUT CURRENT PATHOLOGICAL FRACTURE, UNSPECIFIED OSTEOPOROSIS TYPE: ICD-10-CM

## 2024-12-04 DIAGNOSIS — M25.561 CHRONIC PAIN OF RIGHT KNEE: Primary | ICD-10-CM

## 2024-12-04 PROCEDURE — 20610 DRAIN/INJ JOINT/BURSA W/O US: CPT | Performed by: ORTHOPAEDIC SURGERY

## 2024-12-04 PROCEDURE — 99214 OFFICE O/P EST MOD 30 MIN: CPT | Performed by: FAMILY MEDICINE

## 2024-12-04 PROCEDURE — 99214 OFFICE O/P EST MOD 30 MIN: CPT | Performed by: ORTHOPAEDIC SURGERY

## 2024-12-04 RX ORDER — LIDOCAINE HYDROCHLORIDE 10 MG/ML
2 INJECTION, SOLUTION INFILTRATION; PERINEURAL
Status: COMPLETED | OUTPATIENT
Start: 2024-12-04 | End: 2024-12-04

## 2024-12-04 RX ORDER — ROSUVASTATIN CALCIUM 10 MG/1
10 TABLET, COATED ORAL DAILY
Qty: 90 TABLET | Refills: 1 | Status: SHIPPED | OUTPATIENT
Start: 2024-12-04

## 2024-12-04 RX ORDER — BETAMETHASONE SODIUM PHOSPHATE AND BETAMETHASONE ACETATE 3; 3 MG/ML; MG/ML
12 INJECTION, SUSPENSION INTRA-ARTICULAR; INTRALESIONAL; INTRAMUSCULAR; SOFT TISSUE
Status: COMPLETED | OUTPATIENT
Start: 2024-12-04 | End: 2024-12-04

## 2024-12-04 RX ORDER — BUPIVACAINE HYDROCHLORIDE 2.5 MG/ML
2 INJECTION, SOLUTION INFILTRATION; PERINEURAL
Status: COMPLETED | OUTPATIENT
Start: 2024-12-04 | End: 2024-12-04

## 2024-12-04 RX ADMIN — BUPIVACAINE HYDROCHLORIDE 2 ML: 2.5 INJECTION, SOLUTION INFILTRATION; PERINEURAL at 15:45

## 2024-12-04 RX ADMIN — BETAMETHASONE SODIUM PHOSPHATE AND BETAMETHASONE ACETATE 12 MG: 3; 3 INJECTION, SUSPENSION INTRA-ARTICULAR; INTRALESIONAL; INTRAMUSCULAR; SOFT TISSUE at 15:45

## 2024-12-04 RX ADMIN — LIDOCAINE HYDROCHLORIDE 2 ML: 10 INJECTION, SOLUTION INFILTRATION; PERINEURAL at 15:45

## 2024-12-04 NOTE — PROGRESS NOTES
Assessment/Plan:    Mixed hyperlipidemia  Refill Crestor , await  lab, ldl  goal 100-130    Tremor of both hands  Saw  neuro, having  a  test  for Parkinson's    Osteoporosis without current pathological fracture  Seeing  rheum, starting Priolia    Essential hypertension  Stable  on just diet       Diagnoses and all orders for this visit:    Essential hypertension    Mixed hyperlipidemia  -     rosuvastatin (CRESTOR) 10 MG tablet; Take 1 tablet (10 mg total) by mouth daily  -     Lipid panel  -     Comprehensive metabolic panel    Tremor of both hands    Osteoporosis without current pathological fracture, unspecified osteoporosis type          Subjective:      Patient ID: Agus Espinoza is a 64 y.o. male.    Patient presents with:  Follow-up: 6 mos hypertension/lipids check up, pt had no complaints.  Saw neuro earlier in the week and is having special testing for Parkinson's to determine reason for tremor saw a rheumatology yesterday and is starting Prolia shots was advised to use a cane when the weather is iffy for better balance and fall prevention is seeing Dr. Khan today for his right knee is hoping for gel shots,  declines  flu shot, really  recommend  shingles  vaccine-check with insurance, check itchy area  on back        The following portions of the patient's history were reviewed and updated as appropriate: allergies, current medications, past family history, past medical history, past social history, past surgical history, and problem list.      Review of Systems   Constitutional:  Negative for activity change, appetite change and fatigue.   Respiratory:  Negative for shortness of breath.    Musculoskeletal:  Positive for arthralgias.        R knee pain   Skin:         Pruritus back   Neurological:  Positive for tremors. Negative for dizziness, light-headedness and headaches.   Hematological:  Negative for adenopathy.         Objective:      /82 (BP Location: Left arm, Patient Position:  "Sitting, Cuff Size: Adult)   Pulse 72   Temp 98 °F (36.7 °C) (Temporal)   Ht 5' 10\" (1.778 m)   Wt 75.9 kg (167 lb 6.4 oz)   SpO2 99%   BMI 24.02 kg/m²          Physical Exam  Vitals reviewed.   Constitutional:       Appearance: Normal appearance.   Cardiovascular:      Rate and Rhythm: Normal rate and regular rhythm.      Pulses: Normal pulses.      Heart sounds: Normal heart sounds.   Pulmonary:      Effort: Pulmonary effort is normal.      Breath sounds: Normal breath sounds.   Musculoskeletal:      Right lower leg: No edema.      Left lower leg: No edema.   Lymphadenopathy:      Cervical: No cervical adenopathy.   Skin:     Findings: No rash.   Neurological:      Mental Status: He is alert.      Comments: Tremors of hands   Psychiatric:         Mood and Affect: Mood normal.            "

## 2024-12-04 NOTE — PROGRESS NOTES
Assessment:  1. Chronic pain of right knee  Injection procedure prior authorization      2. Arthritis of right knee  Ambulatory Referral to Orthopedic Surgery    Injection procedure prior authorization      3. Primary osteoarthritis of right knee  Injection procedure prior authorization          Plan:  Right knee osteoarthritis  Patient's brother was called several times during visit to discuss care  Current symptoms of right generalized knee pain in which effects patient's daily activity and sleep despite activity modification and recent cortisone injection  Right knee visco-supplement was ordered as patient has on-going knee pain and stiffness in which interferes with their daily activity and sleep along with crepitus with range of motion on exam and significant osteoarthritic changes on radiograph.  The patient rates their pain at 10/10 at times. The patient has tried home exercise program learned from past physical therapy, steroid injections, activity modification, oral medications with limited benefit.  Bracing has not provided relief.   The patient has been counseled on weight loss.    The patient was provided with right knee betamethasone injection.  The patient tolerated the procedure well.    Follow up in 2-3 weeks     To do next visit:  Return in about 2 weeks (around 12/18/2024) for visco-supplementation.    The above stated was discussed in layman's terms and the patient expressed understanding.  All questions were answered to the patient's satisfaction.       Scribe Attestation      I,:  Nate Sparks MA am acting as a scribe while in the presence of the attending physician.:       I,:  Justice Khan MD personally performed the services described in this documentation    as scribed in my presence.:               Subjective:   Agus Espinoza is a 64 y.o. male who presents for initial evaluation of right knee.  He has had symptoms for several months without injury.  Today he complains of  ongoing right generalized knee pain.  Prolonged weight bearing and repetitive bending aggravates while rest alleviates. The patient rates their pain at 7/10 and above at times.  He has had recent cortisone injection with some benefit.  He denies past knee surgery.          Review of systems negative unless otherwise specified in HPI    Past Medical History:   Diagnosis Date    Arthritis     Hypertension     Seizures (HCC)     Stroke (HCC)        Past Surgical History:   Procedure Laterality Date    ELBOW SURGERY  2017    OR OPTX FEM SHFT FX W/INSJ IMED IMPLT W/WO SCREW Left 10/16/2023    Procedure: INSERTION NAIL IM FEMUR ANTEGRADE (TROCHANTERIC);  Surgeon: Brendan Keane DO;  Location: [facility];  Service: Orthopedics       Family History   Problem Relation Age of Onset    Dementia Mother     Alzheimer's disease Mother     Stroke Father     Hyperlipidemia Brother        Social History     Occupational History    Not on file   Tobacco Use    Smoking status: Never    Smokeless tobacco: Never   Vaping Use    Vaping status: Never Used   Substance and Sexual Activity    Alcohol use: Yes     Comment: occasional    Drug use: No    Sexual activity: Not Currently         Current Outpatient Medications:     alendronate (FOSAMAX) 70 mg tablet, TAKE 1 TABLET (70 MG TOTAL) BY MOUTH EVERY 7 DAYS, Disp: 5 tablet, Rfl: 0    aspirin-dipyridamole (AGGRENOX)  mg per 12 hr capsule, TAKE 1 CAPSULE BY MOUTH 2 (TWO) TIMES A DAY, Disp: 180 capsule, Rfl: 3    Calcium Carb-Cholecalciferol (CALCIUM 500+D PO), , Disp: , Rfl:     Calcium-Magnesium-Vitamin D (CALCIUM MAGNESIUM PO), Take by mouth, Disp: , Rfl:     carBAMazepine (TEGretol) 200 mg tablet, TAKE 3 TABLETS IN THE AM, 2 TABLETS AT DINNER, AND 2 TABLETS AT BEDTIME 10/31/23 - on 7 tablets daily, Disp: 210 tablet, Rfl: 5    co-enzyme Q-10 30 MG capsule, Take 100 mg by mouth daily  , Disp: , Rfl:     Coenzyme Q10 (CoQ-10) 100 MG capsule, , Disp: , Rfl:     docusate sodium (COLACE)  100 mg capsule, Take 1 capsule (100 mg total) by mouth 2 (two) times a day, Disp: , Rfl: 0    levETIRAcetam (KEPPRA) 750 mg tablet, TAKE 1 TABLET (750 MG TOTAL) BY MOUTH EVERY 12 (TWELVE) HOURS, Disp: 60 tablet, Rfl: 5    LORazepam (ATIVAN) 0.5 mg tablet, TAKE 1 TABLET BY MOUTH ONCE DAILY IF NEEDED for seizure.  Limit of 1 in 24 hours, Disp: 10 tablet, Rfl: 0    NON FORMULARY, Super Beets, Disp: , Rfl:     polyethylene glycol (MIRALAX) 17 g packet, Take 17 g by mouth daily Do not start before October 20, 2023., Disp: , Rfl: 0    RABEprazole (ACIPHEX) 20 MG tablet, TAKE 1 TABLET (20 MG TOTAL) BY MOUTH DAILY IN THE EARLY MORNING, Disp: 30 tablet, Rfl: 5    rosuvastatin (CRESTOR) 10 MG tablet, Take 1 tablet (10 mg total) by mouth daily, Disp: 90 tablet, Rfl: 1    VALERIAN ROOT PO, Use, Disp: , Rfl:     Vitamin Mixture (VITAMIN E COMPLETE PO), Take 1 tablet by mouth daily , Disp: , Rfl:     Allergies   Allergen Reactions    Meperidine Seizures    Amoxicillin     Azithromycin Seizures    Other Sneezing     Dust     Pollen Extract Sneezing            Vitals:    12/04/24 1547   BP: 153/93   Pulse: 86       Objective:  Physical exam  General: Awake, Alert, Oriented  Eyes: Pupils equal, round and reactive to light  Heart: regular rate and rhythm  Lungs: No audible wheezing  Abdomen: soft                    Ortho Exam  Right knee:  TTP over medial and lateral joint line  No erythema or ecchymosis  No effusion or swelling  Normal strength  Good ROM with crepitus   Calf compartments soft and supple  Sensation intact  Toes are warm sensate and mobile      Diagnostics, reviewed and taken today if performed as documented:    The attending physician has personally reviewed the pertinent films in PACS and interpretation is as follows:  Right knee x-ray f 10/11/2024:  Moderate-severe tricompartmental arthritic changes with decreased joint space, subchondral sclerosis and osteophyte formation.    Procedures, if performed  "today:    Large joint arthrocentesis: R knee  Universal Protocol:  Consent: Verbal consent obtained.  Risks and benefits: risks, benefits and alternatives were discussed  Consent given by: patient  Time out: Immediately prior to procedure a \"time out\" was called to verify the correct patient, procedure, equipment, support staff and site/side marked as required.  Timeout called at: 12/4/2024 4:28 PM.  Patient understanding: patient states understanding of the procedure being performed  Site marked: the operative site was marked  Patient identity confirmed: verbally with patient  Supporting Documentation  Indications: pain   Procedure Details  Location: knee - R knee  Preparation: Patient was prepped and draped in the usual sterile fashion  Needle size: 22 G  Ultrasound guidance: no  Approach: anterolateral  Medications administered: 12 mg betamethasone acetate-betamethasone sodium phosphate 6 (3-3) mg/mL; 2 mL bupivacaine 0.25 %; 2 mL lidocaine 1 %    Patient tolerance: patient tolerated the procedure well with no immediate complications  Dressing:  Sterile dressing applied            Portions of the record may have been created with voice recognition software.  Occasional wrong word or \"sound a like\" substitutions may have occurred due to the inherent limitations of voice recognition software.  Read the chart carefully and recognize, using context, where substitutions have occurred.    "

## 2024-12-09 ENCOUNTER — TELEPHONE (OUTPATIENT)
Dept: OBGYN CLINIC | Facility: HOSPITAL | Age: 64
End: 2024-12-09

## 2024-12-09 NOTE — TELEPHONE ENCOUNTER
Caller: Agus    Doctor: Erin Rabago     Reason for call: Patient called in to schedule his Euflexxa injections x 3.  [employer] AUTH EXP 12/4/2025 RT KNEE ORTHOVISC#1 2 3 BUY & BILL     Schedule does not allow 3 consecutive weeks until end of January.  Patient is requesting to be scheduled sooner .    Please call to schedule patient .     Call back#: [phone]

## 2024-12-18 ENCOUNTER — OFFICE VISIT (OUTPATIENT)
Dept: NEUROLOGY | Facility: CLINIC | Age: 64
End: 2024-12-18
Payer: COMMERCIAL

## 2024-12-18 VITALS
TEMPERATURE: 98.4 F | WEIGHT: 169.2 LBS | DIASTOLIC BLOOD PRESSURE: 70 MMHG | RESPIRATION RATE: 14 BRPM | HEIGHT: 70 IN | BODY MASS INDEX: 24.22 KG/M2 | HEART RATE: 83 BPM | OXYGEN SATURATION: 98 % | SYSTOLIC BLOOD PRESSURE: 128 MMHG

## 2024-12-18 DIAGNOSIS — G40.209 PARTIAL SYMPTOMATIC EPILEPSY WITH COMPLEX PARTIAL SEIZURES, NOT INTRACTABLE, WITHOUT STATUS EPILEPTICUS (HCC): Primary | ICD-10-CM

## 2024-12-18 DIAGNOSIS — R25.1 TREMOR OF BOTH HANDS: ICD-10-CM

## 2024-12-18 PROCEDURE — 99213 OFFICE O/P EST LOW 20 MIN: CPT | Performed by: PSYCHIATRY & NEUROLOGY

## 2024-12-18 RX ORDER — CARBAMAZEPINE 200 MG/1
TABLET ORAL
Qty: 180 TABLET | Refills: 5 | Status: SHIPPED | OUTPATIENT
Start: 2024-12-18

## 2024-12-18 NOTE — ASSESSMENT & PLAN NOTE
Continues to have resting tremors.  He has been tried on all that was ineffective.  Mysoline is relatively contraindicated due to his ongoing use of Tegretol and Keppra.  He is recently seen at the movement disorder center at [facility] who ordered a DaTscan.  This is pending.  The tremors are worse on the right than the left.       We will continue him on Aggrenox twice a day for stroke prevention.

## 2024-12-18 NOTE — PROGRESS NOTES
Name: Agus Espinoza      : 1960      MRN: [id number]  Encounter Provider: Norma Philip DO  Encounter Date: 2024   Encounter department: [facility] [place]  :  Assessment & Plan  Partial symptomatic epilepsy with complex partial seizures, not intractable, without status epilepticus (HCC)    Orders:    CBC and differential; Future    Carbamazepine level, total; Future    Levetiracetam level; Future    carBAMazepine (TEGretol) 200 mg tablet; TAKE 2  TABLETS IN THE AM, 2 TABLETS AT DINNER, AND 2 TABLETS AT BEDTIME  - on 6  tablets daily  Agus is doing well.  He has had no recurrent seizures or auras.  He currently is on Keppra 750 mg twice a day with 6 Tegretol 200 mg tablets a day.  He did have a prior Keppra level that was low.  I have asked him to obtain repeat laboratory studies.  We discussed the the risks and the benefits of lowering the Tegretol additionally.  This is can include recurrent seizures and or auras.  This would affect his ability to drive.    Tremor of both hands  Continues to have resting tremors.  He has been tried on all that was ineffective.  Mysoline is relatively contraindicated due to his ongoing use of Tegretol and Keppra.  He is recently seen at the movement disorder center at [facility] who ordered a DaTscan.  This is pending.  The tremors are worse on the right than the left.       We will continue him on Aggrenox twice a day for stroke prevention.        History of Present Illness I              Agus Espinoza is a 65 y/o right hand male with a long history of seizures.  Today he presents in a follow-up visit.  He has had no recurrent seizures.  He is currently on Keppra 750 mg twice a day and Tegretol 200 mg tablets 6 a day.  His last tonic-clonic seizure was in 2018.  However he continues to have intermittent auras..   to have an aura and extra Tegretol and Ativan.  45 minutes later while at the diner and  upon standing episode of confusion and weakness and lightheadedness.  I have informed him that this may have been secondary to excessive carbamazepine use.  It is similar episode in January where his Tegretol level was 19.   he did have an EEG that was normal and a CAT scan of the head that was normal.  Keppra was added to his regimen and 2024 and the dose was increased to 750 mg twice a day due to more staring spells . On 10/15/2023 well cleaning of leaves he slipped and fell and broke his left hip.  He underwent surgical repair and subsequently physical therapy at [facility].  Osteoporosis was discussed with the patient in July 2023  .  He was recently seen by rheumatology who ordered lab work and considering adding Prolia to his regimen.   However this has not been approved by the insurance company.  He does continue to have tremors of his hands specially when holding items.  Does not have the rest his brother only was concerned that he could have Parkinson's disease.  He denies any postural instability shuffling gait facial bradykinesia.  Inderal was ineffective and Mysoline has been relatively contraindicated due to his ongoing use of the Keppra and Tegretol.  He recently was seen by movement disorder specialist and a DaTscan was ordered.  This is now pending.    He was also evaluated by rheumatology and diagnosed with osteoporosis.  Other medications are being considered.        He is now walking with a cane without a cane at home.     He is currently on Aggrenox for stroke prevention         Symptoms include of right knee pain.  He will be getting injections starting at the end of December.                                           Review of Systems   Constitutional:  Negative for appetite change, fatigue and fever.   HENT: Negative.  Negative for hearing loss, tinnitus, trouble swallowing and voice change.    Eyes: Negative.  Negative for photophobia, pain and visual disturbance.   Respiratory: Negative.   Negative for shortness of breath.    Cardiovascular: Negative.  Negative for palpitations.   Gastrointestinal: Negative.  Negative for nausea and vomiting.   Endocrine: Negative.  Negative for cold intolerance.   Genitourinary:  Positive for frequency and urgency. Negative for dysuria.   Musculoskeletal:  Positive for back pain. Negative for gait problem, myalgias, neck pain and neck stiffness.   Skin: Negative.  Negative for rash.   Allergic/Immunologic: Negative.    Neurological:  Positive for tremors. Negative for dizziness, seizures, syncope, facial asymmetry, speech difficulty, weakness, light-headedness, numbness and headaches.   Hematological: Negative.  Does not bruise/bleed easily.   Psychiatric/Behavioral:  Positive for confusion. Negative for hallucinations and sleep disturbance.    All other systems reviewed and are negative.   I have personally reviewed the MA's review of systems and made changes as necessary.    Medical History Reviewed by provider this encounter:     .  Current Outpatient Medications on File Prior to Visit   Medication Sig Dispense Refill    alendronate (FOSAMAX) 70 mg tablet TAKE 1 TABLET (70 MG TOTAL) BY MOUTH EVERY 7 DAYS 5 tablet 0    aspirin-dipyridamole (AGGRENOX)  mg per 12 hr capsule TAKE 1 CAPSULE BY MOUTH 2 (TWO) TIMES A  capsule 3    Calcium Carb-Cholecalciferol (CALCIUM 500+D PO)       Calcium-Magnesium-Vitamin D (CALCIUM MAGNESIUM PO) Take by mouth      co-enzyme Q-10 30 MG capsule Take 100 mg by mouth daily        Coenzyme Q10 (CoQ-10) 100 MG capsule       docusate sodium (COLACE) 100 mg capsule Take 1 capsule (100 mg total) by mouth 2 (two) times a day  0    levETIRAcetam (KEPPRA) 750 mg tablet TAKE 1 TABLET (750 MG TOTAL) BY MOUTH EVERY 12 (TWELVE) HOURS 60 tablet 5    LORazepam (ATIVAN) 0.5 mg tablet TAKE 1 TABLET BY MOUTH ONCE DAILY IF NEEDED for seizure.  Limit of 1 in 24 hours 10 tablet 0    NON FORMULARY Super Beets      polyethylene glycol (MIRALAX)  "17 g packet Take 17 g by mouth daily Do not start before October 20, 2023.  0    RABEprazole (ACIPHEX) 20 MG tablet TAKE 1 TABLET (20 MG TOTAL) BY MOUTH DAILY IN THE EARLY MORNING 30 tablet 5    rosuvastatin (CRESTOR) 10 MG tablet Take 1 tablet (10 mg total) by mouth daily 90 tablet 1    VALERIAN ROOT PO Use      Vitamin Mixture (VITAMIN E COMPLETE PO) Take 1 tablet by mouth daily       [DISCONTINUED] carBAMazepine (TEGretol) 200 mg tablet TAKE 3 TABLETS IN THE AM, 2 TABLETS AT DINNER, AND 2 TABLETS AT BEDTIME 10/31/23 - on 7 tablets daily 210 tablet 5     No current facility-administered medications on file prior to visit.      Social History     Tobacco Use    Smoking status: Never    Smokeless tobacco: Never   Vaping Use    Vaping status: Never Used   Substance and Sexual Activity    Alcohol use: Yes     Comment: occasional    Drug use: No    Sexual activity: Not Currently        Objective   /70 (BP Location: Right arm, Patient Position: Sitting, Cuff Size: Adult)   Pulse 83   Temp 98.4 °F (36.9 °C) (Temporal)   Resp 14   Ht 5' 10\" (1.778 m)   Wt 76.7 kg (169 lb 3.2 oz)   SpO2 98%   BMI 24.28 kg/m²     Physical Exam  Eyes:      General: Lids are normal.      Extraocular Movements: Extraocular movements intact.      Pupils: Pupils are equal, round, and reactive to light.   Neurological:      Deep Tendon Reflexes:      Reflex Scores:       Patellar reflexes are 1+ on the right side and 1+ on the left side.       Achilles reflexes are 1+ on the right side and 1+ on the left side.      Neurological Exam  Mental Status  Awake, alert and oriented to person, place and time. Oriented to person, place and time. Language is fluent with no aphasia.    Cranial Nerves  CN II: Visual acuity is normal. Visual fields full to confrontation.  CN III, IV, VI: Extraocular movements intact bilaterally. Normal lids and orbits bilaterally. Pupils equal round and reactive to light bilaterally.  CN V: Facial sensation is " normal.  CN VII: Full and symmetric facial movement.  CN VIII: Hearing is normal.  CN IX, X: Palate elevates symmetrically. Normal gag reflex.  CN XI: Shoulder shrug strength is normal.  CN XII: Tongue midline without atrophy or fasciculations.    Motor    His right upper extremity had curling of the 4th and 5th digit with atrophy of the ADM.  He had normal strength proximally but had difficulty with hand  on the right.  This is ongoing and old.  The left upper extremity strength was normal in the bilateral lower extremity strength was normal he did have bilateral tremors with extension worse on the right than the left.  There is no evidence of cogwheel rigidity....     There is mild tremor noted with extension.  There is no evidence of cogwheel rigidity or a resting tremor..  .    Sensory  Light touch is normal in upper and lower extremities. Temperature is normal in upper and lower extremities.     Reflexes                                            Right                      Left  Patellar                                1+                         1+  Achilles                                1+                         1+  Right Plantar: downgoing  Left Plantar: downgoing    Coordination  Right: Finger-to-nose normal.Left: Finger-to-nose normal.    Gait   Able to rise from chair without using arms.  Was unable to tandem..        I have spent a total time of 23 minutes in caring for this patient on the day of the visit/encounter including Counseling / Coordination of care, Documenting in the medical record, Reviewing / ordering tests, medicine, procedures  , and Obtaining or reviewing history  .         Can return to our offices in several months.

## 2024-12-18 NOTE — ASSESSMENT & PLAN NOTE
Orders:    CBC and differential; Future    Carbamazepine level, total; Future    Levetiracetam level; Future    carBAMazepine (TEGretol) 200 mg tablet; TAKE 2  TABLETS IN THE AM, 2 TABLETS AT DINNER, AND 2 TABLETS AT BEDTIME  - on 6  tablets daily  Agus is doing well.  He has had no recurrent seizures or auras.  He currently is on Keppra 750 mg twice a day with 6 Tegretol 200 mg tablets a day.  He did have a prior Keppra level that was low.  I have asked him to obtain repeat laboratory studies.  We discussed the the risks and the benefits of lowering the Tegretol additionally.  This is can include recurrent seizures and or auras.  This would affect his ability to drive.

## 2024-12-23 DIAGNOSIS — M81.0 OSTEOPOROSIS WITHOUT CURRENT PATHOLOGICAL FRACTURE, UNSPECIFIED OSTEOPOROSIS TYPE: ICD-10-CM

## 2024-12-23 NOTE — TELEPHONE ENCOUNTER
Reason for call:   [x] Refill   [] Prior Auth  [] Other:     Office:   [x] PCP/Provider - Franchesca Chandra,   [] Specialty/Provider -     Medication: alendronate (FOSAMAX) 70 mg    Dose/Frequency: TAKE 1 TABLET (70 MG TOTAL) BY MOUTH EVERY 7 DAYS     Quantity: 5    Pharmacy: [facility]    Does the patient have enough for 3 days?   [x] Yes   [] No - Send as HP to POD

## 2024-12-24 RX ORDER — ALENDRONATE SODIUM 70 MG/1
70 TABLET ORAL
Qty: 5 TABLET | Refills: 0 | Status: SHIPPED | OUTPATIENT
Start: 2024-12-24

## 2024-12-24 NOTE — TELEPHONE ENCOUNTER
Patient needs updated blood work and has previously placed orders. Please contact patient to go for labs. Courtesy refill provided.     Vitamin d

## 2024-12-26 ENCOUNTER — APPOINTMENT (OUTPATIENT)
Dept: LAB | Facility: CLINIC | Age: 64
End: 2024-12-26
Payer: COMMERCIAL

## 2024-12-26 ENCOUNTER — TELEPHONE (OUTPATIENT)
Age: 64
End: 2024-12-26

## 2024-12-26 DIAGNOSIS — G40.209 PARTIAL SYMPTOMATIC EPILEPSY WITH COMPLEX PARTIAL SEIZURES, NOT INTRACTABLE, WITHOUT STATUS EPILEPTICUS (HCC): ICD-10-CM

## 2024-12-26 LAB
BASOPHILS # BLD AUTO: 0.04 THOUSANDS/ÂΜL (ref 0–0.1)
BASOPHILS NFR BLD AUTO: 1 % (ref 0–1)
CARBAMAZEPINE SERPL-MCNC: 13.5 UG/ML (ref 4–12)
EOSINOPHIL # BLD AUTO: 0.14 THOUSAND/ÂΜL (ref 0–0.61)
EOSINOPHIL NFR BLD AUTO: 3 % (ref 0–6)
ERYTHROCYTE [DISTWIDTH] IN BLOOD BY AUTOMATED COUNT: 11.3 % (ref 11.6–15.1)
HCT VFR BLD AUTO: 45.2 % (ref 36.5–49.3)
HGB BLD-MCNC: 15.4 G/DL (ref 12–17)
IMM GRANULOCYTES # BLD AUTO: 0.01 THOUSAND/UL (ref 0–0.2)
IMM GRANULOCYTES NFR BLD AUTO: 0 % (ref 0–2)
LEVETIRACETAM SERPL-MCNC: 13.2 UG/ML (ref 12–46)
LYMPHOCYTES # BLD AUTO: 1.56 THOUSANDS/ÂΜL (ref 0.6–4.47)
LYMPHOCYTES NFR BLD AUTO: 29 % (ref 14–44)
MCH RBC QN AUTO: 33.6 PG (ref 26.8–34.3)
MCHC RBC AUTO-ENTMCNC: 34.1 G/DL (ref 31.4–37.4)
MCV RBC AUTO: 99 FL (ref 82–98)
MONOCYTES # BLD AUTO: 0.49 THOUSAND/ÂΜL (ref 0.17–1.22)
MONOCYTES NFR BLD AUTO: 9 % (ref 4–12)
NEUTROPHILS # BLD AUTO: 3.07 THOUSANDS/ÂΜL (ref 1.85–7.62)
NEUTS SEG NFR BLD AUTO: 58 % (ref 43–75)
NRBC BLD AUTO-RTO: 0 /100 WBCS
PLATELET # BLD AUTO: 219 THOUSANDS/UL (ref 149–390)
PMV BLD AUTO: 8.9 FL (ref 8.9–12.7)
RBC # BLD AUTO: 4.58 MILLION/UL (ref 3.88–5.62)
WBC # BLD AUTO: 5.31 THOUSAND/UL (ref 4.31–10.16)

## 2024-12-26 PROCEDURE — 36415 COLL VENOUS BLD VENIPUNCTURE: CPT

## 2024-12-26 PROCEDURE — 80156 ASSAY CARBAMAZEPINE TOTAL: CPT

## 2024-12-26 PROCEDURE — 83520 IMMUNOASSAY QUANT NOS NONAB: CPT

## 2024-12-26 PROCEDURE — 85025 COMPLETE CBC W/AUTO DIFF WBC: CPT

## 2024-12-27 ENCOUNTER — RESULTS FOLLOW-UP (OUTPATIENT)
Dept: NEUROLOGY | Facility: CLINIC | Age: 64
End: 2024-12-27

## 2024-12-31 ENCOUNTER — PROCEDURE VISIT (OUTPATIENT)
Dept: OBGYN CLINIC | Facility: HOSPITAL | Age: 64
End: 2024-12-31
Payer: COMMERCIAL

## 2024-12-31 VITALS — BODY MASS INDEX: 24.2 KG/M2 | HEIGHT: 70 IN | WEIGHT: 169 LBS

## 2024-12-31 DIAGNOSIS — M25.561 CHRONIC PAIN OF RIGHT KNEE: ICD-10-CM

## 2024-12-31 DIAGNOSIS — M17.11 PRIMARY OSTEOARTHRITIS OF RIGHT KNEE: Primary | ICD-10-CM

## 2024-12-31 DIAGNOSIS — G89.29 CHRONIC PAIN OF RIGHT KNEE: ICD-10-CM

## 2024-12-31 PROCEDURE — 20610 DRAIN/INJ JOINT/BURSA W/O US: CPT

## 2024-12-31 RX ORDER — LIDOCAINE HYDROCHLORIDE 10 MG/ML
4 INJECTION, SOLUTION INFILTRATION; PERINEURAL
Status: COMPLETED | OUTPATIENT
Start: 2024-12-31 | End: 2024-12-31

## 2024-12-31 RX ADMIN — LIDOCAINE HYDROCHLORIDE 4 ML: 10 INJECTION, SOLUTION INFILTRATION; PERINEURAL at 10:00

## 2024-12-31 NOTE — TELEPHONE ENCOUNTER
Norma Philip, DO    Please call the patient regarding his abnormal result. Let Agus know, keppra level is 13.2 , tegretol is 13.5 . If he is doing well ( no seizures or auras) , and no side effects ask him to stay on same dose of meds    ____________________________    I spoke to Agus and read him provider's note above. He informed he is doing well, denies seizures or auras or side effects. He will continue taking same dose of meds .    Pt wanted to inform Dr Philip that he had his DatScan done at [facility] yesterday and would like to discuss those results with provider when able.    CB # [phone]. May leave a detailed msg.

## 2024-12-31 NOTE — PROGRESS NOTES
Assessment:   Diagnosis ICD-10-CM Associated Orders   1. Primary osteoarthritis of right knee  M17.11 Large joint arthrocentesis: R knee     lidocaine (XYLOCAINE) 1 % injection 4 mL     sodium hyaluronate (ORTHOVISC) injection SOSY 30 mg      2. Chronic pain of right knee  M25.561 Large joint arthrocentesis: R knee    G89.29 lidocaine (XYLOCAINE) 1 % injection 4 mL     sodium hyaluronate (ORTHOVISC) injection SOSY 30 mg          Plan:  64 y.o. male with known osteoarthritis of the right knee  S/p injection of corticosteroid 12/4 with little to no pain relief   Presents today for administration of Orthovisc #1 of 3 to the right knee (RP)  Patient tolerated procedure well    Follow up next week for second of three injections of series to right knee     The above stated was discussed in layman's terms and the patient expressed understanding.  All questions were answered to the patient's satisfaction.     To do next visit:  Return in about 1 week (around 1/7/2025) for Orthovisc #2 to right knee.      Subjective:   Agus Espinoza is a 64 y.o. male who presents for administration of visco supplementation to the right knee.  Patient has known osteoarthritis of the right knee, recently treated with injection of corticosteroid which provided little to no pain relief.  He presents today for administration of first of three injections of Orthovisc series to the right knee.  Pain score: 7/10       Review of systems negative unless otherwise specified in HPI    Past Medical History:   Diagnosis Date   • Arthritis    • Hypertension    • Seizures (HCC)    • Stroke (HCC)        Past Surgical History:   Procedure Laterality Date   • ELBOW SURGERY  2017   • TN OPTX FEM SHFT FX W/INSJ IMED IMPLT W/WO SCREW Left 10/16/2023    Procedure: INSERTION NAIL IM FEMUR ANTEGRADE (TROCHANTERIC);  Surgeon: Brendan Keane DO;  Location: [facility];  Service: Orthopedics       Family History   Problem Relation Age of Onset   • Dementia  Mother    • Alzheimer's disease Mother    • Stroke Father    • Hyperlipidemia Brother        Social History     Occupational History   • Not on file   Tobacco Use   • Smoking status: Never   • Smokeless tobacco: Never   Vaping Use   • Vaping status: Never Used   Substance and Sexual Activity   • Alcohol use: Yes     Comment: occasional   • Drug use: No   • Sexual activity: Not Currently         Current Outpatient Medications:   •  alendronate (FOSAMAX) 70 mg tablet, Take 1 tablet (70 mg total) by mouth every 7 days, Disp: 5 tablet, Rfl: 0  •  aspirin-dipyridamole (AGGRENOX)  mg per 12 hr capsule, TAKE 1 CAPSULE BY MOUTH 2 (TWO) TIMES A DAY, Disp: 180 capsule, Rfl: 3  •  Calcium Carb-Cholecalciferol (CALCIUM 500+D PO), , Disp: , Rfl:   •  Calcium-Magnesium-Vitamin D (CALCIUM MAGNESIUM PO), Take by mouth, Disp: , Rfl:   •  carBAMazepine (TEGretol) 200 mg tablet, TAKE 2  TABLETS IN THE AM, 2 TABLETS AT DINNER, AND 2 TABLETS AT BEDTIME  - on 6  tablets daily, Disp: 180 tablet, Rfl: 5  •  co-enzyme Q-10 30 MG capsule, Take 100 mg by mouth daily  , Disp: , Rfl:   •  Coenzyme Q10 (CoQ-10) 100 MG capsule, , Disp: , Rfl:   •  docusate sodium (COLACE) 100 mg capsule, Take 1 capsule (100 mg total) by mouth 2 (two) times a day, Disp: , Rfl: 0  •  levETIRAcetam (KEPPRA) 750 mg tablet, TAKE 1 TABLET (750 MG TOTAL) BY MOUTH EVERY 12 (TWELVE) HOURS, Disp: 60 tablet, Rfl: 5  •  LORazepam (ATIVAN) 0.5 mg tablet, TAKE 1 TABLET BY MOUTH ONCE DAILY IF NEEDED for seizure.  Limit of 1 in 24 hours, Disp: 10 tablet, Rfl: 0  •  NON FORMULARY, Super Beets, Disp: , Rfl:   •  polyethylene glycol (MIRALAX) 17 g packet, Take 17 g by mouth daily Do not start before October 20, 2023., Disp: , Rfl: 0  •  RABEprazole (ACIPHEX) 20 MG tablet, TAKE 1 TABLET (20 MG TOTAL) BY MOUTH DAILY IN THE EARLY MORNING, Disp: 30 tablet, Rfl: 5  •  rosuvastatin (CRESTOR) 10 MG tablet, Take 1 tablet (10 mg total) by mouth daily, Disp: 90 tablet, Rfl: 1  •   "VALERIAN ROOT PO, Use, Disp: , Rfl:   •  Vitamin Mixture (VITAMIN E COMPLETE PO), Take 1 tablet by mouth daily , Disp: , Rfl:   No current facility-administered medications for this visit.    Allergies   Allergen Reactions   • Meperidine Seizures   • Amoxicillin    • Azithromycin Seizures   • Other Sneezing     Dust    • Pollen Extract Sneezing          There were no vitals filed for this visit.    Objective:  Physical exam  General: Awake, Alert, Oriented  Eyes: Pupils equal, round and reactive to light  Heart: regular rate and rhythm  Lungs: No audible wheezing  Abdomen: soft                    Right Knee Exam     Tenderness   The patient is experiencing tenderness in the lateral joint line and medial joint line.    Range of Motion   Extension:  normal   Flexion:  normal     Other   Erythema: absent  Scars: absent  Swelling: mild  Effusion: no effusion present            Diagnostics, reviewed and taken today if performed as documented:    None performed        Procedures, if performed today:    Large joint arthrocentesis: R knee  Universal Protocol:  Consent: Verbal consent obtained.  Risks and benefits: risks, benefits and alternatives were discussed  Consent given by: patient  Site marked: the operative site was marked  Supporting Documentation  Indications: pain   Procedure Details  Location: knee - R knee  Needle size: 22 G  Approach: anterolateral  Medications administered: 4 mL lidocaine 1 %; 30 mg sodium hyaluronate 30 mg/2 mL    Patient tolerance: patient tolerated the procedure well with no immediate complications  Dressing:  Sterile dressing applied            Portions of the record may have been created with voice recognition software.  Occasional wrong word or \"sound a like\" substitutions may have occurred due to the inherent limitations of voice recognition software.  Read the chart carefully and recognize, using context, where substitutions have occurred.      "

## 2025-01-01 ENCOUNTER — HOSPITAL ENCOUNTER (OUTPATIENT)
Facility: HOSPITAL | Age: 65
Setting detail: OBSERVATION
Discharge: HOME | End: 2025-01-03
Attending: EMERGENCY MEDICINE | Admitting: INTERNAL MEDICINE
Payer: COMMERCIAL

## 2025-01-01 ENCOUNTER — APPOINTMENT (EMERGENCY)
Dept: RADIOLOGY | Facility: HOSPITAL | Age: 65
End: 2025-01-01
Payer: COMMERCIAL

## 2025-01-01 DIAGNOSIS — I69.319 COGNITIVE DEFICIT AS LATE EFFECT OF CEREBROVASCULAR ACCIDENT (CVA): ICD-10-CM

## 2025-01-01 DIAGNOSIS — R65.10 SIRS (SYSTEMIC INFLAMMATORY RESPONSE SYNDROME) (HCC): ICD-10-CM

## 2025-01-01 DIAGNOSIS — K21.9 GASTROESOPHAGEAL REFLUX DISEASE WITHOUT ESOPHAGITIS: ICD-10-CM

## 2025-01-01 DIAGNOSIS — K92.1 MELENA: ICD-10-CM

## 2025-01-01 DIAGNOSIS — K21.9 GERD (GASTROESOPHAGEAL REFLUX DISEASE): Primary | ICD-10-CM

## 2025-01-01 LAB
BASOPHILS # BLD MANUAL: 0.1 THOUSAND/UL (ref 0–0.1)
BASOPHILS NFR MAR MANUAL: 1 % (ref 0–1)
CARDIAC TROPONIN I PNL SERPL HS: 3 NG/L (ref ?–50)
DACRYOCYTES BLD QL SMEAR: PRESENT
EOSINOPHIL # BLD MANUAL: 0 THOUSAND/UL (ref 0–0.4)
EOSINOPHIL NFR BLD MANUAL: 0 % (ref 0–6)
ERYTHROCYTE [DISTWIDTH] IN BLOOD BY AUTOMATED COUNT: 11.7 % (ref 11.6–15.1)
HCT VFR BLD AUTO: 48 % (ref 36.5–49.3)
HGB BLD-MCNC: 16.2 G/DL (ref 12–17)
LYMPHOCYTES # BLD AUTO: 0.3 THOUSAND/UL (ref 0.6–4.47)
LYMPHOCYTES # BLD AUTO: 2 % (ref 14–44)
MCH RBC QN AUTO: 33.9 PG (ref 26.8–34.3)
MCHC RBC AUTO-ENTMCNC: 33.8 G/DL (ref 31.4–37.4)
MCV RBC AUTO: 100 FL (ref 82–98)
MONOCYTES # BLD AUTO: 0.1 THOUSAND/UL (ref 0–1.22)
MONOCYTES NFR BLD: 1 % (ref 4–12)
NEUTROPHILS # BLD MANUAL: 9.61 THOUSAND/UL (ref 1.85–7.62)
NEUTS BAND NFR BLD MANUAL: 1 % (ref 0–8)
NEUTS SEG NFR BLD AUTO: 94 % (ref 43–75)
PLATELET # BLD AUTO: 197 THOUSANDS/UL (ref 149–390)
PLATELET BLD QL SMEAR: ADEQUATE
PMV BLD AUTO: 9 FL (ref 8.9–12.7)
POIKILOCYTOSIS BLD QL SMEAR: PRESENT
RBC # BLD AUTO: 4.78 MILLION/UL (ref 3.88–5.62)
RBC MORPH BLD: PRESENT
VARIANT LYMPHS # BLD AUTO: 1 %
WBC # BLD AUTO: 10.12 THOUSAND/UL (ref 4.31–10.16)

## 2025-01-01 PROCEDURE — 80048 BASIC METABOLIC PNL TOTAL CA: CPT

## 2025-01-01 PROCEDURE — 96375 TX/PRO/DX INJ NEW DRUG ADDON: CPT

## 2025-01-01 PROCEDURE — 36415 COLL VENOUS BLD VENIPUNCTURE: CPT

## 2025-01-01 PROCEDURE — 85027 COMPLETE CBC AUTOMATED: CPT

## 2025-01-01 PROCEDURE — 99285 EMERGENCY DEPT VISIT HI MDM: CPT

## 2025-01-01 PROCEDURE — 96365 THER/PROPH/DIAG IV INF INIT: CPT

## 2025-01-01 PROCEDURE — 99285 EMERGENCY DEPT VISIT HI MDM: CPT | Performed by: EMERGENCY MEDICINE

## 2025-01-01 PROCEDURE — 71046 X-RAY EXAM CHEST 2 VIEWS: CPT

## 2025-01-01 PROCEDURE — 84484 ASSAY OF TROPONIN QUANT: CPT | Performed by: EMERGENCY MEDICINE

## 2025-01-01 PROCEDURE — 85007 BL SMEAR W/DIFF WBC COUNT: CPT

## 2025-01-01 PROCEDURE — 96366 THER/PROPH/DIAG IV INF ADDON: CPT

## 2025-01-01 RX ORDER — MAGNESIUM HYDROXIDE/ALUMINUM HYDROXICE/SIMETHICONE 120; 1200; 1200 MG/30ML; MG/30ML; MG/30ML
30 SUSPENSION ORAL ONCE
Status: COMPLETED | OUTPATIENT
Start: 2025-01-01 | End: 2025-01-01

## 2025-01-01 RX ORDER — FAMOTIDINE 10 MG/ML
20 INJECTION, SOLUTION INTRAVENOUS ONCE
Status: COMPLETED | OUTPATIENT
Start: 2025-01-01 | End: 2025-01-01

## 2025-01-01 RX ORDER — SODIUM CHLORIDE, SODIUM GLUCONATE, SODIUM ACETATE, POTASSIUM CHLORIDE, MAGNESIUM CHLORIDE, SODIUM PHOSPHATE, DIBASIC, AND POTASSIUM PHOSPHATE .53; .5; .37; .037; .03; .012; .00082 G/100ML; G/100ML; G/100ML; G/100ML; G/100ML; G/100ML; G/100ML
1000 INJECTION, SOLUTION INTRAVENOUS ONCE
Status: COMPLETED | OUTPATIENT
Start: 2025-01-01 | End: 2025-01-02

## 2025-01-01 RX ADMIN — SODIUM CHLORIDE, SODIUM GLUCONATE, SODIUM ACETATE, POTASSIUM CHLORIDE, MAGNESIUM CHLORIDE, SODIUM PHOSPHATE, DIBASIC, AND POTASSIUM PHOSPHATE 1000 ML: .53; .5; .37; .037; .03; .012; .00082 INJECTION, SOLUTION INTRAVENOUS at 22:31

## 2025-01-01 RX ADMIN — ALUMINUM HYDROXIDE, MAGNESIUM HYDROXIDE, AND SIMETHICONE 30 ML: 200; 200; 20 SUSPENSION ORAL at 22:26

## 2025-01-01 RX ADMIN — FAMOTIDINE 20 MG: 10 INJECTION, SOLUTION INTRAVENOUS at 22:32

## 2025-01-02 ENCOUNTER — APPOINTMENT (OUTPATIENT)
Dept: RADIOLOGY | Facility: HOSPITAL | Age: 65
End: 2025-01-02
Payer: COMMERCIAL

## 2025-01-02 ENCOUNTER — APPOINTMENT (EMERGENCY)
Dept: RADIOLOGY | Facility: HOSPITAL | Age: 65
End: 2025-01-02
Payer: COMMERCIAL

## 2025-01-02 PROBLEM — K92.1 MELENA: Status: ACTIVE | Noted: 2025-01-02

## 2025-01-02 LAB
2HR DELTA HS TROPONIN: 0 NG/L
ABO GROUP BLD: NORMAL
ANION GAP SERPL CALCULATED.3IONS-SCNC: 12 MMOL/L (ref 4–13)
BACTERIA UR QL AUTO: ABNORMAL /HPF
BILIRUB UR QL STRIP: NEGATIVE
BLD GP AB SCN SERPL QL: NEGATIVE
BUN SERPL-MCNC: 30 MG/DL (ref 5–25)
CALCIUM SERPL-MCNC: 8.6 MG/DL (ref 8.4–10.2)
CARBAMAZEPINE SERPL-MCNC: 11.3 UG/ML (ref 4–12)
CARDIAC TROPONIN I PNL SERPL HS: 3 NG/L (ref ?–50)
CHLORIDE SERPL-SCNC: 105 MMOL/L (ref 96–108)
CLARITY UR: CLEAR
CO2 SERPL-SCNC: 17 MMOL/L (ref 21–32)
COLOR UR: ABNORMAL
CREAT SERPL-MCNC: 0.91 MG/DL (ref 0.6–1.3)
ERYTHROCYTE [DISTWIDTH] IN BLOOD BY AUTOMATED COUNT: 12.1 % (ref 11.6–15.1)
FLUAV RNA RESP QL NAA+PROBE: NEGATIVE
FLUBV RNA RESP QL NAA+PROBE: NEGATIVE
GFR SERPL CREATININE-BSD FRML MDRD: 88 ML/MIN/1.73SQ M
GLUCOSE SERPL-MCNC: 128 MG/DL (ref 65–140)
GLUCOSE UR STRIP-MCNC: NEGATIVE MG/DL
HCT VFR BLD AUTO: 37 % (ref 36.5–49.3)
HCT VFR BLD AUTO: 39.8 % (ref 36.5–49.3)
HEMOCCULT STL QL: POSITIVE
HGB BLD-MCNC: 12.8 G/DL (ref 12–17)
HGB BLD-MCNC: 13.9 G/DL (ref 12–17)
HGB UR QL STRIP.AUTO: NEGATIVE
HYALINE CASTS #/AREA URNS LPF: ABNORMAL /LPF
KETONES UR STRIP-MCNC: NEGATIVE MG/DL
LACTATE SERPL-SCNC: 2.1 MMOL/L (ref 0.5–2)
LACTATE SERPL-SCNC: 2.3 MMOL/L (ref 0.5–2)
LEUKOCYTE ESTERASE UR QL STRIP: NEGATIVE
LEVETIRACETAM SERPL-MCNC: 10.6 UG/ML (ref 12–46)
MCH RBC QN AUTO: 34.6 PG (ref 26.8–34.3)
MCHC RBC AUTO-ENTMCNC: 34.9 G/DL (ref 31.4–37.4)
MCV RBC AUTO: 99 FL (ref 82–98)
NITRITE UR QL STRIP: NEGATIVE
NON-SQ EPI CELLS URNS QL MICRO: ABNORMAL /HPF
PH UR STRIP.AUTO: 6.5 [PH]
PLATELET # BLD AUTO: 153 THOUSANDS/UL (ref 149–390)
PMV BLD AUTO: 10.7 FL (ref 8.9–12.7)
POTASSIUM SERPL-SCNC: 3.8 MMOL/L (ref 3.5–5.3)
PROCALCITONIN SERPL-MCNC: 0.25 NG/ML
PROT UR STRIP-MCNC: ABNORMAL MG/DL
RBC # BLD AUTO: 4.02 MILLION/UL (ref 3.88–5.62)
RBC #/AREA URNS AUTO: ABNORMAL /HPF
RH BLD: POSITIVE
RSV RNA RESP QL NAA+PROBE: NEGATIVE
SARS-COV-2 RNA RESP QL NAA+PROBE: NEGATIVE
SODIUM SERPL-SCNC: 134 MMOL/L (ref 135–147)
SP GR UR STRIP.AUTO: 1.03 (ref 1–1.03)
SPECIMEN EXPIRATION DATE: NORMAL
UROBILINOGEN UR STRIP-ACNC: <2 MG/DL
WBC # BLD AUTO: 12.82 THOUSAND/UL (ref 4.31–10.16)
WBC #/AREA URNS AUTO: ABNORMAL /HPF

## 2025-01-02 PROCEDURE — 99223 1ST HOSP IP/OBS HIGH 75: CPT | Performed by: INTERNAL MEDICINE

## 2025-01-02 PROCEDURE — 84145 PROCALCITONIN (PCT): CPT

## 2025-01-02 PROCEDURE — 84484 ASSAY OF TROPONIN QUANT: CPT | Performed by: EMERGENCY MEDICINE

## 2025-01-02 PROCEDURE — 70551 MRI BRAIN STEM W/O DYE: CPT

## 2025-01-02 PROCEDURE — 70450 CT HEAD/BRAIN W/O DYE: CPT

## 2025-01-02 PROCEDURE — 85014 HEMATOCRIT: CPT

## 2025-01-02 PROCEDURE — 86900 BLOOD TYPING SEROLOGIC ABO: CPT

## 2025-01-02 PROCEDURE — 86901 BLOOD TYPING SEROLOGIC RH(D): CPT

## 2025-01-02 PROCEDURE — 36415 COLL VENOUS BLD VENIPUNCTURE: CPT | Performed by: EMERGENCY MEDICINE

## 2025-01-02 PROCEDURE — 86850 RBC ANTIBODY SCREEN: CPT

## 2025-01-02 PROCEDURE — 85027 COMPLETE CBC AUTOMATED: CPT

## 2025-01-02 PROCEDURE — 81001 URINALYSIS AUTO W/SCOPE: CPT

## 2025-01-02 PROCEDURE — 83605 ASSAY OF LACTIC ACID: CPT

## 2025-01-02 PROCEDURE — 99255 IP/OBS CONSLTJ NEW/EST HI 80: CPT | Performed by: INTERNAL MEDICINE

## 2025-01-02 PROCEDURE — 87040 BLOOD CULTURE FOR BACTERIA: CPT

## 2025-01-02 PROCEDURE — 83520 IMMUNOASSAY QUANT NOS NONAB: CPT

## 2025-01-02 PROCEDURE — 82272 OCCULT BLD FECES 1-3 TESTS: CPT

## 2025-01-02 PROCEDURE — 85018 HEMOGLOBIN: CPT

## 2025-01-02 PROCEDURE — 80156 ASSAY CARBAMAZEPINE TOTAL: CPT

## 2025-01-02 PROCEDURE — 0241U HB NFCT DS VIR RESP RNA 4 TRGT: CPT

## 2025-01-02 PROCEDURE — 99245 OFF/OP CONSLTJ NEW/EST HI 55: CPT | Performed by: PSYCHIATRY & NEUROLOGY

## 2025-01-02 RX ORDER — LEVETIRACETAM 750 MG/1
750 TABLET ORAL EVERY 12 HOURS SCHEDULED
Status: DISCONTINUED | OUTPATIENT
Start: 2025-01-02 | End: 2025-01-03 | Stop reason: HOSPADM

## 2025-01-02 RX ORDER — PANTOPRAZOLE SODIUM 40 MG/10ML
40 INJECTION, POWDER, LYOPHILIZED, FOR SOLUTION INTRAVENOUS EVERY 12 HOURS SCHEDULED
Status: DISCONTINUED | OUTPATIENT
Start: 2025-01-02 | End: 2025-01-03

## 2025-01-02 RX ORDER — LEVETIRACETAM 750 MG/1
750 TABLET ORAL EVERY 12 HOURS SCHEDULED
Status: DISCONTINUED | OUTPATIENT
Start: 2025-01-02 | End: 2025-01-02

## 2025-01-02 RX ORDER — ONDANSETRON 2 MG/ML
4 INJECTION INTRAMUSCULAR; INTRAVENOUS EVERY 6 HOURS PRN
Status: DISCONTINUED | OUTPATIENT
Start: 2025-01-02 | End: 2025-01-03 | Stop reason: HOSPADM

## 2025-01-02 RX ORDER — POLYETHYLENE GLYCOL 3350 17 G/17G
17 POWDER, FOR SOLUTION ORAL DAILY
Status: DISCONTINUED | OUTPATIENT
Start: 2025-01-02 | End: 2025-01-03 | Stop reason: HOSPADM

## 2025-01-02 RX ORDER — PRAVASTATIN SODIUM 80 MG/1
80 TABLET ORAL
Status: DISCONTINUED | OUTPATIENT
Start: 2025-01-02 | End: 2025-01-03 | Stop reason: HOSPADM

## 2025-01-02 RX ORDER — CARBAMAZEPINE 200 MG/1
400 TABLET ORAL 3 TIMES DAILY
Status: DISCONTINUED | OUTPATIENT
Start: 2025-01-02 | End: 2025-01-03 | Stop reason: HOSPADM

## 2025-01-02 RX ORDER — PANTOPRAZOLE SODIUM 40 MG/1
40 TABLET, DELAYED RELEASE ORAL
Status: DISCONTINUED | OUTPATIENT
Start: 2025-01-02 | End: 2025-01-02

## 2025-01-02 RX ORDER — ACETAMINOPHEN 325 MG/1
650 TABLET ORAL EVERY 6 HOURS PRN
Status: DISCONTINUED | OUTPATIENT
Start: 2025-01-02 | End: 2025-01-03 | Stop reason: HOSPADM

## 2025-01-02 RX ORDER — DOCUSATE SODIUM 100 MG/1
100 CAPSULE, LIQUID FILLED ORAL 2 TIMES DAILY
Status: DISCONTINUED | OUTPATIENT
Start: 2025-01-02 | End: 2025-01-03 | Stop reason: HOSPADM

## 2025-01-02 RX ORDER — ASPIRIN AND DIPYRIDAMOLE 25; 200 MG/1; MG/1
1 CAPSULE, EXTENDED RELEASE ORAL 2 TIMES DAILY
Status: DISCONTINUED | OUTPATIENT
Start: 2025-01-02 | End: 2025-01-03 | Stop reason: HOSPADM

## 2025-01-02 RX ORDER — CARBAMAZEPINE 200 MG/1
400 TABLET ORAL 3 TIMES DAILY
Status: DISCONTINUED | OUTPATIENT
Start: 2025-01-02 | End: 2025-01-02

## 2025-01-02 RX ORDER — WATER 10 ML/10ML
INJECTION INTRAMUSCULAR; INTRAVENOUS; SUBCUTANEOUS
Status: DISCONTINUED
Start: 2025-01-02 | End: 2025-01-02 | Stop reason: WASHOUT

## 2025-01-02 RX ORDER — SODIUM CHLORIDE 9 MG/ML
100 INJECTION, SOLUTION INTRAVENOUS CONTINUOUS
Status: DISCONTINUED | OUTPATIENT
Start: 2025-01-02 | End: 2025-01-03 | Stop reason: HOSPADM

## 2025-01-02 RX ADMIN — PRAVASTATIN SODIUM 80 MG: 80 TABLET ORAL at 19:24

## 2025-01-02 RX ADMIN — CARBAMAZEPINE 400 MG: 200 TABLET ORAL at 07:11

## 2025-01-02 RX ADMIN — ASPIRIN AND DIPYRIDAMOLE 1 CAPSULE: 25; 200 CAPSULE, EXTENDED RELEASE ORAL at 09:20

## 2025-01-02 RX ADMIN — SODIUM CHLORIDE 100 ML/HR: 0.9 INJECTION, SOLUTION INTRAVENOUS at 23:26

## 2025-01-02 RX ADMIN — CARBAMAZEPINE 400 MG: 200 TABLET ORAL at 17:35

## 2025-01-02 RX ADMIN — DOCUSATE SODIUM 100 MG: 100 CAPSULE, LIQUID FILLED ORAL at 09:20

## 2025-01-02 RX ADMIN — SODIUM CHLORIDE 100 ML/HR: 0.9 INJECTION, SOLUTION INTRAVENOUS at 14:04

## 2025-01-02 RX ADMIN — CARBAMAZEPINE 400 MG: 200 TABLET ORAL at 21:04

## 2025-01-02 RX ADMIN — PANTOPRAZOLE SODIUM 40 MG: 40 TABLET, DELAYED RELEASE ORAL at 07:11

## 2025-01-02 RX ADMIN — PANTOPRAZOLE SODIUM 40 MG: 40 INJECTION, POWDER, FOR SOLUTION INTRAVENOUS at 15:58

## 2025-01-02 RX ADMIN — LEVETIRACETAM 750 MG: 750 TABLET, FILM COATED ORAL at 19:33

## 2025-01-02 RX ADMIN — ASPIRIN AND DIPYRIDAMOLE 1 CAPSULE: 25; 200 CAPSULE, EXTENDED RELEASE ORAL at 17:35

## 2025-01-02 NOTE — SEPSIS NOTE
Sepsis Note   Agus Espinoza 64 y.o. male MRN: [id number]  Unit/Bed#: [facility] Encounter: [id number]       Initial Sepsis Screening       Row Name 01/02/25 1520                Is the patient's history suggestive of a new or worsening infection? Yes (Proceed)  -HC        Suspected source of infection suspect infection, source unknown  -HC        Indicate SIRS criteria Tachycardia > 90 bpm;Tachypnea > 20 resp per min;Leukocytosis (WBC > 88112 IJL) OR Leukopenia (WBC <4000 IJL) OR Bandemia (WBC >10% bands)  -HC        Are two or more of the above signs & symptoms of infection both present and new to the patient? Yes (Proceed)  -HC        Assess for evidence of organ dysfunction: Are any of the below criteria present within 6 hours of suspected infection and SIRS criteria that are NOT considered to be chronic conditions? --                  User Key  (r) = Recorded By, (t) = Taken By, (c) = Cosigned By      Initials Name Provider Type    HC Tara Raymond PA-C Physician Assistant                        There is no height or weight on file to calculate BMI.  Wt Readings from Last 1 Encounters:   12/31/24 76.7 kg (169 lb)        Ideal body weight: 73 kg (160 lb 15 oz)  Adjusted ideal body weight: 74.5 kg (164 lb 2.6 oz)    Patient meeting SIRS criteria secondary to tachycardia, tachypnea and leukocytosis. Unknown source. Patient also is being treated for suspected GI bleed which can be contributing to his abnormal vitals. Will proceed with blood cultures, procalcitonin, and lactic acid  Will also order COVID/FLU/RSV as patient endorsing viral symptoms upon admission.   Continue current IV fluids.

## 2025-01-02 NOTE — ED PROVIDER NOTES
Time reflects when diagnosis was documented in both MDM as applicable and the Disposition within this note       Time User Action Codes Description Comment    1/2/2025 12:20 AM Edu Goff Add [K21.9] GERD (gastroesophageal reflux disease)     1/2/2025  6:10 AM Cal Diallo Add [I69.319] Cognitive deficit as late effect of cerebrovascular accident (CVA)           ED Disposition       ED Disposition   Admit    Condition   Stable    Date/Time   Thu Jan 2, 2025  4:52 AM    Comment   Case was discussed with and the patient's admission status was agreed to be Admission Status: observation status to the service of Dr. Diallo.               Assessment & Plan       Medical Decision Making  Patient is a 64 y.o. male who presents to the ED with original complaint of epigastric pain, vomiting.  Per family patient is not acting normally since this morning.    Vital signs remained stable throughout ED course.  Labs, head CT unremarkable. Exam as listed below.    Differential diagnosis includes but is not limited to metabolic encephalopathy, acute lacunar infarct, acute sepsis.    Plan: Patient admitted to [facility].    View ED course above for further discussion on patient workup.     All labs reviewed and utilized in the medical decision making process  All radiology studies independently viewed by me and interpreted by the radiologist.  I reviewed all testing with the patient.     Amount and/or Complexity of Data Reviewed  Labs: ordered.  Radiology: ordered.    Risk  OTC drugs.  Prescription drug management.  Decision regarding hospitalization.        ED Course as of 01/02/25 1018   Thu Jan 02, 2025   0639 Admitted to [facility] for observation for AMS. Stable.       Medications   aspirin-dipyridamole (AGGRENOX)  mg per 12 hr capsule 1 capsule (1 capsule Oral Given 1/2/25 0920)   docusate sodium (COLACE) capsule 100 mg (100 mg Oral Given 1/2/25 0920)   pantoprazole (PROTONIX) EC tablet 40 mg (40 mg Oral Given 1/2/25 0711)    polyethylene glycol (MIRALAX) packet 17 g (17 g Oral Not Given 1/2/25 0919)   pravastatin (PRAVACHOL) tablet 80 mg (has no administration in time range)   carBAMazepine (TEGretol) tablet 400 mg (400 mg Oral Given 1/2/25 0711)   ondansetron (ZOFRAN) injection 4 mg (has no administration in time range)   acetaminophen (TYLENOL) tablet 650 mg (has no administration in time range)   levETIRAcetam (KEPPRA) tablet 750 mg (has no administration in time range)   multi-electrolyte (ISOLYTE-S PH 7.4) bolus 1,000 mL (0 mL Intravenous Stopped 1/2/25 0012)   Famotidine (PF) (PEPCID) injection 20 mg (20 mg Intravenous Given 1/1/25 2232)   aluminum-magnesium hydroxide-simethicone (MAALOX) oral suspension 30 mL (30 mL Oral Given 1/1/25 2226)       ED Risk Strat Scores                                              History of Present Illness       Chief Complaint   Patient presents with    Epigastric Pain     Pt brought in  by EMS with a CC of heart burn.  Pt says it started yesterday morning. Denies chest pain and SOB. Pt states he vomited this morning.       Past Medical History:   Diagnosis Date    Arthritis     Hypertension     Seizures (HCC)     Stroke (HCC)       Past Surgical History:   Procedure Laterality Date    ELBOW SURGERY  2017    FL OPTX FEM SHFT FX W/INSJ IMED IMPLT W/WO SCREW Left 10/16/2023    Procedure: INSERTION NAIL IM FEMUR ANTEGRADE (TROCHANTERIC);  Surgeon: Brendan Keane DO;  Location: [facility];  Service: Orthopedics      Family History   Problem Relation Age of Onset    Dementia Mother     Alzheimer's disease Mother     Stroke Father     Hyperlipidemia Brother       Social History     Tobacco Use    Smoking status: Never    Smokeless tobacco: Never   Vaping Use    Vaping status: Never Used   Substance Use Topics    Alcohol use: Yes     Comment: occasional    Drug use: No      E-Cigarette/Vaping    E-Cigarette Use Never User       E-Cigarette/Vaping Substances      I have reviewed and agree with the  "history as documented.     This is a 64-year-old male with past medical history significant for CVA, seizure disorder, hypertension, presenting to the emergency room with 1 day epigastric pain and episode of vomiting earlier. Patient appears somewhat distant and is slow to answer questions, at time initial evaluation, unsure of patient's baseline mental status.  Patient only complains of \"heartburn\".  Patient received workup for epigastric pain and was due to be discharged, however patient's brother called the ER and stated that patient's behavior is abnormal for him, that he is normally able to answer questions easily and that patient was acting erratically since this morning and not taking his antiseizure meds or other medications.       Epigastric Pain  Associated symptoms: no abdominal pain, no back pain, no cough, no fever, no palpitations, no shortness of breath and not vomiting        Review of Systems   Constitutional:  Negative for chills and fever.   HENT:  Negative for ear pain and sore throat.    Eyes:  Negative for pain and visual disturbance.   Respiratory:  Negative for cough and shortness of breath.    Cardiovascular:  Negative for chest pain and palpitations.   Gastrointestinal:  Negative for abdominal pain and vomiting.   Genitourinary:  Negative for dysuria and hematuria.   Musculoskeletal:  Negative for arthralgias and back pain.   Skin:  Negative for color change and rash.   Neurological:  Negative for seizures and syncope.   All other systems reviewed and are negative.          Objective       ED Triage Vitals   Temperature Pulse Blood Pressure Respirations SpO2 Patient Position - Orthostatic VS   01/02/25 0104 01/01/25 2104 01/01/25 2104 01/01/25 2104 01/01/25 2104 01/01/25 2104   99.9 °F (37.7 °C) (!) 115 115/73 18 96 % Sitting      Temp Source Heart Rate Source BP Location FiO2 (%) Pain Score    01/02/25 0104 01/01/25 2104 01/01/25 2104 -- 01/01/25 2104    Oral Monitor Left arm  No Pain  "     Vitals      Date and Time Temp Pulse SpO2 Resp BP Pain Score FACES Pain Rating User   01/02/25 0900 -- 116 98 % 45 100/71 -- -- AM   01/02/25 0700 -- 114 96 % 25 110/68 -- -- AM   01/02/25 0130 -- 112 -- 18 114/71 -- -- ED   01/02/25 0104 99.9 °F (37.7 °C) -- -- -- -- -- -- AG   01/02/25 0000 -- 108 98 % 18 -- -- -- ED   01/01/25 2130 -- 110 94 % 18 112/71 -- -- ED   01/01/25 2104 -- 115 96 % 18 115/73 No Pain -- ED            Physical Exam  Vitals and nursing note reviewed.   Constitutional:       General: He is not in acute distress.     Appearance: He is well-developed.   HENT:      Head: Normocephalic and atraumatic.   Eyes:      Conjunctiva/sclera: Conjunctivae normal.   Cardiovascular:      Rate and Rhythm: Normal rate and regular rhythm.      Pulses: Normal pulses.      Heart sounds: Normal heart sounds. No murmur heard.  Pulmonary:      Effort: Pulmonary effort is normal. No respiratory distress.      Breath sounds: Normal breath sounds. No stridor. No wheezing, rhonchi or rales.   Chest:      Chest wall: No tenderness.   Abdominal:      General: Abdomen is flat. There is no distension.      Palpations: Abdomen is soft. There is no mass.      Tenderness: There is no abdominal tenderness. There is no right CVA tenderness, left CVA tenderness, guarding or rebound.      Hernia: No hernia is present.   Musculoskeletal:         General: No swelling.      Cervical back: Neck supple.   Skin:     General: Skin is warm and dry.      Capillary Refill: Capillary refill takes less than 2 seconds.   Neurological:      General: No focal deficit present.      Mental Status: He is alert and oriented to person, place, and time.      Cranial Nerves: No cranial nerve deficit.      Sensory: No sensory deficit.      Motor: No weakness.      Coordination: Coordination normal.      Gait: Gait normal.      Deep Tendon Reflexes: Reflexes normal.   Psychiatric:         Mood and Affect: Mood normal.         Results Reviewed        "Procedure Component Value Units Date/Time    Levetiracetam level [[id number]]  (Abnormal) Collected: 01/02/25 0315    Lab Status: Final result Specimen: Blood from Arm, Right Updated: 01/02/25 0440     Levetiracetam Lvl 10.6 ug/mL     Narrative:      \"Brivaracetam (Briviact) interferes with measurements of levetiracetam in the ARK Levetiracetam Assay. Patients undergoing a switch in drug therapy involving Keppra and Briviact should not be monitored for levetiracetam using the ARK assay. Serum levels of levetiracetam and or brivaracetam should be confirmed by a valid chromatographic method if there is a possibility these drugs are co-present in circulation.\"    Carbamazepine level, total [[id number]]  (Normal) Collected: 01/02/25 0315    Lab Status: Final result Specimen: Blood from Arm, Right Updated: 01/02/25 0347     Carbamazepine Lvl 11.3 ug/mL     HS Troponin I 2hr [[id number]]  (Normal) Collected: 01/02/25 0015    Lab Status: Final result Specimen: Blood from Arm, Right Updated: 01/02/25 0049     hs TnI 2hr 3 ng/L      Delta 2hr hsTnI 0 ng/L     Basic metabolic panel [[id number]]  (Abnormal) Collected: 01/01/25 2234    Lab Status: Final result Specimen: Blood from Arm, Right Updated: 01/02/25 0007     Sodium 134 mmol/L      Potassium 3.8 mmol/L      Chloride 105 mmol/L      CO2 17 mmol/L      ANION GAP 12 mmol/L      BUN 30 mg/dL      Creatinine 0.91 mg/dL      Glucose 128 mg/dL      Calcium 8.6 mg/dL      eGFR 88 ml/min/1.73sq m     Narrative:      National Kid[employer] Disease Foundation guidelines for Chronic Kidney Disease (CKD):     Stage 1 with normal or high GFR (GFR > 90 mL/min/1.73 square meters)    Stage 2 Mild CKD (GFR = 60-89 mL/min/1.73 square meters)    Stage 3A Moderate CKD (GFR = 45-59 mL/min/1.73 square meters)    Stage 3B Moderate CKD (GFR = 30-44 mL/min/1.73 square meters)    Stage 4 Severe CKD (GFR = 15-29 mL/min/1.73 square meters)    Stage 5 End Stage CKD (GFR <15 mL/min/1.73 square meters)  Note: " GFR calculation is accurate only with a steady state creatinine    RBC Morphology Reflex Test [[id number]] Collected: 01/01/25 2234    Lab Status: Final result Specimen: Blood from Arm, Right Updated: 01/02/25 0002    CBC and differential [[id number]]  (Abnormal) Collected: 01/01/25 2234    Lab Status: Final result Specimen: Blood from Arm, Right Updated: 01/01/25 2330     WBC 10.12 Thousand/uL      RBC 4.78 Million/uL      Hemoglobin 16.2 g/dL      Hematocrit 48.0 %       fL      MCH 33.9 pg      MCHC 33.8 g/dL      RDW 11.7 %      MPV 9.0 fL      Platelets 197 Thousands/uL     Narrative:      This is an appended report.  These results have been appended to a previously verified report.    Manual Differential(PHLEBS Do Not Order) [[id number]]  (Abnormal) Collected: 01/01/25 2234    Lab Status: Final result Specimen: Blood from Arm, Right Updated: 01/01/25 2330     Segmented % 94 %      Bands % 1 %      Lymphocytes % 2 %      Monocytes % 1 %      Eosinophils % 0 %      Basophils % 1 %      Atypical Lymphocytes % 1 %      Absolute Neutrophils 9.61 Thousand/uL      Absolute Lymphocytes 0.30 Thousand/uL      Absolute Monocytes 0.10 Thousand/uL      Absolute Eosinophils 0.00 Thousand/uL      Absolute Basophils 0.10 Thousand/uL      Total Counted --     RBC Morphology Present     Platelet Estimate Adequate     Poikilocytes Present     Tear Drop Cells Present    HS Troponin 0hr (reflex protocol) [[id number]]  (Normal) Collected: 01/01/25 2235    Lab Status: Final result Specimen: Blood from Arm, Right Updated: 01/01/25 2316     hs TnI 0hr 3 ng/L             CT head wo contrast   Final Interpretation by Edenilson Foote MD (01/02 0315)      No acute intracranial abnormality.      Moderate chronic microangiopathic changes.            Workstation performed: [id number]         XR chest 2 views   Final Interpretation by Windy Becker MD (01/02 0910)      No focal consolidation, pleural effusion, or pneumothorax.             Resident: Giancarlo Cornejo I, the attending radiologist, have reviewed the images and agree with the final report above.      Workstation performed: [id number]             Procedures    ED Medication and Procedure Management   Prior to Admission Medications   Prescriptions Last Dose Informant Patient Reported? Taking?   Calcium Carb-Cholecalciferol (CALCIUM 500+D PO)  Self Yes No   Calcium-Magnesium-Vitamin D (CALCIUM MAGNESIUM PO)  Self Yes No   Sig: Take by mouth   Coenzyme Q10 (CoQ-10) 100 MG capsule  Self Yes No   LORazepam (ATIVAN) 0.5 mg tablet  Self No No   Sig: TAKE 1 TABLET BY MOUTH ONCE DAILY IF NEEDED for seizure.  Limit of 1 in 24 hours   NON FORMULARY  Self Yes No   Sig: Super Beets   RABEprazole (ACIPHEX) 20 MG tablet  Self No No   Sig: TAKE 1 TABLET (20 MG TOTAL) BY MOUTH DAILY IN THE EARLY MORNING   VALERIAN ROOT PO  Self Yes No   Sig: Use   Vitamin Mixture (VITAMIN E COMPLETE PO)  Self Yes No   Sig: Take 1 tablet by mouth daily    alendronate (FOSAMAX) 70 mg tablet   No No   Sig: Take 1 tablet (70 mg total) by mouth every 7 days   aspirin-dipyridamole (AGGRENOX)  mg per 12 hr capsule  Self No No   Sig: TAKE 1 CAPSULE BY MOUTH 2 (TWO) TIMES A DAY   carBAMazepine (TEGretol) 200 mg tablet   No No   Sig: TAKE 2  TABLETS IN THE AM, 2 TABLETS AT DINNER, AND 2 TABLETS AT BEDTIME  - on 6  tablets daily   co-enzyme Q-10 30 MG capsule  Self Yes No   Sig: Take 100 mg by mouth daily     docusate sodium (COLACE) 100 mg capsule  Self No No   Sig: Take 1 capsule (100 mg total) by mouth 2 (two) times a day   levETIRAcetam (KEPPRA) 750 mg tablet  Self No No   Sig: TAKE 1 TABLET (750 MG TOTAL) BY MOUTH EVERY 12 (TWELVE) HOURS   polyethylene glycol (MIRALAX) 17 g packet  Self No No   Sig: Take 17 g by mouth daily Do not start before October 20, 2023.   rosuvastatin (CRESTOR) 10 MG tablet  Self No No   Sig: Take 1 tablet (10 mg total) by mouth daily      Facility-Administered Medications: None      Patient's Medications   Discharge Prescriptions    No medications on file     No discharge procedures on file.  ED SEPSIS DOCUMENTATION   Time reflects when diagnosis was documented in both MDM as applicable and the Disposition within this note       Time User Action Codes Description Comment    1/2/2025 12:20 AM Edu Goff [K21.9] GERD (gastroesophageal reflux disease)     1/2/2025  6:10 AM Cal Diallo [I69.319] Cognitive deficit as late effect of cerebrovascular accident (CVA)                  Edu Goff DO  01/02/25 1018

## 2025-01-02 NOTE — CONSULTS
Consultation - Gastroenterology   Name: Agus Espinoza 64 y.o. male I MRN: [id number]  Unit/Bed#: QCD I Date of Admission: 1/1/2025   Date of Service: 1/2/2025 I Hospital Day: 0   Inpatient consult to gastroenterology  Consult performed by: Home Dewitt MD  Consult ordered by: Tara Raymond PA-C        Physician Requesting Evaluation: Jaxon Wilkins MD   Reason for Evaluation / Principal Problem: melena    Assessment & Plan  Melena   - as witnessed in the ED. He did present w/ an elevated BUN/Cr. He is also persistently tachycardic. GBS of 6. Hb 16 on admission however. Expect it to drop with resuscitation. Bedside stool sample oreo-colored however smells of blood. FOBT positivity not a relevant finding.   - UGIB, DDx includes esophagitis, MWT, gastritis, PUD, GAVE, Dieulafoy lesion, AVMs, small bowel Crohn's, malignancy, variceal bleed    - Hb > 7 (trend daily), plt > 50, INR < 1.5, T&S q3d, 2 large bore IV access   - okay to c/w his home aggrenox   - PPI bid   - EGD tomorrow to evaluate for source of bleeding and his nausea, loss of appetite and poor PO intake. He is also a candidate for Meneses esophagus screening.  GERD (gastroesophageal reflux disease)   - chronic, well-ctrled on aciphex.   - PPI bid for above      History of Present Illness   HPI:  Agus Espinoza is a 64 y.o. male with a PMH of CVA w/ residual deficits, seizure d/o, GERD who presents AMS and poor PO intake.    He called his brother Matteo who provided some collateral. Apparently the pt has been feeling unwell for the past 1-2 days, poor PO intake, nausea, loss of appetite. He has had severe reflux for many years. He was doing well with PPI, but would forget to renew his prescription. He started his brother's aciphex recently and has had resolution of his symptoms.    While in the ED he had a stool that was black in color. He remains persistently tachy.    No NSAID use besides home aspirin for Hx of CVA. No pepto or iron  tabs    Quit smoking 30+ years ago.    No prior EGD    Colon 04/21/23: 6x ascending polyps snared, 1x transverse polyp snared, L-sided tics. Recall 3y. No path    Review of Systems   Constitutional:  Positive for activity change and appetite change. Negative for chills, fatigue and fever.   Eyes:  Negative for photophobia.   Respiratory:  Negative for cough and shortness of breath.    Cardiovascular:  Negative for chest pain.   Gastrointestinal:  Positive for nausea. Negative for abdominal pain, constipation, diarrhea and vomiting.   Endocrine: Negative.    Genitourinary:  Negative for dysuria and frequency.   Musculoskeletal:  Negative for myalgias.   Skin:  Negative for pallor.   Neurological:  Negative for weakness.   Hematological: Negative.    Psychiatric/Behavioral: Negative.         I have reviewed the patient's PMH, PSH, Social History, Family History, Meds, and Allergies    Objective :  Temp:  [99.9 °F (37.7 °C)] 99.9 °F (37.7 °C)  HR:  [108-118] 108  BP: (100-120)/(68-80) 113/68  Resp:  [18-45] 24  SpO2:  [94 %-98 %] 95 %  O2 Device: None (Room air)    Physical Exam  Constitutional:       General: He is not in acute distress.     Appearance: Normal appearance.   HENT:      Head: Normocephalic and atraumatic.      Nose: Nose normal.      Mouth/Throat:      Mouth: Mucous membranes are moist.   Eyes:      General: No scleral icterus.     Extraocular Movements: Extraocular movements intact.      Conjunctiva/sclera: Conjunctivae normal.      Pupils: Pupils are equal, round, and reactive to light.   Cardiovascular:      Rate and Rhythm: Normal rate and regular rhythm.   Pulmonary:      Effort: Pulmonary effort is normal.   Abdominal:      General: Abdomen is flat. There is no distension.      Palpations: There is no mass.      Tenderness: There is no abdominal tenderness.   Musculoskeletal:         General: No swelling. Normal range of motion.   Skin:     General: Skin is warm and dry.      Capillary Refill:  Capillary refill takes less than 2 seconds.   Neurological:      Mental Status: He is alert. Mental status is at baseline.   Psychiatric:         Mood and Affect: Mood normal.       Lab Results: I have reviewed all relevant labs and imaging.

## 2025-01-02 NOTE — ASSESSMENT & PLAN NOTE
64-year-old male with history of seizures/epilepsy (reportedly complex partial with secondary generalization, follows with  neurology on Keppra/Tegretol), prior CVA on Aggrenox/statin, and ongoing tremor (following with [facility] neurology for this).    Presents to [facility] overnight on 1/2 following family concern for altered mental status and delayed responses to questioning compared to baseline; patient noted to be nauseous/ill-feeling and with poor PO intake during this timeframe as well.      Questionable right-sided weakness was noted at home as well (per responding  on scene).  Initial neuro exam with mildly delayed responses to questioning however no obvious focal/lateralizing deficits. Patient did admit this morning to missing few doses of medications (including Keppra/Tegretol); thus cannot exclude breakthrough complex seizure as part of initial presentation.  Above possible infectious picture may contribute to delayed responses/cognitive slowing as well.    Neuroimaging:  -CT head: No acute intracranial pathology  -Recent MRI brain/DaTscan performed earlier this week: Abnormal, suggestive that patient's tremor likely due to parkinsonian syndrome.(Per report, imaging not available for review)    Plan:  -Given R UE weakness concern at home, can obtain MRI brain without contrast  -No need for formal stroke pathway initiation unless MRI brain positive  -Continue home aggrenox/statin for secondary stroke prevention  -Supportive care  -Therapy evaluations  -No ongoing concern for complex/partial seizure, thus no clear need for EEG  -Ongoing monitoring for any metabolic/infectious derangements:   -vitals with tachycardiac/tachypnea, temp 99.9   -FOBT positive (GI now following; EGD pending)   -CBC now with mild leukocytosis of 12K , stable blood counts   -BMP with minimal hyponatremia, elevated CO2/BUN   -CXR unrevealing

## 2025-01-02 NOTE — ASSESSMENT & PLAN NOTE
- as witnessed in the ED. He did present w/ an elevated BUN/Cr. He is also persistently tachycardic. GBS of 6. Hb 16 on admission however. Expect it to drop with resuscitation. Bedside stool sample oreo-colored however smells of blood. FOBT positivity not a relevant finding.   - UGIB, DDx includes esophagitis, MWT, gastritis, PUD, GAVE, Dieulafoy lesion, AVMs, small bowel Crohn's, malignancy, variceal bleed    - Hb > 7 (trend daily), plt > 50, INR < 1.5, T&S q3d, 2 large bore IV access   - okay to c/w his home aggrenox   - PPI bid   - EGD tomorrow to evaluate for source of bleeding and his nausea, loss of appetite and poor PO intake. He is also a candidate for Meneses esophagus screening.

## 2025-01-02 NOTE — H&P
H&P - Hospitalist   Name: Agus Espinoza 64 y.o. male I MRN: [id number]  Unit/Bed#: [facility] I Date of Admission: 1/1/2025   Date of Service: 1/2/2025 I Hospital Day: 0     Assessment & Plan  Cognitive deficit as late effect of cerebrovascular accident (CVA)  Presenting from home with reported altered mental status, reportedly family noting patient with delayed responses to questions.  There apparently was question of right sided weakness however this was not appreciated on EMS, EM physician, or my examination  Labs with mildly elevated serum creatinine compared to prior and now s/p IV fluids, Keppra and carbamazepine levels further downtrending as below.  CT head negative for acute intracranial pathology  Unclear etiology for the reported change in mental status, reportedly there were questions regarding patient's compliance with medications.  Resume home medications, monitor mental status closely.  Appreciate neurology consultation.  Will attempt to contact family to better clarify situation.  Partial symptomatic epilepsy with complex partial seizures, not intractable, without status epilepticus (HCC)  Follows with [facility] neurology, last tonic-clonic seizure reported 2018 however does have intermittent auras reported  Keppra level noted low 10.8 with carbamazepine level noted 11.3; reportedly per EM physician discussion with family members there were questions of compliance  Resume PTA Keppra 750 mg twice daily and carbamazepine 400 mg 3 times daily  Seizure precautions  GERD (gastroesophageal reflux disease)  Continue PPI  Cerebrovascular accident (CVA) due to thrombosis (HCC)  History of CVA with residual cognitive impairment  CT head on admission negative for new acute intracranial pathology with moderate chronic angiopathic changes and sequela of prior basal ganglia infarctions  Continue Aggrenox and statin therapy      VTE Prophylaxis: Enoxaparin (Lovenox)  / sequential compression device   Code  Status: Level 1 - Full Code   POLST: POLST form is not discussed and not completed at this time.  Discussion with family: Attempted to reach brother via telephone but unable to reach    Anticipated Length of Stay:  Patient will be admitted on an Observation basis with an anticipated length of stay of less than 2 midnights.   Justification for Hospital Stay: Please see detailed plans noted above.    Chief Complaint:     Delayed speech, epigastric pain  History of Present Illness:  Agus Espinoza is a 64 y.o. male who has a past medical history severe for prior CVA with residual cognitive deficit, partial symptomatic epilepsy with complex partial seizures maintained on Keppra and carbamazepine, GERD who presented from home with epigastric pain and reported change in mental status.  Please note history is primarily obtained from record review of available records through EMR and discussion with EM physician as patient somewhat poor historian and I am unable to reach family at this time to better clarify.    Reportedly patient was noticed by family the day of presentation with apparent change in mental status reportedly with delayed answers to questioning also with question of extremity weakness which prompted call to EMS for evaluation.  Patient himself did note some mild epigastric pain but denied any fever/chills, chest pain/pressure, shortness of breath, nausea/vomiting/diarrhea, focal weakness, or numbness/ting/paresthesias; he states that outside of his epigastric pain he is unsure why he was brought here and believes he is at baseline.  During ED evaluation he received famotidine and Maalox with resolution of his epigastric pain.  Broad workup was initiated with labs significant for mildly elevated creatinine compared to prior for which IV fluids were provided, low Keppra and carbamazepine levels as noted above, and a CT head negative for acute intracranial pathology significant only for moderate chronic  microangiopathic changes and sequela of prior basal ganglia infarcts.  Given the reported change in mental status he is admitted for further workup and management as above.    Review of Systems:    Constitutional:  Denies fever or chills   Eyes:  Denies change in visual acuity   HENT:  Denies nasal congestion or sore throat   Respiratory:  Denies cough or shortness of breath   Cardiovascular:  Denies chest pain or edema   GI:  Denies abdominal pain, nausea, vomiting, bloody stools or diarrhea   :  Denies dysuria   Musculoskeletal:  Denies back pain or joint pain   Integument:  Denies rash   Neurologic:  Denies headache, focal weakness or sensory changes   Endocrine:  Denies polyuria or polydipsia   Lymphatic:  Denies swollen glands   Psychiatric:  Denies depression or anxiety but change in mental status reported    Past Medical and Surgical History:   Past Medical History:   Diagnosis Date    Arthritis     Hypertension     Seizures (HCC)     Stroke (HCC)      Past Surgical History:   Procedure Laterality Date    ELBOW SURGERY  2017    SC OPTX FEM SHFT FX W/INSJ IMED IMPLT W/WO SCREW Left 10/16/2023    Procedure: INSERTION NAIL IM FEMUR ANTEGRADE (TROCHANTERIC);  Surgeon: Brendan Keane DO;  Location: [facility];  Service: Orthopedics       Meds/Allergies:  Current Outpatient Medications   Medication Instructions    alendronate (FOSAMAX) 70 mg, Oral, Every 7 days    aspirin-dipyridamole (AGGRENOX)  mg per 12 hr capsule 1 capsule, Oral, 2 times daily    Calcium Carb-Cholecalciferol (CALCIUM 500+D PO)     Calcium-Magnesium-Vitamin D (CALCIUM MAGNESIUM PO) Take by mouth    carBAMazepine (TEGretol) 200 mg tablet TAKE 2  TABLETS IN THE AM, 2 TABLETS AT DINNER, AND 2 TABLETS AT BEDTIME  - on 6  tablets daily    co-enzyme Q-10 100 mg, Daily    Coenzyme Q10 (CoQ-10) 100 MG capsule     docusate sodium (COLACE) 100 mg, Oral, 2 times daily    levETIRAcetam (KEPPRA) 750 mg, Oral, Every 12 hours scheduled    LORazepam  (ATIVAN) 0.5 mg tablet TAKE 1 TABLET BY MOUTH ONCE DAILY IF NEEDED for seizure.  Limit of 1 in 24 hours    NON FORMULARY Super Beets    polyethylene glycol (MIRALAX) 17 g, Oral, Daily    RABEprazole (ACIPHEX) 20 mg, Oral, Daily (early morning)    rosuvastatin (CRESTOR) 10 mg, Oral, Daily    VALERIAN ROOT PO Use    Vitamin Mixture (VITAMIN E COMPLETE PO) 1 tablet, Daily         Allergies:   Allergies   Allergen Reactions    Meperidine Seizures    Amoxicillin     Azithromycin Seizures    Other Sneezing     Dust     Pollen Extract Sneezing     History:  Marital Status: Single     Substance Use History:   Social History     Substance and Sexual Activity   Alcohol Use Yes    Comment: occasional     Social History     Tobacco Use   Smoking Status Never   Smokeless Tobacco Never     Social History     Substance and Sexual Activity   Drug Use No       Family History:  Family History   Problem Relation Age of Onset    Dementia Mother     Alzheimer's disease Mother     Stroke Father     Hyperlipidemia Brother        Physical Exam:     Vitals:   Blood Pressure: 114/71 (01/02/25 0130)  Pulse: (!) 112 (01/02/25 0130)  Temperature: 99.9 °F (37.7 °C) (01/02/25 0104)  Temp Source: Oral (01/02/25 0104)  Respirations: 18 (01/02/25 0130)  SpO2: 98 % (01/02/25 0000)    Constitutional:  Well developed, well nourished, no acute distress, non-toxic appearance   Eyes:  PERRL, conjunctiva normal   HENT:  Atraumatic, external ears normal, nose normal, oropharynx moist, no pharyngeal exudates. Neck- normal range of motion, no tenderness, supple   Respiratory:  No respiratory distress, normal breath sounds, no rales, no wheezing   Cardiovascular: Tachycardic rate, normal rhythm, no murmurs, no gallops, no rubs   GI:  Soft, nondistended, normal bowel sounds, nontender, no organomegaly, no mass, no rebound, no guarding   :  No costovertebral angle tenderness   Musculoskeletal:  No edema, no tenderness, no deformities. Back- no  tenderness  Integument:  Well hydrated, no rash   Lymphatic:  No lymphadenopathy noted   Neurologic:  Alert &awake, communicative, CN 2-12 normal except mildly delayed but appropriate answers to questioning, normal motor function, normal sensory function, no focal deficits noted   Psychiatric:  Speech and behavior appropriate       Lab Results: I have reviewed the laboratory reports/results from this admission    Results from last 7 days   Lab Units 01/01/25  2234 12/26/24  1104   WBC Thousand/uL 10.12 5.31   HEMOGLOBIN g/dL 16.2 15.4   HEMATOCRIT % 48.0 45.2   PLATELETS Thousands/uL 197 219   BANDS PCT % 1  --    SEGS PCT %  --  58   LYMPHO PCT % 2* 29   MONO PCT % 1* 9   EOS PCT % 0 3     Results from last 7 days   Lab Units 01/01/25  2234   SODIUM mmol/L 134*   POTASSIUM mmol/L 3.8   CHLORIDE mmol/L 105   CO2 mmol/L 17*   BUN mg/dL 30*   CREATININE mg/dL 0.91   ANION GAP mmol/L 12   CALCIUM mg/dL 8.6   GLUCOSE RANDOM mg/dL 128                       Imaging: Results Review Statement: I reviewed radiology reports from this admission including: CT head.    CT head wo contrast  Result Date: 1/2/2025  Narrative: CT BRAIN - WITHOUT CONTRAST INDICATION:   AMS. COMPARISON: CT brain dated September 18, 2023. TECHNIQUE:  CT examination of the brain was performed.  Multiplanar 2D reformatted images were created from the source data. Radiation dose length product (DLP) for this visit:  968 mGy-cm .  This examination, like all CT scans performed in the [facility], was performed utilizing techniques to minimize radiation dose exposure, including the use of iterative reconstruction and automated exposure control. IMAGE QUALITY:  Diagnostic. FINDINGS: PARENCHYMA:No intracranial mass, mass effect or midline shift. No CT signs of acute infarction.  No acute parenchymal hemorrhage. There is moderate periventricular white matter low attenuation which is nonspecific and most likely related to chronic small vessel  ischemic changes. Old bilateral basal ganglia lacunar infarcts are again noted, left greater than right. VENTRICLES AND EXTRA-AXIAL SPACES:  Normal for the patient's age. VISUALIZED ORBITS: Normal visualized orbits. PARANASAL SINUSES: Normal visualized paranasal sinuses. CALVARIUM AND EXTRACRANIAL SOFT TISSUES: Normal.     Impression: No acute intracranial abnormality. Moderate chronic microangiopathic changes. Workstation performed: [id number]     NM BRAIN SPECT - AKUA SCAN  Result Date: 12/30/2024  Narrative: I-123 DaTscan History: Tremor [R25.1 (ICD-10-CM)] Radiopharmaceutical and technique: 5.2 mCi of I-123 Ioflupane was administered intravenously. SPECT images of brain was obtained as per protocol. The patient ingested 100 mg of potassium iodide oral solution prior to study for thyroid protection. Findings: No prior study is available for comparison. On the transaxial SPECT brain images, there is near absent activity in bilateral putamen and slightly reduced activity in bilateral caudate, more so on the left.    Impression: Impression: Abnormal DaTscan. For clarification, abnormal DaTscan suggests patient's tremor is likely due to Parkinsonian syndromes rather than essential tremor. The differentials of abnormal DaTscan include idiopathic Parkinson's disease, Multiple system atrophy, and Progressive supranuclear palsy which all have been associated with dopaminergic neurodegeneration. DaTscan was not designed to distinguish among these conditions. DaTSCAN is also unable to discriminate between dementia with Lewy bodies and Parkinson s disease dementia. Clinical correlation is recommended. Workstation:[id number]        ** Please Note: Dragon 360 Dictation voice to text software was used in the creation of this document. **      ** Please Note: This note has been constructed using a voice recognition system. **

## 2025-01-02 NOTE — ASSESSMENT & PLAN NOTE
History of CVA with residual cognitive impairment  CT head on admission negative for new acute intracranial pathology with moderate chronic angiopathic changes and sequela of prior basal ganglia infarctions  Continue Aggrenox and statin therapy   Subjective:   Kee Camp Fausto Arreola M.D.;  Location: [facility];  Service: Orthopedics    MENISCECTOMY, KNEE, MEDIAL Right 3/7/2019    Procedure: MEDIAL MENISCECTOMY - PARTIAL;  Surgeon: Fausto Arreola M.D.;  Location: [facility];  Service: Orthopedics    CHONDROPLASTY Right 3/7/2019    Procedure: CHONDROPLASTY;  Surgeon: Fausto Arreola M.D.;  Location: SURGERY [facility];  Suspension         Comments/MDM:     Patient'Brandt Hernadez PA-C.      "

## 2025-01-02 NOTE — ASSESSMENT & PLAN NOTE
Follows with [facility] neurology, last tonic-clonic seizure reported 2018 however does have intermittent auras reported  Keppra level noted low 10.8 with carbamazepine level noted 11.3; reportedly per EM physician discussion with family members there were questions of compliance  Resume PTA Keppra 750 mg twice daily and carbamazepine 400 mg 3 times daily  Seizure precautions

## 2025-01-02 NOTE — ED NOTES
Pt brother Matteo called and asked if pt was being admitted to hospital due to altered mental status and right sided weakness.  Nurse stated she would call family back after consulting with physician.  EMS did not report any weakness or altered mental status and reported pt to be at baseline.  Neuro assessment performed and noted no weakness or deficits.  Physician made aware.     Tameka Puga RN  01/02/25 0123

## 2025-01-02 NOTE — QUICK NOTE
Patient re-evaluated this afternoon after admission this morning for continuation of care. Please see updated plan below that was not present upon admission    Melena  Notified by nursing that patient had multiple episodes of dark tarry stools   Per patient reports that he has been having dark stools for the past two days   Fecal occult +   Repeat hgb, type and screen   IV protonix BID   IV fluids   H&H q 8 hours   Inpatient consult to GI; appreciate recommendations as patient may require emergent scope will place NPO until evaluated by GI     SIRS (systemic inflammatory response syndrome) (HCC)     Patient meeting SIRS criteria secondary to tachycardia, tachypnea and elevated WBC on repeat labs   Patient also has suspected Upper GI bleed secondary to multiple melena stools   Continue IV fluids   Obtain further work up of procalcitonin, lactic acid, and blood cultures   Covid/ flu/ rsv pending   If concern for bacterial infection will initiate broad spectrum antibiotics

## 2025-01-02 NOTE — ASSESSMENT & PLAN NOTE
-Follows with [facility] neurology  -On Keppra 750 mg BID and Tegretol 400 mg TID  -Levels on admission this morning   -Keppra level 10.6    -Tegretol level 11.3

## 2025-01-02 NOTE — ED NOTES
Pt assisted to bedside commode. Pt had occurrence of diarrhea, black in color, watery stool. Provider made aware.     Tiffanie Hamilton RN  01/02/25 3411

## 2025-01-02 NOTE — CONSULTS
Consultation - Neurology   Name: Agus Espinoza 64 y.o. male I MRN: [id number]  Unit/Bed#: [facility] I Date of Admission: 1/1/2025   Date of Service: 1/2/2025 I Hospital Day: 0   Inpatient consult to Neurology  Consult performed by: Kj Govea PA-C  Consult ordered by: Cal Diallo DO        Physician Requesting Evaluation: Jaxon Wilkins MD   Reason for Evaluation / Principal Problem:     Assessment & Plan  Cognitive deficit as late effect of cerebrovascular accident (CVA)  64-year-old male with history of seizures/epilepsy (reportedly complex partial with secondary generalization, follows with  neurology on Keppra/Tegretol), prior CVA on Aggrenox/statin, and ongoing tremor (following with [facility] for this).    Presents to B overnight on 1/2 following family concern for altered mental status and delayed responses to questioning compared to baseline; patient noted to be nauseous/ill-feeling and with poor PO intake during this timeframe as well.      Questionable right-sided weakness was noted at home as well (per responding  on scene).  Initial neuro exam with mildly delayed responses to questioning however no obvious focal/lateralizing deficits. Patient did admit this morning to missing few doses of medications (including Keppra/Tegretol); thus cannot exclude breakthrough complex seizure as part of initial presentation.  Above possible infectious picture may contribute to delayed responses/cognitive slowing as well.    Neuroimaging:  -CT head: No acute intracranial pathology  -Recent MRI brain/DaTscan performed earlier this week: Abnormal, suggestive that patient's tremor likely due to parkinsonian syndrome.(Per report, imaging not available for review)    Plan:  -Given R UE weakness concern at home, can obtain MRI brain without contrast  -No need for formal stroke pathway initiation unless MRI brain positive  -Continue home aggrenox/statin for secondary stroke  "prevention  -Supportive care  -Therapy evaluations  -No ongoing concern for complex/partial seizure, thus no clear need for EEG  -Ongoing monitoring for any metabolic/infectious derangements:   -vitals with tachycardiac/tachypnea, temp 99.9   -FOBT positive (GI now following; EGD pending)   -CBC now with mild leukocytosis of 12K , stable blood counts   -BMP with minimal hyponatremia, elevated CO2/BUN   -CXR unrevealing  Partial symptomatic epilepsy with complex partial seizures, not intractable, without status epilepticus (HCC)  -Follows with  neurology  -On Keppra 750 mg BID and Tegretol 400 mg TID  -Levels on admission this morning   -Keppra level 10.6    -Tegretol level 11.3  Cerebrovascular accident (CVA) due to thrombosis (HCC)  -Continue aggrenox/statin      Will further discuss plan of care with attending neurologist.     Recommendations for outpatient neurological follow up have yet to be determined.    History of Present Illness   Agus Espinoza is a 64 y.o. male with history as mentioned above in assessment who neurology is asked to evaluate in regards to the above.    Patient is limited historian, but per discussion with him this morning.  States he was brought to the hospital last night after \"not being able to say what medications he was taking\".  Patient does admit that he takes carbamazepine as well as Keppra, admits to missing a few doses over the past several days.    Family was concerned that patient was disoriented on himself yesterday, thus EMS call was called last night and patient brought to the ED for evaluation.  On initial evaluation he was awake, alert, and oriented but with delayed responses to questioning.  No obvious focal/lateralizing deficits were noted.  See neuroimaging and labs above.  Was admitted for further evaluation.    Discussed with patient's brother Matteo this morning as well: Patient was visiting his brother and significant other on [place], while having " "dinner patient was noted to abruptly stop speaking.  He clarified that he did not feel well and went out to his car; patient has a pillbox in his car with his home medications, but Matteo noticed that his Keppra was not present in his pillbox.     Yesterday, Matteo again called patient, who displayed confusion when naming his typical medications, and overall displayed slow responses to Matteo's questions.  This prompted Matteo to call the police; on first responders arrival he was reportedly noted with some right arm weakness.  Subsequently EMS was called and patient was brought to the ED last night for evaluation.    Patient himself states that he feels \"much better than before\".  Per discussion with Matteo, patient does sound improved today when they talked on the phone this morning patient this morning admitted to missing a few doses of his home antiepileptics.    Review of Systems   Constitutional: Negative.    HENT: Negative.     Eyes:  Negative for photophobia and visual disturbance.   Respiratory: Negative.     Cardiovascular: Negative.    Gastrointestinal: Negative.    Musculoskeletal: Negative.    Skin: Negative.    Neurological:  Positive for tremors, seizures, speech difficulty, weakness and headaches. Negative for dizziness, syncope, facial asymmetry, light-headedness and numbness.        I have reviewed the patient's PMH, PSH, Social History, Family History, Meds, and Allergies    Objective :  Temp:  [99.9 °F (37.7 °C)] 99.9 °F (37.7 °C)  HR:  [108-115] 114  BP: (110-115)/(68-73) 110/68  Resp:  [18-25] 25  SpO2:  [94 %-98 %] 96 %  O2 Device: None (Room air)    Physical Exam  Constitutional:       Appearance: Normal appearance.   HENT:      Head: Normocephalic and atraumatic.   Eyes:      Extraocular Movements: Extraocular movements intact.      Conjunctiva/sclera: Conjunctivae normal.      Pupils: Pupils are equal, round, and reactive to light.   Cardiovascular:      Rate and Rhythm: Normal rate.   Pulmonary:      " Effort: Pulmonary effort is normal.   Abdominal:      General: There is no distension.   Musculoskeletal:         General: Normal range of motion.      Cervical back: Normal range of motion and neck supple.   Skin:     General: Skin is warm and dry.   Neurological:      Mental Status: He is alert.     Neurological Exam  Mental Status  Alert.  Patient initially eating breakfast.  He is with slightly delayed responses, but is fully oriented to person, location, month and year as well as the date which is the 2nd.  Perform simple calculations correctly including number of quarters in $1 and 1.75 respectively.  He spells world forwards and backwards correctly.  He names objects and repeat phrases without aphasia nor dysarthria.  He follows cross body and multistep commands correctly.  At one point did doze off during mental status exam, but easily awoken/redirectable. Affect flat..    Cranial Nerves  CN III, IV, VI: Extraocular movements intact bilaterally. Pupils equal round and reactive to light bilaterally.  Full EOMs, no visual field deficit.  Symmetric pupils.  No obvious lower facial asymmetry with smile, tongue protrusion midline.  Symmetric light touch sensation to the face.  Degree of diminished/masked facies..    Motor    No overt increase in tone with passive range of motion.  No clear cogwheeling.  Some bradykinesia; minimally weaker right  strength compared to left, but 5/5 otherwise throughout the extremities with best motor effort..    Sensory  Light touch is normal in upper and lower extremities.   No extinction nor neglect with double simultaneous tactile stim application..    Reflexes  Diminished, no hyperreflexia/UMN signs, no ankle clonus.    .    Coordination      Less pronounced left finger taps than right, finger-to-nose without overt bradykinesia. .    Gait    Deferred for safety..        Lab Results: I have reviewed the following results:CBC:   Results from last 7 days   Lab Units  01/01/25  2234 12/26/24  1104   WBC Thousand/uL 10.12 5.31   RBC Million/uL 4.78 4.58   HEMOGLOBIN g/dL 16.2 15.4   HEMATOCRIT % 48.0 45.2   MCV fL 100* 99*   PLATELETS Thousands/uL 197 219   , BMP/CMP:   Results from last 7 days   Lab Units 01/01/25  2234   SODIUM mmol/L 134*   POTASSIUM mmol/L 3.8   CHLORIDE mmol/L 105   CO2 mmol/L 17*   BUN mg/dL 30*   CREATININE mg/dL 0.91   CALCIUM mg/dL 8.6   EGFR ml/min/1.73sq m 88     Recent Labs     01/01/25  2234   WBC 10.12   HGB 16.2   HCT 48.0      BANDSPCT 1   SODIUM 134*   K 3.8      CO2 17*   BUN 30*   CREATININE 0.91   GLUC 128     Imaging Results Review: I personally reviewed the following image studies in PACS and associated radiology reports.  CT head wo contrast   Final Result by Edenilson Foote MD (01/02 0315)      No acute intracranial abnormality.      Moderate chronic microangiopathic changes.            Workstation performed: [id number]         XR chest 2 views    (Results Pending)       Other Study Results Review: No additional pertinent studies reviewed.    VTE Prophylaxis: None ordered at present    Please see attending's attestation for total time spent/billing.    Please note dictation software was used in the formulation of this note. Please keep that in mind in light of any grammatical errors.

## 2025-01-02 NOTE — TELEPHONE ENCOUNTER
Norma Philip, DO     Please let the pt know , that the physcian who ordered it at [facility] will need to address it . He does need to reach out to them to discuss the results and treatment    ______________________________    I spoke to patient and informed him per provider's note above. He understood and appreciated the update. Pt has no further questions.

## 2025-01-02 NOTE — DISCHARGE INSTRUCTIONS
Your evaluated in the emergency department for epigastric pain.  Based on your physical examination, lab work, and imaging, it is most likely that your epigastric pain was related to your previous history of gastroesophageal reflux disease.  You should continue to comply with your proton pump inhibitor medication for control of your symptoms.  Return to the emergency department if you should have any sudden worsening or change in your pain, chest pain, difficulty breathing, passing out, lightheadedness or dizziness, or any other concerning signs or symptoms.

## 2025-01-02 NOTE — ED NOTES
Upon giving pt AM meds, pt refused to take keppra at this time as he stated he will not take that until 6pm tonight. Medication rescheduled per pt request. Provider made aware.     Tiffanie Hamilton RN  01/02/25 0926       Tiffanie Hamilton RN  01/02/25 0949

## 2025-01-02 NOTE — ASSESSMENT & PLAN NOTE
Presenting from home with reported altered mental status, reportedly family noting patient with delayed responses to questions.  There apparently was question of right sided weakness however this was not appreciated on EMS, EM physician, or my examination  Labs with mildly elevated serum creatinine compared to prior and now s/p IV fluids, Keppra and carbamazepine levels further downtrending as below.  CT head negative for acute intracranial pathology  Unclear etiology for the reported change in mental status, reportedly there were questions regarding patient's compliance with medications.  Resume home medications, monitor mental status closely.  Appreciate neurology consultation.  Will attempt to contact family to better clarify situation.

## 2025-01-02 NOTE — SEPSIS NOTE
"  Sepsis Note   Agus Espinoza 64 y.o. male MRN: [id number]  Unit/Bed#: [facility] Encounter: [id number]       Initial Sepsis Screening       Row Name 01/02/25 1520                Is the patient's history suggestive of a new or worsening infection? Yes (Proceed)  -HC        Suspected source of infection suspect infection, source unknown  -HC        Indicate SIRS criteria Tachycardia > 90 bpm;Tachypnea > 20 resp per min;Leukocytosis (WBC > 49216 IJL) OR Leukopenia (WBC <4000 IJL) OR Bandemia (WBC >10% bands)  -HC        Are two or more of the above signs & symptoms of infection both present and new to the patient? Yes (Proceed)  -HC        Assess for evidence of organ dysfunction: Are any of the below criteria present within 6 hours of suspected infection and SIRS criteria that are NOT considered to be chronic conditions? --                  User Key  (r) = Recorded By, (t) = Taken By, (c) = Cosigned By      Initials Name Provider Type     Tara Raymond PA-C Physician Assistant                    Default Flowsheet Data (Last 720 Hours)       Sepsis Reassess       Row Name 01/02/25 1724                   Repeat Volume Status and Tissue Perfusion Assessment Performed    Date of Reassessment: 01/02/25  -        Time of Reassessment: 1724  -        Sepsis Reassessment Note: Click \"NEXT\" below (NOT \"close\") to generate sepsis reassessment note. YES (proceed by clicking \"NEXT\")  -        Repeat Volume Status and Tissue Perfusion Assessment Performed --                  User Key  (r) = Recorded By, (t) = Taken By, (c) = Cosigned By      Initials Name Provider Type     Tara Raymond PA-C Physician Assistant                    There is no height or weight on file to calculate BMI.  Wt Readings from Last 1 Encounters:   12/31/24 76.7 kg (169 lb)        Ideal body weight: 73 kg (160 lb 15 oz)  Adjusted ideal body weight: 74.5 kg (164 lb 2.6 oz)    "

## 2025-01-02 NOTE — ASSESSMENT & PLAN NOTE
Notified by nursing that patient had multiple episodes of dark tarry stools   Per patient reports that he has been having dark stools for the past two days   Fecal occult +   Repeat hgb, type and screen   IV protonix BID   IV fluids   H&H q 8 hours   Inpatient consult to GI; appreciate recommendations as patient may require emergent scope will place NPO until evaluated by GI

## 2025-01-03 ENCOUNTER — APPOINTMENT (OUTPATIENT)
Dept: GASTROENTEROLOGY | Facility: HOSPITAL | Age: 65
End: 2025-01-03
Payer: COMMERCIAL

## 2025-01-03 ENCOUNTER — PREP FOR PROCEDURE (OUTPATIENT)
Dept: GASTROENTEROLOGY | Facility: CLINIC | Age: 65
End: 2025-01-03

## 2025-01-03 ENCOUNTER — ANESTHESIA (OUTPATIENT)
Dept: GASTROENTEROLOGY | Facility: HOSPITAL | Age: 65
End: 2025-01-03
Payer: COMMERCIAL

## 2025-01-03 ENCOUNTER — ANESTHESIA EVENT (OUTPATIENT)
Dept: GASTROENTEROLOGY | Facility: HOSPITAL | Age: 65
End: 2025-01-03
Payer: COMMERCIAL

## 2025-01-03 VITALS
OXYGEN SATURATION: 99 % | TEMPERATURE: 98.1 F | HEART RATE: 83 BPM | BODY MASS INDEX: 24.71 KG/M2 | SYSTOLIC BLOOD PRESSURE: 118 MMHG | HEIGHT: 68 IN | WEIGHT: 163 LBS | DIASTOLIC BLOOD PRESSURE: 73 MMHG | RESPIRATION RATE: 16 BRPM

## 2025-01-03 DIAGNOSIS — K20.90 ESOPHAGITIS: Primary | ICD-10-CM

## 2025-01-03 LAB
ALBUMIN SERPL BCG-MCNC: 3.3 G/DL (ref 3.5–5)
ALP SERPL-CCNC: 29 U/L (ref 34–104)
ALT SERPL W P-5'-P-CCNC: 16 U/L (ref 7–52)
ANION GAP SERPL CALCULATED.3IONS-SCNC: 9 MMOL/L (ref 4–13)
AST SERPL W P-5'-P-CCNC: 18 U/L (ref 13–39)
BILIRUB SERPL-MCNC: 0.26 MG/DL (ref 0.2–1)
BUN SERPL-MCNC: 30 MG/DL (ref 5–25)
CALCIUM ALBUM COR SERPL-MCNC: 7.5 MG/DL (ref 8.3–10.1)
CALCIUM SERPL-MCNC: 6.9 MG/DL (ref 8.4–10.2)
CHLORIDE SERPL-SCNC: 108 MMOL/L (ref 96–108)
CO2 SERPL-SCNC: 19 MMOL/L (ref 21–32)
CREAT SERPL-MCNC: 0.72 MG/DL (ref 0.6–1.3)
GFR SERPL CREATININE-BSD FRML MDRD: 98 ML/MIN/1.73SQ M
GLUCOSE P FAST SERPL-MCNC: 108 MG/DL (ref 65–99)
GLUCOSE SERPL-MCNC: 108 MG/DL (ref 65–140)
HCT VFR BLD AUTO: 31.6 % (ref 36.5–49.3)
HCT VFR BLD AUTO: 35.9 % (ref 36.5–49.3)
HGB BLD-MCNC: 11.2 G/DL (ref 12–17)
HGB BLD-MCNC: 12.3 G/DL (ref 12–17)
LACTATE SERPL-SCNC: 1.8 MMOL/L (ref 0.5–2)
POTASSIUM SERPL-SCNC: 2.9 MMOL/L (ref 3.5–5.3)
PROT SERPL-MCNC: 5.2 G/DL (ref 6.4–8.4)
SODIUM SERPL-SCNC: 136 MMOL/L (ref 135–147)

## 2025-01-03 PROCEDURE — 88112 CYTOPATH CELL ENHANCE TECH: CPT | Performed by: SPECIALIST

## 2025-01-03 PROCEDURE — 85014 HEMATOCRIT: CPT

## 2025-01-03 PROCEDURE — 88305 TISSUE EXAM BY PATHOLOGIST: CPT | Performed by: SPECIALIST

## 2025-01-03 PROCEDURE — 80053 COMPREHEN METABOLIC PANEL: CPT

## 2025-01-03 PROCEDURE — 85018 HEMOGLOBIN: CPT

## 2025-01-03 PROCEDURE — 83605 ASSAY OF LACTIC ACID: CPT

## 2025-01-03 PROCEDURE — 99239 HOSP IP/OBS DSCHRG MGMT >30: CPT

## 2025-01-03 PROCEDURE — 43235 EGD DIAGNOSTIC BRUSH WASH: CPT | Performed by: INTERNAL MEDICINE

## 2025-01-03 RX ORDER — CARBAMAZEPINE 200 MG/1
400 TABLET ORAL 3 TIMES DAILY
Start: 2025-01-03

## 2025-01-03 RX ORDER — SODIUM CHLORIDE 9 MG/ML
INJECTION, SOLUTION INTRAVENOUS CONTINUOUS PRN
Status: DISCONTINUED | OUTPATIENT
Start: 2025-01-03 | End: 2025-01-03

## 2025-01-03 RX ORDER — LIDOCAINE HYDROCHLORIDE 20 MG/ML
INJECTION, SOLUTION EPIDURAL; INFILTRATION; INTRACAUDAL; PERINEURAL AS NEEDED
Status: DISCONTINUED | OUTPATIENT
Start: 2025-01-03 | End: 2025-01-03

## 2025-01-03 RX ORDER — SUCRALFATE 1 G/1
1 TABLET ORAL 4 TIMES DAILY
Qty: 120 TABLET | Refills: 0 | Status: SHIPPED | OUTPATIENT
Start: 2025-01-03 | End: 2025-01-10 | Stop reason: SDUPTHER

## 2025-01-03 RX ORDER — PROPOFOL 10 MG/ML
INJECTION, EMULSION INTRAVENOUS AS NEEDED
Status: DISCONTINUED | OUTPATIENT
Start: 2025-01-03 | End: 2025-01-03

## 2025-01-03 RX ORDER — RABEPRAZOLE SODIUM 20 MG/1
20 TABLET, DELAYED RELEASE ORAL 2 TIMES DAILY
Qty: 180 TABLET | Refills: 0 | Status: SHIPPED | OUTPATIENT
Start: 2025-01-03 | End: 2025-01-10 | Stop reason: SDUPTHER

## 2025-01-03 RX ORDER — POTASSIUM CHLORIDE 1500 MG/1
40 TABLET, EXTENDED RELEASE ORAL ONCE
Status: COMPLETED | OUTPATIENT
Start: 2025-01-03 | End: 2025-01-03

## 2025-01-03 RX ORDER — ONDANSETRON 4 MG/1
4 TABLET, FILM COATED ORAL EVERY 8 HOURS PRN
Qty: 20 TABLET | Refills: 0 | Status: SHIPPED | OUTPATIENT
Start: 2025-01-03

## 2025-01-03 RX ORDER — POTASSIUM CHLORIDE 14.9 MG/ML
20 INJECTION INTRAVENOUS
Status: DISCONTINUED | OUTPATIENT
Start: 2025-01-03 | End: 2025-01-03

## 2025-01-03 RX ORDER — RABEPRAZOLE SODIUM 20 MG/1
20 TABLET, DELAYED RELEASE ORAL
Qty: 90 TABLET | Refills: 3 | Status: SHIPPED | OUTPATIENT
Start: 2025-01-03 | End: 2025-01-03

## 2025-01-03 RX ORDER — SODIUM CHLORIDE, SODIUM LACTATE, POTASSIUM CHLORIDE, CALCIUM CHLORIDE 600; 310; 30; 20 MG/100ML; MG/100ML; MG/100ML; MG/100ML
125 INJECTION, SOLUTION INTRAVENOUS CONTINUOUS
OUTPATIENT
Start: 2025-01-03

## 2025-01-03 RX ADMIN — LIDOCAINE HYDROCHLORIDE 5 ML: 20 INJECTION, SOLUTION EPIDURAL; INFILTRATION; INTRACAUDAL; PERINEURAL at 13:17

## 2025-01-03 RX ADMIN — PANTOPRAZOLE SODIUM 40 MG: 40 INJECTION, POWDER, FOR SOLUTION INTRAVENOUS at 08:05

## 2025-01-03 RX ADMIN — SODIUM CHLORIDE: 0.9 INJECTION, SOLUTION INTRAVENOUS at 13:12

## 2025-01-03 RX ADMIN — LEVETIRACETAM 750 MG: 750 TABLET, FILM COATED ORAL at 08:05

## 2025-01-03 RX ADMIN — CARBAMAZEPINE 400 MG: 200 TABLET ORAL at 06:08

## 2025-01-03 RX ADMIN — PROPOFOL 50 MG: 10 INJECTION, EMULSION INTRAVENOUS at 13:19

## 2025-01-03 RX ADMIN — PROPOFOL 150 MG: 10 INJECTION, EMULSION INTRAVENOUS at 13:17

## 2025-01-03 RX ADMIN — PROPOFOL 40 MG: 10 INJECTION, EMULSION INTRAVENOUS at 13:25

## 2025-01-03 RX ADMIN — POTASSIUM CHLORIDE 40 MEQ: 1500 TABLET, EXTENDED RELEASE ORAL at 15:56

## 2025-01-03 RX ADMIN — PROPOFOL 40 MG: 10 INJECTION, EMULSION INTRAVENOUS at 13:23

## 2025-01-03 NOTE — ASSESSMENT & PLAN NOTE
Follows with [facility] neurology, last tonic-clonic seizure reported 2018 however does have intermittent auras reported  Keppra level noted low 10.8 with carbamazepine level noted 11.3; reportedly per EM physician discussion with family members there were questions of compliance  Resume PTA Keppra 750 mg twice daily and carbamazepine 400 mg 3 times daily  Seizure precautions

## 2025-01-03 NOTE — ASSESSMENT & PLAN NOTE
Will increase dose of PPI to twice daily 20 mg.  40 mg total in a day.  EGD done which was noted to be gastritis  Follow-up in the outpatient setting.

## 2025-01-03 NOTE — ANESTHESIA POSTPROCEDURE EVALUATION
Post-Op Assessment Note    CV Status:  Stable  Pain Score: 0    Pain management: adequate       Mental Status:  Sleepy and arousable   Hydration Status:  Euvolemic   PONV Controlled:  Controlled   Airway Patency:  Patent     Post Op Vitals Reviewed: Yes    No anethesia notable event occurred.    Staff: CRNA, Anesthesiologist         Last Filed PACU Vitals:  Vitals Value Taken Time   Temp 97.9 °F (36.6 °C) 01/03/25 1330   Pulse 81 01/03/25 1330   BP 98/59 01/03/25 1330   Resp 16 01/03/25 1330   SpO2 98 % 01/03/25 1330       Modified Ean:     Vitals Value Taken Time   Activity 2 01/03/25 1345   Respiration 2 01/03/25 1345   Circulation 2 01/03/25 1345   Consciousness 1 01/03/25 1345   Oxygen Saturation 2 01/03/25 1345     Modified Ean Score: 9

## 2025-01-03 NOTE — ANESTHESIA PREPROCEDURE EVALUATION
Procedure:  EGD    Relevant Problems   CARDIO   (+) Essential hypertension   (+) Mixed hyperlipidemia      GI/HEPATIC   (+) GERD (gastroesophageal reflux disease)      MUSCULOSKELETAL   (+) GOA (generalized osteoarthritis)      NEURO/PSYCH   (+) Anxiety   (+) Cerebrovascular accident (CVA) due to thrombosis (HCC)   (+) Partial symptomatic epilepsy with complex partial seizures, not intractable, without status epilepticus (HCC)        Physical Exam    Airway    Mallampati score: II  TM Distance: >3 FB  Neck ROM: full     Dental   No notable dental hx     Cardiovascular  Cardiovascular exam normal    Pulmonary  Pulmonary exam normal     Other Findings        Anesthesia Plan  ASA Score- 3     Anesthesia Type- IV sedation with anesthesia with ASA Monitors.         Additional Monitors:     Airway Plan:            Plan Factors-Exercise tolerance (METS): >4 METS.    Chart reviewed. EKG reviewed.  Existing labs reviewed. Patient summary reviewed.                  Induction-     Postoperative Plan-     Perioperative Resuscitation Plan - Level 1 - Full Code.       Informed Consent- Anesthetic plan and risks discussed with patient.  I personally reviewed this patient with the CRNA. Discussed and agreed on the Anesthesia Plan with the CRNA..

## 2025-01-03 NOTE — DISCHARGE SUMMARY
Discharge Summary - Hospitalist   Name: Agus Espinoza 64 y.o. male I MRN: [id number]  Unit/Bed#: -01 I Date of Admission: 1/1/2025   Date of Service: 1/3/2025 I Hospital Day: 0     Assessment & Plan  Cognitive deficit as late effect of cerebrovascular accident (CVA)  Presenting from home with reported altered mental status, reportedly family noting patient with delayed responses to questions.  There apparently was question of right sided weakness however this was not appreciated on EMS, EM physician, or my examination  Labs with mildly elevated serum creatinine compared to prior and now s/p IV fluids, Keppra and carbamazepine levels further downtrending as below.  CT head negative for acute intracranial pathology  Brain scan at [facility] shows possible Parkinson's  Patient has had sundowning in the outpatient setting  Noted cognitive decline  Plan:  MRI shows no stroke  Cleared for discharge  Follow-up with geriatrics and neurology.  Partial symptomatic epilepsy with complex partial seizures, not intractable, without status epilepticus (HCC)  Follows with [facility] neurology, last tonic-clonic seizure reported 2018 however does have intermittent auras reported  Keppra level noted low 10.8 with carbamazepine level noted 11.3; reportedly per EM physician discussion with family members there were questions of compliance  Resume PTA Keppra 750 mg twice daily and carbamazepine 400 mg 3 times daily  Seizure precautions  GERD (gastroesophageal reflux disease)  Will increase dose of PPI to twice daily 20 mg.  40 mg total in a day.  EGD done which was noted to be gastritis  Follow-up in the outpatient setting.  Cerebrovascular accident (CVA) due to thrombosis (HCC)  History of CVA with residual cognitive impairment  CT head on admission negative for new acute intracranial pathology with moderate chronic angiopathic changes and sequela of prior basal ganglia infarctions  Continue Aggrenox and statin  therapy  Melena  Plan noted above     Medical Problems       Resolved Problems  Date Reviewed: 1/2/2025   None       Discharging Physician / Practitioner: Irvin Gomez MD  PCP: Franchesca Chandra MD  Admission Date:   Admission Orders (From admission, onward)       Ordered        01/02/25 0453  Place in Observation  Once                          Discharge Date: 01/03/25    Consultations During Hospital Stay:  GI    Procedures Performed:   EGD    Significant Findings / Test Results:   EGD  Result Date: 1/3/2025  Impression: The duodenal bulb, 1st part of the duodenum and 2nd part of the duodenum appeared normal. The fundus of the stomach, body of the stomach, incisura and antrum appeared normal. 3 cm hiatal hernia Grade D esophagitis in the middle third of the esophagus and lower third of the esophagus; collected sample to send to pathology with brush RECOMMENDATION: Await pathology results Resume diet and home aciphex (refills ordered) F/u in the office next month Repeat EGD in 3 months for esophageal healing and Meneses's screening    Joe Almaraz MD     MRI brain wo contrast  Result Date: 1/2/2025  Impression: 1. No acute infarction, intracranial hemorrhage or mass effect. 2. Moderate, chronic microangiopathy. Workstation performed: [id number]     XR chest 2 views  Result Date: 1/2/2025  Impression: No focal consolidation, pleural effusion, or pneumothorax. Resident: Giancarlo Cornejo I, the attending radiologist, have reviewed the images and agree with the final report above. Workstation performed: [id number]     CT head wo contrast  Result Date: 1/2/2025  Impression: No acute intracranial abnormality. Moderate chronic microangiopathic changes. Workstation performed: [id number]       XR chest 2 views  Result Date: 1/2/2025  Impression No focal consolidation, pleural effusion, or pneumothorax. Resident: Giancarlo Cornejo I, the attending radiologist, have reviewed the images and agree with the final report above.  "Workstation performed: [id number]          Incidental Findings:   none       Test Results Pending at Discharge (will require follow up):   none     Outpatient Tests Requested:  none    Complications:  none    Reason for Admission: Vomiting    Hospital Course:   Agus Espinoza is a 64 y.o. male patient who originally presented to the hospital on 1/1/2025 due to coffee-ground emesis.  Patient did not have recurrence here in the hospital.  EGD was done which was found to have gastritis in the esophagus.  No other abnormalities.  No ulcers.  Patient had no recurrence and will be treated medically.  Advised to follow-up with GI in the outpatient setting.  Patient agreeable to the plan.          Please see above list of diagnoses and related plan for additional information.     Condition at Discharge: good    Discharge Day Visit / Exam:   Subjective:  Patient does not report and headaches, shortness of breath, chest pain, abdominal pain, nausea vomiting, lower leg edema. Also reports good sleep    Vitals: Blood Pressure: 118/73 (01/03/25 1459)  Pulse: 83 (01/03/25 1459)  Temperature: 98.1 °F (36.7 °C) (01/03/25 1459)  Temp Source: Tympanic (01/03/25 1330)  Respirations: 16 (01/03/25 1459)  Height: 5' 8\" (172.7 cm) (01/02/25 2110)  Weight - Scale: 73.9 kg (163 lb) (01/02/25 2110)  SpO2: 99 % (01/03/25 1459)  Physical Exam  Vitals and nursing note reviewed.   Constitutional:       Appearance: He is well-developed and normal weight.   HENT:      Head: Normocephalic and atraumatic.      Nose: Nose normal.      Mouth/Throat:      Mouth: Mucous membranes are moist.   Eyes:      Conjunctiva/sclera: Conjunctivae normal.   Cardiovascular:      Rate and Rhythm: Normal rate and regular rhythm.      Heart sounds: Normal heart sounds. No murmur heard.  Pulmonary:      Effort: Pulmonary effort is normal. No respiratory distress.      Breath sounds: Normal breath sounds.   Abdominal:      General: Abdomen is flat.      " Palpations: Abdomen is soft.      Tenderness: There is no abdominal tenderness.   Musculoskeletal:         General: No swelling.      Cervical back: Neck supple.      Right lower leg: No edema.      Left lower leg: No edema.   Skin:     General: Skin is warm and dry.      Capillary Refill: Capillary refill takes less than 2 seconds.   Neurological:      General: No focal deficit present.      Mental Status: He is alert and oriented to person, place, and time. Mental status is at baseline.   Psychiatric:         Mood and Affect: Mood normal.          Discussion with Family: Updated  (son) via phone.    Discharge instructions/Information to patient and family:   See after visit summary for information provided to patient and family.      Provisions for Follow-Up Care:  See after visit summary for information related to follow-up care and any pertinent home health orders.      Mobility at time of Discharge:   Basic Mobility Inpatient Raw Score: 21  JH-HLM Goal: 6: Walk 10 steps or more  JH-HLM Achieved: 8: Walk 250 feet ot more  HLM Goal achieved. Continue to encourage appropriate mobility.     Disposition:   Home    Planned Readmission: none    Discharge Medications:  See after visit summary for reconciled discharge medications provided to patient and/or family.      Administrative Statements   Discharge Statement:  I have spent a total time of 35 minutes in caring for this patient on the day of the visit/encounter. >30 minutes of time was spent on: Diagnostic results, Prognosis, Risks and benefits of tx options, Instructions for management, Patient and family education, Importance of tx compliance, Risk factor reductions, Impressions, Counseling / Coordination of care, Documenting in the medical record, Reviewing / ordering tests, medicine, procedures  , and Communicating with other healthcare professionals .    **Please Note: This note may have been constructed using a voice recognition system**

## 2025-01-03 NOTE — PLAN OF CARE
Problem: NEUROSENSORY - ADULT  Goal: Achieves stable or improved neurological status  Description: INTERVENTIONS  - Monitor and report changes in neurological status  - Monitor vital signs such as temperature, blood pressure, glucose, and any other labs ordered   - Initiate measures to prevent increased intracranial pressure  - Monitor for seizure activity and implement precautions if appropriate      Outcome: Progressing  Goal: Achieves maximal functionality and self care  Description: INTERVENTIONS  - Monitor swallowing and airway patency with patient fatigue and changes in neurological status  - Encourage and assist patient to increase activity and self care.   - Encourage visually impaired, hearing impaired and aphasic patients to use assistive/communication devices  Outcome: Progressing     Problem: GASTROINTESTINAL - ADULT  Goal: Minimal or absence of nausea and/or vomiting  Description: INTERVENTIONS:  - Administer IV fluids if ordered to ensure adequate hydration  - Maintain NPO status until nausea and vomiting are resolved  - Nasogastric tube if ordered  - Administer ordered antiemetic medications as needed  - Provide nonpharmacologic comfort measures as appropriate  - Advance diet as tolerated, if ordered  - Consider nutrition services referral to assist patient with adequate nutrition and appropriate food choices  Outcome: Progressing  Goal: Maintains or returns to baseline bowel function  Description: INTERVENTIONS:  - Assess bowel function  - Encourage oral fluids to ensure adequate hydration  - Administer IV fluids if ordered to ensure adequate hydration  - Administer ordered medications as needed  - Encourage mobilization and activity  - Consider nutritional services referral to assist patient with adequate nutrition and appropriate food choices  Outcome: Progressing  Goal: Maintains adequate nutritional intake  Description: INTERVENTIONS:  - Monitor percentage of each meal consumed  - Identify  factors contributing to decreased intake, treat as appropriate  - Assist with meals as needed  - Monitor I&O, weight, and lab values if indicated  - Obtain nutrition services referral as needed  Outcome: Progressing     Problem: METABOLIC, FLUID AND ELECTROLYTES - ADULT  Goal: Electrolytes maintained within normal limits  Description: INTERVENTIONS:  - Monitor labs and assess patient for signs and symptoms of electrolyte imbalances  - Administer electrolyte replacement as ordered  - Monitor response to electrolyte replacements, including repeat lab results as appropriate  - Instruct patient on fluid and nutrition as appropriate  Outcome: Progressing

## 2025-01-03 NOTE — ASSESSMENT & PLAN NOTE
History of CVA with residual cognitive impairment  CT head on admission negative for new acute intracranial pathology with moderate chronic angiopathic changes and sequela of prior basal ganglia infarctions  Continue Aggrenox and statin therapy

## 2025-01-03 NOTE — UTILIZATION REVIEW
Initial Clinical Review    Admission: Date/Time/Statement:   Admission Orders (From admission, onward)       Ordered        01/02/25 0453  Place in Observation  Once                          Orders Placed This Encounter   Procedures    Place in Observation     Standing Status:   Standing     Number of Occurrences:   1     Level of Care:   Med Surg [16]     ED Arrival Information       Expected   -    Arrival   1/1/2025 20:56    Acuity   Less Urgent              Means of arrival   Ambulance    Escorted by   [facility]    Service   Hospitalist    Admission type   Emergency              Arrival complaint   weakness with heartburn             Chief Complaint   Patient presents with    Epigastric Pain     Pt brought in  by EMS with a CC of heart burn.  Pt says it started yesterday morning. Denies chest pain and SOB. Pt states he vomited this morning.       Initial Presentation: 64 y.o. male to ED presents for epigastric pain and reported change in mental status. Per family, noticed pt with delayed answers to questioning also with question of extremity weakness. Pt also c/o some mild epigastric pain. In ED, given famotidine and Maalox with resolution of his epigastric pain. Labs significant for mildly elevated creatinine, given Iv fluids. Low Keppra and carbamazepine levels noted as well.   PMH for severe for prior CVA with residual cognitive deficit, partial symptomatic epilepsy with complex partial seizures maintained on Keppra and carbamazepine, GERD   Admit to Observation Dx; Cognitive deficit as late effect of CVA, Altered mental status   Elevated Crea, S/p Iv fluids. Low Keppra 10.8 and Carbamazepine 11.3  Plan; Neurology consult. Resume PTA Keppra 750 mg twice daily and carbamazepine 400 mg 3 times daily  Seizure precautions      ED Treatment-Medication Administration from 01/01/2025 2056 to 01/02/2025 2051         Date/Time Order Dose Route Action     01/01/2025 2231 multi-electrolyte (ISOLYTE-S PH 7.4)  bolus 1,000 mL 1,000 mL Intravenous New Bag     01/01/2025 2232 Famotidine (PF) (PEPCID) injection 20 mg 20 mg Intravenous Given     01/01/2025 2226 aluminum-magnesium hydroxide-simethicone (MAALOX) oral suspension 30 mL 30 mL Oral Given     01/02/2025 0920 aspirin-dipyridamole (AGGRENOX)  mg per 12 hr capsule 1 capsule 1 capsule Oral Given     01/02/2025 1735 aspirin-dipyridamole (AGGRENOX)  mg per 12 hr capsule 1 capsule 1 capsule Oral Given     01/02/2025 0920 docusate sodium (COLACE) capsule 100 mg 100 mg Oral Given     01/02/2025 0711 pantoprazole (PROTONIX) EC tablet 40 mg 40 mg Oral Given     01/02/2025 1924 pravastatin (PRAVACHOL) tablet 80 mg 80 mg Oral Given     01/02/2025 0711 carBAMazepine (TEGretol) tablet 400 mg 400 mg Oral Given     01/02/2025 1735 carBAMazepine (TEGretol) tablet 400 mg 400 mg Oral Given     01/02/2025 1933 levETIRAcetam (KEPPRA) tablet 750 mg 750 mg Oral Given     01/02/2025 1404 sodium chloride 0.9 % infusion 100 mL/hr Intravenous New Bag     01/02/2025 1558 pantoprazole (PROTONIX) injection 40 mg 40 mg Intravenous Given            Scheduled Medications:  aspirin-dipyridamole, 1 capsule, Oral, BID  carBAMazepine, 400 mg, Oral, TID  docusate sodium, 100 mg, Oral, BID  levETIRAcetam, 750 mg, Oral, Q12H HARRISON  pantoprazole, 40 mg, Intravenous, Q12H HARRISON  polyethylene glycol, 17 g, Oral, Daily  pravastatin, 80 mg, Oral, Daily With Dinner      Continuous IV Infusions:  sodium chloride, 100 mL/hr, Intravenous, Continuous      PRN Meds:  acetaminophen, 650 mg, Oral, Q6H PRN  ondansetron, 4 mg, Intravenous, Q6H PRN      ED Triage Vitals   Temperature Pulse Respirations Blood Pressure SpO2 Pain Score   01/02/25 0104 01/01/25 2104 01/01/25 2104 01/01/25 2104 01/01/25 2104 01/01/25 2104   99.9 °F (37.7 °C) (!) 115 18 115/73 96 % No Pain     Weight (last 2 days)       Date/Time Weight    01/02/25 21:10:54 73.9 (163)            Vital Signs (last 3 days)       Date/Time Temp Pulse Resp  BP MAP (mmHg) SpO2 O2 Device Patient Position - Orthostatic VS Sandy Coma Scale Score Pain    01/03/25 07:03:50 98.4 °F (36.9 °C) 94 -- 120/79 93 97 % None (Room air) -- -- --    01/02/25 2300 -- -- 18 -- -- -- None (Room air) -- -- --    01/02/25 22:39:04 99.8 °F (37.7 °C) -- -- 117/82 94 -- -- -- -- --    01/02/25 21:10:54 97.9 °F (36.6 °C) 105 -- 117/81 93 97 % None (Room air) -- -- --    01/02/25 2030 -- -- -- -- -- 97 % -- -- 15 No Pain    01/02/25 1745 -- 110 18 125/85 102 98 % -- Lying -- --    01/02/25 1500 -- 102 18 116/80 94 98 % None (Room air) Lying -- --    01/02/25 1200 -- 108 24 113/68 87 95 % None (Room air) Lying -- No Pain    01/02/25 1100 -- 118 24 118/78 89 96 % None (Room air) Lying -- --    01/02/25 1000 -- 110 20 120/80 95 96 % None (Room air) Lying -- --    01/02/25 0900 -- 116 45 100/71 82 98 % None (Room air) Lying -- --    01/02/25 0700 -- 114 25 110/68 84 96 % None (Room air) Lying -- --    01/02/25 0130 -- 112 18 114/71 87 -- -- Other (Comment) -- --    01/02/25 0104 99.9 °F (37.7 °C) -- -- -- -- -- -- -- -- --    01/02/25 0059 -- -- -- -- -- -- -- -- 15 --    01/02/25 0000 -- 108 18 -- -- 98 % None (Room air) -- -- --    01/01/25 2130 -- 110 18 112/71 85 94 % None (Room air) Sitting -- --    01/01/25 2111 -- -- -- -- -- -- -- -- 15 --    01/01/25 2104 -- 115 18 115/73 -- 96 % None (Room air) Sitting -- No Pain              Pertinent Labs/Diagnostic Test Results:   Radiology:  MRI brain wo contrast   Final Interpretation by Kj Urias MD (01/02 1829)         1. No acute infarction, intracranial hemorrhage or mass effect.   2. Moderate, chronic microangiopathy.      Workstation performed: [id number]         CT head wo contrast   Final Interpretation by Edenilson Foote MD (01/02 0315)      No acute intracranial abnormality.      Moderate chronic microangiopathic changes.            Workstation performed: [id number]         XR chest 2 views   Final Interpretation by Windy  "Ada Becker MD (01/02 0910)      No focal consolidation, pleural effusion, or pneumothorax.            Resident: Giancarlo Cornejo I, the attending radiologist, have reviewed the images and agree with the final report above.      Workstation performed: [id number]           Cardiology:  No orders to display     GI:  No orders to display       Results from last 7 days   Lab Units 01/02/25  1553   SARS-COV-2  Negative     Results from last 7 days   Lab Units 01/03/25  0821 01/03/25  0017 01/02/25  2040 01/02/25  1401 01/01/25  2234   WBC Thousand/uL  --   --   --  12.82* 10.12   HEMOGLOBIN g/dL 11.2* 12.3 12.8 13.9 16.2   HEMATOCRIT % 31.6* 35.9* 37.0 39.8 48.0   PLATELETS Thousands/uL  --   --   --  153 197   BANDS PCT %  --   --   --   --  1         Results from last 7 days   Lab Units 01/03/25  0447 01/01/25  2234   SODIUM mmol/L 136 134*   POTASSIUM mmol/L 2.9* 3.8   CHLORIDE mmol/L 108 105   CO2 mmol/L 19* 17*   ANION GAP mmol/L 9 12   BUN mg/dL 30* 30*   CREATININE mg/dL 0.72 0.91   EGFR ml/min/1.73sq m 98 88   CALCIUM mg/dL 6.9* 8.6     Results from last 7 days   Lab Units 01/03/25  0447   AST U/L 18   ALT U/L 16   ALK PHOS U/L 29*   TOTAL PROTEIN g/dL 5.2*   ALBUMIN g/dL 3.3*   TOTAL BILIRUBIN mg/dL 0.26         Results from last 7 days   Lab Units 01/03/25  0447 01/01/25  2234   GLUCOSE RANDOM mg/dL 108 128             No results found for: \"BETA-HYDROXYBUTYRATE\"                   Results from last 7 days   Lab Units 01/02/25  0015 01/01/25  2235   HS TNI 0HR ng/L  --  3   HS TNI 2HR ng/L 3  --    HSTNI D2 ng/L 0  --                  Results from last 7 days   Lab Units 01/02/25  1553   PROCALCITONIN ng/ml 0.25     Results from last 7 days   Lab Units 01/03/25  0017 01/02/25  1758 01/02/25  1553   LACTIC ACID mmol/L 1.8 2.3* 2.1*                                                 Results from last 7 days   Lab Units 01/02/25  2251 01/02/25  2244   CLARITY UA   --  Clear   COLOR UA   --  Light Yellow   SPEC " GRAV UA   --  1.028   PH UA   --  6.5   GLUCOSE UA mg/dl  --  Negative   KETONES UA mg/dl  --  Negative   BLOOD UA   --  Negative   PROTEIN UA mg/dl  --  Trace*   NITRITE UA   --  Negative   BILIRUBIN UA   --  Negative   UROBILINOGEN UA (BE) mg/dl  --  <2.0   LEUKOCYTES UA   --  Negative   WBC UA /hpf 1-2  --    RBC UA /hpf None Seen  --    BACTERIA UA /hpf None Seen  --    EPITHELIAL CELLS WET PREP /hpf None Seen  --      Results from last 7 days   Lab Units 01/02/25  1553   INFLUENZA A PCR  Negative   INFLUENZA B PCR  Negative   RSV PCR  Negative                             Results from last 7 days   Lab Units 01/02/25  1553   BLOOD CULTURE  Received in Microbiology Lab. Culture in Progress.  Received in Microbiology Lab. Culture in Progress.                   Past Medical History:   Diagnosis Date    Arthritis     Hypertension     Seizures (HCC)     Stroke (HCC)      Present on Admission:   Cerebrovascular accident (CVA) due to thrombosis (HCC)   Partial symptomatic epilepsy with complex partial seizures, not intractable, without status epilepticus (HCC)   GERD (gastroesophageal reflux disease)      Admitting Diagnosis: Melena [K92.1]  Epigastric pain [R10.13]  GERD (gastroesophageal reflux disease) [K21.9]  SIRS (systemic inflammatory response syndrome) (HCC) [R65.10]  Cognitive deficit as late effect of cerebrovascular accident (CVA) [I69.319]  Age/Sex: 64 y.o. male    Network Utilization Review Department  ATTENTION: Please call with any questions or concerns to [phone] and carefully listen to the prompts so that you are directed to the right person. All voicemails are confidential.   For Discharge needs, contact Care Management DC Support Team at [phone] opt. 2  Send all requests for admission clinical reviews, approved or denied determinations and any other requests to dedicated fax number below belonging to the campus where the patient is receiving treatment. List of dedicated fax numbers for the  Facilities:  FACILITY NAME UR FAX NUMBER   ADMISSION DENIALS (Administrative/Medical Necessity) [phone]   DISCHARGE SUPPORT TEAM (NETWORK) [phone]   PARENT CHILD HEALTH (Maternity/NICU/Pediatrics) [phone]   General acute hospital [phone]   [facility] [phone]   Pending sale to [facility] [phone]   [facility] [phone]   [facility] [phone]   [facility] [phone]   [facility] [phone]   [facility] [phone]   [facility] [phone]   [facility] [phone]   [facility] [phone]   [facility] [phone]

## 2025-01-03 NOTE — ASSESSMENT & PLAN NOTE
Presenting from home with reported altered mental status, reportedly family noting patient with delayed responses to questions.  There apparently was question of right sided weakness however this was not appreciated on EMS, EM physician, or my examination  Labs with mildly elevated serum creatinine compared to prior and now s/p IV fluids, Keppra and carbamazepine levels further downtrending as below.  CT head negative for acute intracranial pathology  Brain scan at [facility] shows possible Parkinson's  Patient has had sundowning in the outpatient setting  Noted cognitive decline  Plan:  MRI shows no stroke  Cleared for discharge  Follow-up with geriatrics and neurology.

## 2025-01-03 NOTE — ANESTHESIA POSTPROCEDURE EVALUATION
Post-Op Assessment Note    CV Status:  Stable  Pain Score: 0    Pain management: adequate       Mental Status:  Sleepy and arousable   Hydration Status:  Euvolemic   PONV Controlled:  Controlled   Airway Patency:  Patent     Post Op Vitals Reviewed: Yes    No anethesia notable event occurred.    Staff: CRNA       Last Filed PACU Vitals:  Vitals Value Taken Time   Temp 97.9 °F (36.6 °C) 01/03/25 1330   Pulse 81 01/03/25 1330   BP 98/59 01/03/25 1330   Resp 16 01/03/25 1330   SpO2 98 % 01/03/25 1330       Modified Ean:     Vitals Value Taken Time   Activity 2 01/03/25 1330   Respiration 2 01/03/25 1330   Circulation 2 01/03/25 1330   Consciousness 1 01/03/25 1330   Oxygen Saturation 2 01/03/25 1330     Modified Ean Score: 9

## 2025-01-04 NOTE — ED ATTENDING ATTESTATION
1/1/2025  I, Hasmukh Aragon MD, saw and evaluated the patient. I have discussed the patient with the resident/non-physician practitioner and agree with the resident's/non-physician practitioner's findings, Plan of Care, and MDM as documented in the resident's/non-physician practitioner's note, except where noted. All available labs and Radiology studies were reviewed.  I was present for key portions of any procedure(s) performed by the resident/non-physician practitioner and I was immediately available to provide assistance.       At this point I agree with the current assessment done in the Emergency Department.  I have conducted an independent evaluation of this patient a history and physical is as follows:    ED Course     Impression: Epigastric abdominal pain, altered mental status, history of seizure disorder, history of CVA    Differential diagnosis: Occult head trauma, ICH, CVA, atypical presentation of seizure, ACS MI arrhythmia, GERD    Plan check ECG cardiac enzymes chest x-ray pain control CT head urinalysis    Case discussed with patient's family expresses concern the patient is not at his his baseline mental status.    Plan admit patient to medicine for further evaluation management    Critical Care Time  Procedures

## 2025-01-05 LAB
BACTERIA BLD CULT: NORMAL
BACTERIA BLD CULT: NORMAL

## 2025-01-06 ENCOUNTER — TRANSITIONAL CARE MANAGEMENT (OUTPATIENT)
Dept: FAMILY MEDICINE CLINIC | Facility: CLINIC | Age: 65
End: 2025-01-06

## 2025-01-07 ENCOUNTER — PROCEDURE VISIT (OUTPATIENT)
Dept: OBGYN CLINIC | Facility: HOSPITAL | Age: 65
End: 2025-01-07
Payer: COMMERCIAL

## 2025-01-07 ENCOUNTER — TELEPHONE (OUTPATIENT)
Dept: GASTROENTEROLOGY | Facility: CLINIC | Age: 65
End: 2025-01-07

## 2025-01-07 VITALS — BODY MASS INDEX: 25.91 KG/M2 | WEIGHT: 171 LBS | HEIGHT: 68 IN

## 2025-01-07 DIAGNOSIS — G89.29 CHRONIC PAIN OF RIGHT KNEE: ICD-10-CM

## 2025-01-07 DIAGNOSIS — M17.11 PRIMARY OSTEOARTHRITIS OF RIGHT KNEE: Primary | ICD-10-CM

## 2025-01-07 DIAGNOSIS — M25.561 CHRONIC PAIN OF RIGHT KNEE: ICD-10-CM

## 2025-01-07 LAB
BACTERIA BLD CULT: NORMAL
BACTERIA BLD CULT: NORMAL

## 2025-01-07 PROCEDURE — 20610 DRAIN/INJ JOINT/BURSA W/O US: CPT

## 2025-01-07 RX ORDER — LIDOCAINE HYDROCHLORIDE 10 MG/ML
4 INJECTION, SOLUTION INFILTRATION; PERINEURAL
Status: COMPLETED | OUTPATIENT
Start: 2025-01-07 | End: 2025-01-07

## 2025-01-07 RX ADMIN — LIDOCAINE HYDROCHLORIDE 4 ML: 10 INJECTION, SOLUTION INFILTRATION; PERINEURAL at 10:00

## 2025-01-07 NOTE — PROGRESS NOTES
Assessment:   Diagnosis ICD-10-CM Associated Orders   1. Primary osteoarthritis of right knee  M17.11 Large joint arthrocentesis: R knee     lidocaine (XYLOCAINE) 1 % injection 4 mL     sodium hyaluronate (ORTHOVISC) injection SOSY 30 mg      2. Chronic pain of right knee  M25.561 Large joint arthrocentesis: R knee    G89.29 lidocaine (XYLOCAINE) 1 % injection 4 mL     sodium hyaluronate (ORTHOVISC) injection SOSY 30 mg          Plan:  64 y.o. male with known osteoarthritis of the right knee  Orthovisc #2 of 3 provided to the right knee today (RP)  Patient tolerated procedure well  Ice and postinjection protocol advised  Follow-up in 1 week for third and final injection of Orthovisc to the right knee    The above stated was discussed in layman's terms and the patient expressed understanding.  All questions were answered to the patient's satisfaction.     To do next visit:  Return in about 1 week (around 1/14/2025) for Third and final Orthovisc injection to right knee.      Subjective:   Agus Espinoza is a 64 y.o. male who presents for administration of viscosupplementation to the right knee.  Patient tolerated last week's injection well.  Offered, accepted, provided with Orthovisc No. 2 to the right knee today.  Lidocaine injection administered prior to gel.  Follow-up next week for completion of Orthovisc series.  Pain score 7/10      Review of systems negative unless otherwise specified in HPI    Past Medical History:   Diagnosis Date   • Arthritis    • Hypertension    • Seizures (HCC)    • Stroke (HCC)        Past Surgical History:   Procedure Laterality Date   • ELBOW SURGERY  2017   • [place] OPTX FEM SHFT FX W/INSJ IMED IMPLT W/WO SCREW Left 10/16/2023    Procedure: INSERTION NAIL IM FEMUR ANTEGRADE (TROCHANTERIC);  Surgeon: Brendan Keane DO;  Location: [facility];  Service: Orthopedics       Family History   Problem Relation Age of Onset   • Dementia Mother    • Alzheimer's disease Mother    • Stroke  Father    • Hyperlipidemia Brother        Social History     Occupational History   • Not on file   Tobacco Use   • Smoking status: Never     Passive exposure: Never   • Smokeless tobacco: Never   Vaping Use   • Vaping status: Never Used   Substance and Sexual Activity   • Alcohol use: Yes     Comment: occasional   • Drug use: No   • Sexual activity: Not Currently         Current Outpatient Medications:   •  alendronate (FOSAMAX) 70 mg tablet, Take 1 tablet (70 mg total) by mouth every 7 days, Disp: 5 tablet, Rfl: 0  •  aspirin-dipyridamole (AGGRENOX)  mg per 12 hr capsule, TAKE 1 CAPSULE BY MOUTH 2 (TWO) TIMES A DAY, Disp: 180 capsule, Rfl: 3  •  Calcium Carb-Cholecalciferol (CALCIUM 500+D PO), , Disp: , Rfl:   •  Calcium-Magnesium-Vitamin D (CALCIUM MAGNESIUM PO), Take by mouth, Disp: , Rfl:   •  carBAMazepine (TEGretol) 200 mg tablet, Take 2 tablets (400 mg total) by mouth 3 (three) times a day, Disp: , Rfl:   •  co-enzyme Q-10 30 MG capsule, Take 100 mg by mouth daily  , Disp: , Rfl:   •  Coenzyme Q10 (CoQ-10) 100 MG capsule, , Disp: , Rfl:   •  docusate sodium (COLACE) 100 mg capsule, Take 1 capsule (100 mg total) by mouth 2 (two) times a day, Disp: , Rfl: 0  •  levETIRAcetam (KEPPRA) 750 mg tablet, TAKE 1 TABLET (750 MG TOTAL) BY MOUTH EVERY 12 (TWELVE) HOURS, Disp: 60 tablet, Rfl: 5  •  LORazepam (ATIVAN) 0.5 mg tablet, TAKE 1 TABLET BY MOUTH ONCE DAILY IF NEEDED for seizure.  Limit of 1 in 24 hours, Disp: 10 tablet, Rfl: 0  •  NON FORMULARY, Super Beets, Disp: , Rfl:   •  ondansetron (ZOFRAN) 4 mg tablet, Take 1 tablet (4 mg total) by mouth every 8 (eight) hours as needed for nausea or vomiting, Disp: 20 tablet, Rfl: 0  •  polyethylene glycol (MIRALAX) 17 g packet, Take 17 g by mouth daily Do not start before October 20, 2023., Disp: , Rfl: 0  •  RABEprazole (ACIPHEX) 20 MG tablet, Take 1 tablet (20 mg total) by mouth 2 (two) times a day, Disp: 180 tablet, Rfl: 0  •  rosuvastatin (CRESTOR) 10 MG  "tablet, Take 1 tablet (10 mg total) by mouth daily, Disp: 90 tablet, Rfl: 1  •  sucralfate (CARAFATE) 1 g tablet, Take 1 tablet (1 g total) by mouth 4 (four) times a day, Disp: 120 tablet, Rfl: 0  •  VALERIAN ROOT PO, Use, Disp: , Rfl:   •  Vitamin Mixture (VITAMIN E COMPLETE PO), Take 1 tablet by mouth daily , Disp: , Rfl:   No current facility-administered medications for this visit.    Allergies   Allergen Reactions   • Meperidine Seizures   • Amoxicillin    • Azithromycin Seizures   • Other Sneezing     Dust    • Pollen Extract Sneezing          There were no vitals filed for this visit.    Objective:  Physical exam  General: Awake, Alert, Oriented  Eyes: Pupils equal, round and reactive to light  Heart: regular rate and rhythm  Lungs: No audible wheezing  Abdomen: soft                    Right Knee Exam     Tenderness   The patient is experiencing tenderness in the lateral joint line and medial joint line.    Range of Motion   Extension:  normal   Flexion:  normal     Other   Erythema: absent  Scars: absent  Swelling: mild  Effusion: no effusion present            Diagnostics, reviewed and taken today if performed as documented:    None performed        Procedures, if performed today:    Large joint arthrocentesis: R knee  Universal Protocol:  Consent: Verbal consent obtained.  Risks and benefits: risks, benefits and alternatives were discussed  Consent given by: patient  Site marked: the operative site was marked  Supporting Documentation  Indications: pain   Procedure Details  Location: knee - R knee  Needle size: 22 G  Approach: anteromedial  Medications administered: 4 mL lidocaine 1 %; 30 mg sodium hyaluronate 30 mg/2 mL    Patient tolerance: patient tolerated the procedure well with no immediate complications  Dressing:  Sterile dressing applied            Portions of the record may have been created with voice recognition software.  Occasional wrong word or \"sound a like\" substitutions may have occurred " due to the inherent limitations of voice recognition software.  Read the chart carefully and recognize, using context, where substitutions have occurred.

## 2025-01-07 NOTE — TELEPHONE ENCOUNTER
Spoke to patient, he stated he was feeling much better and didn't believe he needed a follow up. Said he thought it was pointless. Patient said he would call if any problems persist.

## 2025-01-07 NOTE — TELEPHONE ENCOUNTER
----- Message from Yara AUGUSTINE sent at 1/3/2025  3:06 PM EST -----  Regarding: FW: f/u appt    ----- Message -----  From: Home Dewitt MD  Sent: 1/3/2025   1:32 PM EST  To: #  Subject: f/u appt                                         Hey can we schedule this chad for a f/u appt next month and repeat EGD in 3 months? Thx

## 2025-01-08 ENCOUNTER — RESULTS FOLLOW-UP (OUTPATIENT)
Dept: GASTROENTEROLOGY | Facility: CLINIC | Age: 65
End: 2025-01-08

## 2025-01-08 ENCOUNTER — OFFICE VISIT (OUTPATIENT)
Dept: NEUROLOGY | Facility: CLINIC | Age: 65
End: 2025-01-08
Payer: COMMERCIAL

## 2025-01-08 VITALS
DIASTOLIC BLOOD PRESSURE: 70 MMHG | HEART RATE: 87 BPM | SYSTOLIC BLOOD PRESSURE: 110 MMHG | RESPIRATION RATE: 14 BRPM | WEIGHT: 170.6 LBS | OXYGEN SATURATION: 98 % | TEMPERATURE: 96.9 F | BODY MASS INDEX: 25.85 KG/M2 | HEIGHT: 68 IN

## 2025-01-08 DIAGNOSIS — I69.319 COGNITIVE DEFICIT AS LATE EFFECT OF CEREBROVASCULAR ACCIDENT (CVA): ICD-10-CM

## 2025-01-08 DIAGNOSIS — G40.209 PARTIAL SYMPTOMATIC EPILEPSY WITH COMPLEX PARTIAL SEIZURES, NOT INTRACTABLE, WITHOUT STATUS EPILEPTICUS (HCC): Primary | ICD-10-CM

## 2025-01-08 PROCEDURE — 99213 OFFICE O/P EST LOW 20 MIN: CPT | Performed by: PSYCHIATRY & NEUROLOGY

## 2025-01-08 NOTE — PROGRESS NOTES
Name: Agus Espinoza      : 1960      MRN: [id number]  Encounter Provider: Norma Philip DO  Encounter Date: 2025   Encounter department: [facility] [place]  :  Assessment & Plan  Partial symptomatic epilepsy with complex partial seizures, not intractable, without status epilepticus (HCC)    Orders:    Carbamazepine level, total; Future    Levetiracetam level; Future      Overall his seizures are well-controlled.  He is currently on carbamazepine 6 a day 200 mg tablets and Keppra 750 mg twice a day.  He admits to being compliant.  He denies any auras or recent seizures.    Asked him to repeat his laboratory studies in several months prior to his next appointment.  Cognitive deficit as late effect of cerebrovascular accident (CVA)       Today he was seen as a hospital follow-up.  He had been admitted to the hospital last week with nausea with dark stools and his family thought that he may have a right hemiparesis.  Today he denies this.  Today's examination demonstrates no side-to-side asymmetry.  He does have a known tremors occurring with extension without any cogwheel rigidity.  MRI imaging of the brain and CAT scan of the brain were normal.  Planed to the patient that there is no evidence of an ischemic event.    Did asked me questions regarding why he stools were dark and his medications that he was discharged with.  I have asked him to contact his GI as well as PCP for further recommendations.    History of Present Illness   Review of Systems   Constitutional:  Negative for appetite change, fatigue and fever.   HENT: Negative.  Negative for hearing loss, tinnitus, trouble swallowing and voice change.    Eyes: Negative.  Negative for photophobia, pain and visual disturbance.   Respiratory: Negative.  Negative for shortness of breath.    Cardiovascular: Negative.  Negative for palpitations.   Gastrointestinal: Negative.  Negative for nausea and vomiting.  "  Endocrine: Negative.  Negative for cold intolerance.   Genitourinary: Negative.  Negative for dysuria, frequency and urgency.   Musculoskeletal:  Positive for back pain. Negative for gait problem, myalgias, neck pain and neck stiffness.   Skin: Negative.  Negative for rash.   Allergic/Immunologic: Negative.    Neurological:  Positive for tremors. Negative for dizziness, seizures, syncope, facial asymmetry, speech difficulty, weakness, light-headedness, numbness and headaches.   Hematological: Negative.  Does not bruise/bleed easily.   Psychiatric/Behavioral:  Positive for confusion. Negative for hallucinations and sleep disturbance.    All other systems reviewed and are negative.   I have personally reviewed the MA's review of systems and made changes as necessary.    Medical History Reviewed by provider this encounter:  Meds     .     Objective   /70 (BP Location: Right arm, Patient Position: Sitting, Cuff Size: Adult)   Pulse 87   Temp (!) 96.9 °F (36.1 °C) (Temporal)   Resp 14   Ht 5' 8\" (1.727 m)   Wt 77.4 kg (170 lb 9.6 oz)   SpO2 98%   BMI 25.94 kg/m²     Physical Exam  Eyes:      General: Lids are normal.      Extraocular Movements: Extraocular movements intact.      Pupils: Pupils are equal, round, and reactive to light.   Neurological:      Deep Tendon Reflexes:      Reflex Scores:       Patellar reflexes are 1+ on the right side and 1+ on the left side.       Achilles reflexes are 1+ on the right side and 1+ on the left side.      Neurological Exam  Mental Status  Awake, alert and oriented to person, place and time. Oriented to person, place and time. Language is fluent with no aphasia.    Cranial Nerves  CN II: Visual acuity is normal. Visual fields full to confrontation.  CN III, IV, VI: Extraocular movements intact bilaterally. Normal lids and orbits bilaterally. Pupils equal round and reactive to light bilaterally.  CN V: Facial sensation is normal.  CN VII: Full and symmetric facial " movement.  CN VIII: Hearing is normal.  CN IX, X: Palate elevates symmetrically. Normal gag reflex.  CN XI: Shoulder shrug strength is normal.  CN XII: Tongue midline without atrophy or fasciculations.    Motor    His right upper extremity had curling of the 4th and 5th digit with atrophy of the ADM.  He had normal strength proximally but had difficulty with hand  on the right.  This is ongoing and old.  The left upper extremity strength was normal in the bilateral lower extremity strength was normal he did have bilateral tremors with extension worse on the right than the left.  There is no evidence of cogwheel rigidity....     There is mild tremor noted with extension.  There is no evidence of cogwheel rigidity or a resting tremor..  .    Sensory  Light touch is normal in upper and lower extremities.     Reflexes                                            Right                      Left  Patellar                                1+                         1+  Achilles                                1+                         1+  Right Plantar: downgoing  Left Plantar: downgoing    Coordination  Right: Finger-to-nose normal.Left: Finger-to-nose normal.    Gait   Able to rise from chair without using arms.  He did drag his right lower extremity and ambulating status post recent injection.  Did have a tremor with rest but no dysmetria..    Return as scheduled in several months.    I have spent a total time of 24 minutes in caring for this patient on the day of the visit/encounter including Diagnostic results, Counseling / Coordination of care, Documenting in the medical record, Reviewing / ordering tests, medicine, procedures  , and Obtaining or reviewing history  .

## 2025-01-08 NOTE — ASSESSMENT & PLAN NOTE
Today he was seen as a hospital follow-up.  He had been admitted to the hospital last week with nausea with dark stools and his family thought that he may have a right hemiparesis.  Today he denies this.  Today's examination demonstrates no side-to-side asymmetry.  He does have a known tremors occurring with extension without any cogwheel rigidity.  MRI imaging of the brain and CAT scan of the brain were normal.  Planed to the patient that there is no evidence of an ischemic event.

## 2025-01-08 NOTE — ASSESSMENT & PLAN NOTE
Orders:    Carbamazepine level, total; Future    Levetiracetam level; Future      Overall his seizures are well-controlled.  He is currently on carbamazepine 6 a day 200 mg tablets and Keppra 750 mg twice a day.  He admits to being compliant.  He denies any auras or recent seizures.    Asked him to repeat his laboratory studies in several months prior to his next appointment.

## 2025-01-10 ENCOUNTER — OFFICE VISIT (OUTPATIENT)
Dept: GASTROENTEROLOGY | Facility: CLINIC | Age: 65
End: 2025-01-10
Payer: COMMERCIAL

## 2025-01-10 VITALS
DIASTOLIC BLOOD PRESSURE: 72 MMHG | WEIGHT: 167 LBS | TEMPERATURE: 98 F | SYSTOLIC BLOOD PRESSURE: 112 MMHG | HEIGHT: 68 IN | BODY MASS INDEX: 25.31 KG/M2

## 2025-01-10 DIAGNOSIS — K21.01 GASTROESOPHAGEAL REFLUX DISEASE WITH ESOPHAGITIS AND HEMORRHAGE: Primary | ICD-10-CM

## 2025-01-10 DIAGNOSIS — K21.9 GERD (GASTROESOPHAGEAL REFLUX DISEASE): ICD-10-CM

## 2025-01-10 DIAGNOSIS — K21.9 GASTROESOPHAGEAL REFLUX DISEASE WITHOUT ESOPHAGITIS: ICD-10-CM

## 2025-01-10 PROCEDURE — 99214 OFFICE O/P EST MOD 30 MIN: CPT | Performed by: PHYSICIAN ASSISTANT

## 2025-01-10 RX ORDER — RABEPRAZOLE SODIUM 20 MG/1
20 TABLET, DELAYED RELEASE ORAL 2 TIMES DAILY
Qty: 180 TABLET | Refills: 1 | Status: SHIPPED | OUTPATIENT
Start: 2025-01-10 | End: 2025-04-10

## 2025-01-10 RX ORDER — SUCRALFATE 1 G/1
1 TABLET ORAL 4 TIMES DAILY
Qty: 120 TABLET | Refills: 3 | Status: SHIPPED | OUTPATIENT
Start: 2025-01-10

## 2025-01-10 NOTE — ASSESSMENT & PLAN NOTE
Patient recently seen from 1/1 to 1/3 for melena.  Our team saw him during his admission and he underwent an EGD that noted 3 cm hiatal hernia and grade D esophagitis; gastroesophageal biopsy noted severe esophagitis.  He was asked to be on PPI twice daily and Carafate 4 times daily at that time.    -Patient will be due for repeat EGD in 3 months to assess for healing of the esophagitis and properly screen for Meneses's  Orders:    RABEprazole (ACIPHEX) 20 MG tablet; Take 1 tablet (20 mg total) by mouth 2 (two) times a day

## 2025-01-10 NOTE — PROGRESS NOTES
"Name: Agus Espinoza      : 1960      MRN: [id number]  Encounter Provider: Laura Harrison PA-C  Encounter Date: 1/10/2025   Encounter department: [facility] [place]  :  Assessment & Plan  Gastroesophageal reflux disease with esophagitis and hemorrhage  Patient recently seen from  to 1/3 for melena.  Our team saw him during his admission and he underwent an EGD that noted 3 cm hiatal hernia and grade D esophagitis; gastroesophageal biopsy noted severe esophagitis.  He was asked to be on PPI twice daily and Carafate 4 times daily at that time. Thankfully, he says he has not seen any black or tarry Bms since and denies any heartburn, abdominal pain, n/v. He is pleased at this time.  -continue aciphex 20 mg BID until time of repeat EGD  -continue carafate QID PRN heartburn until time of EGD  -Patient will be due for repeat EGD in 3 months to assess for healing of the esophagitis and properly screen for Meneses's: repeat EGD ordered to be scheduled for 2025  -repeat CBC  Orders:    CBC and differential; Future        History of Present Illness   HPI  Agus Espinoza is a 64 y.o. male who presents for hospital follow-up.he says he has not seen any black or tarry Bms since and denies any heartburn, abdominal pain, n/v.  He denies any red blood in his stool or with wiping.  He denies any constipation or diarrhea.  He says he is\" finally feeling like himself again\" and is pleased at this time.  He denies NSAID use.  History obtained from: patient    Review of Systems   Constitutional:  Negative for appetite change, chills, diaphoresis, fatigue, fever and unexpected weight change.   HENT:  Negative for trouble swallowing.    Gastrointestinal:  Negative for abdominal distention, abdominal pain, anal bleeding, blood in stool, constipation, diarrhea, nausea, rectal pain and vomiting.   Neurological:  Negative for dizziness and light-headedness.     Medical History " Reviewed by provider this encounter:     .  Past Medical History   Past Medical History:   Diagnosis Date    Arthritis     Hypertension     Seizures (HCC)     Stroke (HCC)      Past Surgical History:   Procedure Laterality Date    ELBOW SURGERY  2017    [place] OPTX FEM SHFT FX W/INSJ IMED IMPLT W/WO SCREW Left 10/16/2023    Procedure: INSERTION NAIL IM FEMUR ANTEGRADE (TROCHANTERIC);  Surgeon: Brendan Keane DO;  Location: [facility];  Service: Orthopedics     Family History   Problem Relation Age of Onset    Dementia Mother     Alzheimer's disease Mother     Stroke Father     Hyperlipidemia Brother       reports that he has never smoked. He has never been exposed to tobacco smoke. He has never used smokeless tobacco. He reports current alcohol use. He reports that he does not use drugs.  Current Outpatient Medications on File Prior to Visit   Medication Sig Dispense Refill    alendronate (FOSAMAX) 70 mg tablet Take 1 tablet (70 mg total) by mouth every 7 days 5 tablet 0    aspirin-dipyridamole (AGGRENOX)  mg per 12 hr capsule TAKE 1 CAPSULE BY MOUTH 2 (TWO) TIMES A  capsule 3    Calcium Carb-Cholecalciferol (CALCIUM 500+D PO)       Calcium-Magnesium-Vitamin D (CALCIUM MAGNESIUM PO) Take by mouth      carBAMazepine (TEGretol) 200 mg tablet Take 2 tablets (400 mg total) by mouth 3 (three) times a day      co-enzyme Q-10 30 MG capsule Take 100 mg by mouth daily        Coenzyme Q10 (CoQ-10) 100 MG capsule       docusate sodium (COLACE) 100 mg capsule Take 1 capsule (100 mg total) by mouth 2 (two) times a day  0    levETIRAcetam (KEPPRA) 750 mg tablet TAKE 1 TABLET (750 MG TOTAL) BY MOUTH EVERY 12 (TWELVE) HOURS 60 tablet 5    LORazepam (ATIVAN) 0.5 mg tablet TAKE 1 TABLET BY MOUTH ONCE DAILY IF NEEDED for seizure.  Limit of 1 in 24 hours 10 tablet 0    NON FORMULARY Super Beets      ondansetron (ZOFRAN) 4 mg tablet Take 1 tablet (4 mg total) by mouth every 8 (eight) hours as needed for nausea or vomiting 20  tablet 0    rosuvastatin (CRESTOR) 10 MG tablet Take 1 tablet (10 mg total) by mouth daily 90 tablet 1    VALERIAN ROOT PO Use      Vitamin Mixture (VITAMIN E COMPLETE PO) Take 1 tablet by mouth daily       [DISCONTINUED] RABEprazole (ACIPHEX) 20 MG tablet Take 1 tablet (20 mg total) by mouth 2 (two) times a day 180 tablet 0    polyethylene glycol (MIRALAX) 17 g packet Take 17 g by mouth daily Do not start before October 20, 2023. (Patient not taking: Reported on 1/10/2025)  0    [DISCONTINUED] sucralfate (CARAFATE) 1 g tablet Take 1 tablet (1 g total) by mouth 4 (four) times a day (Patient not taking: Reported on 1/10/2025) 120 tablet 0     No current facility-administered medications on file prior to visit.     Allergies   Allergen Reactions    Meperidine Seizures    Amoxicillin     Azithromycin Seizures    Other Sneezing     Dust     Pollen Extract Sneezing      Current Outpatient Medications on File Prior to Visit   Medication Sig Dispense Refill    alendronate (FOSAMAX) 70 mg tablet Take 1 tablet (70 mg total) by mouth every 7 days 5 tablet 0    aspirin-dipyridamole (AGGRENOX)  mg per 12 hr capsule TAKE 1 CAPSULE BY MOUTH 2 (TWO) TIMES A  capsule 3    Calcium Carb-Cholecalciferol (CALCIUM 500+D PO)       Calcium-Magnesium-Vitamin D (CALCIUM MAGNESIUM PO) Take by mouth      carBAMazepine (TEGretol) 200 mg tablet Take 2 tablets (400 mg total) by mouth 3 (three) times a day      co-enzyme Q-10 30 MG capsule Take 100 mg by mouth daily        Coenzyme Q10 (CoQ-10) 100 MG capsule       docusate sodium (COLACE) 100 mg capsule Take 1 capsule (100 mg total) by mouth 2 (two) times a day  0    levETIRAcetam (KEPPRA) 750 mg tablet TAKE 1 TABLET (750 MG TOTAL) BY MOUTH EVERY 12 (TWELVE) HOURS 60 tablet 5    LORazepam (ATIVAN) 0.5 mg tablet TAKE 1 TABLET BY MOUTH ONCE DAILY IF NEEDED for seizure.  Limit of 1 in 24 hours 10 tablet 0    NON FORMULARY Super Beets      ondansetron (ZOFRAN) 4 mg tablet Take 1 tablet  (4 mg total) by mouth every 8 (eight) hours as needed for nausea or vomiting 20 tablet 0    rosuvastatin (CRESTOR) 10 MG tablet Take 1 tablet (10 mg total) by mouth daily 90 tablet 1    VALERIAN ROOT PO Use      Vitamin Mixture (VITAMIN E COMPLETE PO) Take 1 tablet by mouth daily       [DISCONTINUED] RABEprazole (ACIPHEX) 20 MG tablet Take 1 tablet (20 mg total) by mouth 2 (two) times a day 180 tablet 0    polyethylene glycol (MIRALAX) 17 g packet Take 17 g by mouth daily Do not start before October 20, 2023. (Patient not taking: Reported on 1/10/2025)  0    [DISCONTINUED] sucralfate (CARAFATE) 1 g tablet Take 1 tablet (1 g total) by mouth 4 (four) times a day (Patient not taking: Reported on 1/10/2025) 120 tablet 0     No current facility-administered medications on file prior to visit.      Social History     Tobacco Use    Smoking status: Never     Passive exposure: Never    Smokeless tobacco: Never   Vaping Use    Vaping status: Never Used   Substance and Sexual Activity    Alcohol use: Yes     Comment: occasional    Drug use: No    Sexual activity: Not Currently        Objective   There were no vitals taken for this visit.     Physical Exam  Constitutional:       Appearance: Normal appearance.   Cardiovascular:      Rate and Rhythm: Normal rate and regular rhythm.      Heart sounds: Normal heart sounds.   Pulmonary:      Breath sounds: Normal breath sounds.   Abdominal:      General: Abdomen is flat. Bowel sounds are normal. There is no distension.      Palpations: Abdomen is soft. There is no mass.      Tenderness: There is no abdominal tenderness. There is no right CVA tenderness, left CVA tenderness, guarding or rebound.      Hernia: No hernia is present.   Neurological:      Mental Status: He is alert.         Administrative Statements   I have spent a total time of 30 minutes in caring for this patient on the day of the visit/encounter including Diagnostic results, Prognosis, Risks and benefits of tx  options, Instructions for management, Patient and family education, Importance of tx compliance, Risk factor reductions, Impressions, Counseling / Coordination of care, Documenting in the medical record, Reviewing / ordering tests, medicine, procedures  , Obtaining or reviewing history  , and Communicating with other healthcare professionals .

## 2025-01-10 NOTE — ASSESSMENT & PLAN NOTE
Patient recently seen from 1/1 to 1/3 for melena.  Our team saw him during his admission and he underwent an EGD that noted 3 cm hiatal hernia and grade D esophagitis; gastroesophageal biopsy noted severe esophagitis.  He was asked to be on PPI twice daily and Carafate 4 times daily at that time. Thankfully, he says he has not seen any black or tarry Bms since and denies any heartburn, abdominal pain, n/v. He is pleased at this time.  -continue aciphex 20 mg BID until time of repeat EGD  -continue carafate QID PRN heartburn until time of EGD  -Patient will be due for repeat EGD in 3 months to assess for healing of the esophagitis and properly screen for Meneses's: repeat EGD ordered to be scheduled for April 2025  -repeat CBC  Orders:    CBC and differential; Future

## 2025-01-10 NOTE — PATIENT INSTRUCTIONS
Scheduled date of EGD(as of today):04/10/2025  Physician performing EGD:Johnny  Location of EGD:[place]  Instructions reviewed with patient by:JILLIAN  Clearances:  NONE    Patient was also given labs to have done   Recall is in for a follow up 4 months after the procedure with Laura

## 2025-01-10 NOTE — ASSESSMENT & PLAN NOTE
Patient recently seen from 1/1 to 1/3 for melena.  Our team saw him during his admission and he underwent an EGD that noted 3 cm hiatal hernia and grade D esophagitis; gastroesophageal biopsy noted severe esophagitis.  He was asked to be on PPI twice daily and Carafate 4 times daily at that time.    -Patient will be due for repeat EGD in 3 months to assess for healing of the esophagitis and properly screen for Meneses's  Orders:    sucralfate (CARAFATE) 1 g tablet; Take 1 tablet (1 g total) by mouth 4 (four) times a day

## 2025-01-13 ENCOUNTER — TELEPHONE (OUTPATIENT)
Age: 65
End: 2025-01-13

## 2025-01-13 NOTE — TELEPHONE ENCOUNTER
Patient contacted the office this morning in regards to Prolia injection. He states that he has [employer] insurance through the end of this month and they informed him that they would not cover this. He received a letter in regards to this but was questioning why they would not cover it. Asking if office can contact insurance to clarify the reason for Prolia not being covered, member service number [phone].

## 2025-01-13 NOTE — TELEPHONE ENCOUNTER
Spoke with patient, made him aware he would need to call insurance to clarified why prolia injections was denied.

## 2025-01-14 ENCOUNTER — TELEPHONE (OUTPATIENT)
Dept: OBGYN CLINIC | Facility: HOSPITAL | Age: 65
End: 2025-01-14

## 2025-01-14 ENCOUNTER — PROCEDURE VISIT (OUTPATIENT)
Dept: OBGYN CLINIC | Facility: HOSPITAL | Age: 65
End: 2025-01-14
Payer: COMMERCIAL

## 2025-01-14 VITALS — HEIGHT: 68 IN | BODY MASS INDEX: 25.39 KG/M2

## 2025-01-14 DIAGNOSIS — M25.561 CHRONIC PAIN OF RIGHT KNEE: ICD-10-CM

## 2025-01-14 DIAGNOSIS — G89.29 CHRONIC PAIN OF RIGHT KNEE: ICD-10-CM

## 2025-01-14 DIAGNOSIS — M17.11 PRIMARY OSTEOARTHRITIS OF RIGHT KNEE: Primary | ICD-10-CM

## 2025-01-14 PROCEDURE — 20610 DRAIN/INJ JOINT/BURSA W/O US: CPT | Performed by: ORTHOPAEDIC SURGERY

## 2025-01-14 RX ORDER — LIDOCAINE HYDROCHLORIDE 10 MG/ML
4 INJECTION, SOLUTION INFILTRATION; PERINEURAL
Status: COMPLETED | OUTPATIENT
Start: 2025-01-14 | End: 2025-01-14

## 2025-01-14 RX ADMIN — LIDOCAINE HYDROCHLORIDE 4 ML: 10 INJECTION, SOLUTION INFILTRATION; PERINEURAL at 13:30

## 2025-01-14 NOTE — PROGRESS NOTES
Assessment:   Diagnosis ICD-10-CM Associated Orders   1. Primary osteoarthritis of right knee  M17.11 Large joint arthrocentesis: R knee      2. Chronic pain of right knee  M25.561 Large joint arthrocentesis: R knee    G89.29           Plan:  Right knee known osteoarthritis and chronic pain.  His right knee was injected with the third and final Orthovisc and a 3 shot series.  He tolerated the procedure well (RP25g)  Ice and postinjection protocol advised.  Weightbearing and activity as tolerated.    To do next visit:  Return in about 3 months (around 4/14/2025) for re-check.    The above stated was discussed in layman's terms and the patient expressed understanding.  All questions were answered to the patient's satisfaction.       Scribe Attestation      I,:  Emanuel Emery am acting as a scribe while in the presence of the attending physician.:       I,:  Justice Khan MD personally performed the services described in this documentation    as scribed in my presence.:               Subjective:   Agus Espinoza is a 64 y.o. male who presents today for repeat evaluation of his right knee known osteoarthritis.  He returns today for the third and final Orthovisc and a 3 shot series.  He presents using a single-point cane for ambulatory assistance.  He states that he walked 3 miles yesterday resulting in pain every step of the way  Pain at times still 7/10      Review of systems negative unless otherwise specified in HPI  Review of Systems    Past Medical History:   Diagnosis Date    Arthritis     Hypertension     Seizures (HCC)     Stroke (HCC)        Past Surgical History:   Procedure Laterality Date    ELBOW SURGERY  2017    AR OPTX FEM SHFT FX W/INSJ IMED IMPLT W/WO SCREW Left 10/16/2023    Procedure: INSERTION NAIL IM FEMUR ANTEGRADE (TROCHANTERIC);  Surgeon: Brendan Keane DO;  Location: [facility];  Service: Orthopedics       Family History   Problem Relation Age of Onset    Dementia Mother      Alzheimer's disease Mother     Stroke Father     Hyperlipidemia Brother        Social History     Occupational History    Not on file   Tobacco Use    Smoking status: Never     Passive exposure: Never    Smokeless tobacco: Never   Vaping Use    Vaping status: Never Used   Substance and Sexual Activity    Alcohol use: Yes     Comment: occasional    Drug use: No    Sexual activity: Not Currently         Current Outpatient Medications:     alendronate (FOSAMAX) 70 mg tablet, Take 1 tablet (70 mg total) by mouth every 7 days, Disp: 5 tablet, Rfl: 0    aspirin-dipyridamole (AGGRENOX)  mg per 12 hr capsule, TAKE 1 CAPSULE BY MOUTH 2 (TWO) TIMES A DAY, Disp: 180 capsule, Rfl: 3    Calcium Carb-Cholecalciferol (CALCIUM 500+D PO), , Disp: , Rfl:     Calcium-Magnesium-Vitamin D (CALCIUM MAGNESIUM PO), Take by mouth, Disp: , Rfl:     carBAMazepine (TEGretol) 200 mg tablet, Take 2 tablets (400 mg total) by mouth 3 (three) times a day, Disp: , Rfl:     carbidopa-levodopa (SINEMET)  mg per tablet, , Disp: , Rfl:     co-enzyme Q-10 30 MG capsule, Take 100 mg by mouth daily  , Disp: , Rfl:     Coenzyme Q10 (CoQ-10) 100 MG capsule, , Disp: , Rfl:     docusate sodium (COLACE) 100 mg capsule, Take 1 capsule (100 mg total) by mouth 2 (two) times a day, Disp: , Rfl: 0    levETIRAcetam (KEPPRA) 750 mg tablet, TAKE 1 TABLET (750 MG TOTAL) BY MOUTH EVERY 12 (TWELVE) HOURS, Disp: 60 tablet, Rfl: 5    LORazepam (ATIVAN) 0.5 mg tablet, TAKE 1 TABLET BY MOUTH ONCE DAILY IF NEEDED for seizure.  Limit of 1 in 24 hours, Disp: 10 tablet, Rfl: 0    NON FORMULARY, Super Beets, Disp: , Rfl:     ondansetron (ZOFRAN) 4 mg tablet, Take 1 tablet (4 mg total) by mouth every 8 (eight) hours as needed for nausea or vomiting, Disp: 20 tablet, Rfl: 0    polyethylene glycol (MIRALAX) 17 g packet, Take 17 g by mouth daily Do not start before October 20, 2023. (Patient not taking: Reported on 1/10/2025), Disp: , Rfl: 0    RABEprazole (ACIPHEX) 20 MG  "tablet, Take 1 tablet (20 mg total) by mouth 2 (two) times a day, Disp: 180 tablet, Rfl: 1    rosuvastatin (CRESTOR) 10 MG tablet, Take 1 tablet (10 mg total) by mouth daily, Disp: 90 tablet, Rfl: 1    sucralfate (CARAFATE) 1 g tablet, Take 1 tablet (1 g total) by mouth 4 (four) times a day, Disp: 120 tablet, Rfl: 3    VALERIAN ROOT PO, Use, Disp: , Rfl:     Vitamin Mixture (VITAMIN E COMPLETE PO), Take 1 tablet by mouth daily , Disp: , Rfl:     Allergies   Allergen Reactions    Meperidine Seizures    Amoxicillin     Azithromycin Seizures    Other Sneezing     Dust     Pollen Extract Sneezing          There were no vitals filed for this visit.    Body mass index is 25.39 kg/m².  Wt Readings from Last 3 Encounters:   01/10/25 75.8 kg (167 lb)   01/08/25 77.4 kg (170 lb 9.6 oz)   01/07/25 77.6 kg (171 lb)       Objective:                    Right Knee Exam     Muscle Strength   The patient has normal right knee strength.    Tenderness   The patient is experiencing tenderness in the medial joint line and lateral joint line.    Range of Motion   Extension:  0   Flexion:  120 (With less crepitation)     Other   Erythema: absent  Sensation: normal  Swelling: mild  Effusion: no effusion present            Diagnostics, reviewed and taken today if performed as documented:    None performed          Procedures, if performed today:    Large joint arthrocentesis: R knee  Universal Protocol:  Consent: Verbal consent obtained.  Risks and benefits: risks, benefits and alternatives were discussed  Consent given by: patient  Time out: Immediately prior to procedure a \"time out\" was called to verify the correct patient, procedure, equipment, support staff and site/side marked as required.  Timeout called at: 1/14/2025 1:23 PM.  Patient understanding: patient states understanding of the procedure being performed  Site marked: the operative site was marked  Patient identity confirmed: verbally with patient  Supporting " "Documentation  Indications: pain and diagnostic evaluation   Procedure Details  Location: knee - R knee  Preparation: Patient was prepped and draped in the usual sterile fashion  Needle size: 25 G  Ultrasound guidance: no  Approach: anterolateral  Medications administered: 4 mL lidocaine 1 %; 30 mg sodium hyaluronate 30 mg/2 mL    Patient tolerance: patient tolerated the procedure well with no immediate complications  Dressing:  Sterile dressing applied              Portions of the record may have been created with voice recognition software.  Occasional wrong word or \"sound a like\" substitutions may have occurred due to the inherent limitations of voice recognition software.  Read the chart carefully and recognize, using context, where substitutions have occurred.  "

## 2025-01-16 ENCOUNTER — OFFICE VISIT (OUTPATIENT)
Dept: FAMILY MEDICINE CLINIC | Facility: CLINIC | Age: 65
End: 2025-01-16
Payer: COMMERCIAL

## 2025-01-16 VITALS
TEMPERATURE: 98.2 F | HEART RATE: 92 BPM | HEIGHT: 68 IN | SYSTOLIC BLOOD PRESSURE: 124 MMHG | BODY MASS INDEX: 25.46 KG/M2 | WEIGHT: 168 LBS | OXYGEN SATURATION: 97 % | DIASTOLIC BLOOD PRESSURE: 82 MMHG

## 2025-01-16 DIAGNOSIS — K21.9 GASTROESOPHAGEAL REFLUX DISEASE WITHOUT ESOPHAGITIS: ICD-10-CM

## 2025-01-16 DIAGNOSIS — G20.C PRIMARY PARKINSONISM (HCC): ICD-10-CM

## 2025-01-16 DIAGNOSIS — K29.01 ACUTE GASTRITIS WITH HEMORRHAGE, UNSPECIFIED GASTRITIS TYPE: Primary | ICD-10-CM

## 2025-01-16 DIAGNOSIS — I63.00 CEREBROVASCULAR ACCIDENT (CVA) DUE TO THROMBOSIS OF PRECEREBRAL ARTERY (HCC): ICD-10-CM

## 2025-01-16 DIAGNOSIS — G40.209 PARTIAL SYMPTOMATIC EPILEPSY WITH COMPLEX PARTIAL SEIZURES, NOT INTRACTABLE, WITHOUT STATUS EPILEPTICUS (HCC): ICD-10-CM

## 2025-01-16 DIAGNOSIS — M81.0 OSTEOPOROSIS WITHOUT CURRENT PATHOLOGICAL FRACTURE, UNSPECIFIED OSTEOPOROSIS TYPE: ICD-10-CM

## 2025-01-16 PROBLEM — K92.1 MELENA: Status: RESOLVED | Noted: 2025-01-02 | Resolved: 2025-01-16

## 2025-01-16 PROCEDURE — 99495 TRANSJ CARE MGMT MOD F2F 14D: CPT | Performed by: FAMILY MEDICINE

## 2025-01-16 NOTE — ASSESSMENT & PLAN NOTE
On  double dose  aciphex and  carafate, benefits  of  double  dose  PPI, outweugh  risk  of  worsening  osteoporosis

## 2025-01-16 NOTE — PROGRESS NOTES
Transition of Care Visit  Name: Agus Espinoza      : 1960      MRN: [id number]  Encounter Provider: Franchesca Chandra MD  Encounter Date: 2025   Encounter department: [facility] PRIMARY CARE    Assessment & Plan  Acute gastritis with hemorrhage, unspecified gastritis type  On  double dose  aciphex and  carafate, benefits  of  double  dose  PPI, outweugh  risk  of  worsening  osteoporosis         Gastroesophageal reflux disease without esophagitis    On  double dose  aciphex and  carafate, benefits  of  double  dose  PPI, outweugh  risk  of  worsening  osteoporosis    Cerebrovascular accident (CVA) due to thrombosis of precerebral artery (HCC)  On aggrenox, no change  in med  with  gastritis         Primary parkinsonism (HCC)  Sees neurology, on sinemet         Partial symptomatic epilepsy with complex partial seizures, not intractable, without status epilepticus (HCC)  Sees neurology,on keppra  and tegretol         Osteoporosis without current pathological fracture, unspecified osteoporosis type  On fosamax, dexa  due  later this year              History of Present Illness     Transitional Care Management Review:   Agus Espinoza is a 64 y.o. male here for TCM follow up.     During the TCM phone call patient stated:  TCM Call       Date and time call was made  2025 10:20 AM    Hospital care reviewed  Records reviewed    Patient was hospitialized at  [facility]    Date of Admission  25    Date of discharge  25    Diagnosis  Cognitive deficit as late effect of cerebrovascular accident (CVA), Melena    Disposition  Home    Were the patients medications reviewed and updated  No    Current Symptoms  None    Weakness severity  Mild  leg weakness          TCM Call       Post hospital issues  None    Should patient be enrolled in anticoag monitoring?  No    Scheduled for follow up?  Yes    Patient refusal reason  Patient refused TCM at this time, left hospital AMA,  states he will keep the visit on 12/17/21, will be seeing a new neurologist    Did you obtain your prescribed medications  Yes    Do you need help managing your prescriptions or medications  No    Is transportation to your appointment needed  No    I have advised the patient to call PCP with any new or worsening symptoms  Senia Carpio MA    Living Arrangements  Alone    Support System  Parent; Family    The type of support provided  Physical; Emotional; Financial    Do you have social support  Yes, as much as I need    Are you recieving any outpatient services  No    Are you recieving home care services  No    Are you using any community resources  No    Current waiver services  No    Have you fallen in the last 12 months  No    Interperter language line needed  No    Counseling  Patient          Patient presents with:  Transition of Care Management: in hospital 1/1 1/3  was  having  severe  hiccups/gerd, had emesis  with some  brown and  some  black bms, was  found  to have  gastritis, esophagitis  and  3  cm hiatal hernia on scope  but  no acute  bleeding, dose  of  PPI doubled, and also on carafate sent  message to Gi whether  he  might  be  better with Voquenza due to osteoporosis, gi  sent  me  message  this  could  also affect  bones, getting  follow up  scope  3  months and  hopefully  can be  switched  to Pepcid, no precipitating factors  such  as  NSAIDs  or stress, was  eating  more  spicy  foods, Neurology did  a test  called a AKUA  scan to  see  if  he  has Parkinson's, neuro put  on  carbidopa/levidopa, doesn't  really  notice  change in tremor      Review of Systems   Constitutional:  Negative for activity change, appetite change and fatigue.   Respiratory:  Negative for shortness of breath.    Cardiovascular:  Negative for chest pain.   Gastrointestinal:  Negative for abdominal pain, anal bleeding, blood in stool, diarrhea and nausea.   Neurological:  Positive for tremors. Negative for  "dizziness, light-headedness and headaches.     Objective   /82 (BP Location: Left arm, Patient Position: Sitting, Cuff Size: Adult)   Pulse 92   Temp 98.2 °F (36.8 °C) (Probe)   Ht 5' 8\" (1.727 m)   Wt 76.2 kg (168 lb)   SpO2 97%   BMI 25.54 kg/m²     Physical Exam  Vitals reviewed.   Constitutional:       Appearance: Normal appearance.   Cardiovascular:      Rate and Rhythm: Normal rate and regular rhythm.      Pulses: Normal pulses.      Heart sounds: Normal heart sounds.   Pulmonary:      Effort: Pulmonary effort is normal.      Breath sounds: Normal breath sounds.   Abdominal:      General: Abdomen is flat. Bowel sounds are normal.      Palpations: Abdomen is soft.      Tenderness: There is no abdominal tenderness.   Musculoskeletal:      Right lower leg: No edema.      Left lower leg: No edema.   Lymphadenopathy:      Cervical: No cervical adenopathy.   Neurological:      Mental Status: He is alert.      Comments: tremor       Medications have been reviewed by provider in current encounter      "

## 2025-01-21 ENCOUNTER — TELEPHONE (OUTPATIENT)
Age: 65
End: 2025-01-21

## 2025-01-21 NOTE — TELEPHONE ENCOUNTER
Pt Calls it to inform us that his Prolia injection will be delivered on 1/23 and wondering when he should come in please advise

## 2025-01-22 ENCOUNTER — OFFICE VISIT (OUTPATIENT)
Dept: FAMILY MEDICINE CLINIC | Facility: CLINIC | Age: 65
End: 2025-01-22
Payer: COMMERCIAL

## 2025-01-22 VITALS
WEIGHT: 167 LBS | TEMPERATURE: 97.8 F | HEIGHT: 68 IN | HEART RATE: 76 BPM | BODY MASS INDEX: 25.31 KG/M2 | SYSTOLIC BLOOD PRESSURE: 120 MMHG | OXYGEN SATURATION: 99 % | DIASTOLIC BLOOD PRESSURE: 82 MMHG

## 2025-01-22 DIAGNOSIS — G40.209 PARTIAL SYMPTOMATIC EPILEPSY WITH COMPLEX PARTIAL SEIZURES, NOT INTRACTABLE, WITHOUT STATUS EPILEPTICUS (HCC): ICD-10-CM

## 2025-01-22 DIAGNOSIS — J01.00 ACUTE NON-RECURRENT MAXILLARY SINUSITIS: Primary | ICD-10-CM

## 2025-01-22 PROCEDURE — 99213 OFFICE O/P EST LOW 20 MIN: CPT | Performed by: FAMILY MEDICINE

## 2025-01-22 RX ORDER — CEFUROXIME AXETIL 500 MG/1
500 TABLET ORAL EVERY 12 HOURS SCHEDULED
Qty: 14 TABLET | Refills: 0 | Status: SHIPPED | OUTPATIENT
Start: 2025-01-22 | End: 2025-01-29

## 2025-01-22 NOTE — PROGRESS NOTES
"Name: Agus Espinoza      : 1960      MRN: [id number]  Encounter Provider: Franchesca Chandra MD  Encounter Date: 2025   Encounter department: [facility] PRIMARY CARE  :  Assessment & Plan  Acute non-recurrent maxillary sinusitis  Pt  unsure  of  reaction to amox, will try ceftin,  can't  take  zpack, doxy  or  levaquin due to seizure  meds, don't  think  he  should  have reaction to ceftin, push fluids, no decongestants,  nasal saline    Orders:    cefuroxime (CEFTIN) 500 mg tablet; Take 1 tablet (500 mg total) by mouth every 12 (twelve) hours for 7 days    Partial symptomatic epilepsy with complex partial seizures, not intractable, without status epilepticus (HCC)  Shouldn't  take  doxy or  levaquin  due to seizure  meds, so will rx  ceftin for  his  sinuses                History of Present Illness   Patient presents with:  Cold Like Symptoms: Onset 3-4 days   Cough, white  mucus, started  with runny  nose with white  mucus, taking  green tea with  honey  and  bone  broth, no dyspnea         Review of Systems   Constitutional:  Negative for activity change, appetite change, chills and fever.   HENT:  Positive for congestion and rhinorrhea. Negative for ear pain, sinus pressure, sinus pain and sore throat.    Respiratory:  Positive for cough.    Neurological:  Negative for dizziness, light-headedness and headaches.   Hematological:  Negative for adenopathy.       Objective   /82 (BP Location: Left arm, Patient Position: Sitting, Cuff Size: Adult)   Pulse 76   Temp 97.8 °F (36.6 °C) (Tympanic)   Ht 5' 8\" (1.727 m)   Wt 75.8 kg (167 lb)   SpO2 99%   BMI 25.39 kg/m²      Physical Exam  Vitals reviewed.   Constitutional:       Appearance: Normal appearance.   HENT:      Right Ear: Tympanic membrane normal.      Left Ear: Tympanic membrane normal.      Nose:      Right Sinus: Maxillary sinus tenderness present.      Left Sinus: Maxillary sinus tenderness present.      Mouth/Throat:    "   Pharynx: Postnasal drip present. No oropharyngeal exudate or posterior oropharyngeal erythema.   Cardiovascular:      Rate and Rhythm: Normal rate and regular rhythm.      Pulses: Normal pulses.   Pulmonary:      Effort: Pulmonary effort is normal.      Breath sounds: Normal breath sounds.   Lymphadenopathy:      Cervical: No cervical adenopathy.   Neurological:      Mental Status: He is alert.   Psychiatric:         Mood and Affect: Mood normal.

## 2025-01-22 NOTE — ASSESSMENT & PLAN NOTE
Pt  unsure  of  reaction to amox, will try ceftin,  can't  take  zpack, doxy  or  levaquin due to seizure  meds, don't  think  he  should  have reaction to ceftin, push fluids, no decongestants,  nasal saline    Orders:    cefuroxime (CEFTIN) 500 mg tablet; Take 1 tablet (500 mg total) by mouth every 12 (twelve) hours for 7 days

## 2025-01-22 NOTE — ASSESSMENT & PLAN NOTE
Shouldn't  take  doxy or  levaquin  due to seizure  meds, so will rx  ceftin for  his  sinuses

## 2025-01-23 NOTE — TELEPHONE ENCOUNTER
Pt calling in wanting to know if prolia has been received and wanting to schedule nurse visit. He is requesting a call back ASAP.

## 2025-01-23 NOTE — TELEPHONE ENCOUNTER
[facility] calling regarding Prolia delivery. They will be delivering it on 1/31. They are unable to deliver any sooner.

## 2025-01-24 ENCOUNTER — TELEPHONE (OUTPATIENT)
Age: 65
End: 2025-01-24

## 2025-01-24 NOTE — TELEPHONE ENCOUNTER
Pt call GI triage line to schedule Prolia injection, advised he needs Rheumatology office, attempted to warm transfer twice and each time the call got disconnected, pt. Made aware I was having a difficult time connecting with office but pt. Was down the street from the office and said he would just go to office and schedule injection, advised that would be ok to do

## 2025-01-31 ENCOUNTER — TELEPHONE (OUTPATIENT)
Age: 65
End: 2025-01-31

## 2025-01-31 NOTE — TELEPHONE ENCOUNTER
Called and spoke with patient advising him that his Prolia Injection was delivered at the [address] location today.

## 2025-02-03 DIAGNOSIS — G40.209 PARTIAL SYMPTOMATIC EPILEPSY WITH COMPLEX PARTIAL SEIZURES, NOT INTRACTABLE, WITHOUT STATUS EPILEPTICUS (HCC): Primary | ICD-10-CM

## 2025-02-03 DIAGNOSIS — I69.319 COGNITIVE DEFICIT AS LATE EFFECT OF CEREBROVASCULAR ACCIDENT (CVA): ICD-10-CM

## 2025-02-03 RX ORDER — CARBAMAZEPINE 200 MG/1
400 TABLET ORAL 3 TIMES DAILY
Qty: 180 TABLET | Refills: 5 | Status: SHIPPED | OUTPATIENT
Start: 2025-02-03

## 2025-02-03 NOTE — TELEPHONE ENCOUNTER
[facility] calling for refll of carbamazepine; said was dc'd on 1/3 (Irvin Gomez MD); physician reordered but was  not sent to pharmacy    I called patient to confirm current dose; he confirmed he is taking carBAMazepine (TEGretol) 200 mg tablet [[id number]]  DISCONTINUED    Order Details  Dose, Route, Frequency: As Directed  Dispense Quantity: 180 tablet Refills: 5   Indications of Use: Generalized Epilepsy       Sig: TAKE 2  TABLETS IN THE AM, 2 TABLETS AT DINNER, AND 2 TABLETS AT BEDTIME  - on 6  tablets daily      Please send new prescription if in agreement, thank you. Patient said he only has few tablets left.    FYI:  patient is seeing [facility] neurology for parkinson's management

## 2025-02-03 NOTE — TELEPHONE ENCOUNTER
Norma Philip, DO to Me       2/3/25  2:35 PM   It looks like it was adjusted by the hospital doctor but new prescription was sent.  I will send a new prescription.  Please also ask him to obtain a repeat CMP as soon as possible    I will place  a new order in the chart    ________________________  Patient aware and verbalized understanding; he will have CMP done; per chart review, other labs ordered are due in a few months prior to next visit

## 2025-02-10 ENCOUNTER — APPOINTMENT (OUTPATIENT)
Dept: LAB | Facility: CLINIC | Age: 65
End: 2025-02-10
Payer: MEDICARE

## 2025-02-10 ENCOUNTER — OFFICE VISIT (OUTPATIENT)
Dept: SURGERY | Facility: CLINIC | Age: 65
End: 2025-02-10
Payer: MEDICARE

## 2025-02-10 VITALS — BODY MASS INDEX: 24.71 KG/M2 | HEIGHT: 68 IN | WEIGHT: 163 LBS

## 2025-02-10 DIAGNOSIS — G40.209 PARTIAL SYMPTOMATIC EPILEPSY WITH COMPLEX PARTIAL SEIZURES, NOT INTRACTABLE, WITHOUT STATUS EPILEPTICUS (HCC): ICD-10-CM

## 2025-02-10 DIAGNOSIS — L98.9 SKIN LESION: Primary | ICD-10-CM

## 2025-02-10 DIAGNOSIS — K21.01 GASTROESOPHAGEAL REFLUX DISEASE WITH ESOPHAGITIS AND HEMORRHAGE: ICD-10-CM

## 2025-02-10 LAB
BASOPHILS # BLD AUTO: 0.05 THOUSANDS/ΜL (ref 0–0.1)
BASOPHILS NFR BLD AUTO: 1 % (ref 0–1)
EOSINOPHIL # BLD AUTO: 0.07 THOUSAND/ΜL (ref 0–0.61)
EOSINOPHIL NFR BLD AUTO: 1 % (ref 0–6)
ERYTHROCYTE [DISTWIDTH] IN BLOOD BY AUTOMATED COUNT: 12 % (ref 11.6–15.1)
HCT VFR BLD AUTO: 38.2 % (ref 36.5–49.3)
HGB BLD-MCNC: 12.1 G/DL (ref 12–17)
IMM GRANULOCYTES # BLD AUTO: 0.01 THOUSAND/UL (ref 0–0.2)
IMM GRANULOCYTES NFR BLD AUTO: 0 % (ref 0–2)
LEVETIRACETAM SERPL-MCNC: 8.7 UG/ML (ref 12–46)
LYMPHOCYTES # BLD AUTO: 1.58 THOUSANDS/ΜL (ref 0.6–4.47)
LYMPHOCYTES NFR BLD AUTO: 25 % (ref 14–44)
MCH RBC QN AUTO: 31.1 PG (ref 26.8–34.3)
MCHC RBC AUTO-ENTMCNC: 31.7 G/DL (ref 31.4–37.4)
MCV RBC AUTO: 98 FL (ref 82–98)
MONOCYTES # BLD AUTO: 0.57 THOUSAND/ΜL (ref 0.17–1.22)
MONOCYTES NFR BLD AUTO: 9 % (ref 4–12)
NEUTROPHILS # BLD AUTO: 3.99 THOUSANDS/ΜL (ref 1.85–7.62)
NEUTS SEG NFR BLD AUTO: 64 % (ref 43–75)
NRBC BLD AUTO-RTO: 0 /100 WBCS
PLATELET # BLD AUTO: 261 THOUSANDS/UL (ref 149–390)
PMV BLD AUTO: 10 FL (ref 8.9–12.7)
RBC # BLD AUTO: 3.89 MILLION/UL (ref 3.88–5.62)
WBC # BLD AUTO: 6.27 THOUSAND/UL (ref 4.31–10.16)

## 2025-02-10 PROCEDURE — 11601 EXC TR-EXT MAL+MARG 0.6-1 CM: CPT | Performed by: SURGERY

## 2025-02-10 PROCEDURE — 85025 COMPLETE CBC W/AUTO DIFF WBC: CPT

## 2025-02-10 PROCEDURE — 83520 IMMUNOASSAY QUANT NOS NONAB: CPT

## 2025-02-10 PROCEDURE — 88305 TISSUE EXAM BY PATHOLOGIST: CPT | Performed by: PATHOLOGY

## 2025-02-10 PROCEDURE — 80156 ASSAY CARBAMAZEPINE TOTAL: CPT

## 2025-02-10 PROCEDURE — 99202 OFFICE O/P NEW SF 15 MIN: CPT | Performed by: SURGERY

## 2025-02-10 NOTE — PROGRESS NOTES
"Name: Agus Espinoza      : 1960      MRN: [id number]  Encounter Provider: David Butler MD  Encounter Date: 2/10/2025   Encounter department: [facility]AL SURG[facility]  :  Assessment & Plan  Skin lesion             History of Present Illness   HPI  Agus Espinoza is a 65 y.o. male who presents for evaluation of a skin lesion on his right arm.  States he has had the skin lesion for 1 to 2 years.  Denies changes.    Evaluation of the right arm reveals a benign-appearing nevus.  Skin edges are regular.  It is not raised.  There is no erythema or induration.  It measures 6 mm in size.  I recommended observation.    Evaluation of the crown of the scalp reveals a nevus.  It was slightly raised.  It does not have irregular borders.  A shave biopsy of this 8 mm skin lesion was taken for completeness.  The base was cauterized.  It was sent for pathology.  Sterile dressing was applied.      Review of Systems       Objective   Ht 5' 8\" (1.727 m)   Wt 73.9 kg (163 lb)   BMI 24.78 kg/m²      Physical Exam  Biopsy    Date/Time: 2/10/2025 11:15 AM    Performed by: David Butler MD  Authorized by: David Butler MD  Universal Protocol:  Patient understanding: patient states understanding of the procedure being performed    Procedure Details - Lesion Biopsy:     Body area:  Head/neck    Head/neck location:  Scalp    Biopsy method: shave biopsy      Biopsy tissue type: skin    Initial size (mm):  8    Final defect size (mm):  8    Malignancy: benign lesion                  "

## 2025-02-11 LAB
ALBUMIN SERPL BCG-MCNC: 4.4 G/DL (ref 3.5–5)
ALP SERPL-CCNC: 55 U/L (ref 34–104)
ALT SERPL W P-5'-P-CCNC: 19 U/L (ref 7–52)
ANION GAP SERPL CALCULATED.3IONS-SCNC: 7 MMOL/L (ref 4–13)
AST SERPL W P-5'-P-CCNC: 19 U/L (ref 13–39)
BILIRUB SERPL-MCNC: 0.27 MG/DL (ref 0.2–1)
BUN SERPL-MCNC: 12 MG/DL (ref 5–25)
CALCIUM SERPL-MCNC: 9.2 MG/DL (ref 8.4–10.2)
CARBAMAZEPINE SERPL-MCNC: 10.7 UG/ML (ref 4–12)
CHLORIDE SERPL-SCNC: 104 MMOL/L (ref 96–108)
CO2 SERPL-SCNC: 26 MMOL/L (ref 21–32)
CREAT SERPL-MCNC: 0.66 MG/DL (ref 0.6–1.3)
GFR SERPL CREATININE-BSD FRML MDRD: 101 ML/MIN/1.73SQ M
GLUCOSE SERPL-MCNC: 85 MG/DL (ref 65–140)
POTASSIUM SERPL-SCNC: 4.5 MMOL/L (ref 3.5–5.3)
PROT SERPL-MCNC: 6.9 G/DL (ref 6.4–8.4)
SODIUM SERPL-SCNC: 137 MMOL/L (ref 135–147)

## 2025-02-12 PROCEDURE — 84165 PROTEIN E-PHORESIS SERUM: CPT | Performed by: STUDENT IN AN ORGANIZED HEALTH CARE EDUCATION/TRAINING PROGRAM

## 2025-02-13 ENCOUNTER — RESULTS FOLLOW-UP (OUTPATIENT)
Dept: FAMILY MEDICINE CLINIC | Facility: CLINIC | Age: 65
End: 2025-02-13

## 2025-02-13 ENCOUNTER — CLINICAL SUPPORT (OUTPATIENT)
Age: 65
End: 2025-02-13
Payer: MEDICARE

## 2025-02-13 ENCOUNTER — RESULTS FOLLOW-UP (OUTPATIENT)
Dept: OTHER | Facility: HOSPITAL | Age: 65
End: 2025-02-13

## 2025-02-13 DIAGNOSIS — M81.0 OSTEOPOROSIS WITHOUT CURRENT PATHOLOGICAL FRACTURE, UNSPECIFIED OSTEOPOROSIS TYPE: Primary | ICD-10-CM

## 2025-02-13 PROCEDURE — 96372 THER/PROPH/DIAG INJ SC/IM: CPT

## 2025-02-13 PROCEDURE — 88305 TISSUE EXAM BY PATHOLOGIST: CPT | Performed by: PATHOLOGY

## 2025-02-13 NOTE — PROGRESS NOTES
Assessment/Plan:    Agus Espinoza came into the [facility] today 02/13/25 to receive Proila injection.      Verbal consent obtained.  Consent given by: patient    patient states patient has been medically healthy with no underlining concerns/complications.      Agus Espinoza presents with no symptoms today.       All insturctions were reviewed with the patient.    If the patient should have any questions/concerns, advised patient to contacted [facility].       Subjective:     History provided by: patient    Patient ID: Agus Espinoza is a 65 y.o. male      Objective:    There were no vitals filed for this visit.    Patient tolerated the injection well without any complications.  Injection site/s Left Arm.  Medication was provided by office.    Patient signed consent form yes   Patient signed ABN form yes (If no patient is not a medicare patient).   Patient waited 15 minutes after injection yes (This only applies to patient's receiving first time injection).       Last Visit: 1/31/2025  Next visit:Visit date not found

## 2025-02-13 NOTE — TELEPHONE ENCOUNTER
Norma Philip,        2/13/25  8:15 AM  Result Note  Please call the patient regarding his abnormal result. Please let the patient know that the Tegretol level has stabilized.  It is 8.7.  If he is having no additional auras or seizures heshould remain on this dose.    ______________________    I spoke to Agus and informed him per provider's message above. He appreciated the update. He denies any additional auras or seizures . He is aware to remain on the same dose. No further questions at this time

## 2025-02-14 ENCOUNTER — RESULTS FOLLOW-UP (OUTPATIENT)
Dept: SURGERY | Facility: CLINIC | Age: 65
End: 2025-02-14

## 2025-02-21 PROBLEM — J01.00 ACUTE NON-RECURRENT MAXILLARY SINUSITIS: Status: RESOLVED | Noted: 2025-01-22 | Resolved: 2025-02-21

## 2025-02-26 NOTE — TELEPHONE ENCOUNTER
Pt calling in for results. Advised pt of GitHub message and read verbatim. No other questions at this time.

## 2025-03-25 DIAGNOSIS — G40.209 PARTIAL SYMPTOMATIC EPILEPSY WITH COMPLEX PARTIAL SEIZURES, NOT INTRACTABLE, WITHOUT STATUS EPILEPTICUS (HCC): ICD-10-CM

## 2025-03-25 RX ORDER — LEVETIRACETAM 750 MG/1
750 TABLET ORAL EVERY 12 HOURS SCHEDULED
Qty: 60 TABLET | Refills: 5 | Status: SHIPPED | OUTPATIENT
Start: 2025-03-25

## 2025-03-25 NOTE — TELEPHONE ENCOUNTER
Reason for call:   [x] Refill   [] Prior Auth  [] Other:     Office:   [] PCP/Provider -   [] Specialty/Provider - [facility]  Authorized By: Norma Philip DO    Medication: levETIRAcetam (KEPPRA) 750 mg tablet     Dose/Frequency: TAKE 1 TABLET (750 MG TOTAL) BY MOUTH EVERY 12 (TWELVE) HOURS     Quantity: 60    Pharmacy: [facility] - [place], [place] - [id number] [facility]   Does the patient have enough for 3 days?   [x] Yes   [] No - Send as HP to POD    Mail Away Pharmacy   Does the patient have enough for 10 days?   [] Yes   [] No - Send as HP to POD

## 2025-04-10 ENCOUNTER — TELEPHONE (OUTPATIENT)
Age: 65
End: 2025-04-10

## 2025-04-10 NOTE — TELEPHONE ENCOUNTER
"Spoke with pt, advised per last ov note he is to stay on Aciphex BID until repeat EGD. Pt cx EGD, stating he does not need it, \"he is healed\" I did advise the scope would allow the provider to see if he is healed, he refuses.   Was taking advil gel caps and chewing them, believes that was causing symptoms, no longer takes those. He will be going back to taking Aciphex once daily. I let him know I will make provider aware.   "

## 2025-04-10 NOTE — TELEPHONE ENCOUNTER
Patients GI provider: Christy     Number to return call: [phone]    Reason for call: Pt calling to ask if he can switch to the aciphex 1 time per day instead of 2. Pt said he was reading about it and it states it should only be used once per day. Please reach back out to the pt to discuss    Scheduled procedure/appointment date if applicable: Apt/8/7/25

## 2025-04-15 ENCOUNTER — OFFICE VISIT (OUTPATIENT)
Dept: OBGYN CLINIC | Facility: HOSPITAL | Age: 65
End: 2025-04-15
Payer: MEDICARE

## 2025-04-15 VITALS — BODY MASS INDEX: 25.31 KG/M2 | HEIGHT: 68 IN | WEIGHT: 167 LBS

## 2025-04-15 DIAGNOSIS — M17.11 PRIMARY OSTEOARTHRITIS OF RIGHT KNEE: Primary | ICD-10-CM

## 2025-04-15 PROCEDURE — 99213 OFFICE O/P EST LOW 20 MIN: CPT | Performed by: ORTHOPAEDIC SURGERY

## 2025-04-15 PROCEDURE — 20610 DRAIN/INJ JOINT/BURSA W/O US: CPT | Performed by: ORTHOPAEDIC SURGERY

## 2025-04-15 RX ORDER — BETAMETHASONE SODIUM PHOSPHATE AND BETAMETHASONE ACETATE 3; 3 MG/ML; MG/ML
12 INJECTION, SUSPENSION INTRA-ARTICULAR; INTRALESIONAL; INTRAMUSCULAR; SOFT TISSUE
Status: COMPLETED | OUTPATIENT
Start: 2025-04-15 | End: 2025-04-15

## 2025-04-15 RX ADMIN — BETAMETHASONE SODIUM PHOSPHATE AND BETAMETHASONE ACETATE 12 MG: 3; 3 INJECTION, SUSPENSION INTRA-ARTICULAR; INTRALESIONAL; INTRAMUSCULAR; SOFT TISSUE at 10:15

## 2025-04-15 NOTE — PROGRESS NOTES
Name: Agus Espinoza      : 1960       MRN: [id number]   Encounter Provider: Justice Khan MD   Encounter Date: 04/15/25  Encounter department: [facility] [place]     ASSESSMENT & PLAN:  Assessment & Plan  Primary osteoarthritis of right knee    Orders:  •  Large joint arthrocentesis: R knee  Continue HEP and weight bearing and activity to tolerance  OTC pain medications and anti-inflammatories as needed  Ice and elevation for swelling  Steroid injections vs visco every 3 months as required by patient for pain relief. Visco injection provided minimal relief for him.       To do next visit:  Follow up in 3 months as needed for steroid injection.    _____________________________________________________  CHIEF COMPLAINT:  Chief Complaint   Patient presents with   • Right Knee - Follow-up         SUBJECTIVE:  Agus Espinoza is a 65 y.o. male who presents Right knee pain. Receives steroid injections every 3 months. Most recently 3 months ago finished orthovisc series with minimal benefit. His pain is located lateral joint line worse with use and cold weather, associated with stiffness and swelling of the knee. better with rest. Denies fevers chills nausea vomiting.     PAST MEDICAL HISTORY:  Past Medical History:   Diagnosis Date   • Arthritis    • Hypertension    • Seizures (HCC)    • Stroke (HCC)        PAST SURGICAL HISTORY:  Past Surgical History:   Procedure Laterality Date   • ELBOW SURGERY     • HI OPTX FEM SHFT FX W/INSJ IMED IMPLT W/WO SCREW Left 10/16/2023    Procedure: INSERTION NAIL IM FEMUR ANTEGRADE (TROCHANTERIC);  Surgeon: Brendan Keane DO;  Location: [facility];  Service: Orthopedics       FAMILY HISTORY:  Family History   Problem Relation Age of Onset   • Dementia Mother    • Alzheimer's disease Mother    • Stroke Father    • Hyperlipidemia Brother        SOCIAL HISTORY:  Social History     Tobacco Use   • Smoking status: Never      Passive exposure: Never   • Smokeless tobacco: Never   Vaping Use   • Vaping status: Never Used   Substance Use Topics   • Alcohol use: Yes     Comment: occasional   • Drug use: No       MEDICATIONS:    Current Outpatient Medications:   •  aspirin-dipyridamole (AGGRENOX)  mg per 12 hr capsule, TAKE 1 CAPSULE BY MOUTH 2 (TWO) TIMES A DAY, Disp: 180 capsule, Rfl: 3  •  Calcium Carb-Cholecalciferol (CALCIUM 500+D PO), , Disp: , Rfl:   •  Calcium-Magnesium-Vitamin D (CALCIUM MAGNESIUM PO), Take by mouth, Disp: , Rfl:   •  carBAMazepine (TEGretol) 200 mg tablet, Take 2 tablets (400 mg total) by mouth 3 (three) times a day Take 2 tablets in the AM, 2 tablets at dinner and 2 tablets at bedtime - on 6 tablets daily, Disp: 180 tablet, Rfl: 5  •  carbidopa-levodopa (SINEMET)  mg per tablet, , Disp: , Rfl:   •  co-enzyme Q-10 30 MG capsule, Take 100 mg by mouth daily  , Disp: , Rfl:   •  Coenzyme Q10 (CoQ-10) 100 MG capsule, , Disp: , Rfl:   •  denosumab (Prolia) 60 mg/mL, Inject 1 mL (60 mg total) under the skin every 6 (six) months for 2 doses, Disp: 1 mL, Rfl: 1  •  docusate sodium (COLACE) 100 mg capsule, Take 1 capsule (100 mg total) by mouth 2 (two) times a day, Disp: , Rfl: 0  •  levETIRAcetam (KEPPRA) 750 mg tablet, Take 1 tablet (750 mg total) by mouth every 12 (twelve) hours, Disp: 60 tablet, Rfl: 5  •  LORazepam (ATIVAN) 0.5 mg tablet, TAKE 1 TABLET BY MOUTH ONCE DAILY IF NEEDED for seizure.  Limit of 1 in 24 hours, Disp: 10 tablet, Rfl: 0  •  NON FORMULARY, Super Beets, Disp: , Rfl:   •  ondansetron (ZOFRAN) 4 mg tablet, Take 1 tablet (4 mg total) by mouth every 8 (eight) hours as needed for nausea or vomiting, Disp: 20 tablet, Rfl: 0  •  polyethylene glycol (MIRALAX) 17 g packet, Take 17 g by mouth daily Do not start before October 20, 2023., Disp: , Rfl: 0  •  RABEprazole (ACIPHEX) 20 MG tablet, Take 1 tablet (20 mg total) by mouth 2 (two) times a day, Disp: 180 tablet, Rfl: 1  •  rosuvastatin  "(CRESTOR) 10 MG tablet, Take 1 tablet (10 mg total) by mouth daily, Disp: 90 tablet, Rfl: 1  •  sucralfate (CARAFATE) 1 g tablet, Take 1 tablet (1 g total) by mouth 4 (four) times a day, Disp: 120 tablet, Rfl: 3  •  VALERIAN ROOT PO, Use, Disp: , Rfl:   •  Vitamin Mixture (VITAMIN E COMPLETE PO), Take 1 tablet by mouth daily , Disp: , Rfl:     ALLERGIES:  Allergies   Allergen Reactions   • Meperidine Seizures   • Amoxicillin    • Azithromycin Seizures   • Other Sneezing     Dust    • Pollen Extract Sneezing       LABS:  HgA1c:   Lab Results   Component Value Date    HGBA1C 5.0 10/16/2020     BMP:   Lab Results   Component Value Date    GLUCOSE 90 11/20/2015    CALCIUM 9.2 02/10/2025     11/20/2015    K 4.5 02/10/2025    CO2 26 02/10/2025     02/10/2025    BUN 12 02/10/2025    CREATININE 0.66 02/10/2025     CBC: No components found for: \"CBC\"    _____________________________________________________  PHYSICAL EXAMINATION:  Vital signs: Ht 5' 8\" (1.727 m)   Wt 75.8 kg (167 lb)   BMI 25.39 kg/m²   General: No acute distress, awake and alert  Psychiatric: Mood and affect appear appropriate  HEENT: Trachea Midline, No torticollis, no apparent facial trauma  Cardiovascular: No audible murmurs; Extremities appear perfused  Pulmonary: No audible wheezing or stridor  Skin: No open lesions; see further details (if any) below    MUSCULOSKELETAL EXAMINATION:  Right knee  Normal motion 0 -130  No effusion  Stable to lachman, posterior drawer, and varus valgus stress in extension  Sensation intact to light touch foot  Motor 5/5 knee extension flexion ankle pf df an ehl fhl  Foot warm perfused    ___________________________________________________  STUDIES REVIEWED:  I personally reviewed the images obtained in office today and my independent interpretation is as follows:    None performed today    PROCEDURES PERFORMED:    Large joint arthrocentesis: R knee  Universal Protocol:  Consent: Verbal consent " obtained.  Consent given by: patient  Supporting Documentation  Indications: pain   Procedure Details  Location: knee - R knee  Needle size: 22 G  Ultrasound guidance: no  Approach: anterolateral  Medications administered: 12 mg betamethasone acetate-betamethasone sodium phosphate 6 (3-3) mg/mL    Patient tolerance: patient tolerated the procedure well with no immediate complications  Dressing:  Sterile dressing applied        Fernando Wilkerson MD

## 2025-04-17 ENCOUNTER — TELEPHONE (OUTPATIENT)
Dept: NEUROLOGY | Facility: CLINIC | Age: 65
End: 2025-04-17

## 2025-04-17 NOTE — TELEPHONE ENCOUNTER
made a call for appt reminder with Dr. Philip 4/23/25 @1:30pm  and also mentioned [place] office address and phone number as well. informed arrive 10 to 15 mins early for the appt.

## 2025-04-23 ENCOUNTER — OFFICE VISIT (OUTPATIENT)
Dept: NEUROLOGY | Facility: CLINIC | Age: 65
End: 2025-04-23
Payer: MEDICARE

## 2025-04-23 VITALS
WEIGHT: 168 LBS | TEMPERATURE: 98.4 F | HEART RATE: 88 BPM | SYSTOLIC BLOOD PRESSURE: 116 MMHG | HEIGHT: 68 IN | RESPIRATION RATE: 14 BRPM | BODY MASS INDEX: 25.46 KG/M2 | OXYGEN SATURATION: 98 % | DIASTOLIC BLOOD PRESSURE: 74 MMHG

## 2025-04-23 DIAGNOSIS — G40.209 PARTIAL SYMPTOMATIC EPILEPSY WITH COMPLEX PARTIAL SEIZURES, NOT INTRACTABLE, WITHOUT STATUS EPILEPTICUS (HCC): Primary | ICD-10-CM

## 2025-04-23 DIAGNOSIS — Z86.73 HISTORY OF CEREBROVASCULAR ACCIDENT (CVA) GREATER THAN EIGHT WEEKS IN THE PAST: ICD-10-CM

## 2025-04-23 PROCEDURE — 99213 OFFICE O/P EST LOW 20 MIN: CPT | Performed by: PSYCHIATRY & NEUROLOGY

## 2025-04-23 RX ORDER — ASPIRIN AND DIPYRIDAMOLE 25; 200 MG/1; MG/1
1 CAPSULE, EXTENDED RELEASE ORAL 2 TIMES DAILY
Qty: 180 CAPSULE | Refills: 3 | Status: SHIPPED | OUTPATIENT
Start: 2025-04-23

## 2025-04-23 RX ORDER — ASPIRIN AND DIPYRIDAMOLE 25; 200 MG/1; MG/1
1 CAPSULE, EXTENDED RELEASE ORAL 2 TIMES DAILY
Qty: 180 CAPSULE | Refills: 3 | Status: SHIPPED | OUTPATIENT
Start: 2025-04-23 | End: 2025-04-23 | Stop reason: SDUPTHER

## 2025-04-23 NOTE — ASSESSMENT & PLAN NOTE
Orders:    Carbamazepine level, total; Future    Levetiracetam level; Future    Comprehensive metabolic panel; Future    CBC and differential; Future      Agus has a history of seizures and is currently on Tegretol 2 tablets 3 times a day and Keppra 750 mg twice a day.  Recent levels were stable.  Keppra level was 8.7 carbamazepine level was 10.7.  In the past carbamazepine levels have been high with higher doses and have been associated with hyponatremia..  has had no recent seizures no side effects and is otherwise doing well.  He is on Prolia for osteoporosis.    Agus discussed with me about potentially reducing the dose of Tegretol.  We discussed that he has had no seizures his Tegretol level and Keppra level are normal, he has no side effects, no evidence of hyponatremia.  Therefore I felt that at this point we will continue him on the same dose.  Will reorder the laboratory studies in several months.

## 2025-04-23 NOTE — PROGRESS NOTES
Name: Agus Espinoza      : 1960      MRN: [id number]  Encounter Provider: Norma Philip DO  Encounter Date: 2025   Encounter department: [facility] [place]  :  Assessment & Plan  Partial symptomatic epilepsy with complex partial seizures, not intractable, without status epilepticus (HCC)    Orders:    Carbamazepine level, total; Future    Levetiracetam level; Future    Comprehensive metabolic panel; Future    CBC and differential; Future      Agus has a history of seizures and is currently on Tegretol 2 tablets 3 times a day and Keppra 750 mg twice a day.  Recent levels were stable.  Keppra level was 8.7 carbamazepine level was 10.7.  In the past carbamazepine levels have been high with higher doses and have been associated with hyponatremia..  has had no recent seizures no side effects and is otherwise doing well.  He is on Prolia for osteoporosis.    Agus discussed with me about potentially reducing the dose of Tegretol.  We discussed that he has had no seizures his Tegretol level and Keppra level are normal, he has no side effects, no evidence of hyponatremia.  Therefore I felt that at this point we will continue him on the same dose.  Will reorder the laboratory studies in several months.  History of cerebrovascular accident (CVA) greater than eight weeks in the past    Orders:    aspirin-dipyridamole (AGGRENOX)  mg per 12 hr capsule; Take 1 capsule by mouth 2 (two) times a day      The patient does have a history of a prior CVA we will continue him on Aggrenox twice a day.      He is now followed by [facility] movement disorder center and is on Sinemet 3 times a day.  He does have an appointment with them next week when he will discuss alteration of his doses.        History of Present Illness      Today Agus presents for a neurological follow-up.  He was seen several months ago.  He follows up in our offices for seizures.  He is currently on  Keppra 750 twice a day and Tegretol 200 mg tablets 2 tablets 3 times a day.  He has had no seizures and no auras.  His recent levels were stable.  His sodium has been stable.  In the past he has had periods of hyponatremia due to higher doses of Tegretol.    After his last visit he was seen by [facility] neurology and given a diagnosis of Parkinson's disease.  He has been placed on Sinemet 25/103 times a day.  We discussed the time he does take this medications.  He wakens at 6:00 in the morning will take his morning dose of Sinemet and go back to sleep will awaken at 11.  His second dose will be around suppertime around 5 and his third dose will be approximately 9 PM.  He reports the Sinemet has been helpful but he is requesting alterations of his doses.  He denies any symptoms upon falling asleep.  He falls asleep approximately 11 PM..  He does have an appointment with movement disorder specialist next week.                           Review of Systems   Constitutional:  Negative for appetite change, fatigue and fever.   HENT: Negative.  Negative for hearing loss, tinnitus, trouble swallowing and voice change.    Eyes: Negative.  Negative for photophobia, pain and visual disturbance.   Respiratory: Negative.  Negative for shortness of breath.    Cardiovascular: Negative.  Negative for palpitations.   Gastrointestinal: Negative.  Negative for nausea and vomiting.   Endocrine: Negative.  Negative for cold intolerance.   Genitourinary: Negative.  Negative for dysuria, frequency and urgency.   Musculoskeletal:  Negative for back pain, gait problem, myalgias, neck pain and neck stiffness.   Skin: Negative.  Negative for rash.   Allergic/Immunologic: Negative.    Neurological:  Negative for dizziness, tremors, seizures, syncope, facial asymmetry, speech difficulty, weakness, light-headedness, numbness and headaches.   Hematological: Negative.  Does not bruise/bleed easily.   Psychiatric/Behavioral: Negative.   Negative for confusion, hallucinations and sleep disturbance.    All other systems reviewed and are negative.   I have personally reviewed the MA's review of systems and made changes as necessary.    Medical History Reviewed by provider this encounter:  Meds     .  Current Outpatient Medications on File Prior to Visit   Medication Sig Dispense Refill    Calcium Carb-Cholecalciferol (CALCIUM 500+D PO)       Calcium-Magnesium-Vitamin D (CALCIUM MAGNESIUM PO) Take by mouth      carBAMazepine (TEGretol) 200 mg tablet Take 2 tablets (400 mg total) by mouth 3 (three) times a day Take 2 tablets in the AM, 2 tablets at dinner and 2 tablets at bedtime - on 6 tablets daily 180 tablet 5    carbidopa-levodopa (SINEMET)  mg per tablet       co-enzyme Q-10 30 MG capsule Take 100 mg by mouth daily        Coenzyme Q10 (CoQ-10) 100 MG capsule       denosumab (Prolia) 60 mg/mL Inject 1 mL (60 mg total) under the skin every 6 (six) months for 2 doses 1 mL 1    docusate sodium (COLACE) 100 mg capsule Take 1 capsule (100 mg total) by mouth 2 (two) times a day  0    levETIRAcetam (KEPPRA) 750 mg tablet Take 1 tablet (750 mg total) by mouth every 12 (twelve) hours 60 tablet 5    LORazepam (ATIVAN) 0.5 mg tablet TAKE 1 TABLET BY MOUTH ONCE DAILY IF NEEDED for seizure.  Limit of 1 in 24 hours 10 tablet 0    NON FORMULARY Super Beets      ondansetron (ZOFRAN) 4 mg tablet Take 1 tablet (4 mg total) by mouth every 8 (eight) hours as needed for nausea or vomiting 20 tablet 0    polyethylene glycol (MIRALAX) 17 g packet Take 17 g by mouth daily Do not start before October 20, 2023.  0    rosuvastatin (CRESTOR) 10 MG tablet Take 1 tablet (10 mg total) by mouth daily 90 tablet 1    sucralfate (CARAFATE) 1 g tablet Take 1 tablet (1 g total) by mouth 4 (four) times a day 120 tablet 3    VALERIAN ROOT PO Use      Vitamin Mixture (VITAMIN E COMPLETE PO) Take 1 tablet by mouth daily       [DISCONTINUED] aspirin-dipyridamole (AGGRENOX)  mg  "per 12 hr capsule TAKE 1 CAPSULE BY MOUTH 2 (TWO) TIMES A  capsule 3    RABEprazole (ACIPHEX) 20 MG tablet Take 1 tablet (20 mg total) by mouth 2 (two) times a day 180 tablet 1     No current facility-administered medications on file prior to visit.      Social History     Tobacco Use    Smoking status: Never     Passive exposure: Never    Smokeless tobacco: Never   Vaping Use    Vaping status: Never Used   Substance and Sexual Activity    Alcohol use: Yes     Comment: occasional    Drug use: No    Sexual activity: Not Currently        Objective   Ht 5' 8\" (1.727 m)   BMI 25.39 kg/m²     Physical Exam  Eyes:      General: Lids are normal.      Extraocular Movements: Extraocular movements intact.      Pupils: Pupils are equal, round, and reactive to light.       Neurological Exam  Mental Status  Awake, alert and oriented to person, place and time. Oriented to person, place and time. Language is fluent with no aphasia.  There is no dysarthria..    Cranial Nerves  CN II: Visual acuity is normal. Visual fields full to confrontation.  CN III, IV, VI: Extraocular movements intact bilaterally. Normal lids and orbits bilaterally. Pupils equal round and reactive to light bilaterally.  CN V: Facial sensation is normal.  CN VII: Full and symmetric facial movement.  CN VIII: Hearing is normal.  CN IX, X: Palate elevates symmetrically. Normal gag reflex.  CN XI: Shoulder shrug strength is normal.  CN XII: Tongue midline without atrophy or fasciculations.    Motor    He had a tremor with extension .  No cogwheel rigidity was noted today.  Today he was evaluated at 1 PM he had taken his dose of Sinemet  at 6 AM.  He did report 8 mild tremor while holding onto items..    Sensory  Light touch is normal in upper and lower extremities. Temperature is normal in upper and lower extremities.     Coordination  Right: Heel-to-shin normal.Left: Heel-to-shin normal.    Gait   Able to rise from chair without using arms.  He had no " postural instability.  He had no evidence of a shuffling gait gait he did have an antalgic gait due to pain in the right knee..      He can return to office in 6 months.      I have spent a total time of 23 minutes in caring for this patient on the day of the visit/encounter including Counseling / Coordination of care, Documenting in the medical record, Reviewing/placing orders in the medical record (including tests, medications, and/or procedures), and Obtaining or reviewing history  .

## 2025-04-25 ENCOUNTER — TELEPHONE (OUTPATIENT)
Dept: GASTROENTEROLOGY | Facility: CLINIC | Age: 65
End: 2025-04-25

## 2025-04-25 ENCOUNTER — OFFICE VISIT (OUTPATIENT)
Dept: FAMILY MEDICINE CLINIC | Facility: CLINIC | Age: 65
End: 2025-04-25
Payer: MEDICARE

## 2025-04-25 VITALS
BODY MASS INDEX: 25.31 KG/M2 | HEART RATE: 86 BPM | OXYGEN SATURATION: 99 % | HEIGHT: 68 IN | RESPIRATION RATE: 18 BRPM | DIASTOLIC BLOOD PRESSURE: 78 MMHG | SYSTOLIC BLOOD PRESSURE: 126 MMHG | WEIGHT: 167 LBS

## 2025-04-25 DIAGNOSIS — Z00.00 MEDICARE ANNUAL WELLNESS VISIT, SUBSEQUENT: Primary | ICD-10-CM

## 2025-04-25 DIAGNOSIS — K29.01 ACUTE SUPERFICIAL GASTRITIS WITH HEMORRHAGE: ICD-10-CM

## 2025-04-25 DIAGNOSIS — Z12.5 SCREENING FOR PROSTATE CANCER: ICD-10-CM

## 2025-04-25 DIAGNOSIS — M81.0 OSTEOPOROSIS WITHOUT CURRENT PATHOLOGICAL FRACTURE, UNSPECIFIED OSTEOPOROSIS TYPE: ICD-10-CM

## 2025-04-25 DIAGNOSIS — K21.9 GERD (GASTROESOPHAGEAL REFLUX DISEASE): ICD-10-CM

## 2025-04-25 DIAGNOSIS — I10 ESSENTIAL HYPERTENSION: ICD-10-CM

## 2025-04-25 DIAGNOSIS — E78.2 MIXED HYPERLIPIDEMIA: ICD-10-CM

## 2025-04-25 PROCEDURE — G0439 PPPS, SUBSEQ VISIT: HCPCS | Performed by: FAMILY MEDICINE

## 2025-04-25 PROCEDURE — G2211 COMPLEX E/M VISIT ADD ON: HCPCS | Performed by: FAMILY MEDICINE

## 2025-04-25 PROCEDURE — 99214 OFFICE O/P EST MOD 30 MIN: CPT | Performed by: FAMILY MEDICINE

## 2025-04-25 RX ORDER — SUCRALFATE 1 G/1
1 TABLET ORAL 2 TIMES DAILY
Qty: 60 TABLET | Refills: 3 | Status: SHIPPED | OUTPATIENT
Start: 2025-04-25

## 2025-04-25 NOTE — ASSESSMENT & PLAN NOTE
Ldl goal , on Crestor  10  Orders:    Lipid panel; Future    Comprehensive metabolic panel; Future

## 2025-04-25 NOTE — PATIENT INSTRUCTIONS
Await  lab   Okay on meds, dexa due 9/25  Medicare Preventive Visit Patient Instructions  Thank you for completing your Welcome to Medicare Visit or Medicare Annual Wellness Visit today. Your next wellness visit will be due in one year (4/26/2026).  The screening/preventive services that you may require over the next 5-10 years are detailed below. Some tests may not apply to you based off risk factors and/or age. Screening tests ordered at today's visit but not completed yet may show as past due. Also, please note that scanned in results may not display below.  Preventive Screenings:  Service Recommendations Previous Testing/Comments   Colorectal Cancer Screening  Colonoscopy    Fecal Occult Blood Test (FOBT)/Fecal Immunochemical Test (FIT)  Fecal DNA/Cologuard Test  Flexible Sigmoidoscopy Age: 45-75 years old   Colonoscopy: every 10 years (May be performed more frequently if at higher risk)  OR  FOBT/FIT: every 1 year  OR  Cologuard: every 3 years  OR  Sigmoidoscopy: every 5 years  Screening may be recommended earlier than age 45 if at higher risk for colorectal cancer. Also, an individualized decision between you and your healthcare provider will decide whether screening between the ages of 76-85 would be appropriate. Colonoscopy: 04/21/2023  FOBT/FIT: 01/02/2025  Cologuard: Not on file  Sigmoidoscopy: Not on file    Screening Current     Prostate Cancer Screening Individualized decision between patient and health care provider in men between ages of 55-69   Medicare will cover every 12 months beginning on the day after your 50th birthday PSA: 0.55 ng/mL           Hepatitis C Screening Once for adults born between 1945 and 1965  More frequently in patients at high risk for Hepatitis C Hep C Antibody: 11/16/2019    Screening Current   Diabetes Screening 1-2 times per year if you're at risk for diabetes or have pre-diabetes Fasting glucose: 108 mg/dL (1/3/2025)  A1C: 5.0 % (10/16/2020)  Screening Current    Cholesterol Screening Once every 5 years if you don't have a lipid disorder. May order more often based on risk factors. Lipid panel: 03/29/2024  Screening Not Indicated  History Lipid Disorder      Other Preventive Screenings Covered by Medicare:  Abdominal Aortic Aneurysm (AAA) Screening: covered once if your at risk. You're considered to be at risk if you have a family history of AAA or a male between the age of 65-75 who smoking at least 100 cigarettes in your lifetime.  Lung Cancer Screening: covers low dose CT scan once per year if you meet all of the following conditions: (1) Age 55-77; (2) No signs or symptoms of lung cancer; (3) Current smoker or have quit smoking within the last 15 years; (4) You have a tobacco smoking history of at least 20 pack years (packs per day x number of years you smoked); (5) You get a written order from a healthcare provider.  Glaucoma Screening: covered annually if you're considered high risk: (1) You have diabetes OR (2) Family history of glaucoma OR (3)  aged 50 and older OR (4)  American aged 65 and older  Osteoporosis Screening: covered every 2 years if you meet one of the following conditions: (1) Have a vertebral abnormality; (2) On glucocorticoid therapy for more than 3 months; (3) Have primary hyperparathyroidism; (4) On osteoporosis medications and need to assess response to drug therapy.  HIV Screening: covered annually if you're between the age of 15-65. Also covered annually if you are younger than 15 and older than 65 with risk factors for HIV infection. For pregnant patients, it is covered up to 3 times per pregnancy.    Immunizations:  Immunization Recommendations   Influenza Vaccine Annual influenza vaccination during flu season is recommended for all persons aged >= 6 months who do not have contraindications   Pneumococcal Vaccine   * Pneumococcal conjugate vaccine = PCV13 (Prevnar 13), PCV15 (Vaxneuvance), PCV20 (Prevnar 20)  *  Pneumococcal polysaccharide vaccine = PPSV23 (Pneumovax) Adults 19-65 yo with certain risk factors or if 65+ yo  If never received any pneumonia vaccine: recommend Prevnar 20 (PCV20)  Give PCV20 if previously received 1 dose of PCV13 or PPSV23   Hepatitis B Vaccine 3 dose series if at intermediate or high risk (ex: diabetes, end stage renal disease, liver disease)   Respiratory syncytial virus (RSV) Vaccine - COVERED BY MEDICARE PART D  * RSVPreF3 (Arexvy) CDC recommends that adults 60 years of age and older may receive a single dose of RSV vaccine using shared clinical decision-making (SCDM)   Tetanus (Td) Vaccine - COST NOT COVERED BY MEDICARE PART B Following completion of primary series, a booster dose should be given every 10 years to maintain immunity against tetanus. Td may also be given as tetanus wound prophylaxis.   Tdap Vaccine - COST NOT COVERED BY MEDICARE PART B Recommended at least once for all adults. For pregnant patients, recommended with each pregnancy.   Shingles Vaccine (Shingrix) - COST NOT COVERED BY MEDICARE PART B  2 shot series recommended in those 19 years and older who have or will have weakened immune systems or those 50 years and older     Health Maintenance Due:      Topic Date Due    HIV Screening  Never done    Colorectal Cancer Screening  04/21/2026    Hepatitis C Screening  Completed     Immunizations Due:      Topic Date Due    Pneumococcal Vaccine: 65+ Years (1 of 1 - PCV) Never done    Influenza Vaccine (1) 09/01/2024    COVID-19 Vaccine (3 - 2024-25 season) 09/01/2024     Advance Directives   What are advance directives?  Advance directives are legal documents that state your wishes and plans for medical care. These plans are made ahead of time in case you lose your ability to make decisions for yourself. Advance directives can apply to any medical decision, such as the treatments you want, and if you want to donate organs.   What are the types of advance directives?  There are  many types of advance directives, and each state has rules about how to use them. You may choose a combination of any of the following:  Living will:  This is a written record of the treatment you want. You can also choose which treatments you do not want, which to limit, and which to stop at a certain time. This includes surgery, medicine, IV fluid, and tube feedings.   Durable power of  for healthcare (DPAHC):  This is a written record that states who you want to make healthcare choices for you when you are unable to make them for yourself. This person, called a proxy, is usually a family member or a friend. You may choose more than 1 proxy.  Do not resuscitate (DNR) order:  A DNR order is used in case your heart stops beating or you stop breathing. It is a request not to have certain forms of treatment, such as CPR. A DNR order may be included in other types of advance directives.  Medical directive:  This covers the care that you want if you are in a coma, near death, or unable to make decisions for yourself. You can list the treatments you want for each condition. Treatment may include pain medicine, surgery, blood transfusions, dialysis, IV or tube feedings, and a ventilator (breathing machine).  Values history:  This document has questions about your views, beliefs, and how you feel and think about life. This information can help others choose the care that you would choose.  Why are advance directives important?  An advance directive helps you control your care. Although spoken wishes may be used, it is better to have your wishes written down. Spoken wishes can be misunderstood, or not followed. Treatments may be given even if you do not want them. An advance directive may make it easier for your family to make difficult choices about your care.   Weight Management   Why it is important to manage your weight:  Being overweight increases your risk of health conditions such as heart disease, high blood  pressure, type 2 diabetes, and certain types of cancer. It can also increase your risk for osteoarthritis, sleep apnea, and other respiratory problems. Aim for a slow, steady weight loss. Even a small amount of weight loss can lower your risk of health problems.  How to lose weight safely:  A safe and healthy way to lose weight is to eat fewer calories and get regular exercise. You can lose up about 1 pound a week by decreasing the number of calories you eat by 500 calories each day.   Healthy meal plan for weight management:  A healthy meal plan includes a variety of foods, contains fewer calories, and helps you stay healthy. A healthy meal plan includes the following:  Eat whole-grain foods more often.  A healthy meal plan should contain fiber. Fiber is the part of grains, fruits, and vegetables that is not broken down by your body. Whole-grain foods are healthy and provide extra fiber in your diet. Some examples of whole-grain foods are whole-wheat breads and pastas, oatmeal, brown rice, and bulgur.  Eat a variety of vegetables every day.  Include dark, leafy greens such as spinach, kale, bianca greens, and mustard greens. Eat yellow and orange vegetables such as carrots, sweet potatoes, and winter squash.   Eat a variety of fruits every day.  Choose fresh or canned fruit (canned in its own juice or light syrup) instead of juice. Fruit juice has very little or no fiber.  Eat low-fat dairy foods.  Drink fat-free (skim) milk or 1% milk. Eat fat-free yogurt and low-fat cottage cheese. Try low-fat cheeses such as mozzarella and other reduced-fat cheeses.  Choose meat and other protein foods that are low in fat.  Choose beans or other legumes such as split peas or lentils. Choose fish, skinless poultry (chicken or turkey), or lean cuts of red meat (beef or pork). Before you cook meat or poultry, cut off any visible fat.   Use less fat and oil.  Try baking foods instead of frying them. Add less fat, such as margarine,  sour cream, regular salad dressing and mayonnaise to foods. Eat fewer high-fat foods. Some examples of high-fat foods include french fries, doughnuts, ice cream, and cakes.  Eat fewer sweets.  Limit foods and drinks that are high in sugar. This includes candy, cookies, regular soda, and sweetened drinks.  Exercise:  Exercise at least 30 minutes per day on most days of the week. Some examples of exercise include walking, biking, dancing, and swimming. You can also fit in more physical activity by taking the stairs instead of the elevator or parking farther away from stores. Ask your healthcare provider about the best exercise plan for you.      © Copyright [employer] 2018 Information is for End User's use only and may not be sold, redistributed or otherwise used for commercial purposes. All illustrations and images included in CareNotes® are the copyrighted property of [employer] Inc[employer] or [employer]

## 2025-04-25 NOTE — ASSESSMENT & PLAN NOTE
Needs egd repeat  to assess  healing, can go down 1  a  day of aciphex and see what testing shows,  prefer  not  to have  pt  on long term PPI due to osteoporosis

## 2025-04-25 NOTE — PROGRESS NOTES
Name: Agus Espinoza      : 1960      MRN: [id number]  Encounter Provider: Franchesca Chandra MD  Encounter Date: 2025   Encounter department: [facility]  :  Assessment & Plan  Medicare annual wellness visit, subsequent  Rec shingles shot at pharmacy       Acute superficial gastritis with hemorrhage  Needs egd repeat  to assess  healing, can go down 1  a  day of aciphex and see what testing shows,  prefer  not  to have  pt  on long term PPI due to osteoporosis         Osteoporosis without current pathological fracture, unspecified osteoporosis type  Needs egd repeat  to assess  healing, can go down 1  a  day of aciphex and see what testing shows,  prefer  not  to have  pt  on long term PPI due to osteoporosis,dexa due  end of , on Prolia  Orders:    DXA bone density spine hip and pelvis; Future    Mixed hyperlipidemia  Ldl goal , on Crestor  10  Orders:    Lipid panel; Future    Comprehensive metabolic panel; Future    Screening for prostate cancer    Orders:    PSA, Total Screen; Future    Essential hypertension  Bp stable            Preventive health issues were discussed with patient, and age appropriate screening tests were ordered as noted in patient's After Visit Summary. Personalized health advice and appropriate referrals for health education or preventive services given if needed, as noted in patient's After Visit Summary.    History of Present Illness     Patient presents with:  Medicare Wellness Visit   Stomach issues  better, wants to  cut  down on aciphex, given taper  instructions, landers s need  f/u egd  due  to esophagitis,  staying away  from NSAIDs  for  most  part, only  tasking Tylenol, r  knee  bothering  him, has  seen ortho       Patient Care Team:  Franchesca Chandra MD as PCP - General (Family Medicine)    Review of Systems   Constitutional:  Negative for activity change, appetite change and fatigue.   Respiratory:  Negative for shortness of breath.     Cardiovascular:  Negative for chest pain.   Musculoskeletal:  Positive for arthralgias and back pain.        R knee pain   Neurological:  Negative for dizziness and headaches.     Medical History Reviewed by provider this encounter:  Meds  Problems       Annual Wellness Visit Questionnaire   Agus is here for his Subsequent Wellness visit.     Health Risk Assessment:   Patient rates overall health as good. Patient feels that their physical health rating is much better. Patient is very satisfied with their life. Eyesight was rated as same. Hearing was rated as same. Patient feels that their emotional and mental health rating is much better. Patients states they are never, rarely angry. Patient states they are often unusually tired/fatigued. Pain experienced in the last 7 days has been some. Patient's pain rating has been 3/10. Patient states that he has experienced no weight loss or gain in last 6 months. R knee and back pain    Depression Screening:   PHQ-2 Score: 0      Fall Risk Screening:   In the past year, patient has experienced: no history of falling in past year      Home Safety:  Patient does not have trouble with stairs inside or outside of their home. Patient has working smoke alarms and has working carbon monoxide detector. Home safety hazards include: none.     Nutrition:   Current diet is Regular.     Medications:   Patient is currently taking over-the-counter supplements. OTC medications include: see medication list. Patient is able to manage medications.     Activities of Daily Living (ADLs)/Instrumental Activities of Daily Living (IADLs):   Walk and transfer into and out of bed and chair?: Yes  Dress and groom yourself?: Yes    Bathe or shower yourself?: Yes    Feed yourself? Yes  Do your laundry/housekeeping?: Yes  Manage your money, pay your bills and track your expenses?: Yes  Make your own meals?: Yes    Do your own shopping?: Yes    Previous Hospitalizations:   Any hospitalizations or ED  visits within the last 12 months?: Yes    How many hospitalizations have you had in the last year?: 1-2    Advance Care Planning:   Living will: Yes    Advanced directive: Yes      Comments: Brother Matteo    Cognitive Screening:   Provider or family/friend/caregiver concerned regarding cognition?: No    Preventive Screenings      Cardiovascular Screening:    General: History Lipid Disorder and Screening Current      Diabetes Screening:     General: Screening Current      Colorectal Cancer Screening:     General: Screening Current      Prostate Cancer Screening:    General: Risks and Benefits Discussed      Osteoporosis Screening:    General: History Osteoporosis and Risks and Benefits Discussed    Due for: DXA Axial      Abdominal Aortic Aneurysm (AAA) Screening:    Risk factors include: age between 65-76 yo        Lung Cancer Screening:     General: Screening Not Indicated      Hepatitis C Screening:    General: Screening Current    Immunizations:  - Immunizations due: Influenza, Prevnar 20 and Zoster (Shingrix)    Screening, Brief Intervention, and Referral to Treatment (SBIRT)     Screening  Typical number of drinks in a day: 0  Typical number of drinks in a week: 0  Interpretation: Low risk drinking behavior.    Single Item Drug Screening:  How often have you used an illegal drug (including marijuana) or a prescription medication for non-medical reasons in the past year? never    Single Item Drug Screen Score: 0  Interpretation: Negative screen for possible drug use disorder    Social Drivers of Health     Food Insecurity: No Food Insecurity (4/25/2025)    Hunger Vital Sign     Worried About Running Out of Food in the Last Year: Never true     Ran Out of Food in the Last Year: Never true   Transportation Needs: No Transportation Needs (4/25/2025)    PRAPARE - Transportation     Lack of Transportation (Medical): No     Lack of Transportation (Non-Medical): No   Housing Stability: Low Risk  (4/25/2025)    Housing  "Stability Vital Sign     Unable to Pay for Housing in the Last Year: No     Number of Times Moved in the Last Year: 0     Homeless in the Last Year: No   Utilities: Not At Risk (4/25/2025)    [facility] Utilities     Threatened with loss of utilities: No     No results found.    Objective   /78 (BP Location: Right arm, Patient Position: Sitting, Cuff Size: Standard)   Pulse 86   Resp 18   Ht 5' 8\" (1.727 m)   Wt 75.8 kg (167 lb)   SpO2 99%   BMI 25.39 kg/m²     Physical Exam  Vitals reviewed.   Constitutional:       Appearance: Normal appearance.   Neck:      Vascular: No carotid bruit.   Cardiovascular:      Rate and Rhythm: Normal rate and regular rhythm.      Pulses: Normal pulses.      Heart sounds: Normal heart sounds.   Pulmonary:      Effort: Pulmonary effort is normal.      Breath sounds: Normal breath sounds.   Musculoskeletal:      Right lower leg: No edema.      Left lower leg: No edema.   Lymphadenopathy:      Cervical: No cervical adenopathy.   Neurological:      Mental Status: He is alert.   Psychiatric:         Mood and Affect: Mood normal.         "

## 2025-04-25 NOTE — TELEPHONE ENCOUNTER
The patient walked into the office today inquiring if he is able to adjust his dosing of sucralfate tablets. He wants to know if he is able to go from 4 pills daily to taking 2 pills daily. He asked that someone from the clinical team/ provider call him regarding this. Last seen back in January 2025 jose Sanchez. Thank you all!!!!

## 2025-04-25 NOTE — ASSESSMENT & PLAN NOTE
Needs egd repeat  to assess  healing, can go down 1  a  day of aciphex and see what testing shows,  prefer  not  to have  pt  on long term PPI due to osteoporosis,dexa due  end of 9/25, on Prolia  Orders:    DXA bone density spine hip and pelvis; Future

## 2025-04-26 ENCOUNTER — APPOINTMENT (OUTPATIENT)
Dept: LAB | Facility: CLINIC | Age: 65
End: 2025-04-26
Payer: MEDICARE

## 2025-04-26 DIAGNOSIS — G40.209 PARTIAL SYMPTOMATIC EPILEPSY WITH COMPLEX PARTIAL SEIZURES, NOT INTRACTABLE, WITHOUT STATUS EPILEPTICUS (HCC): ICD-10-CM

## 2025-04-26 LAB
ALBUMIN SERPL BCG-MCNC: 4.5 G/DL (ref 3.5–5)
ALP SERPL-CCNC: 46 U/L (ref 34–104)
ALT SERPL W P-5'-P-CCNC: 12 U/L (ref 7–52)
ANION GAP SERPL CALCULATED.3IONS-SCNC: 8 MMOL/L (ref 4–13)
AST SERPL W P-5'-P-CCNC: 20 U/L (ref 13–39)
BASOPHILS # BLD AUTO: 0.03 THOUSANDS/ÂΜL (ref 0–0.1)
BASOPHILS NFR BLD AUTO: 1 % (ref 0–1)
BILIRUB SERPL-MCNC: 0.33 MG/DL (ref 0.2–1)
BUN SERPL-MCNC: 14 MG/DL (ref 5–25)
CALCIUM SERPL-MCNC: 9.4 MG/DL (ref 8.4–10.2)
CARBAMAZEPINE SERPL-MCNC: 11.5 UG/ML (ref 4–12)
CHLORIDE SERPL-SCNC: 104 MMOL/L (ref 96–108)
CO2 SERPL-SCNC: 27 MMOL/L (ref 21–32)
CREAT SERPL-MCNC: 0.74 MG/DL (ref 0.6–1.3)
EOSINOPHIL # BLD AUTO: 0.12 THOUSAND/ÂΜL (ref 0–0.61)
EOSINOPHIL NFR BLD AUTO: 2 % (ref 0–6)
ERYTHROCYTE [DISTWIDTH] IN BLOOD BY AUTOMATED COUNT: 14 % (ref 11.6–15.1)
GFR SERPL CREATININE-BSD FRML MDRD: 96 ML/MIN/1.73SQ M
GLUCOSE SERPL-MCNC: 88 MG/DL (ref 65–140)
HCT VFR BLD AUTO: 41.7 % (ref 36.5–49.3)
HGB BLD-MCNC: 13.3 G/DL (ref 12–17)
IMM GRANULOCYTES # BLD AUTO: 0.02 THOUSAND/UL (ref 0–0.2)
IMM GRANULOCYTES NFR BLD AUTO: 0 % (ref 0–2)
LEVETIRACETAM SERPL-MCNC: 14.3 UG/ML (ref 12–46)
LYMPHOCYTES # BLD AUTO: 2.04 THOUSANDS/ÂΜL (ref 0.6–4.47)
LYMPHOCYTES NFR BLD AUTO: 33 % (ref 14–44)
MCH RBC QN AUTO: 28.6 PG (ref 26.8–34.3)
MCHC RBC AUTO-ENTMCNC: 31.9 G/DL (ref 31.4–37.4)
MCV RBC AUTO: 90 FL (ref 82–98)
MONOCYTES # BLD AUTO: 0.51 THOUSAND/ÂΜL (ref 0.17–1.22)
MONOCYTES NFR BLD AUTO: 8 % (ref 4–12)
NEUTROPHILS # BLD AUTO: 3.45 THOUSANDS/ÂΜL (ref 1.85–7.62)
NEUTS SEG NFR BLD AUTO: 56 % (ref 43–75)
NRBC BLD AUTO-RTO: 0 /100 WBCS
PLATELET # BLD AUTO: 237 THOUSANDS/UL (ref 149–390)
PMV BLD AUTO: 9.2 FL (ref 8.9–12.7)
POTASSIUM SERPL-SCNC: 4.5 MMOL/L (ref 3.5–5.3)
PROT SERPL-MCNC: 7.1 G/DL (ref 6.4–8.4)
RBC # BLD AUTO: 4.65 MILLION/UL (ref 3.88–5.62)
SODIUM SERPL-SCNC: 139 MMOL/L (ref 135–147)
WBC # BLD AUTO: 6.17 THOUSAND/UL (ref 4.31–10.16)

## 2025-04-26 PROCEDURE — 85025 COMPLETE CBC W/AUTO DIFF WBC: CPT

## 2025-04-26 PROCEDURE — 80053 COMPREHEN METABOLIC PANEL: CPT

## 2025-04-26 PROCEDURE — 80156 ASSAY CARBAMAZEPINE TOTAL: CPT

## 2025-04-26 PROCEDURE — 83520 IMMUNOASSAY QUANT NOS NONAB: CPT

## 2025-05-13 DIAGNOSIS — E78.2 MIXED HYPERLIPIDEMIA: ICD-10-CM

## 2025-05-13 RX ORDER — ROSUVASTATIN CALCIUM 10 MG/1
10 TABLET, COATED ORAL DAILY
Qty: 30 TABLET | Refills: 0 | Status: SHIPPED | OUTPATIENT
Start: 2025-05-13

## 2025-05-13 NOTE — TELEPHONE ENCOUNTER
Reason for call:   [x] Refill   [] Prior Auth  [] Other:     Office:   [x] PCP/Provider -  Franchesca Chandra MD   [] Specialty/Provider -     Medication: rosuvastatin (CRESTOR) 10 MG tablet     Dose/Frequency: Take 1 tablet (10 mg total) by mouth daily     Quantity: 90    Pharmacy: [facility] - [place], [place] - [address] [phone]     Local Pharmacy   Does the patient have enough for 3 days?   [x] Yes   [] No - Send as HP to POD    Mail Away Pharmacy   Does the patient have enough for 10 days?   [] Yes   [] No - Send as HP to POD

## 2025-06-18 ENCOUNTER — OFFICE VISIT (OUTPATIENT)
Dept: GASTROENTEROLOGY | Facility: CLINIC | Age: 65
End: 2025-06-18
Payer: MEDICARE

## 2025-06-18 VITALS
WEIGHT: 168 LBS | HEIGHT: 68 IN | HEART RATE: 80 BPM | BODY MASS INDEX: 25.46 KG/M2 | DIASTOLIC BLOOD PRESSURE: 76 MMHG | SYSTOLIC BLOOD PRESSURE: 122 MMHG

## 2025-06-18 DIAGNOSIS — Z86.0100 HISTORY OF COLON POLYPS: ICD-10-CM

## 2025-06-18 DIAGNOSIS — K21.01 GASTROESOPHAGEAL REFLUX DISEASE WITH ESOPHAGITIS AND HEMORRHAGE: Primary | ICD-10-CM

## 2025-06-18 PROCEDURE — 99213 OFFICE O/P EST LOW 20 MIN: CPT | Performed by: PHYSICIAN ASSISTANT

## 2025-06-18 PROCEDURE — G2211 COMPLEX E/M VISIT ADD ON: HCPCS | Performed by: PHYSICIAN ASSISTANT

## 2025-06-18 NOTE — ASSESSMENT & PLAN NOTE
Patient was admitted earlier this year from 1/1 to 1/3 for melena. Our team saw him during his admission and he underwent an EGD that noted 3 cm hiatal hernia and grade D esophagitis; gastroesophageal biopsy noted severe esophagitis.  Patient has been asked to repeat his EGD after being on PPI twice daily and Carafate 4 times daily however he has deferred this.  Patient called our office recently and asked to switch from Aciphex twice daily to once a day, and also asked to decrease his Carafate from 4 times daily to twice daily.  Today: Patient says he is doing well and has not had any further symptoms.  He said he would like to stop taking the Carafate completely and would like to remain on Aciphex once a day.  He again notes he would like to hold off on repeat EGD.  -I did explain the importance of a repeat EGD due to his severe esophagitis that was noted however he again defers; he says he would like to avoid any procedures unless he has symptoms.  I explained to him that if he is going to continue to defer repeat EGD then at the very least I would like for him to remain on PPI, which he is in agreement to but would like to just stay on Aciphex once a day  -Continue Aciphex 20 mg daily  -pt would like to follow-up as needed

## 2025-06-20 ENCOUNTER — TELEPHONE (OUTPATIENT)
Age: 65
End: 2025-06-20

## 2025-06-20 NOTE — TELEPHONE ENCOUNTER
Patient called, he stated he had a biopsy done several months ago 2/10/2025 and he wants to know if he has cancer.  The doctor that did the biopsy is Dr. David Butler, general surgeon, [facility].  The patient tried calling Dr. Butler's office today, but they are closed.    Recommended patient reach out to Dr. Butler's office on Monday to discuss the results .   Clinical support received and advised patient can also review results on my chart under test results. Relayed information to the patient. He thanked us for our help.  No call back needed.

## 2025-06-23 NOTE — TELEPHONE ENCOUNTER
PT called Gen Surgery- relayed the information to him and informed him that the message is on his MyChart

## 2025-06-30 DIAGNOSIS — E78.2 MIXED HYPERLIPIDEMIA: ICD-10-CM

## 2025-07-01 RX ORDER — ROSUVASTATIN CALCIUM 10 MG/1
10 TABLET, COATED ORAL DAILY
Qty: 30 TABLET | Refills: 0 | Status: SHIPPED | OUTPATIENT
Start: 2025-07-01

## 2025-07-05 ENCOUNTER — APPOINTMENT (OUTPATIENT)
Dept: LAB | Facility: CLINIC | Age: 65
End: 2025-07-05
Attending: FAMILY MEDICINE
Payer: MEDICARE

## 2025-07-05 DIAGNOSIS — Z12.5 SCREENING FOR PROSTATE CANCER: ICD-10-CM

## 2025-07-05 DIAGNOSIS — E78.2 MIXED HYPERLIPIDEMIA: ICD-10-CM

## 2025-07-05 LAB
ALBUMIN SERPL BCG-MCNC: 4.6 G/DL (ref 3.5–5)
ALP SERPL-CCNC: 46 U/L (ref 34–104)
ALT SERPL W P-5'-P-CCNC: 7 U/L (ref 7–52)
ANION GAP SERPL CALCULATED.3IONS-SCNC: 9 MMOL/L (ref 4–13)
AST SERPL W P-5'-P-CCNC: 21 U/L (ref 13–39)
BILIRUB SERPL-MCNC: 0.43 MG/DL (ref 0.2–1)
BUN SERPL-MCNC: 15 MG/DL (ref 5–25)
CALCIUM SERPL-MCNC: 8.7 MG/DL (ref 8.4–10.2)
CHLORIDE SERPL-SCNC: 105 MMOL/L (ref 96–108)
CHOLEST SERPL-MCNC: 172 MG/DL (ref ?–200)
CO2 SERPL-SCNC: 26 MMOL/L (ref 21–32)
CREAT SERPL-MCNC: 0.71 MG/DL (ref 0.6–1.3)
GFR SERPL CREATININE-BSD FRML MDRD: 98 ML/MIN/1.73SQ M
GLUCOSE P FAST SERPL-MCNC: 91 MG/DL (ref 65–99)
HDLC SERPL-MCNC: 92 MG/DL
LDLC SERPL CALC-MCNC: 62 MG/DL (ref 0–100)
NONHDLC SERPL-MCNC: 80 MG/DL
POTASSIUM SERPL-SCNC: 4 MMOL/L (ref 3.5–5.3)
PROT SERPL-MCNC: 7.2 G/DL (ref 6.4–8.4)
PSA SERPL-MCNC: 0.77 NG/ML (ref 0–4)
SODIUM SERPL-SCNC: 140 MMOL/L (ref 135–147)
TRIGL SERPL-MCNC: 88 MG/DL (ref ?–150)

## 2025-07-05 PROCEDURE — 80053 COMPREHEN METABOLIC PANEL: CPT

## 2025-07-05 PROCEDURE — G0103 PSA SCREENING: HCPCS

## 2025-07-05 PROCEDURE — 36415 COLL VENOUS BLD VENIPUNCTURE: CPT

## 2025-07-15 VITALS — HEIGHT: 68 IN | WEIGHT: 170 LBS | BODY MASS INDEX: 25.76 KG/M2

## 2025-07-15 DIAGNOSIS — M17.11 PRIMARY OSTEOARTHRITIS OF RIGHT KNEE: ICD-10-CM

## 2025-07-15 DIAGNOSIS — G89.29 CHRONIC PAIN OF RIGHT KNEE: Primary | ICD-10-CM

## 2025-07-15 DIAGNOSIS — M25.561 CHRONIC PAIN OF RIGHT KNEE: Primary | ICD-10-CM

## 2025-07-15 PROCEDURE — 99213 OFFICE O/P EST LOW 20 MIN: CPT | Performed by: ORTHOPAEDIC SURGERY

## 2025-07-15 PROCEDURE — 20610 DRAIN/INJ JOINT/BURSA W/O US: CPT | Performed by: ORTHOPAEDIC SURGERY

## 2025-07-15 RX ORDER — BETAMETHASONE SODIUM PHOSPHATE AND BETAMETHASONE ACETATE 3; 3 MG/ML; MG/ML
12 INJECTION, SUSPENSION INTRA-ARTICULAR; INTRALESIONAL; INTRAMUSCULAR; SOFT TISSUE
Status: COMPLETED | OUTPATIENT
Start: 2025-07-15 | End: 2025-07-15

## 2025-07-15 RX ORDER — LIDOCAINE HYDROCHLORIDE 10 MG/ML
4 INJECTION, SOLUTION INFILTRATION; PERINEURAL
Status: COMPLETED | OUTPATIENT
Start: 2025-07-15 | End: 2025-07-15

## 2025-07-15 RX ADMIN — BETAMETHASONE SODIUM PHOSPHATE AND BETAMETHASONE ACETATE 12 MG: 3; 3 INJECTION, SUSPENSION INTRA-ARTICULAR; INTRALESIONAL; INTRAMUSCULAR; SOFT TISSUE at 09:45

## 2025-07-15 RX ADMIN — LIDOCAINE HYDROCHLORIDE 4 ML: 10 INJECTION, SOLUTION INFILTRATION; PERINEURAL at 09:45

## 2025-07-18 ENCOUNTER — TELEPHONE (OUTPATIENT)
Age: 65
End: 2025-07-18

## 2025-07-18 NOTE — TELEPHONE ENCOUNTER
Called pt to reschedule appt for prolia injection  to agust 14 pt stated we was morena call back because he was not able to writing down the appt . Fyi  if pt call back pls reschedule appt for the 14 agust

## 2025-08-01 ENCOUNTER — TELEPHONE (OUTPATIENT)
Age: 65
End: 2025-08-01

## 2025-08-01 DIAGNOSIS — E78.2 MIXED HYPERLIPIDEMIA: ICD-10-CM

## 2025-08-01 DIAGNOSIS — I69.319 COGNITIVE DEFICIT AS LATE EFFECT OF CEREBROVASCULAR ACCIDENT (CVA): ICD-10-CM

## 2025-08-01 RX ORDER — CARBAMAZEPINE 200 MG/1
400 TABLET ORAL 3 TIMES DAILY
Qty: 180 TABLET | Refills: 2 | Status: SHIPPED | OUTPATIENT
Start: 2025-08-01

## 2025-08-04 RX ORDER — ROSUVASTATIN CALCIUM 10 MG/1
10 TABLET, COATED ORAL DAILY
Qty: 30 TABLET | Refills: 5 | Status: SHIPPED | OUTPATIENT
Start: 2025-08-04

## (undated) DEVICE — SUT VICRYL PLUS 0 CTB-1 27 IN VCPB260H

## (undated) DEVICE — SUT MONOCRYL 2-0 CT-1 27 IN Y339H

## (undated) DEVICE — DRILL BIT/ CALIBRATED/ Ø4.2MM EXTRA-LONG

## (undated) DEVICE — SUT MONOCRYL 3-0 SH 27 IN Y316H

## (undated) DEVICE — GLOVE SRG BIOGEL 7.5

## (undated) DEVICE — PROXIMATE PLUS MD MULTI-DIRECTIONAL RELEASE SKIN STAPLERS CONTAINS 35 STAINLESS STEEL STAPLES APPROXIMATE CLOSED DIMENSIONS: 6.9MM X 3.9MM WIDE: Brand: PROXIMATE

## (undated) DEVICE — STERILE ORIF HIP PACK: Brand: CARDINAL HEALTH

## (undated) DEVICE — DRESSING MEPILEX AG BORDER 4 X 4 IN

## (undated) DEVICE — 2.5MM REAMING ROD WITH BALL TIP/950MM-STERILE

## (undated) DEVICE — PAD GROUNDING ADULT

## (undated) DEVICE — ARTHROSCOPY FLOOR MAT

## (undated) DEVICE — CHLORAPREP HI-LITE 26ML ORANGE

## (undated) DEVICE — 3.2MM GUIDE WIRE 400MM

## (undated) DEVICE — 6617 IOBAN II PATIENT ISOLATION DRAPE 5/BX,4BX/CS: Brand: STERI-DRAPE™ IOBAN™ 2

## (undated) DEVICE — DRAPE C-ARMOUR

## (undated) DEVICE — DISPOSABLE EQUIPMENT COVER: Brand: SMALL TOWEL DRAPE

## (undated) DEVICE — INTENDED FOR TISSUE SEPARATION, AND OTHER PROCEDURES THAT REQUIRE A SHARP SURGICAL BLADE TO PUNCTURE OR CUT.: Brand: BARD-PARKER SAFETY BLADES SIZE 10, STERILE

## (undated) DEVICE — 3M™ STERI-DRAPE™ U-DRAPE 1015: Brand: STERI-DRAPE™

## (undated) DEVICE — DRAPE C-ARM X-RAY

## (undated) DEVICE — GLOVE INDICATOR PI UNDERGLOVE SZ 8 BLUE